# Patient Record
Sex: FEMALE | Race: WHITE | Employment: FULL TIME | URBAN - METROPOLITAN AREA
[De-identification: names, ages, dates, MRNs, and addresses within clinical notes are randomized per-mention and may not be internally consistent; named-entity substitution may affect disease eponyms.]

---

## 2017-02-01 ENCOUNTER — OFFICE VISIT (OUTPATIENT)
Dept: FAMILY MEDICINE CLINIC | Age: 47
End: 2017-02-01

## 2017-02-01 VITALS
DIASTOLIC BLOOD PRESSURE: 89 MMHG | TEMPERATURE: 99 F | WEIGHT: 293 LBS | RESPIRATION RATE: 26 BRPM | OXYGEN SATURATION: 98 % | SYSTOLIC BLOOD PRESSURE: 145 MMHG | HEART RATE: 69 BPM | BODY MASS INDEX: 48.82 KG/M2 | HEIGHT: 65 IN

## 2017-02-01 DIAGNOSIS — R05.9 COUGH: ICD-10-CM

## 2017-02-01 DIAGNOSIS — Z23 ENCOUNTER FOR IMMUNIZATION: ICD-10-CM

## 2017-02-01 DIAGNOSIS — J06.9 UPPER RESPIRATORY TRACT INFECTION, UNSPECIFIED TYPE: Primary | ICD-10-CM

## 2017-02-01 RX ORDER — LORAZEPAM 1 MG/1
1 TABLET ORAL
Qty: 30 TAB | Refills: 0 | Status: SHIPPED | OUTPATIENT
Start: 2017-02-01 | End: 2017-05-18 | Stop reason: SDUPTHER

## 2017-02-01 RX ORDER — BENZONATATE 200 MG/1
200 CAPSULE ORAL
Qty: 21 CAP | Refills: 1 | Status: SHIPPED | OUTPATIENT
Start: 2017-02-01 | End: 2017-02-08

## 2017-02-01 RX ORDER — AZITHROMYCIN 250 MG/1
TABLET, FILM COATED ORAL
Qty: 6 TAB | Refills: 1 | Status: SHIPPED | OUTPATIENT
Start: 2017-02-01 | End: 2017-02-06

## 2017-02-01 NOTE — PROGRESS NOTES
Cleveland Santiago is a 48 y.o. female      Issues discussed today include:        Signs and symptoms:  Cough producing green sputum  Duration: one week  Context:  +exposures  Location:  Head and chest  Quality:  congestion  Severity:  Preventing rest  Timing:  now  Modifying factors:  No fevers      Data reviewed or ordered today:  Needs flu vaccine    Other problems include:  Patient Active Problem List   Diagnosis Code    Anxiety F41.9    Panic attacks F41.0    Morbid obesity ([facility] [address] E66.01    HTN (hypertension), benign I10    Dysmenorrhea N94.6    H/O: hysterectomy Z90.710    Smoker F17.200    MAK (nonalcoholic steatohepatitis) K75.81       Medications:  Current Outpatient Prescriptions   Medication Sig Dispense Refill    azithromycin (ZITHROMAX) 250 mg tablet Take 2 tablets today, then take 1 tablet daily 6 Tab 1    benzonatate (TESSALON) 200 mg capsule Take 1 Cap by mouth three (3) times daily as needed for Cough for up to 7 days. 21 Cap 1    losartan (COZAAR) 25 mg tablet TAKE 1 TABLET BY MOUTH DAILY 90 Tab 1    LORazepam (ATIVAN) 1 mg tablet Take 1 Tab by mouth every six (6) hours as needed for Anxiety (Panic Attack). Max Daily Amount: 4 mg. 30 Tab 0    PARoxetine (PAXIL) 40 mg tablet Take 1 Tab by mouth daily. 90 Tab 1       Allergies: Allergies   Allergen Reactions    Pcn [Penicillins] Anaphylaxis and Rash    Amlodipine Other (comments)     Fatigue/drowsy       LMP:  Patient's last menstrual period was 06/10/2013. Social History     Social History    Marital status:      Spouse name: N/A    Number of children: N/A    Years of education: N/A     Occupational History    Not on file.      Social History Main Topics    Smoking status: Current Some Day Smoker     Packs/day: 0.50     Years: 28.00     Types: Cigarettes    Smokeless tobacco: Never Used    Alcohol use Yes      Comment: rarely    Drug use: No    Sexual activity: Yes     Partners: Male     Birth control/ protection: None     Other Topics Concern    Not on file     Social History Narrative         Family History   Problem Relation Age of Onset    Heart Disease Mother      heart bypass    Heart Attack Mother     Diabetes Brother     Stroke Maternal Grandmother     Malignant Hyperthermia Neg Hx     Pseudocholinesterase Deficiency Neg Hx     Delayed Awakening Neg Hx     Post-op Nausea/Vomiting Neg Hx     Emergence Delirium Neg Hx     Post-op Cognitive Dysfunction Neg Hx     Other Neg Hx          Meaningful use:  done      ROS:  Headaches:  no  Chest Pain:  no  SOB:  no  Fevers:  no  Other significant ROS:  +facial pressure    Patient's last menstrual period was 06/10/2013. Physical Exam  Visit Vitals    /89    Pulse 69    Temp 99 °F (37.2 °C) (Oral)    Resp 26    Ht 5' 5\" (1.651 m)    Wt 302 lb (137 kg)    LMP 06/10/2013    SpO2 98%    BMI 50.26 kg/m2     BP Readings from Last 3 Encounters:   02/01/17 145/89   06/09/16 (!) 155/91   05/16/16 120/58     Constitutional:  Appears well,  No Acute Distress, Vitals noted  Psychiatric:   Affect normal, Alert and cooperative, Oriented to person/place/time    Eyes:   Pupils equally round and reactive, EOMI, conjunctiva clear, eyelids normal  ENT:   External ears and nose normal/lips, teeth=OK/gums normal, TMs and Orophyarynx normal  Neck:   general inspection and Thyroid normal.  No abnormal cervical or supraclavicular nodes    Lungs:   clear to auscultation, good respiratory effort  Heart: Ausculation normal.  Regular rhythm. No cardiac murmurs. o carotid bruits or palpable thrills          Assessment:    Patient Active Problem List   Diagnosis Code    Anxiety F41.9    Panic attacks F41.0    Morbid obesity ([facility] [address] E66.01    HTN (hypertension), benign I10    Dysmenorrhea N94.6    H/O: hysterectomy Z90.710    Smoker F17.200    MAK (nonalcoholic steatohepatitis) K75.81       Today's diagnoses are:    ICD-10-CM ICD-9-CM    1.  Upper respiratory tract infection, unspecified type J06.9 465.9 azithromycin (ZITHROMAX) 250 mg tablet   2. Cough R05 786.2 benzonatate (TESSALON) 200 mg capsule       Plan:  Orders Placed This Encounter    azithromycin (ZITHROMAX) 250 mg tablet     Sig: Take 2 tablets today, then take 1 tablet daily     Dispense:  6 Tab     Refill:  1    benzonatate (TESSALON) 200 mg capsule     Sig: Take 1 Cap by mouth three (3) times daily as needed for Cough for up to 7 days. Dispense:  21 Cap     Refill:  1       See patient instructions  There are no Patient Instructions on file for this visit.       refresh note:  done    AVS Printed:  done

## 2017-02-01 NOTE — MR AVS SNAPSHOT
Visit Information Date & Time Provider Department Dept. Phone Encounter #  
 2/1/2017 11:00 AM Angelika Clarke MD [address] [place] [address] [phone] [id number] Upcoming Health Maintenance Date Due  
 BREAST CANCER SCRN MAMMOGRAM 7/6/2017 PAP AKA CERVICAL CYTOLOGY 7/6/2018 DTaP/Tdap/Td series (2 - Td) 7/6/2025 Allergies as of 2/1/2017  Review Complete On: 2/1/2017 By: Uri Centeno Severity Noted Reaction Type Reactions Pcn [Penicillins] High 07/21/2010   Systemic Anaphylaxis, Rash Amlodipine  11/18/2013   Side Effect Other (comments) Fatigue/drowsy Current Immunizations  Reviewed on 2/1/2017 Name Date Hep A Vaccine (Adult) 10/21/2015 Hep B Vaccine (Adult) 1/20/2016, 10/21/2015 Influenza Vaccine Split 10/7/2012 Pneumococcal Polysaccharide (PPSV-23) 11/18/2013 Tdap 7/6/2015 Reviewed by Uri Centeno on 2/1/2017 at 10:55 AM  
You Were Diagnosed With   
  
 Codes Comments Upper respiratory tract infection, unspecified type    -  Primary ICD-10-CM: J06.9 ICD-9-CM: 465.9 Cough     ICD-10-CM: R05 ICD-9-CM: 485. 2 Vitals BP Pulse Temp Resp Height(growth percentile) Weight(growth percentile) 145/89 69 99 °F (37.2 °C) (Oral) 26 5' 5\" (1.651 m) 302 lb (137 kg) LMP SpO2 BMI OB Status Smoking Status 06/10/2013 98% 50.26 kg/m2 Hysterectomy Current Some Day Smoker BMI and BSA Data Body Mass Index Body Surface Area  
 50.26 kg/m 2 2.51 m 2 Preferred Pharmacy [facility] Phone [facility] [address] [phone] Your Updated Medication List  
  
   
This list is accurate as of: 2/1/17 11:04 AM.  Always use your most recent med list.  
  
  
  
  
 azithromycin 250 mg tablet Commonly known as:  Panda Sleight Take 2 tablets today, then take 1 tablet daily  
  
 benzonatate 200 mg capsule Commonly known as:  TESSALON  
 Take 1 Cap by mouth three (3) times daily as needed for Cough for up to 7 days. LORazepam 1 mg tablet Commonly known as:  ATIVAN Take 1 Tab by mouth every six (6) hours as needed for Anxiety (Panic Attack). Max Daily Amount: 4 mg.  
  
 losartan 25 mg tablet Commonly known as:  COZAAR  
TAKE 1 TABLET BY MOUTH DAILY PARoxetine 40 mg tablet Commonly known as:  PAXIL Take 1 Tab by mouth daily. Prescriptions Sent to Pharmacy Refills  
 azithromycin (ZITHROMAX) 250 mg tablet 1 Sig: Take 2 tablets today, then take 1 tablet daily Class: Normal  
 Pharmacy: [address] [place] [address] Ph #: [phone]  
 benzonatate (TESSALON) 200 mg capsule 1 Sig: Take 1 Cap by mouth three (3) times daily as needed for Cough for up to 7 days. Class: Normal  
 Pharmacy: [address] [facility] Ph #: [phone] Route: Oral  
  
Patient Instructions Gargle with warm salt water Take:   over the counter tylenol as directed for pain or fever Use OTC Mucinex for cough Try Zyrtec 10 mg daily for allergy symptoms. Consider over the counter nasacort steroid nasal spray Consider trying the \"NetiPot\" which is a salt water nasal flush if you have nasal congestion:  It really helps! Make sure you use distilled water to make the saline solution Introducing [facility]! Dear Joan Gonzalez: Thank you for requesting a Reocar account. Our records indicate that you already have an active Reocar account. You can access your account anytime at [email or web address]. [email or web address] Did you know that you can access your hospital and ER discharge instructions at any time in Reocar? You can also review all of your test results from your hospital stay or ER visit. Additional Information If you have questions, please visit the Frequently Asked Questions section of the [employer] website at [email or web address] Remember, [employer] is NOT to be used for urgent needs. For medical emergencies, dial 911. Now available from your iPhone and Android! Please provide this summary of care documentation to your next provider. Your primary care clinician is listed as Agnieszkapearl Lopez. If you have any questions after today's visit, please call [phone].

## 2017-02-01 NOTE — PATIENT INSTRUCTIONS
Gargle with warm salt water    Take:   over the counter tylenol as directed for pain or fever    Use OTC Mucinex for cough    Try Zyrtec 10 mg daily for allergy symptoms. Consider over the counter nasacort steroid nasal spray    Consider trying the \"NetiPot\" which is a salt water nasal flush if you have nasal congestion:  It really helps!   Make sure you use distilled water to make the saline solution

## 2017-02-01 NOTE — TELEPHONE ENCOUNTER
Requested Prescriptions     Pending Prescriptions Disp Refills    LORazepam (ATIVAN) 1 mg tablet 30 Tab 0     Sig: Take 1 Tab by mouth every six (6) hours as needed for Anxiety (Panic Attack). Max Daily Amount: 4 mg.

## 2017-02-01 NOTE — PROGRESS NOTES
Chief Complaint   Patient presents with    Nasal Congestion     x 2 days, thinks she may have sinus infection. has pressure behind eyes.      Sinus Pain

## 2017-03-20 ENCOUNTER — OFFICE VISIT (OUTPATIENT)
Dept: FAMILY MEDICINE CLINIC | Age: 47
End: 2017-03-20

## 2017-03-20 VITALS
BODY MASS INDEX: 48.82 KG/M2 | TEMPERATURE: 97.9 F | DIASTOLIC BLOOD PRESSURE: 83 MMHG | WEIGHT: 293 LBS | HEART RATE: 69 BPM | RESPIRATION RATE: 16 BRPM | SYSTOLIC BLOOD PRESSURE: 124 MMHG | OXYGEN SATURATION: 100 % | HEIGHT: 65 IN

## 2017-03-20 DIAGNOSIS — I95.1 ORTHOSTATIC HYPOTENSION: Primary | ICD-10-CM

## 2017-03-20 DIAGNOSIS — R42 DIZZINESS: ICD-10-CM

## 2017-03-20 DIAGNOSIS — R07.89 CHEST PRESSURE: ICD-10-CM

## 2017-03-20 RX ORDER — DIPHENHYDRAMINE HCL 25 MG
25 CAPSULE ORAL
COMMUNITY
End: 2018-03-12 | Stop reason: ALTCHOICE

## 2017-03-20 NOTE — MR AVS SNAPSHOT
Visit Information Date & Time Provider Department Dept. Phone Encounter #  
 3/20/2017  1:20 PM Ulices Kaminski, [facility] [phone] [id number] Follow-up Instructions Return if symptoms worsen or fail to improve. Upcoming Health Maintenance Date Due  
 BREAST CANCER SCRN MAMMOGRAM 7/6/2017 PAP AKA CERVICAL CYTOLOGY 7/6/2018 DTaP/Tdap/Td series (2 - Td) 7/6/2025 Allergies as of 3/20/2017  Review Complete On: 3/20/2017 By: Mandy Su LPN Severity Noted Reaction Type Reactions Pcn [Penicillins] High 07/21/2010   Systemic Anaphylaxis, Rash Amlodipine  11/18/2013   Side Effect Other (comments) Fatigue/drowsy Current Immunizations  Reviewed on 2/1/2017 Name Date Hep A Vaccine (Adult) 10/21/2015 Hep B Vaccine (Adult) 1/20/2016, 10/21/2015 Influenza Vaccine (Quad) PF 2/1/2017 Influenza Vaccine Split 10/7/2012 Pneumococcal Polysaccharide (PPSV-23) 11/18/2013 Tdap 7/6/2015 Not reviewed this visit You Were Diagnosed With   
  
 Codes Comments Orthostatic hypotension    -  Primary ICD-10-CM: I95.1 ICD-9-CM: 487. 0 Chest pressure     ICD-10-CM: R07.89 ICD-9-CM: 786.59 Dizziness     ICD-10-CM: H02 ICD-9-CM: 780.4 Vitals BP Pulse Temp Resp Height(growth percentile) Weight(growth percentile) 124/83 (BP 1 Location: Left arm, BP Patient Position: Standing) 69 97.9 °F (36.6 °C) (Oral) 16 5' 5\" (1.651 m) 309 lb (140.2 kg) LMP SpO2 BMI OB Status Smoking Status 06/10/2013 100% 51.42 kg/m2 Hysterectomy Current Some Day Smoker Vitals History BMI and BSA Data Body Mass Index Body Surface Area  
 51.42 kg/m 2 2.54 m 2 Preferred Pharmacy [facility] Phone [facility] [address] [place] [address], [address] [place] [address] [phone] Your Updated Medication List  
  
   
This list is accurate as of: 3/20/17  2:43 PM.  Always use your most recent med list.  
  
  
  
  
 BENADRYL 25 mg capsule Generic drug:  diphenhydrAMINE Take 25 mg by mouth every six (6) hours as needed. LORazepam 1 mg tablet Commonly known as:  ATIVAN Take 1 Tab by mouth every six (6) hours as needed for Anxiety (Panic Attack). Max Daily Amount: 4 mg.  
  
 losartan 25 mg tablet Commonly known as:  COZAAR  
TAKE 1 TABLET BY MOUTH DAILY PARoxetine 40 mg tablet Commonly known as:  PAXIL Take 1 Tab by mouth daily. We Performed the Following AMB POC EKG ROUTINE W/ 12 LEADS, INTER & REP [29963 CPT(R)] CBC W/O DIFF [40260 CPT(R)] Follow-up Instructions Return if symptoms worsen or fail to improve. Patient Instructions Orthostatic Hypotension: Care Instructions Your Care Instructions Orthostatic hypotension is a quick drop in blood pressure. It happens when you get up from sitting or lying down. You may feel faint, lightheaded, or dizzy. When a person sits up or stands up, the body changes the way it pumps blood. This can slow the flow of blood to the brain for a very short time. And that can make you feel lightheaded. Many medicines can cause this problem, especially in older people. Lack of fluids (dehydration) or illnesses such as diabetes or heart disease also can cause it. Follow-up care is a key part of your treatment and safety. Be sure to make and go to all appointments, and call your doctor if you are having problems. It's also a good idea to know your test results and keep a list of the medicines you take. How can you care for yourself at home? · Tell your doctor about any problems you have with your medicines. · If your doctor prescribes medicine to help prevent a low blood pressure problem, take it exactly as prescribed. Call your doctor if you think you are having a problem with your medicine.  
· Drink plenty of fluids, enough so that your urine is light yellow or clear like water. Choose water and other caffeine-free clear liquids. If you have kidney, heart, or liver disease and have to limit fluids, talk with your doctor before you increase the amount of fluids you drink. · Limit or avoid alcohol and caffeine. · Get up slowly from bed or after sitting for a long time. If you are in bed, roll to your side and swing your legs over the edge of the bed and onto the floor. Push your body up to a sitting position. Wait for a while before you slowly stand up. If you are dizzy or lightheaded, sit or lie down. When should you call for help? Call 911 anytime you think you may need emergency care. For example, call if: 
· You passed out (lost consciousness). Watch closely for changes in your health, and be sure to contact your doctor if: 
· You do not get better as expected. Where can you learn more? Go to http://reyna-perri.info/. Enter H650 in the search box to learn more about \"Orthostatic Hypotension: Care Instructions. \" Current as of: January 27, 2016 Content Version: 11.1 © 6897-4687 [employer]ashion. Care instructions adapted under license by [employer] (which disclaims liability or warranty for this information). If you have questions about a medical condition or this instruction, always ask your healthcare professional. [address] any warranty or liability for your use of this information. Introducing [facility]! Dear Alie Atkinson: Thank you for requesting a BlitzLocal account. Our records indicate that you already have an active BlitzLocal account. You can access your account anytime at [email or web address] Did you know that you can access your hospital and ER discharge instructions at any time in BlitzLocal? You can also review all of your test results from your hospital stay or ER visit. Additional Information If you have questions, please visit the Frequently Asked Questions section of the [employer] website at [email or web address] Remember, [employer] is NOT to be used for urgent needs. For medical emergencies, dial 911. Now available from your iPhone and Android! Please provide this summary of care documentation to your next provider. Your primary care clinician is listed as Loretta Moseley. If you have any questions after today's visit, please call [phone].

## 2017-03-20 NOTE — PROGRESS NOTES
Radha Arrieta  48 y.o. female  1970  [address] [place] \[facility]\"" [address] [phone]  [id number]   [address]: Progress Note  Maria L Jeffries MD       Encounter Date: 3/20/2017    Chief Complaint   Patient presents with    Dizziness    Numbness     in left leg     History of Present Illness   Angelia Diaz is a 48 y.o. female who presents to clinic today for dizziness x2-3 weeks. Dizziness when bending over. No other associated sx. Intermittent in nature. Also notes heaviness in left chest.  Not exacerbated with activity. Occurred last night and resolved with turning. Rate 5/10 aching pain. No known aggravating factors. Does not occur with exertion. Numbness in upper leg x 2, lasted about an hour. Review of Systems   Review of Systems   Constitutional: Negative for chills, fever and weight loss. HENT: Negative for ear pain. Respiratory: Negative for cough, shortness of breath and wheezing. Cardiovascular: Positive for chest pain. Negative for palpitations. Gastrointestinal: Negative. Negative for abdominal pain and nausea. Genitourinary: Negative. Neurological: Positive for dizziness and sensory change (Anterior thighs). Negative for headaches. Psychiatric/Behavioral: Negative for depression, substance abuse and suicidal ideas. The patient is nervous/anxious. All other systems reviewed and are negative. Vitals/Objective:     Vitals:    03/20/17 1316 03/20/17 1421 03/20/17 1422 03/20/17 1423   BP: (!) 141/92 (!) 159/97 150/81 124/83   Pulse: 69      Resp: 16      Temp: 97.9 °F (36.6 °C)      TempSrc: Oral      SpO2: 100%      Weight: 309 lb (140.2 kg)      Height: 5' 5\" (1.651 m)        Body mass index is 51.42 kg/(m^2). Physical Exam   Constitutional: She is oriented to person, place, and time. Neck: Neck supple. No JVD present. Carotid bruit is not present. No thyroid mass and no thyromegaly present.    Cardiovascular: Normal rate, regular rhythm, normal heart sounds and intact distal pulses. Exam reveals no gallop and no friction rub. No murmur heard. Pulmonary/Chest: Effort normal. She has no decreased breath sounds. She has no wheezes. She has no rhonchi. Neurological: She is alert and oriented to person, place, and time. She has normal strength. A sensory deficit is present. No cranial nerve deficit. She displays a negative Romberg sign. GCS eye subscore is 4. GCS verbal subscore is 5. GCS motor subscore is 6. Negative [place]-Halpike     EKG personally reviewed. EKG: normal EKG, normal sinus rhythm. Assessment and Plan:   1. Orthostatic hypotension  Dizziness likely secondary intravascular volume depletion. Encouraged hydration. CBC to r/o significant anemia.  - CBC W/O DIFF    2. Chest pressure  URI/Cough and congestion.    - diphenhydrAMINE (BENADRYL) 25 mg capsule; Take 25 mg by mouth every six (6) hours as needed. - AMB POC EKG ROUTINE W/ 12 LEADS, INTER & REP    3. Dizziness  RTC if no resolution. Discussed risk of dizziness with chronic medications. I have discussed the diagnosis with the patient and the intended plan as seen in the above orders. she has expressed understanding. The patient has received an after-visit summary and questions were answered concerning future plans. I have discussed medication side effects and warnings with the patient as well. Follow-up Disposition:  Return if symptoms worsen or fail to improve. Electronically Signed: Perri Holland MD     History   Patients past medical, surgical and family histories were reviewed and updated.     Past Medical History:   Diagnosis Date    Anemia     Anxiety     Current smoker     Depression     Hypertension     Morbid obesity ([facility] [address]     Psychiatric disorder     anxiety & panic attacks    Snoring      Past Surgical History:   Procedure Laterality Date    BIOPSY  2010    polyp, 1/2 cm - negative, per patient    [facility]LO[facility]    CYSTOURETHROSCOPY  7/10/2013    CYSTOSCOPY performed by Houston Danielle MD at [address] HX CHOLECYSTECTOMY  2006    CA LAPAROSCOPY W TOT HYSTERECTUTERUS <=250 GRAM  W TUBE/OVARY  7/10/2013    HYSTERECTOMY ROBOTIC ASSISTED performed by Houston Danielle MD at [facility] MAIN OR     Family History   Problem Relation Age of Onset    Heart Disease Mother      heart bypass    Heart Attack Mother     Diabetes Brother     Stroke Maternal Grandmother     Malignant Hyperthermia Neg Hx     Pseudocholinesterase Deficiency Neg Hx     Delayed Awakening Neg Hx     Post-op Nausea/Vomiting Neg Hx     Emergence Delirium Neg Hx     Post-op Cognitive Dysfunction Neg Hx     Other Neg Hx      Social History     Social History    Marital status:      Spouse name: N/A    Number of children: N/A    Years of education: N/A     Occupational History    Not on file. Social History Main Topics    Smoking status: Current Some Day Smoker     Packs/day: 0.50     Years: 28.00     Types: Cigarettes    Smokeless tobacco: Never Used    Alcohol use Yes      Comment: rarely    Drug use: No    Sexual activity: Yes     Partners: Male     Birth control/ protection: None     Other Topics Concern    Not on file     Social History Narrative            Current Medications/Allergies     Current Outpatient Prescriptions   Medication Sig Dispense Refill    diphenhydrAMINE (BENADRYL) 25 mg capsule Take 25 mg by mouth every six (6) hours as needed.  LORazepam (ATIVAN) 1 mg tablet Take 1 Tab by mouth every six (6) hours as needed for Anxiety (Panic Attack). Max Daily Amount: 4 mg. 30 Tab 0    losartan (COZAAR) 25 mg tablet TAKE 1 TABLET BY MOUTH DAILY 90 Tab 1    PARoxetine (PAXIL) 40 mg tablet Take 1 Tab by mouth daily.  90 Tab 1     Allergies   Allergen Reactions    Pcn [Penicillins] Anaphylaxis and Rash    Amlodipine Other (comments)     Fatigue/drowsy

## 2017-03-20 NOTE — PROGRESS NOTES
Chief Complaint   Patient presents with    Dizziness     1. Have you been to the ER, urgent care clinic since your last visit? Hospitalized since your last visit? No    2. Have you seen or consulted any other health care providers outside of the [address] [place] [address] since your last visit? Include any pap smears or colon screening.  No

## 2017-03-20 NOTE — PATIENT INSTRUCTIONS
Orthostatic Hypotension: Care Instructions  Your Care Instructions  Orthostatic hypotension is a quick drop in blood pressure. It happens when you get up from sitting or lying down. You may feel faint, lightheaded, or dizzy. When a person sits up or stands up, the body changes the way it pumps blood. This can slow the flow of blood to the brain for a very short time. And that can make you feel lightheaded. Many medicines can cause this problem, especially in older people. Lack of fluids (dehydration) or illnesses such as diabetes or heart disease also can cause it. Follow-up care is a key part of your treatment and safety. Be sure to make and go to all appointments, and call your doctor if you are having problems. It's also a good idea to know your test results and keep a list of the medicines you take. How can you care for yourself at home? · Tell your doctor about any problems you have with your medicines. · If your doctor prescribes medicine to help prevent a low blood pressure problem, take it exactly as prescribed. Call your doctor if you think you are having a problem with your medicine. · Drink plenty of fluids, enough so that your urine is light yellow or clear like water. Choose water and other caffeine-free clear liquids. If you have kidney, heart, or liver disease and have to limit fluids, talk with your doctor before you increase the amount of fluids you drink. · Limit or avoid alcohol and caffeine. · Get up slowly from bed or after sitting for a long time. If you are in bed, roll to your side and swing your legs over the edge of the bed and onto the floor. Push your body up to a sitting position. Wait for a while before you slowly stand up. If you are dizzy or lightheaded, sit or lie down. When should you call for help? Call 911 anytime you think you may need emergency care. For example, call if:  · You passed out (lost consciousness).   Watch closely for changes in your health, and be sure to contact your doctor if:  · You do not get better as expected. Where can you learn more? Go to http://reyna-perri.info/. Enter Y970 in the search box to learn more about \"Orthostatic Hypotension: Care Instructions. \"  Current as of: January 27, 2016  Content Version: 11.1  © 4691-2847 [employer]. Care instructions adapted under license by [employer] (which disclaims liability or warranty for this information). If you have questions about a medical condition or this instruction, always ask your healthcare professional. [address] any warranty or liability for your use of this information.

## 2017-03-21 LAB
ERYTHROCYTE [DISTWIDTH] IN BLOOD BY AUTOMATED COUNT: 15 % (ref 12.3–15.4)
HCT VFR BLD AUTO: 37.6 % (ref 34–46.6)
HGB BLD-MCNC: 12.2 G/DL (ref 11.1–15.9)
MCH RBC QN AUTO: 26.9 PG (ref 26.6–33)
MCHC RBC AUTO-ENTMCNC: 32.4 G/DL (ref 31.5–35.7)
MCV RBC AUTO: 83 FL (ref 79–97)
PLATELET # BLD AUTO: 262 X10E3/UL (ref 150–379)
RBC # BLD AUTO: 4.53 X10E6/UL (ref 3.77–5.28)
WBC # BLD AUTO: 9 X10E3/UL (ref 3.4–10.8)

## 2017-03-22 NOTE — PROGRESS NOTES
Your blood counts are normal, showing no anemia. This correlates with the diagnosis of orthostatic hypotension due to dehydration. Let us know if your symptoms continue.

## 2017-05-19 ENCOUNTER — OFFICE VISIT (OUTPATIENT)
Dept: FAMILY MEDICINE CLINIC | Age: 47
End: 2017-05-19

## 2017-05-19 VITALS
RESPIRATION RATE: 18 BRPM | WEIGHT: 293 LBS | TEMPERATURE: 98.2 F | DIASTOLIC BLOOD PRESSURE: 82 MMHG | SYSTOLIC BLOOD PRESSURE: 136 MMHG | HEART RATE: 71 BPM | HEIGHT: 65 IN | OXYGEN SATURATION: 97 % | BODY MASS INDEX: 48.82 KG/M2

## 2017-05-19 DIAGNOSIS — R42 DIZZINESS: ICD-10-CM

## 2017-05-19 DIAGNOSIS — F51.04 PSYCHOPHYSIOLOGICAL INSOMNIA: ICD-10-CM

## 2017-05-19 DIAGNOSIS — F41.0 PANIC ATTACKS: ICD-10-CM

## 2017-05-19 DIAGNOSIS — F41.9 ANXIETY: Primary | ICD-10-CM

## 2017-05-19 DIAGNOSIS — E66.01 MORBID OBESITY DUE TO EXCESS CALORIES (HCC): ICD-10-CM

## 2017-05-19 RX ORDER — TRAZODONE HYDROCHLORIDE 50 MG/1
50 TABLET ORAL
Qty: 30 TAB | Refills: 1 | Status: SHIPPED | OUTPATIENT
Start: 2017-05-19 | End: 2018-03-01 | Stop reason: ALTCHOICE

## 2017-05-19 RX ORDER — LORAZEPAM 1 MG/1
1 TABLET ORAL
Qty: 30 TAB | Refills: 0 | Status: SHIPPED | OUTPATIENT
Start: 2017-05-19 | End: 2017-08-07 | Stop reason: SDUPTHER

## 2017-05-19 NOTE — PROGRESS NOTES
McCrory Pain Englehart  48 y.o. female  1970  [address] [facility] [address]  [id number]   [address]: Progress Note  Geovanna Alatorre MD       Encounter Date: 5/19/2017    Chief Complaint   Patient presents with    Lethargy     could be blood pressure    Dizziness     when bending and standing     History of Present Illness   Mello Dunn is a 48 y.o. female who presents to clinic today for dizziness. Pt states she continues to have intermittent lightheadedness and weakness. She attributes it to her weight. She awoke with a headache, pounding, resolved with tylenol. She does note some chest heaviness. Worse with position changes. She also had a concern for some left hand tingling.  +mild snoring. Take benadryl which she has been taking nightly for the past 4-5 yrs for insomnia. Denies any additional sleep aids. Depression fluctuating. She states she has panic attacks in her sleep. She is currently on paxil and prn meds. Declines changing her medications at this time. Tried buspar which had unfavorable side effects. 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 She also complaints of foot swelling worse on the right. Review of Systems   Review of Systems   Constitutional: Negative for fever. Respiratory: Negative for cough. Cardiovascular: Positive for chest pain. Negative for palpitations, orthopnea, claudication and leg swelling. Gastrointestinal: Negative for abdominal pain, constipation, diarrhea, nausea and vomiting. Vitals/Objective:     Vitals:    05/19/17 1508 05/19/17 1518 05/19/17 1519   BP: 138/86 124/85 136/82   Pulse: 71     Resp: 18     Temp: 98.2 °F (36.8 °C)     TempSrc: Oral     SpO2: 97%     Weight: 310 lb 6.4 oz (140.8 kg)     Height: 5' 5\" (1.651 m)       Body mass index is 51.65 kg/(m^2). Physical Exam   Constitutional: She appears well-developed and well-nourished. No distress. Cardiovascular: Normal rate, regular rhythm and normal heart sounds. Exam reveals no gallop and no friction rub. No murmur heard. Pulmonary/Chest: Effort normal and breath sounds normal. No respiratory distress. She has no wheezes. She has no rales. Abdominal: Soft. Bowel sounds are normal. She exhibits no distension. There is no tenderness. There is no rebound and no guarding. Skin: She is not diaphoretic. Vitals reviewed. EKG unremarkable. No results found for this or any previous visit (from the past 24 hour(s)). Assessment and Plan:   1. Anxiety  Currently on paxil. Suggested changing to SNRI. However, pt states she has been stable and declines medication change at this time. 2. Panic attacks      3. Morbid obesity due to excess calories ([address]      4. Psychophysiological insomnia  Added trazodone for sleep. - traZODone (DESYREL) 50 mg tablet; Take 1 Tab by mouth nightly. Dispense: 30 Tab; Refill: 1    5. Dizziness  EKG neg. Has recent evaluation with similar symptoms. Advised likely secondary to panic attacks. She continues to have to take prn medications. Trazodone should also help with stability and improving panic attacks. Highly concerned for medication side effects. Tried buspar yet had multiple side effects.    - AMB POC EKG ROUTINE W/ 12 LEADS, INTER & REP    I have discussed the diagnosis with the patient and the intended plan as seen in the above orders. she has expressed understanding. The patient has received an after-visit summary and questions were answered concerning future plans. I have discussed medication side effects and warnings with the patient as well. Follow-up Disposition:  Return if symptoms worsen or fail to improve. Electronically Signed: Jason Prado MD     History   Patients past medical, surgical and family histories were reviewed and updated.     Past Medical History:   Diagnosis Date    Anemia     Anxiety     Current smoker     Depression     Hypertension     Morbid obesity [address] [address]     Psychiatric disorder     anxiety & panic attacks    Snoring      Past Surgical History:   Procedure Laterality Date    BIOPSY  2010    polyp, 1/2 cm - negative, per patient   [address]  2010    [facility]    CYSTOURETHROSCOPY  7/10/2013    CYSTOSCOPY performed by Bryson Dodge MD at [address] HX CHOLECYSTECTOMY  2006    AK LAPAROSCOPY W TOT HYSTERECTUTERUS <=250 GRAM  W TUBE/OVARY  7/10/2013    HYSTERECTOMY ROBOTIC ASSISTED performed by Bryson Dodge MD at [facility] MAIN OR     Family History   Problem Relation Age of Onset    Heart Disease Mother      heart bypass    Heart Attack Mother     Diabetes Brother     Stroke Maternal Grandmother     Malignant Hyperthermia Neg Hx     Pseudocholinesterase Deficiency Neg Hx     Delayed Awakening Neg Hx     Post-op Nausea/Vomiting Neg Hx     Emergence Delirium Neg Hx     Post-op Cognitive Dysfunction Neg Hx     Other Neg Hx      Social History     Social History    Marital status:      Spouse name: N/A    Number of children: N/A    Years of education: N/A     Occupational History    Not on file. Social History Main Topics    Smoking status: Current Some Day Smoker     Packs/day: 0.50     Years: 28.00     Types: Cigarettes    Smokeless tobacco: Never Used    Alcohol use Yes      Comment: rarely    Drug use: No    Sexual activity: Yes     Partners: Male     Birth control/ protection: None     Other Topics Concern    Not on file     Social History Narrative            Current Medications/Allergies     Current Outpatient Prescriptions   Medication Sig Dispense Refill    traZODone (DESYREL) 50 mg tablet Take 1 Tab by mouth nightly. 30 Tab 1    losartan (COZAAR) 25 mg tablet TAKE 1 TABLET BY MOUTH DAILY 90 Tab 1    PARoxetine (PAXIL) 40 mg tablet Take 1 Tab by mouth daily.  90 Tab 1    LORazepam (ATIVAN) 1 mg tablet Take 1 Tab by mouth every six (6) hours as needed for Anxiety (Panic Attack). Max Daily Amount: 4 mg. 30 Tab 0    diphenhydrAMINE (BENADRYL) 25 mg capsule Take 25 mg by mouth every six (6) hours as needed.        Allergies   Allergen Reactions    Pcn [Penicillins] Anaphylaxis and Rash    Amlodipine Other (comments)     Fatigue/drowsy

## 2017-05-19 NOTE — MR AVS SNAPSHOT
Visit Information Date & Time Provider Department Dept. Phone Encounter #  
 5/19/2017  3:00 PM Noe Harris, [place] [phone] [id number] Follow-up Instructions Return if symptoms worsen or fail to improve. Upcoming Health Maintenance Date Due  
 BREAST CANCER SCRN MAMMOGRAM 7/6/2017 INFLUENZA AGE 9 TO ADULT 8/1/2017 PAP AKA CERVICAL CYTOLOGY 7/6/2018 DTaP/Tdap/Td series (2 - Td) 7/6/2025 Allergies as of 5/19/2017  Review Complete On: 5/19/2017 By: Noe Harris MD  
  
 Severity Noted Reaction Type Reactions Pcn [Penicillins] High 07/21/2010   Systemic Anaphylaxis, Rash Amlodipine  11/18/2013   Side Effect Other (comments) Fatigue/drowsy Current Immunizations  Reviewed on 2/1/2017 Name Date Hep A Vaccine (Adult) 10/21/2015 Hep B Vaccine (Adult) 1/20/2016, 10/21/2015 Influenza Vaccine (Quad) PF 2/1/2017 Influenza Vaccine Split 10/7/2012 Pneumococcal Polysaccharide (PPSV-23) 11/18/2013 Tdap 7/6/2015 Not reviewed this visit You Were Diagnosed With   
  
 Codes Comments Anxiety    -  Primary ICD-10-CM: F41.9 ICD-9-CM: 300.00 Panic attacks     ICD-10-CM: F41.0 ICD-9-CM: 300.01 Morbid obesity due to excess calories ([facility] [address]     ICD-10-CM: E66.01 
ICD-9-CM: 278.01   
 Psychophysiological insomnia     ICD-10-CM: F51.04 
ICD-9-CM: 307.42 Dizziness     ICD-10-CM: N23 ICD-9-CM: 780.4 Vitals BP Pulse Temp Resp Height(growth percentile) Weight(growth percentile) 136/82 (BP 1 Location: Left arm, BP Patient Position: Supine) 71 98.2 °F (36.8 °C) (Oral) 18 5' 5\" (1.651 m) 310 lb 6.4 oz (140.8 kg) LMP SpO2 BMI OB Status Smoking Status 06/10/2013 97% 51.65 kg/m2 Hysterectomy Current Some Day Smoker Vitals History BMI and BSA Data Body Mass Index Body Surface Area  
 51.65 kg/m 2 2.54 m 2 Preferred Pharmacy Pharmacy Name Phone [facility] [address] [place] [address], [address] [place] [address] [phone] Your Updated Medication List  
  
   
This list is accurate as of: 5/19/17  3:53 PM.  Always use your most recent med list.  
  
  
  
  
 BENADRYL 25 mg capsule Generic drug:  diphenhydrAMINE Take 25 mg by mouth every six (6) hours as needed. LORazepam 1 mg tablet Commonly known as:  ATIVAN Take 1 Tab by mouth every six (6) hours as needed for Anxiety (Panic Attack). Max Daily Amount: 4 mg.  
  
 losartan 25 mg tablet Commonly known as:  COZAAR  
TAKE 1 TABLET BY MOUTH DAILY PARoxetine 40 mg tablet Commonly known as:  PAXIL Take 1 Tab by mouth daily. traZODone 50 mg tablet Commonly known as:  Shelva [place] Take 1 Tab by mouth nightly. Prescriptions Sent to Pharmacy Refills  
 traZODone (DESYREL) 50 mg tablet 1 Sig: Take 1 Tab by mouth nightly. Class: Normal  
 Pharmacy: [address] #: [phone] Route: Oral  
  
We Performed the Following AMB POC EKG ROUTINE W/ 12 LEADS, INTER & REP [23379 CPT(R)] Follow-up Instructions Return if symptoms worsen or fail to improve. Patient Instructions Learning About Sleeping Well What does sleeping well mean? Sleeping well means getting enough sleep. How much sleep is enough varies among people. The number of hours you sleep is not as important as how you feel when you wake up. If you do not feel refreshed, you probably need more sleep. Another sign of not getting enough sleep is feeling tired during the day. The average total nightly sleep time is 7½ to 8 hours. Healthy adults may need a little more or a little less than this. Why is getting enough sleep important? Getting enough quality sleep is a basic part of good health. When your sleep suffers, your mood and your thoughts can suffer too.  You may find yourself feeling more grumpy or stressed. Not getting enough sleep also can lead to serious problems, including injury, accidents, anxiety, and depression. What might cause poor sleeping? Many things can cause sleep problems, including: · Stress. Stress can be caused by fear about a single event, such as giving a speech. Or you may have ongoing stress, such as worry about work or school. · Depression, anxiety, and other mental or emotional conditions. · Changes in your sleep habits or surroundings. This includes changes that happen where you sleep, such as noise, light, or sleeping in a different bed. It also includes changes in your sleep pattern, such as having jet lag or working a late shift. · Health problems, such as pain, breathing problems, and restless legs syndrome. · Lack of regular exercise. How can you help yourself? Here are some tips that may help you sleep more soundly and wake up feeling more refreshed. Your sleeping area · Use your bedroom only for sleeping and sex. A bit of light reading may help you fall asleep. But if it doesn't, do your reading elsewhere in the house. Don't watch TV in bed. · Be sure your bed is big enough to stretch out comfortably, especially if you have a sleep partner. · Keep your bedroom quiet, dark, and cool. Use curtains, blinds, or a sleep mask to block out light. To block out noise, use earplugs, soothing music, or a \"white noise\" machine. Your evening and bedtime routine · Create a relaxing bedtime routine. You might want to take a warm shower or bath, listen to soothing music, or drink a cup of noncaffeinated tea. · Go to bed at the same time every night. And get up at the same time every morning, even if you feel tired. What to avoid · Limit caffeine (coffee, tea, caffeinated sodas) during the day, and don't have any for at least 4 to 6 hours before bedtime. · Don't drink alcohol before bedtime.  Alcohol can cause you to wake up more often during the night. · Don't smoke or use tobacco, especially in the evening. Nicotine can keep you awake. · Don't take naps during the day, especially close to bedtime. · Don't lie in bed awake for too long. If you can't fall asleep, or if you wake up in the middle of the night and can't get back to sleep within 15 minutes or so, get out of bed and go to another room until you feel sleepy. · Don't take medicine right before bed that may keep you awake or make you feel hyper or energized. Your doctor can tell you if your medicine may do this and if you can take it earlier in the day. If you can't sleep · Imagine yourself in a peaceful, pleasant scene. Focus on the details and feelings of being in a place that is relaxing. · Get up and do a quiet or boring activity until you feel sleepy. · Don't drink any liquids after 6 p.m. if you wake up often because you have to go to the bathroom. Where can you learn more? Go to http://reynaBioRestorative Therapiesperri.info/. Enter R251 in the search box to learn more about \"Learning About Sleeping Well. \" Current as of: July 26, 2016 Content Version: 11.2 © 2869-1360 [employer]. Care instructions adapted under license by [employer] (which disclaims liability or warranty for this information). If you have questions about a medical condition or this instruction, always ask your healthcare professional. [place] [address] any warranty or liability for your use of this information. Insomnia: Care Instructions Your Care Instructions Insomnia is the inability to sleep well. It is a common problem for most people at some time. Insomnia may make it hard for you to get to sleep, stay asleep, or sleep as long as you need to. This can make you tired and grouchy during the day. It can also make you forgetful, less effective at work, and unhappy. Insomnia can be caused by conditions such as depression or anxiety.  Pain can also affect your ability to sleep. When these problems are solved, the insomnia usually clears up. But sometimes bad sleep habits can cause insomnia. If insomnia is affecting your work or your enjoyment of life, you can take steps to improve your sleep. Follow-up care is a key part of your treatment and safety. Be sure to make and go to all appointments, and call your doctor if you are having problems. It's also a good idea to know your test results and keep a list of the medicines you take. How can you care for yourself at home? What to avoid · Do not have drinks with caffeine, such as coffee or black tea, for 8 hours before bed. · Do not smoke or use other types of tobacco near bedtime. Nicotine is a stimulant and can keep you awake. · Avoid drinking alcohol late in the evening, because it can cause you to wake in the middle of the night. · Do not eat a big meal close to bedtime. If you are hungry, eat a light snack. · Do not drink a lot of water close to bedtime, because the need to urinate may wake you up during the night. · Do not read or watch TV in bed. Use the bed only for sleeping and sexual activity. What to try · Go to bed at the same time every night, and wake up at the same time every morning. Do not take naps during the day. · Keep your bedroom quiet, dark, and cool. · Sleep on a comfortable pillow and mattress. · If watching the clock makes you anxious, turn it facing away from you so you cannot see the time. · If you worry when you lie down, start a worry book. Well before bedtime, write down your worries, and then set the book and your concerns aside. · Try meditation or other relaxation techniques before you go to bed. · If you cannot fall asleep, get up and go to another room until you feel sleepy. Do something relaxing. Repeat your bedtime routine before you go to bed again. · Make your house quiet and calm about an hour before bedtime.  Turn down the lights, turn off the TV, log off the computer, and turn down the volume on music. This can help you relax after a busy day. When should you call for help? Watch closely for changes in your health, and be sure to contact your doctor if: 
· Your efforts to improve your sleep do not work. · Your insomnia gets worse. · You have been feeling down, depressed, or hopeless or have lost interest in things that you usually enjoy. Where can you learn more? Go to http://reyna-perri.info/. Enter P513 in the search box to learn more about \"Insomnia: Care Instructions. \" Current as of: July 26, 2016 Content Version: 11.2 © 7032-0394 [employer]. Care instructions adapted under license by [employer] (which disclaims liability or warranty for this information). If you have questions about a medical condition or this instruction, always ask your healthcare professional. [place] [address] any warranty or liability for your use of this information. Introducing [facility]! Dear Michel Godfrey: Thank you for requesting a Q.branch account. Our records indicate that you already have an active Q.branch account. You can access your account anytime at [email or web address] Did you know that you can access your hospital and ER discharge instructions at any time in Q.branch? You can also review all of your test results from your hospital stay or ER visit. Additional Information If you have questions, please visit the Frequently Asked Questions section of the Q.branch website at [email or web address] Remember, Q.branch is NOT to be used for urgent needs. For medical emergencies, dial 911. Now available from your iPhone and Android! Please provide this summary of care documentation to your next provider. Your primary care clinician is listed as Irma Hodge.  If you have any questions after today's visit, please call [phone].

## 2017-05-19 NOTE — PATIENT INSTRUCTIONS
Learning About Sleeping Well  What does sleeping well mean? Sleeping well means getting enough sleep. How much sleep is enough varies among people. The number of hours you sleep is not as important as how you feel when you wake up. If you do not feel refreshed, you probably need more sleep. Another sign of not getting enough sleep is feeling tired during the day. The average total nightly sleep time is 7½ to 8 hours. Healthy adults may need a little more or a little less than this. Why is getting enough sleep important? Getting enough quality sleep is a basic part of good health. When your sleep suffers, your mood and your thoughts can suffer too. You may find yourself feeling more grumpy or stressed. Not getting enough sleep also can lead to serious problems, including injury, accidents, anxiety, and depression. What might cause poor sleeping? Many things can cause sleep problems, including:  · Stress. Stress can be caused by fear about a single event, such as giving a speech. Or you may have ongoing stress, such as worry about work or school. · Depression, anxiety, and other mental or emotional conditions. · Changes in your sleep habits or surroundings. This includes changes that happen where you sleep, such as noise, light, or sleeping in a different bed. It also includes changes in your sleep pattern, such as having jet lag or working a late shift. · Health problems, such as pain, breathing problems, and restless legs syndrome. · Lack of regular exercise. How can you help yourself? Here are some tips that may help you sleep more soundly and wake up feeling more refreshed. Your sleeping area  · Use your bedroom only for sleeping and sex. A bit of light reading may help you fall asleep. But if it doesn't, do your reading elsewhere in the house. Don't watch TV in bed. · Be sure your bed is big enough to stretch out comfortably, especially if you have a sleep partner.   · Keep your bedroom quiet, dark, and cool. Use curtains, blinds, or a sleep mask to block out light. To block out noise, use earplugs, soothing music, or a \"white noise\" machine. Your evening and bedtime routine  · Create a relaxing bedtime routine. You might want to take a warm shower or bath, listen to soothing music, or drink a cup of noncaffeinated tea. · Go to bed at the same time every night. And get up at the same time every morning, even if you feel tired. What to avoid  · Limit caffeine (coffee, tea, caffeinated sodas) during the day, and don't have any for at least 4 to 6 hours before bedtime. · Don't drink alcohol before bedtime. Alcohol can cause you to wake up more often during the night. · Don't smoke or use tobacco, especially in the evening. Nicotine can keep you awake. · Don't take naps during the day, especially close to bedtime. · Don't lie in bed awake for too long. If you can't fall asleep, or if you wake up in the middle of the night and can't get back to sleep within 15 minutes or so, get out of bed and go to another room until you feel sleepy. · Don't take medicine right before bed that may keep you awake or make you feel hyper or energized. Your doctor can tell you if your medicine may do this and if you can take it earlier in the day. If you can't sleep  · Imagine yourself in a peaceful, pleasant scene. Focus on the details and feelings of being in a place that is relaxing. · Get up and do a quiet or boring activity until you feel sleepy. · Don't drink any liquids after 6 p.m. if you wake up often because you have to go to the bathroom. Where can you learn more? Go to http://reyna-perri.info/. Enter X208 in the search box to learn more about \"Learning About Sleeping Well. \"  Current as of: July 26, 2016  Content Version: 11.2  © 9900-3457 [employer].  Care instructions adapted under license by [employer] (which disclaims liability or warranty for this information). If you have questions about a medical condition or this instruction, always ask your healthcare professional. [address] any warranty or liability for your use of this information. Insomnia: Care Instructions  Your Care Instructions  Insomnia is the inability to sleep well. It is a common problem for most people at some time. Insomnia may make it hard for you to get to sleep, stay asleep, or sleep as long as you need to. This can make you tired and grouchy during the day. It can also make you forgetful, less effective at work, and unhappy. Insomnia can be caused by conditions such as depression or anxiety. Pain can also affect your ability to sleep. When these problems are solved, the insomnia usually clears up. But sometimes bad sleep habits can cause insomnia. If insomnia is affecting your work or your enjoyment of life, you can take steps to improve your sleep. Follow-up care is a key part of your treatment and safety. Be sure to make and go to all appointments, and call your doctor if you are having problems. It's also a good idea to know your test results and keep a list of the medicines you take. How can you care for yourself at home? What to avoid  · Do not have drinks with caffeine, such as coffee or black tea, for 8 hours before bed. · Do not smoke or use other types of tobacco near bedtime. Nicotine is a stimulant and can keep you awake. · Avoid drinking alcohol late in the evening, because it can cause you to wake in the middle of the night. · Do not eat a big meal close to bedtime. If you are hungry, eat a light snack. · Do not drink a lot of water close to bedtime, because the need to urinate may wake you up during the night. · Do not read or watch TV in bed. Use the bed only for sleeping and sexual activity. What to try  · Go to bed at the same time every night, and wake up at the same time every morning. Do not take naps during the day.   · Keep your bedroom quiet, dark, and cool. · Sleep on a comfortable pillow and mattress. · If watching the clock makes you anxious, turn it facing away from you so you cannot see the time. · If you worry when you lie down, start a worry book. Well before bedtime, write down your worries, and then set the book and your concerns aside. · Try meditation or other relaxation techniques before you go to bed. · If you cannot fall asleep, get up and go to another room until you feel sleepy. Do something relaxing. Repeat your bedtime routine before you go to bed again. · Make your house quiet and calm about an hour before bedtime. Turn down the lights, turn off the TV, log off the computer, and turn down the volume on music. This can help you relax after a busy day. When should you call for help? Watch closely for changes in your health, and be sure to contact your doctor if:  · Your efforts to improve your sleep do not work. · Your insomnia gets worse. · You have been feeling down, depressed, or hopeless or have lost interest in things that you usually enjoy. Where can you learn more? Go to http://reyna-perri.info/. Enter P513 in the search box to learn more about \"Insomnia: Care Instructions. \"  Current as of: July 26, 2016  Content Version: 11.2  © 3048-3907 [employer]. Care instructions adapted under license by [employer] (which disclaims liability or warranty for this information). If you have questions about a medical condition or this instruction, always ask your healthcare professional. [place] [address] any warranty or liability for your use of this information.

## 2017-05-19 NOTE — LETTER
NOTIFICATION RETURN TO WORK / SCHOOL 
 
5/19/2017 3:54 PM 
 
Ms. Gerard Oliver [address] [phone] To Whom It May Concern: 
 
Gerard Oliver is currently under the care of [facility]. She was seen and evaluated on 05/19/17. She will return to work/school on: 5/20/17 without restrictions. If there are questions or concerns please have the patient contact our office. Sincerely, Melanie Asher MD

## 2017-07-24 ENCOUNTER — OFFICE VISIT (OUTPATIENT)
Dept: FAMILY MEDICINE CLINIC | Age: 47
End: 2017-07-24

## 2017-07-24 VITALS
DIASTOLIC BLOOD PRESSURE: 87 MMHG | HEART RATE: 85 BPM | BODY MASS INDEX: 48.82 KG/M2 | WEIGHT: 293 LBS | SYSTOLIC BLOOD PRESSURE: 134 MMHG | TEMPERATURE: 99.1 F | OXYGEN SATURATION: 99 % | HEIGHT: 65 IN | RESPIRATION RATE: 20 BRPM

## 2017-07-24 DIAGNOSIS — M54.9 BACK PAIN, UNSPECIFIED BACK LOCATION, UNSPECIFIED BACK PAIN LATERALITY, UNSPECIFIED CHRONICITY: Primary | ICD-10-CM

## 2017-07-24 DIAGNOSIS — Z12.31 ENCOUNTER FOR SCREENING MAMMOGRAM FOR BREAST CANCER: ICD-10-CM

## 2017-07-24 DIAGNOSIS — J06.9 UPPER RESPIRATORY TRACT INFECTION, UNSPECIFIED TYPE: ICD-10-CM

## 2017-07-24 DIAGNOSIS — E66.01 MORBID OBESITY DUE TO EXCESS CALORIES (HCC): ICD-10-CM

## 2017-07-24 DIAGNOSIS — Z13.31 SCREENING FOR DEPRESSION: ICD-10-CM

## 2017-07-24 DIAGNOSIS — F17.200 SMOKER: ICD-10-CM

## 2017-07-24 LAB
BILIRUB UR QL STRIP: NORMAL
GLUCOSE UR-MCNC: NEGATIVE MG/DL
KETONES P FAST UR STRIP-MCNC: NEGATIVE MG/DL
PH UR STRIP: 5.5 [PH] (ref 4.6–8)
PROT UR QL STRIP: NORMAL MG/DL
SP GR UR STRIP: 1.03 (ref 1–1.03)
UA UROBILINOGEN AMB POC: NORMAL (ref 0.2–1)
URINALYSIS CLARITY POC: CLEAR
URINALYSIS COLOR POC: YELLOW
URINE BLOOD POC: NEGATIVE
URINE LEUKOCYTES POC: NEGATIVE
URINE NITRITES POC: NEGATIVE

## 2017-07-24 RX ORDER — MELOXICAM 7.5 MG/1
7.5 TABLET ORAL DAILY
Qty: 10 TAB | Refills: 0 | Status: SHIPPED | OUTPATIENT
Start: 2017-07-24 | End: 2018-03-01 | Stop reason: ALTCHOICE

## 2017-07-24 RX ORDER — DOXYCYCLINE 100 MG/1
100 TABLET ORAL 2 TIMES DAILY
Qty: 20 TAB | Refills: 0 | Status: SHIPPED | OUTPATIENT
Start: 2017-07-24 | End: 2017-08-03

## 2017-07-24 NOTE — LETTER
7/24/2017 4:29 PM 
 
Ms. Talha Duffy [address] [place] [phone] To Whom It May Concern, [employer] form work through 7/25. May return 7/26/2017 for medical reasons. Sincerely, Hansa Cruz MD

## 2017-07-24 NOTE — LETTER
7/24/2017 4:12 PM 
 
Ms. Warden Melissa [address] [place] [phone] Body mass index is 51.75 kg/(m^2). Focus on regular exercise (150 minutes each week) and healthy eating. Eat more fruits and vegetables. Eat more protein (egg whites, beans, and nuts you know you tolerate) and less carbohydrates (white bread, white rice, white pasta, white potatoes, sodas, and sweets). Eat appropriately small portion sizes. WELLNESS exam every November. Sincerely, Yoav Bustillo MD

## 2017-07-24 NOTE — LETTER
7/26/2017 10:33 AM 
 
Ms. Bettye Hector [address] [place] [phone] Dear Bettye Hector: 
 
Please find your most recent results below. Resulted Orders AMB POC URINALYSIS DIP STICK AUTO W/O MICRO Result Value Ref Range Color (UA POC) Yellow Clarity (UA POC) Clear Glucose (UA POC) Negative Negative Bilirubin (UA POC) 1+ Negative Ketones (UA POC) Negative Negative Specific gravity (UA POC) 1.030 1.001 - 1.035 Blood (UA POC) Negative Negative pH (UA POC) 5.5 4.6 - 8.0 Protein (UA POC) 1+ Negative mg/dL Urobilinogen (UA POC) 0.2 mg/dL 0.2 - 1 Nitrites (UA POC) Negative Negative Leukocyte esterase (UA POC) Negative Negative CULTURE, URINE Result Value Ref Range Urine Culture, Routine No growth Narrative Performed at:  [address] [place] [address], [address], [place]  [id number] : Renée Wilson MD, Phone:  [phone] Your urine culture is negative. Sincerely, Keith Pedraza MD

## 2017-07-24 NOTE — LETTER
7/24/2017 4:20 PM 
 
Ms. Jackelin Lux [address] [place] [phone] I strongly suggest that you stop smoking or use of any tobacco products. This may be the most important thing you can do for your health. While medicines may help, the most important thing for you is the decision to quit. If you need a \"Quit \", please call [phone]-QUIT NOW ([phone]). If you need help with nicotine withdrawal, I suggest over-the-counter nicotine replacement aids such as nicotine gum or patches, used as directed. As you may know, use of tobacco products increases your risk for many forms of cancer, heart disease, stroke, and blood clotting. Your body is very forgiving. Quit now and improve your health! Sincerely, Tamir Birmingham MD

## 2017-07-24 NOTE — MR AVS SNAPSHOT
Visit Information Date & Time Provider Department Dept. Phone Encounter #  
 7/24/2017  7:30 PM Zulay Hogue MD 1000 [facility] [phone] [id number] Your Appointments 7/24/2017  7:30 PM  
WALK IN with Zulay Hogue MD  
[address]) Appt Note: back pain per nurse [address] [place] [place] [address]  
[phone]  
  
   
 [address] [facility] [phone]  
  
    
 7/25/2017  9:40 AM  
ROUTINE CARE with Belkis Monteiro MD  
[address]) Appt Note: back pain, lower back; r/s  
 [address] [place] [place] [address]  
[phone]  
  
   
 [address] [phone] Upcoming Health Maintenance Date Due  
 BREAST CANCER SCRN MAMMOGRAM 7/6/2017 INFLUENZA AGE 9 TO ADULT 8/1/2017 PAP AKA CERVICAL CYTOLOGY 7/6/2018 DTaP/Tdap/Td series (2 - Td) 7/6/2025 Allergies as of 7/24/2017  Review Complete On: 7/24/2017 By: Zulay Hogue MD  
  
 Severity Noted Reaction Type Reactions Pcn [Penicillins] High 07/21/2010   Systemic Anaphylaxis, Rash Amlodipine  11/18/2013   Side Effect Other (comments) Fatigue/drowsy Current Immunizations  Reviewed on 2/1/2017 Name Date Hep A Vaccine (Adult) 10/21/2015 Hep B Vaccine (Adult) 1/20/2016, 10/21/2015 Influenza Vaccine (Quad) PF 2/1/2017 Influenza Vaccine Split 10/7/2012 Pneumococcal Polysaccharide (PPSV-23) 11/18/2013 Tdap 7/6/2015 Not reviewed this visit You Were Diagnosed With   
  
 Codes Comments Back pain, unspecified back location, unspecified back pain laterality, unspecified chronicity    -  Primary ICD-10-CM: M54.9 ICD-9-CM: 724.5 Encounter for screening mammogram for breast cancer     ICD-10-CM: Z12.31 
ICD-9-CM: V76.12 Smoker     ICD-10-CM: V96.443 ICD-9-CM: 305.1 Morbid obesity due to excess calories (HCC)     ICD-10-CM: E66.01 
ICD-9-CM: 278.01 letter Screening for depression     ICD-10-CM: Z13.89 ICD-9-CM: V79.0 Upper respiratory tract infection, unspecified type     ICD-10-CM: J06.9 ICD-9-CM: 465.9 Vitals BP Pulse Temp Resp Height(growth percentile) Weight(growth percentile) 134/87 (BP 1 Location: Left arm, BP Patient Position: Sitting) 85 99.1 °F (37.3 °C) (Oral) 20 5' 5\" (1.651 m) 311 lb (141.1 kg) LMP SpO2 BMI OB Status Smoking Status 06/10/2013 99% 51.75 kg/m2 Hysterectomy Current Some Day Smoker Vitals History BMI and BSA Data Body Mass Index Body Surface Area 51.75 kg/m 2 2.54 m 2 Preferred Pharmacy Pharmacy Name Phone [facility] [address] [phone] Your Updated Medication List  
  
   
This list is accurate as of: 7/24/17  4:23 PM.  Always use your most recent med list.  
  
  
  
  
 BENADRYL 25 mg capsule Generic drug:  diphenhydrAMINE Take 25 mg by mouth every six (6) hours as needed. doxycycline 100 mg tablet Commonly known as:  ADOXA Take 1 Tab by mouth two (2) times a day for 10 days. LORazepam 1 mg tablet Commonly known as:  ATIVAN Take 1 Tab by mouth every six (6) hours as needed for Anxiety (Panic Attack). Max Daily Amount: 4 mg.  
  
 losartan 25 mg tablet Commonly known as:  COZAAR  
TAKE 1 TABLET BY MOUTH DAILY  
  
 meloxicam 7.5 mg tablet Commonly known as:  MOBIC Take 1 Tab by mouth daily. PARoxetine 40 mg tablet Commonly known as:  PAXIL Take 1 Tab by mouth daily. traZODone 50 mg tablet Commonly known as:  Georganna Quiver Take 1 Tab by mouth nightly. Prescriptions Printed Refills  
 doxycycline (ADOXA) 100 mg tablet 0 Sig: Take 1 Tab by mouth two (2) times a day for 10 days. Class: Print  Route: Oral  
 meloxicam (MOBIC) 7.5 mg tablet 0  
 Sig: Take 1 Tab by mouth daily. Class: Print Route: Oral  
  
We Performed the Following AMB POC URINALYSIS DIP STICK AUTO W/O MICRO [88235 CPT(R)] BEHAV ASSMT W/SCORE & DOCD/STAND INSTRUMENT [id number] CPT(R)] CULTURE, URINE [id number] CPT(R)] MT SMOKING AND TOBACCO USE CESSATION 3 - 10 MINUTES [34268 CPT(R)] To-Do List   
 07/24/2017 Imaging:  LM MAMMO BI SCREENING INCL CAD Patient Instructions Take meloxicam with food May also Take:   over the counter tylenol as directed for pain or fever Use OTC Mucinex for cough Consider over the counter nasacort steroid nasal spray Consider trying the \"NetiPot\" which is a salt water nasal flush if you have nasal congestion:  It really helps! Make sure you use distilled water to make the saline solution Stop smoking Focus on regular exercise (150 minutes each week) and healthy eating. Eat more fruits and vegetables. Eat more protein (egg whites, beans, and nuts you know you tolerate) and less carbohydrates (white bread, white rice, white pasta, white potatoes, sodas, and sweets). Eat appropriately small portion sizes. Introducing [facility]! Dear Sara Steven: Thank you for requesting a UserMojo account. Our records indicate that you already have an active UserMojo account. You can access your account anytime at [email or web address] Did you know that you can access your hospital and ER discharge instructions at any time in UserMojo? You can also review all of your test results from your hospital stay or ER visit. Additional Information If you have questions, please visit the Frequently Asked Questions section of the UserMojo website at [email or web address] Remember, UserMojo is NOT to be used for urgent needs. For medical emergencies, dial 911. Now available from your iPhone and Android! Please provide this summary of care documentation to your next provider. Your primary care clinician is listed as Bhavya Ness. If you have any questions after today's visit, please call [phone].

## 2017-07-24 NOTE — PROGRESS NOTES
Chief Complaint   Patient presents with    Back Pain     lower back pain x 2 days, pain level 6/10     1. Have you been to the ER, urgent care clinic since your last visit? Hospitalized since your last visit? No    2. Have you seen or consulted any other health care providers outside of the [address] [place] [address] since your last visit? Include any pap smears or colon screening. No     Reviewed record in preparation for visit and have obtained necessary documentation.

## 2017-07-24 NOTE — LETTER
NOTIFICATION RETURN TO WORK / SCHOOL 
 
7/24/2017 4:28 PM 
 
Ms. Anitha Cabrera [address] [address] To Whom It May Concern: 
 
Anitha Cabrera is [facility]. She will return to work/school on 07/26/17. If there are questions or concerns please have the patient contact our office. Sincerely, Sherrie Andino MD

## 2017-07-24 NOTE — PROGRESS NOTES
Crow Condon is a 48 y.o. female      Issues discussed today include:        Signs and symptoms:  Back pain  Duration:   2 days  Context:   No injury or illness  Location:  Mid back  Quality:  Sounds muscluar  Severity:  annoying  Timin days  Modifying factors: Body mass index is 51.75 kg/(m^2). Data reviewed or ordered today:  mammogram    Other problems include:  Patient Active Problem List   Diagnosis Code    Anxiety F41.9    Panic attacks F41.0    Morbid obesity ([facility] [address] E66.01    HTN (hypertension), benign I10    Dysmenorrhea N94.6    H/O: hysterectomy Z90.710    Smoker F17.200    MAK (nonalcoholic steatohepatitis) K75.81       Medications:  Current Outpatient Prescriptions   Medication Sig Dispense Refill    doxycycline (ADOXA) 100 mg tablet Take 1 Tab by mouth two (2) times a day for 10 days. 20 Tab 0    meloxicam (MOBIC) 7.5 mg tablet Take 1 Tab by mouth daily. 10 Tab 0    LORazepam (ATIVAN) 1 mg tablet Take 1 Tab by mouth every six (6) hours as needed for Anxiety (Panic Attack). Max Daily Amount: 4 mg. 30 Tab 0    traZODone (DESYREL) 50 mg tablet Take 1 Tab by mouth nightly. 30 Tab 1    diphenhydrAMINE (BENADRYL) 25 mg capsule Take 25 mg by mouth every six (6) hours as needed.  losartan (COZAAR) 25 mg tablet TAKE 1 TABLET BY MOUTH DAILY 90 Tab 1    PARoxetine (PAXIL) 40 mg tablet Take 1 Tab by mouth daily. 90 Tab 1       Allergies: Allergies   Allergen Reactions    Pcn [Penicillins] Anaphylaxis and Rash    Amlodipine Other (comments)     Fatigue/drowsy       LMP:  Patient's last menstrual period was 06/10/2013. Social History     Social History    Marital status:      Spouse name: N/A    Number of children: N/A    Years of education: N/A     Occupational History    Not on file.      Social History Main Topics    Smoking status: Current Some Day Smoker     Packs/day: 0.50     Years: 28.00     Types: Cigarettes    Smokeless tobacco: Never Used    Alcohol use Yes      Comment: rarely    Drug use: No    Sexual activity: Yes     Partners: Male     Birth control/ protection: None     Other Topics Concern    Not on file     Social History Narrative         Family History   Problem Relation Age of Onset    Heart Disease Mother      heart bypass    Heart Attack Mother     Diabetes Brother     Stroke Maternal Grandmother     Malignant Hyperthermia Neg Hx     Pseudocholinesterase Deficiency Neg Hx     Delayed Awakening Neg Hx     Post-op Nausea/Vomiting Neg Hx     Emergence Delirium Neg Hx     Post-op Cognitive Dysfunction Neg Hx     Other Neg Hx          Meaningful use:  done      ROS:  Headaches:  no  Chest Pain:  no  SOB:  no  Fevers:  no  Falls:  no  anxiety/depression/losing interest in doing things that were previously enjoyed:  no. PHQ2 = 0  Other significant ROS:  +smoker (we discussed cessation for 5 minutes), some sinus congestion    Patient's last menstrual period was 06/10/2013. Physical Exam  Visit Vitals    /87 (BP 1 Location: Left arm, BP Patient Position: Sitting)    Pulse 85    Temp 99.1 °F (37.3 °C) (Oral)    Resp 20    Ht 5' 5\" (1.651 m)    Wt 311 lb (141.1 kg)    LMP 06/10/2013    SpO2 99%    BMI 51.75 kg/m2     BP Readings from Last 3 Encounters:   07/24/17 134/87   05/19/17 136/82   03/20/17 124/83     Constitutional:  Appears well,  No Acute Distress, Vitals noted  Psychiatric:   Affect normal, Alert and cooperative, Oriented to person/place/time    Eyes:   Pupils equally round and reactive, EOMI, conjunctiva clear, eyelids normal  ENT:   External ears and nose normal/lips, teeth=OK/gums normal, TMs and Orophyarynx normal  Neck:   general inspection and Thyroid normal.  No abnormal cervical or supraclavicular nodes    Lungs:   clear to auscultation, good respiratory effort  Heart: Ausculation normal.  Regular rhythm. No cardiac murmurs.   No carotid bruits or palpable thrills  Chest wall normal  Abd: benign  Extremities:   without edema, good peripheral pulses  Skin:   Warm to palpation, without rashes, bruising, or suspicious lesions     MSK  Tender over both SI joints. Full ROM at hips, no tenderness TB areas      Assessment:    Patient Active Problem List   Diagnosis Code    Anxiety F41.9    Panic attacks F41.0    Morbid obesity ([address] E66.01    HTN (hypertension), benign I10    Dysmenorrhea N94.6    H/O: hysterectomy Z90.710    Smoker F17.200    MAK (nonalcoholic steatohepatitis) K75.81       Today's diagnoses are:    ICD-10-CM ICD-9-CM    1. Back pain, unspecified back location, unspecified back pain laterality, unspecified chronicity M54.9 724.5 AMB POC URINALYSIS DIP STICK AUTO W/O MICRO      CULTURE, URINE      meloxicam (MOBIC) 7.5 mg tablet   2. Encounter for screening mammogram for breast cancer Z12.31 V76.12 LM MAMMO BI SCREENING INCL CAD   3. Smoker F17.200 305.1 NE SMOKING AND TOBACCO USE CESSATION 3 - 10 MINUTES   4. Morbid obesity due to excess calories (HCC) E66.01 278.01     letter   5. Screening for depression Z13.89 V79.0 BEHAV ASSMT W/SCORE & DOCD/STAND INSTRUMENT   6. Upper respiratory tract infection, unspecified type J06.9 465.9 doxycycline (ADOXA) 100 mg tablet       Plan:  Orders Placed This Encounter    BEHAV ASSMT W/SCORE & DOCD/STAND INSTRUMENT    CULTURE, URINE    LM MAMMO BI SCREENING INCL CAD     Standing Status:   Future     Standing Expiration Date:   8/24/2018     Order Specific Question:   Reason for Exam     Answer:   screening     Order Specific Question:   Is Patient Pregnant? Answer:   Unknown    AMB POC URINALYSIS DIP STICK AUTO W/O MICRO    NE SMOKING AND TOBACCO USE CESSATION 3 - 10 MINUTES    doxycycline (ADOXA) 100 mg tablet     Sig: Take 1 Tab by mouth two (2) times a day for 10 days. Dispense:  20 Tab     Refill:  0     Take with food    meloxicam (MOBIC) 7.5 mg tablet     Sig: Take 1 Tab by mouth daily.      Dispense:  10 Tab     Refill:  0 See patient instructions  Patient Instructions   Take meloxicam with food    May also Take:   over the counter tylenol as directed for pain or fever    Use OTC Mucinex for cough    Consider over the counter nasacort steroid nasal spray    Consider trying the \"NetiPot\" which is a salt water nasal flush if you have nasal congestion:  It really helps! Make sure you use distilled water to make the saline solution    Stop smoking    Focus on regular exercise (150 minutes each week) and healthy eating. Eat more fruits and vegetables. Eat more protein (egg whites, beans, and nuts you know you tolerate) and less carbohydrates (white bread, white rice, white pasta, white potatoes, sodas, and sweets). Eat appropriately small portion sizes. Obtain mammogram                refresh note:  done    AVS Printed:  done    .

## 2017-07-24 NOTE — PATIENT INSTRUCTIONS
Take meloxicam with food    May also Take:   over the counter tylenol as directed for pain or fever    Use OTC Mucinex for cough    Consider over the counter nasacort steroid nasal spray    Consider trying the \"NetiPot\" which is a salt water nasal flush if you have nasal congestion:  It really helps! Make sure you use distilled water to make the saline solution    Stop smoking    Focus on regular exercise (150 minutes each week) and healthy eating. Eat more fruits and vegetables. Eat more protein (egg whites, beans, and nuts you know you tolerate) and less carbohydrates (white bread, white rice, white pasta, white potatoes, sodas, and sweets). Eat appropriately small portion sizes.     Obtain mammogram

## 2017-07-26 LAB — BACTERIA UR CULT: NO GROWTH

## 2017-08-01 ENCOUNTER — HOSPITAL ENCOUNTER (OUTPATIENT)
Dept: MAMMOGRAPHY | Age: 47
Discharge: HOME | End: 2017-08-01
Payer: COMMERCIAL

## 2017-08-01 DIAGNOSIS — Z12.31 ENCOUNTER FOR SCREENING MAMMOGRAM FOR BREAST CANCER: ICD-10-CM

## 2017-08-01 PROCEDURE — 77067 SCR MAMMO BI INCL CAD: CPT

## 2017-08-01 NOTE — LETTER
8/2/2017 10:41 AM 
 
Ms. Crow Condon [address] [place] [phone] Dear Crow Taurus: 
 
Please find your most recent results below. Resulted Orders LM MAMMO BI SCREENING INCL CAD Narrative STUDY: Bilateral digital screening mammogram 
 
INDICATION:  Screening. COMPARISON:  None. BREAST COMPOSITION:  The breasts are almost entirely fatty. FINDINGS: Bilateral digital screening mammography was performed and is 
interpreted in conjunction with a computer assisted detection (CAD) system. There are scattered punctate benign-appearing calcifications in the left breast. 
No suspicious masses or calcifications are identified. There has been no 
significant change. IMPRESSION: 
BI-RADS 2: Benign. No mammographic evidence of malignancy. RECOMMENDATIONS: 
Next screening mammogram is recommended in one year. The patient will be notified of these results. Your recent mammogram was normal.  Congratulations.  Please follow up in one year. Sincerely, Leticia Nino MD

## 2017-08-15 RX ORDER — LORAZEPAM 1 MG/1
1 TABLET ORAL
Qty: 30 TAB | Refills: 0 | Status: SHIPPED | OUTPATIENT
Start: 2017-08-15 | End: 2017-10-13 | Stop reason: SDUPTHER

## 2017-08-31 ENCOUNTER — OFFICE VISIT (OUTPATIENT)
Dept: FAMILY MEDICINE CLINIC | Age: 47
End: 2017-08-31

## 2017-08-31 VITALS
RESPIRATION RATE: 18 BRPM | HEIGHT: 65 IN | TEMPERATURE: 98.4 F | DIASTOLIC BLOOD PRESSURE: 83 MMHG | HEART RATE: 68 BPM | WEIGHT: 293 LBS | BODY MASS INDEX: 48.82 KG/M2 | OXYGEN SATURATION: 99 % | SYSTOLIC BLOOD PRESSURE: 125 MMHG

## 2017-08-31 DIAGNOSIS — M25.571 RIGHT ANKLE PAIN, UNSPECIFIED CHRONICITY: Primary | ICD-10-CM

## 2017-08-31 NOTE — PROGRESS NOTES
Subjective:     Latoya Hensley is an 48 y.o. female who presents with right ankle pain. Pain for about 5 months. Started after doing a lot of walking while in [place]. Pain mostly over the lateral ankle. Pain with standing for a long time. Improves with rest.     Past Medical History:   Diagnosis Date    Anemia     Anxiety     Current smoker     Depression     Hypertension     Morbid obesity ([address]     Psychiatric disorder     anxiety & panic attacks    Snoring      Family History   Problem Relation Age of Onset    Heart Disease Mother      heart bypass    Heart Attack Mother     Diabetes Brother     Stroke Maternal Grandmother     Malignant Hyperthermia Neg Hx     Pseudocholinesterase Deficiency Neg Hx     Delayed Awakening Neg Hx     Post-op Nausea/Vomiting Neg Hx     Emergence Delirium Neg Hx     Post-op Cognitive Dysfunction Neg Hx     Other Neg Hx      Current Outpatient Prescriptions   Medication Sig Dispense Refill    LORazepam (ATIVAN) 1 mg tablet Take 1 Tab by mouth every six (6) hours as needed for Anxiety (Panic Attack). Max Daily Amount: 4 mg. 30 Tab 0    diphenhydrAMINE (BENADRYL) 25 mg capsule Take 25 mg by mouth every six (6) hours as needed.  losartan (COZAAR) 25 mg tablet TAKE 1 TABLET BY MOUTH DAILY 90 Tab 1    PARoxetine (PAXIL) 40 mg tablet Take 1 Tab by mouth daily. 90 Tab 1    meloxicam (MOBIC) 7.5 mg tablet Take 1 Tab by mouth daily. 10 Tab 0    traZODone (DESYREL) 50 mg tablet Take 1 Tab by mouth nightly. 30 Tab 1     Allergies   Allergen Reactions    Pcn [Penicillins] Anaphylaxis and Rash    Amlodipine Other (comments)     Fatigue/drowsy     Social History     Social History    Marital status:      Spouse name: N/A    Number of children: N/A    Years of education: N/A     Occupational History    Not on file.      Social History Main Topics    Smoking status: Current Some Day Smoker     Packs/day: 0.50     Years: 28.00     Types: Cigarettes    Smokeless tobacco: Never Used    Alcohol use Yes      Comment: rarely    Drug use: No    Sexual activity: Yes     Partners: Male     Birth control/ protection: None     Other Topics Concern    Not on file     Social History Narrative       Review of Systems  A comprehensive review of systems was negative except for that written in the HPI. Objective:     Visit Vitals    /83 (BP 1 Location: Left arm, BP Patient Position: Sitting)    Pulse 68    Temp 98.4 °F (36.9 °C) (Oral)    Resp 18    Ht 5' 5\" (1.651 m)    Wt 308 lb (139.7 kg)    LMP 06/10/2013    SpO2 99%    BMI 51.25 kg/m2       Gen: No apparent distress. Alert and oriented. Responds to all questions appropriately. Ankle/Foot: right  Ankle Effusion: None  Lateral soft tissue swelling  Great Toe MTP (normal/valgus/hallux rigidis/varus): Normal    ROM:FROM    Alignment:  Hindfoot (neutral/varus/valgus): valgus  Midfoot (normal/planus/cavus): planus  Forefoot (normal/pronated/supinated): pronated    Dynamic Test:  Gait: antalgic  Urias test: Normal      Palpation:  Diffuse lateral ankle tenderness  Achilles insertion tenderness: None   Achilles tendon tenderness: None   Great Toe IP joint tenderness: None  Great Toe MTP join tenderness t: None  Lisfranc Joint tenderness: None  Medial Malleolus tenderness: None  Lateral Malleolus tenderness: None  5th Metatarsal tenderness: None  Metatarsals tenderness: None  Navicular tenderness: None    Instability:  Anterior Drawer: Normal  Talar Tilt: Normal  Syndesmosis Tenderness: None  External Rotation Test: Normal    Strength (0-5/5):   Plantarflexion: 5/5  Dorsiflexion: 5/5  Inversion: 5/5  Eversion: 5/5  FHL: 5/5  EHL: 5/5    Neuro/Vascular:  Pulses intact, no edema, and neurologically intact. Skin: No obvious rash    Imaging: Radiographs of the right ankle personally reviewed and demonstrates no obvious fracture or dislocation. Mild degenerative changes.      Assessment:       ICD-10-CM ICD-9-CM    1. Right ankle pain, unspecified chronicity M25.571 719.47 XR ANKLE RT MIN 3 V   Mild degenerative changes; posterior tib tendon dysfunction with lateral ankle impingement. Plan:       1. Home Exercise Program as per handout. 2. Ice 15 minutes, three times a day PRN and after exercise. Can alternate with heat for 15 minutes. 3. Discussed proper footwear  4. antipronator shoe inserts  5. ASO    Medications:    1. Naproxin (Aleve): 220mg 1-2 tablets twice a day PRN. 2. Acetaminophen (Tylenol):  500mg 1-2 tablets every 6 hours as needed for pain.     RTC: PRN

## 2017-08-31 NOTE — PROGRESS NOTES
Chief Complaint   Patient presents with    Foot Pain     pt states right foot pain ongoing for a year keep swelling up     1. Have you been to the ER, urgent care clinic since your last visit? Hospitalized since your last visit? No    2. Have you seen or consulted any other health care providers outside of the [address] [place] [address] since your last visit? Include any pap smears or colon screening.  No

## 2017-08-31 NOTE — LETTER
NOTIFICATION RETURN TO WORK / SCHOOL 
 
8/31/2017 11:56 AM 
 
Ms. Pike [address] [place] [phone] To Whom It May Concern: 
 
Rosalba Pimentel is currently under the care of [facility]. She was seen in clinic 8/31/17. Please allow to wear athletic type shoe to work. If there are questions or concerns please have the patient contact our office.  
 
 
 
Sincerely, 
 
 
Fabian Lee MD

## 2017-09-06 DIAGNOSIS — F41.9 ANXIETY: ICD-10-CM

## 2017-09-06 RX ORDER — PAROXETINE HYDROCHLORIDE 40 MG/1
TABLET, FILM COATED ORAL
Qty: 90 TAB | Refills: 1 | Status: SHIPPED | OUTPATIENT
Start: 2017-09-06 | End: 2017-10-13 | Stop reason: SDUPTHER

## 2017-10-13 ENCOUNTER — OFFICE VISIT (OUTPATIENT)
Dept: FAMILY MEDICINE CLINIC | Age: 47
End: 2017-10-13

## 2017-10-13 VITALS
WEIGHT: 293 LBS | OXYGEN SATURATION: 98 % | DIASTOLIC BLOOD PRESSURE: 83 MMHG | BODY MASS INDEX: 48.82 KG/M2 | TEMPERATURE: 98 F | RESPIRATION RATE: 18 BRPM | HEIGHT: 65 IN | HEART RATE: 66 BPM | SYSTOLIC BLOOD PRESSURE: 129 MMHG

## 2017-10-13 DIAGNOSIS — I10 ESSENTIAL HYPERTENSION: ICD-10-CM

## 2017-10-13 DIAGNOSIS — Z00.00 WELL WOMAN EXAM (NO GYNECOLOGICAL EXAM): Primary | ICD-10-CM

## 2017-10-13 DIAGNOSIS — F41.9 ANXIETY: ICD-10-CM

## 2017-10-13 DIAGNOSIS — Z23 ENCOUNTER FOR IMMUNIZATION: ICD-10-CM

## 2017-10-13 DIAGNOSIS — Z71.6 TOBACCO ABUSE COUNSELING: ICD-10-CM

## 2017-10-13 RX ORDER — LORAZEPAM 1 MG/1
1 TABLET ORAL
Qty: 30 TAB | Refills: 0 | Status: SHIPPED | OUTPATIENT
Start: 2017-10-13 | End: 2017-12-11 | Stop reason: SDUPTHER

## 2017-10-13 RX ORDER — LOSARTAN POTASSIUM 25 MG/1
TABLET ORAL
Qty: 90 TAB | Refills: 1 | Status: SHIPPED | OUTPATIENT
Start: 2017-10-13 | End: 2018-02-12 | Stop reason: SDUPTHER

## 2017-10-13 RX ORDER — PAROXETINE HYDROCHLORIDE 40 MG/1
TABLET, FILM COATED ORAL
Qty: 90 TAB | Refills: 1 | Status: SHIPPED | OUTPATIENT
Start: 2017-10-13 | End: 2018-02-12 | Stop reason: SDUPTHER

## 2017-10-13 NOTE — PATIENT INSTRUCTIONS
[place] [address] [facility], [facility]  [phone]    Rodolfo Pacheco, Ph.D.  [facility] Office  Phone: [phone]  FAX: [phone]  [address]  Phone: [phone]  FAX: [phone]  [address] [facility], [address]  [facility] Office  [phone]  FAX: [phone], [facility] [address]    [facility] Office  [phone]  FAX: 800 [facility], [address] [facility], [facility] [address]    [facility] - Cecily Serra, Ph.D  [address] [facility] [address] [facility], [facility].   [phone]    The [facility] Office  [address] [facility], [address]  [phone]    [facility] Office  [address], [address] [facility]  [phone]    [facility] ([place] [facility])  [phone]    [address] Location  [address], [facility]    [address] [facility]  [address] [place] [address], [address] [place] [address], [address]  [phone]    No Villal[place]os (for residents of [facility])  [address] Address  [facility] [address]  Phone Numbers  [phone]   TDD [phone]   FAX [phone]  Crisis Intervention  24 Hours/Day   [phone]     [facility] (for residents of [facility])  Referral/Intake Services [phone]   Emergency Services (24 hrs 365 days) [phone]   (all clinics during business hours & [facility] location for after hours)     Anxiety Disorder: Care Instructions  Your Care Instructions  Anxiety is a normal reaction to stress.  Difficult situations can cause you to have symptoms such as sweaty palms and a nervous feeling. In an anxiety disorder, the symptoms are far more severe. Constant worry, muscle tension, trouble sleeping, nausea and diarrhea, and other symptoms can make normal daily activities difficult or impossible. These symptoms may occur for no reason, and they can affect your work, school, or social life. Medicines, counseling, and self-care can all help. Follow-up care is a key part of your treatment and safety. Be sure to make and go to all appointments, and call your doctor if you are having problems. It's also a good idea to know your test results and keep a list of the medicines you take. How can you care for yourself at home? · Take medicines exactly as directed. Call your doctor if you think you are having a problem with your medicine. · Go to your counseling sessions and follow-up appointments. · Recognize and accept your anxiety. Then, when you are in a situation that makes you anxious, say to yourself, \"This is not an emergency. I feel uncomfortable, but I am not in danger. I can keep going even if I feel anxious. \"  · Be kind to your body:  ¨ Relieve tension with exercise or a massage. ¨ Get enough rest.  ¨ Avoid alcohol, caffeine, nicotine, and illegal drugs. They can increase your anxiety level and cause sleep problems. ¨ Learn and do relaxation techniques. See below for more about these techniques. · Engage your mind. Get out and do something you enjoy. Go to a funny movie, or take a walk or hike. Plan your day. Having too much or too little to do can make you anxious. · Keep a record of your symptoms. Discuss your fears with a good friend or family member, or join a support group for people with similar problems. Talking to others sometimes relieves stress. · Get involved in social groups, or volunteer to help others. Being alone sometimes makes things seem worse than they are.   · Get at least 30 minutes of exercise on most days of the week to relieve stress. Walking is a good choice. You also may want to do other activities, such as running, swimming, cycling, or playing tennis or team sports. Relaxation techniques  Do relaxation exercises 10 to 20 minutes a day. You can play soothing, relaxing music while you do them, if you wish. · Tell others in your house that you are going to do your relaxation exercises. Ask them not to disturb you. · Find a comfortable place, away from all distractions and noise. · Lie down on your back, or sit with your back straight. · Focus on your breathing. Make it slow and steady. · Breathe in through your nose. Breathe out through either your nose or mouth. · Breathe deeply, filling up the area between your navel and your rib cage. Breathe so that your belly goes up and down. · Do not hold your breath. · Breathe like this for 5 to 10 minutes. Notice the feeling of calmness throughout your whole body. As you continue to breathe slowly and deeply, relax by doing the following for another 5 to 10 minutes:  · Tighten and relax each muscle group in your body. You can begin at your toes and work your way up to your head. · Imagine your muscle groups relaxing and becoming heavy. · Empty your mind of all thoughts. · Let yourself relax more and more deeply. · Become aware of the state of calmness that surrounds you. · When your relaxation time is over, you can bring yourself back to alertness by moving your fingers and toes and then your hands and feet and then stretching and moving your entire body. Sometimes people fall asleep during relaxation, but they usually wake up shortly afterward. · Always give yourself time to return to full alertness before you drive a car or do anything that might cause an accident if you are not fully alert. Never play a relaxation tape while you drive a car. When should you call for help? Call 911 anytime you think you may need emergency care.  For example, call if:  · You feel you cannot stop from hurting yourself or someone else. Keep the numbers for these national suicide hotlines: 4-308-005-TALK (9-353.520.3407) and 4-075-YPDEXID (3-520.704.7503). If you or someone you know talks about suicide or feeling hopeless, get help right away. Watch closely for changes in your health, and be sure to contact your doctor if:  · You have anxiety or fear that affects your life. · You have symptoms of anxiety that are new or different from those you had before. Where can you learn more? Go to http://[employer].info/. Enter P754 in the search box to learn more about \"Anxiety Disorder: Care Instructions. \"  Current as of: July 26, 2016  Content Version: 11.3  © 0852-8189 [employer]. Care instructions adapted under license by [employer] (which disclaims liability or warranty for this information). If you have questions about a medical condition or this instruction, always ask your healthcare professional. [place] [address] any warranty or liability for your use of this information.     Influenza (Flu) Vaccine (Inactivated or Recombinant): What You Need to Know  Why get vaccinated? Influenza (\"flu\") is a contagious disease that spreads around the [place] every winter, usually between October and May. Flu is caused by influenza viruses and is spread mainly by coughing, sneezing, and close contact. Anyone can get flu. Flu strikes suddenly and can last several days. Symptoms vary by age, but can include:  · Fever/chills. · Sore throat. · Muscle aches. · Fatigue. · Cough. · Headache. · Runny or stuffy nose. Flu can also lead to pneumonia and blood infections, and cause diarrhea and seizures in children. If you have a medical condition, such as heart or lung disease, flu can make it worse. Flu is more dangerous for some people.  Infants and young children, people 72years of age and older, pregnant women, and people with certain health conditions or a weakened immune system are at greatest risk. Each year thousands of people in the [facility] die from flu, and many more are hospitalized. Flu vaccine can:  · Keep you from getting flu. · Make flu less severe if you do get it. · Keep you from spreading flu to your family and other people. Inactivated and recombinant flu vaccines  A dose of flu vaccine is recommended every flu season. Children 6 months through 6years of age may need two doses during the same flu season. Everyone else needs only one dose each flu season. Some inactivated flu vaccines contain a very small amount of a mercury-based preservative called thimerosal. Studies have not shown thimerosal in vaccines to be harmful, but flu vaccines that do not contain thimerosal are available. There is no live flu virus in flu shots. They cannot cause the flu. There are many flu viruses, and they are always changing. Each year a new flu vaccine is made to protect against three or four viruses that are likely to cause disease in the upcoming flu season. But even when the vaccine doesn't exactly match these viruses, it may still provide some protection. Flu vaccine cannot prevent:  · Flu that is caused by a virus not covered by the vaccine. · Illnesses that look like flu but are not. Some people should not get this vaccine  Tell the person who is giving you the vaccine:  · If you have any severe (life-threatening) allergies. If you ever had a life-threatening allergic reaction after a dose of flu vaccine, or have a severe allergy to any part of this vaccine, you may be advised not to get vaccinated. Most, but not all, types of flu vaccine contain a small amount of egg protein. · If you ever had Guillain-Barré syndrome (also called GBS) Some people with a history of GBS should not get this vaccine. This should be discussed with your doctor. · If you are not feeling well.  It is usually okay to get flu vaccine when you have a mild illness, but you might be asked to come back when you feel better. Risks of a vaccine reaction  With any medicine, including vaccines, there is a chance of reactions. These are usually mild and go away on their own, but serious reactions are also possible. Most people who get a flu shot do not have any problems with it. Minor problems following a flu shot include:  · Soreness, redness, or swelling where the shot was given  · Hoarseness  · Sore, red or itchy eyes  · Cough  · Fever  · Aches  · Headache  · Itching  · Fatigue  If these problems occur, they usually begin soon after the shot and last 1 or 2 days. More serious problems following a flu shot can include the following:  · There may be a small increased risk of Guillain-Barré Syndrome (GBS) after inactivated flu vaccine. This risk has been estimated at 1 or 2 additional cases per million people vaccinated. This is much lower than the risk of severe complications from flu, which can be prevented by flu vaccine. · Cook L[place] children who get the flu shot along with pneumococcal vaccine (PCV13) and/or DTaP vaccine at the same time might be slightly more likely to have a seizure caused by fever. Ask your doctor for more information. Tell your doctor if a child who is getting flu vaccine has ever had a seizure  Problems that could happen after any injected vaccine:  · People sometimes faint after a medical procedure, including vaccination. Sitting or lying down for about 15 minutes can help prevent fainting, and injuries caused by a fall. Tell your doctor if you feel dizzy, or have vision changes or ringing in the ears. · Some people get severe pain in the shoulder and have difficulty moving the arm where a shot was given. This happens very rarely. · Any medication can cause a severe allergic reaction.  Such reactions from a vaccine are very rare, estimated at about 1 in a million doses, and would happen within a few minutes to a few hours after the vaccination. As with any medicine, there is a very remote chance of a vaccine causing a serious injury or death. The safety of vaccines is always being monitored. For more information, visit: www.cdc.gov/vaccinesafety/. What if there is a serious reaction? What should I look for? · Look for anything that concerns you, such as signs of a severe allergic reaction, very high fever, or unusual behavior. Signs of a severe allergic reaction can include hives, swelling of the face and throat, difficulty breathing, a fast heartbeat, dizziness, and weakness - usually within a few minutes to a few hours after the vaccination. What should I do? · If you think it is a severe allergic reaction or other emergency that can't wait, call 9-1-1 and get the person to the nearest hospital. Otherwise, call your doctor. · Reactions should be reported to the \"Vaccine Adverse Event Reporting System\" (VAERS). Your doctor should file this report, or you can do it yourself through the VAERS website at www.vaers. [facility].gov, or by calling 6-499.354.7362. VAERS does not give medical advice. The National Vaccine Injury Compensation Program  The National Vaccine Injury Compensation Program (VICP) is a federal program that was created to compensate people who may have been injured by certain vaccines. Persons who believe they may have been injured by a vaccine can learn about the program and about filing a claim by calling 0-460.429.9950 or visiting the "[employer] website at www.[facility].gov/vaccinecompensation. There is a time limit to file a claim for compensation. How can I learn more? · Ask your healthcare provider. He or she can give you the vaccine package insert or suggest other sources of information. · Call your local or state health department.   · Contact the Centers for Disease Control and Prevention (CDC):  ¨ Call 9-562.529.9515 (1-800-CDC-INFO) or  ¨ Visit CDC's website at www.cdc.gov/flu  Vaccine Information Statement  Inactivated Influenza Vaccine  8/7/2015)  [address] ROS[address]DA Flores [id number]-21  Department of Health and Human Services  Centers for Disease Control and Prevention  Many Vaccine Information Statements are available in Congolese and other languages. See www.immunize.org/vis. Muchas hojas de información sobre vacunas están disponibles en español y en otros idiomas. Visite www.immunize.org/vis. Care instructions adapted under license by [employer] (which disclaims liability or warranty for this information).  If you have questions about a medical condition or this instruction, always ask your healthcare professional. [place] [address] any warranty or liability for your use of this information.

## 2017-10-13 NOTE — MR AVS SNAPSHOT
Visit Information Date & Time Provider Department Dept. Phone Encounter #  
 10/13/2017  1:45 PM Doug Cuba, [facility] [phone] [id number] Upcoming Health Maintenance Date Due INFLUENZA AGE 9 TO ADULT 8/1/2017 PAP AKA CERVICAL CYTOLOGY 7/6/2018 BREAST CANCER SCRN MAMMOGRAM 8/1/2019 DTaP/Tdap/Td series (2 - Td) 7/6/2025 Allergies as of 10/13/2017  Review Complete On: 10/13/2017 By: Doug Cuba DO Severity Noted Reaction Type Reactions Pcn [Penicillins] High 07/21/2010   Systemic Anaphylaxis, Rash Amlodipine  11/18/2013   Side Effect Other (comments) Fatigue/drowsy Current Immunizations  Reviewed on 2/1/2017 Name Date Hep A Vaccine (Adult) 10/21/2015 Hep B Vaccine (Adult) 1/20/2016, 10/21/2015 Influenza Vaccine (Quad) PF  Incomplete, 2/1/2017 Influenza Vaccine Split 10/7/2012 Pneumococcal Polysaccharide (PPSV-23) 11/18/2013 Tdap 7/6/2015 Not reviewed this visit You Were Diagnosed With   
  
 Codes Comments Encounter for immunization    -  Primary ICD-10-CM: D65 ICD-9-CM: V03.89 Essential hypertension     ICD-10-CM: I10 
ICD-9-CM: 401.9 switch HCTZ to losartan Anxiety     ICD-10-CM: F41.9 ICD-9-CM: 300.00 Well woman exam (no gynecological exam)     ICD-10-CM: Z00.00 ICD-9-CM: V70.0 Vitals BP Pulse Temp Resp Height(growth percentile) Weight(growth percentile) 129/83 (BP 1 Location: Right arm, BP Patient Position: Sitting) 66 98 °F (36.7 °C) (Oral) 18 5' 5\" (1.651 m) 312 lb (141.5 kg) LMP SpO2 BMI OB Status Smoking Status 06/10/2013 98% 51.92 kg/m2 Hysterectomy Current Some Day Smoker Vitals History BMI and BSA Data Body Mass Index Body Surface Area 51.92 kg/m 2 2.55 m 2 Preferred Pharmacy Pharmacy Name Phone [facility] [address], [address] [place] [address] [phone] Your Updated Medication List  
  
   
This list is accurate as of: 10/13/17  2:04 PM.  Always use your most recent med list.  
  
  
  
  
 BENADRYL 25 mg capsule Generic drug:  diphenhydrAMINE Take 25 mg by mouth every six (6) hours as needed. LORazepam 1 mg tablet Commonly known as:  ATIVAN Take 1 Tab by mouth every six (6) hours as needed for Anxiety (Panic Attack). Max Daily Amount: 4 mg.  
  
 losartan 25 mg tablet Commonly known as:  COZAAR  
TAKE 1 TABLET BY MOUTH DAILY  
  
 meloxicam 7.5 mg tablet Commonly known as:  MOBIC Take 1 Tab by mouth daily. PARoxetine 40 mg tablet Commonly known as:  PAXIL TAKE 1 TABLET BY MOUTH EVERY DAY  
  
 pneumococcal 13 magdalena conj dip 0.5 mL Syrg injection Commonly known as:  PREVNAR-13  
0.5 mL by IntraMUSCular route once for 1 dose. traZODone 50 mg tablet Commonly known as:  [place] Take 1 Tab by mouth nightly. Prescriptions Printed Refills LORazepam (ATIVAN) 1 mg tablet 0 Sig: Take 1 Tab by mouth every six (6) hours as needed for Anxiety (Panic Attack). Max Daily Amount: 4 mg. Class: Print Route: Oral  
  
Prescriptions Sent to Pharmacy Refills  
 losartan (COZAAR) 25 mg tablet 1 Sig: TAKE 1 TABLET BY MOUTH DAILY Class: Normal  
 Pharmacy: [address] Ph #: [phone] PARoxetine (PAXIL) 40 mg tablet 1 Sig: TAKE 1 TABLET BY MOUTH EVERY DAY Class: Normal  
 Pharmacy: [address] Ph #: [phone] pneumococcal 13 magdalena conj dip (PREVNAR-13) 0.5 mL syrg injection 0 Si.5 mL by IntraMUSCular route once for 1 dose. Class: Normal  
 Pharmacy: [address] Ph #: [phone] Route: IntraMUSCular We Performed the Following CBC W/O DIFF [21331 CPT(R)] HEMOGLOBIN A1C WITH EAG [05062 CPT(R)] INFLUENZA VIRUS VAC QUAD,SPLIT,PRESV FREE SYRINGE IM [id number] CPT(R)] LIPID PANEL [72572 CPT(R)] METABOLIC PANEL, COMPREHENSIVE [74402 CPT(R)] [place] IMMUNIZ ADMIN,1 SINGLE/COMB VAC/TOXOID [id number] CPT(R)] TSH 3RD GENERATION [07540 CPT(R)] Patient Instructions Marcinronnie Tanya, [address] [facility] [address] 
[phone] Cherelle Kelsey, Ph.D. 
[address] [facility] Phone: [phone] FAX: [phone] 
[address] Phone: [phone] FAX: [phone] 
201 [facility], [address] [facility] [address] [employer] [phone] FAX: [phone] [facility] 33 [facility] [phone] FAX: [phone] [facility], [address] [facility], [facility] [address] [facility] [place] - Christiano López, Ph.D 
1111 [facility] [address]  
[phone] [facility] SPECIALTY [facility] [address] 1099 [facility], [facility] [address] 
[phone] [facility] Office [address] [place] [address] 
[phone] [facility] ([place] [facility]) [phone] [address] Location [address] 33 47 [facility] [address] [facility] [address] [place], [address] 
[phone] [facility] (for residents of [place]) Street Address [address] Phone Numbers 
[phone] TDD [phone] FAX [phone] Crisis Intervention 24 Hours/Day  
[phone] [facility] (for residents of [facility], and [address] [place] [address]) Referral/Intake Services [phone] Emergency Services (24 hrs 365 days) [phone]  
(all clinics during business hours & [place] location for after hours) Anxiety Disorder: Care Instructions Your Care Instructions Anxiety is a normal reaction to stress. Difficult situations can cause you to have symptoms such as sweaty palms and a nervous feeling. In an anxiety disorder, the symptoms are far more severe. Constant worry, muscle tension, trouble sleeping, nausea and diarrhea, and other symptoms can make normal daily activities difficult or impossible. These symptoms may occur for no reason, and they can affect your work, school, or social life. Medicines, counseling, and self-care can all help. Follow-up care is a key part of your treatment and safety. Be sure to make and go to all appointments, and call your doctor if you are having problems. It's also a good idea to know your test results and keep a list of the medicines you take. How can you care for yourself at home? · Take medicines exactly as directed. Call your doctor if you think you are having a problem with your medicine. · Go to your counseling sessions and follow-up appointments. · Recognize and accept your anxiety. Then, when you are in a situation that makes you anxious, say to yourself, \"This is not an emergency. I feel uncomfortable, but I am not in danger. I can keep going even if I feel anxious. \" · Be kind to your body: ¨ Relieve tension with exercise or a massage. ¨ Get enough rest. 
¨ Avoid alcohol, caffeine, nicotine, and illegal drugs. They can increase your anxiety level and cause sleep problems. ¨ Learn and do relaxation techniques. See below for more about these techniques. · Engage your mind. Get out and do something you enjoy. Go to a funny movie, or take a walk or hike. Plan your day. Having too much or too little to do can make you anxious. · Keep a record of your symptoms.  Discuss your fears with a good friend or family member, or join a support group for people with similar problems. Talking to others sometimes relieves stress. · Get involved in social groups, or volunteer to help others. Being alone sometimes makes things seem worse than they are. · Get at least 30 minutes of exercise on most days of the week to relieve stress. Walking is a good choice. You also may want to do other activities, such as running, swimming, cycling, or playing tennis or team sports. Relaxation techniques Do relaxation exercises 10 to 20 minutes a day. You can play soothing, relaxing music while you do them, if you wish. · Tell others in your house that you are going to do your relaxation exercises. Ask them not to disturb you. · Find a comfortable place, away from all distractions and noise. · Lie down on your back, or sit with your back straight. · Focus on your breathing. Make it slow and steady. · Breathe in through your nose. Breathe out through either your nose or mouth. · Breathe deeply, filling up the area between your navel and your rib cage. Breathe so that your belly goes up and down. · Do not hold your breath. · Breathe like this for 5 to 10 minutes. Notice the feeling of calmness throughout your whole body. As you continue to breathe slowly and deeply, relax by doing the following for another 5 to 10 minutes: · Tighten and relax each muscle group in your body. You can begin at your toes and work your way up to your head. · Imagine your muscle groups relaxing and becoming heavy. · Empty your mind of all thoughts. · Let yourself relax more and more deeply. · Become aware of the state of calmness that surrounds you. · When your relaxation time is over, you can bring yourself back to alertness by moving your fingers and toes and then your hands and feet and then stretching and moving your entire body. Sometimes people fall asleep during relaxation, but they usually wake up shortly afterward. · Always give yourself time to return to full alertness before you drive a car or do anything that might cause an accident if you are not fully alert. Never play a relaxation tape while you drive a car. When should you call for help? Call 911 anytime you think you may need emergency care. For example, call if: 
· You feel you cannot stop from hurting yourself or someone else. Keep the numbers for these national suicide hotlines: 7-724-134-TALK (6-290.351.7807) and 9-765-MYQPIEY (4-391.215.6007). If you or someone you know talks about suicide or feeling hopeless, get help right away. Watch closely for changes in your health, and be sure to contact your doctor if: 
· You have anxiety or fear that affects your life. · You have symptoms of anxiety that are new or different from those you had before. Where can you learn more? Go to http://reynaIgnis Energyperri.info/. Enter P754 in the search box to learn more about \"Anxiety Disorder: Care Instructions. \" Current as of: July 26, 2016 Content Version: 11.3 © 8739-9273 Portr. Care instructions adapted under license by [employer] (which disclaims liability or warranty for this information). If you have questions about a medical condition or this instruction, always ask your healthcare professional. [address] any warranty or liability for your use of this information. 
  
  
 
 
 
  
Introducing hospitals & HEALTH SERVICES! Dear Med Jaimes: Thank you for requesting a Medlio account. Our records indicate that you already have an active Medlio account. You can access your account anytime at [email or web address]). [email or web address] ([email or web address]) Did you know that you can access your hospital and ER discharge instructions at any time in Medlio? You can also review all of your test results from your hospital stay or ER visit. Additional Information If you have questions, please visit the Frequently Asked Questions section of the Medlio website at [email or web address]). [email or web address]. Remember, Medlio is NOT to be used for urgent needs. For medical emergencies, dial 911. Now available from your iPhone and Android! Please provide this summary of care documentation to your next provider. Your primary care clinician is listed as Juve Linton. If you have any questions after today's visit, please call [phone].

## 2017-10-13 NOTE — LETTER
To whom it may concern: Ms. Alexa Galdamez was seen at [facility] 10/13/17. Thank you for your understanding, Cody Francis

## 2017-10-13 NOTE — PROGRESS NOTES
Chief Complaint   Patient presents with    Medication Refill           1. Have you been to the ER, urgent care clinic since your last visit? Hospitalized since your last visit? No    2. Have you seen or consulted any other health care providers outside of the [address] [place] [address] since your last visit? Include any pap smears or colon screening.  No

## 2017-10-13 NOTE — LETTER
10/13/2017 2:45 PM 
 
Ms. Carly Ford [address] [place] [phone] To whom it may concern: 
 
Please excuse Ms. Kiki Olguin from work 10/13-10/14/17. She can return to work 10/15/17 without restrictions. If you have any questions please do not hesitate to call. Sincerely, Galilea Gerber, DO

## 2017-10-14 LAB
ALBUMIN SERPL-MCNC: 4 G/DL (ref 3.5–5.5)
ALBUMIN/GLOB SERPL: 1.5 {RATIO} (ref 1.2–2.2)
ALP SERPL-CCNC: 95 IU/L (ref 39–117)
ALT SERPL-CCNC: 12 IU/L (ref 0–32)
AST SERPL-CCNC: 8 IU/L (ref 0–40)
BILIRUB SERPL-MCNC: 0.3 MG/DL (ref 0–1.2)
BUN SERPL-MCNC: 12 MG/DL (ref 6–24)
BUN/CREAT SERPL: 16 (ref 9–23)
CALCIUM SERPL-MCNC: 9.4 MG/DL (ref 8.7–10.2)
CHLORIDE SERPL-SCNC: 100 MMOL/L (ref 96–106)
CHOLEST SERPL-MCNC: 211 MG/DL (ref 100–199)
CO2 SERPL-SCNC: 25 MMOL/L (ref 18–29)
CREAT SERPL-MCNC: 0.76 MG/DL (ref 0.57–1)
ERYTHROCYTE [DISTWIDTH] IN BLOOD BY AUTOMATED COUNT: 14.5 % (ref 12.3–15.4)
EST. AVERAGE GLUCOSE BLD GHB EST-MCNC: 111 MG/DL
GLOBULIN SER CALC-MCNC: 2.7 G/DL (ref 1.5–4.5)
GLUCOSE SERPL-MCNC: 115 MG/DL (ref 65–99)
HBA1C MFR BLD: 5.5 % (ref 4.8–5.6)
HCT VFR BLD AUTO: 40 % (ref 34–46.6)
HDLC SERPL-MCNC: 44 MG/DL
HGB BLD-MCNC: 12.6 G/DL (ref 11.1–15.9)
INTERPRETATION, 910389: NORMAL
LDLC SERPL CALC-MCNC: 113 MG/DL (ref 0–99)
MCH RBC QN AUTO: 27.1 PG (ref 26.6–33)
MCHC RBC AUTO-ENTMCNC: 31.5 G/DL (ref 31.5–35.7)
MCV RBC AUTO: 86 FL (ref 79–97)
PLATELET # BLD AUTO: 264 X10E3/UL (ref 150–379)
POTASSIUM SERPL-SCNC: 4.4 MMOL/L (ref 3.5–5.2)
PROT SERPL-MCNC: 6.7 G/DL (ref 6–8.5)
RBC # BLD AUTO: 4.65 X10E6/UL (ref 3.77–5.28)
SODIUM SERPL-SCNC: 141 MMOL/L (ref 134–144)
TRIGL SERPL-MCNC: 268 MG/DL (ref 0–149)
TSH SERPL DL<=0.005 MIU/L-ACNC: 3.06 UIU/ML (ref 0.45–4.5)
VLDLC SERPL CALC-MCNC: 54 MG/DL (ref 5–40)
WBC # BLD AUTO: 10.5 X10E3/UL (ref 3.4–10.8)

## 2017-10-16 ENCOUNTER — TELEPHONE (OUTPATIENT)
Dept: FAMILY MEDICINE CLINIC | Age: 47
End: 2017-10-16

## 2017-10-16 DIAGNOSIS — E78.00 HYPERCHOLESTEREMIA: Primary | ICD-10-CM

## 2017-10-16 NOTE — PROGRESS NOTES
CBC, TSH WNL. Lipid panel shows elevated TC and LDL. Patient 10 yr CVD risk is 6.3%. Per AAFP guidelines recommendation is to start stain therapy at 7.5%. Patient did mention she had a hamburger 1 hour before lipid panel drawn.

## 2017-10-16 NOTE — TELEPHONE ENCOUNTER
10/16/2017 6:00 PM    Spoke with patient about recent lab results and elevated TC, TG and LDL. Patient had a hamburger 1 hour before lab work which may be cause for elevating. 10 year CVD risk factor is 6.3%. Discussed guidelines recommend statin at 7.5% or LDL > 160. Patient has recently started dieting and seeing . Recommend patient continue diet and exercise and come in for fasting lipid panel recheck in 1-2 months and can discuss possible medication therapy if still elevated. CBC, CMP, HgA1c, TSH all within normal range.      Mirtha Godfrey,   Family Medicine Resident, PGY1

## 2017-10-16 NOTE — TELEPHONE ENCOUNTER
10/16/2017 5:44 PM    Left message telling patient to call the practice back. Would like to discuss recent lab work.      Be Albrecht, DO  Family Medicine Resident, PGY1

## 2017-12-11 DIAGNOSIS — F41.9 ANXIETY: ICD-10-CM

## 2017-12-14 RX ORDER — LORAZEPAM 1 MG/1
1 TABLET ORAL
Qty: 30 TAB | Refills: 0 | Status: SHIPPED | OUTPATIENT
Start: 2017-12-14 | End: 2018-02-16 | Stop reason: SDUPTHER

## 2018-02-05 DIAGNOSIS — I10 ESSENTIAL HYPERTENSION: ICD-10-CM

## 2018-02-05 DIAGNOSIS — F41.9 ANXIETY: ICD-10-CM

## 2018-02-05 NOTE — TELEPHONE ENCOUNTER
Patient needs a refill of the following  Requested Prescriptions     Pending Prescriptions Disp Refills    LORazepam (ATIVAN) 1 mg tablet 30 Tab 0     Sig: Take 1 Tab by mouth every six (6) hours as needed for Anxiety (Panic Attack). Max Daily Amount: 4 mg.

## 2018-02-12 RX ORDER — LOSARTAN POTASSIUM 25 MG/1
TABLET ORAL
Qty: 90 TAB | Refills: 1 | Status: SHIPPED | OUTPATIENT
Start: 2018-02-12 | End: 2019-02-08 | Stop reason: SDUPTHER

## 2018-02-12 RX ORDER — LORAZEPAM 1 MG/1
1 TABLET ORAL
Qty: 30 TAB | Refills: 0 | Status: CANCELLED | OUTPATIENT
Start: 2018-02-12

## 2018-02-12 RX ORDER — PAROXETINE HYDROCHLORIDE 40 MG/1
TABLET, FILM COATED ORAL
Qty: 90 TAB | Refills: 1 | Status: SHIPPED | OUTPATIENT
Start: 2018-02-12 | End: 2018-11-21 | Stop reason: SDUPTHER

## 2018-02-13 NOTE — TELEPHONE ENCOUNTER
Per Pedro Cruz this patient is due for follow up. She is asking for refill of controlled substance Ativan and should be seen. Please ino her and offer her an appt.  Thanks

## 2018-02-16 ENCOUNTER — OFFICE VISIT (OUTPATIENT)
Dept: FAMILY MEDICINE CLINIC | Age: 48
End: 2018-02-16

## 2018-02-16 VITALS
HEART RATE: 64 BPM | HEIGHT: 65 IN | TEMPERATURE: 98.3 F | OXYGEN SATURATION: 97 % | RESPIRATION RATE: 18 BRPM | DIASTOLIC BLOOD PRESSURE: 95 MMHG | SYSTOLIC BLOOD PRESSURE: 130 MMHG | BODY MASS INDEX: 48.82 KG/M2 | WEIGHT: 293 LBS

## 2018-02-16 DIAGNOSIS — F41.0 PANIC ATTACKS: Primary | ICD-10-CM

## 2018-02-16 DIAGNOSIS — I10 HTN (HYPERTENSION), BENIGN: ICD-10-CM

## 2018-02-16 DIAGNOSIS — F41.9 ANXIETY: ICD-10-CM

## 2018-02-16 DIAGNOSIS — E78.9 LIPID DISORDER: ICD-10-CM

## 2018-02-16 PROBLEM — F32.1 MODERATE MAJOR DEPRESSION (HCC): Status: ACTIVE | Noted: 2018-02-16

## 2018-02-16 RX ORDER — LORAZEPAM 1 MG/1
1 TABLET ORAL
Qty: 30 TAB | Refills: 0 | Status: SHIPPED | OUTPATIENT
Start: 2018-02-16 | End: 2018-03-29 | Stop reason: SDUPTHER

## 2018-02-16 NOTE — PROGRESS NOTES
History of Present Illness:     Chief Complaint   Patient presents with    Labs    Anxiety    Medication Refill     Pt is a 50y.o. year old female    Presents to clinic for medication refill. She has been under a lot of stress with work. She currently takes Paxil and Ativan 1mg. Very stressed with her parents living with her. She feels overwhelmed at work. Takes Ativan 2-3 times per week. She has had 2 severe panic attacks over the past 1 year. She thinks a lot of her anxiety has to do with her parents. Takes her Paxil every other day. HTN - Usually in the 120s/80s at home. Feels it is elevated right now due to stress. Looking into bariatric surgery for weight loss. Currently taking Cozaar. Elevated triglycerides - Needs to repeat her lipids fasting. Past Medical History:   Diagnosis Date    Anemia     Anxiety     Current smoker     Depression     Hypertension     Morbid obesity ([address]     Psychiatric disorder     anxiety & panic attacks    Snoring          Current Outpatient Prescriptions on File Prior to Visit   Medication Sig Dispense Refill    losartan (COZAAR) 25 mg tablet TAKE 1 TABLET BY MOUTH DAILY 90 Tab 1    PARoxetine (PAXIL) 40 mg tablet TAKE 1 TABLET BY MOUTH EVERY DAY 90 Tab 1    diphenhydrAMINE (BENADRYL) 25 mg capsule Take 25 mg by mouth every six (6) hours as needed.  meloxicam (MOBIC) 7.5 mg tablet Take 1 Tab by mouth daily. 10 Tab 0    traZODone (DESYREL) 50 mg tablet Take 1 Tab by mouth nightly. 30 Tab 1     No current facility-administered medications on file prior to visit. Allergies:   Allergies   Allergen Reactions    Pcn [Penicillins] Anaphylaxis and Rash    Amlodipine Other (comments)     Fatigue/drowsy         Review of Systems:  Denies fever, chills, sweats  Denies chest pain, MACKEY, palpitations, LE edema      Objective:     Vitals:    02/16/18 1524 02/16/18 1608   BP: 167/79 (!) 130/95   Pulse: 64    Resp: 18    Temp: 98.3 °F (36.8 °C)    TempSrc: Oral    SpO2: 97%    Weight: 313 lb (142 kg)    Height: 5' 5\" (1.651 m)        Physical Exam:  General appearance - alert, well appearing, and in no distress  Mental status - alert, oriented to person, place, and time, anxious  Chest - clear to auscultation, no wheezes, rales or rhonchi, symmetric air entry  Heart - normal rate, regular rhythm, normal S1, S2, no murmurs, rubs, clicks or gallops      Recent Labs:  No results found for this or any previous visit (from the past 12 hour(s)). Assessment and Plan:   Pt is a 50y.o. year old female,      ICD-10-CM ICD-9-CM    1. Panic attacks F41.0 300.01    2. Anxiety F41.9 300.00 LORazepam (ATIVAN) 1 mg tablet   3. HTN (hypertension), benign I10 401.1      Continue current medication regimen   Encouraged her to consider counseling    Pt to monitor home BPs. If above goal, needs follow up. But reported home BPs are WNL. Repeat lipid panel ordered. She is fasting for lab today. Follow up in 3 months if BPs at home are remaining at goal.  If above goal, follow up in 2 wks. Pt in agreement with plan. Andrew Carias MD      I have discussed the diagnosis with the patient and the intended plan as seen in the above orders. The patient has received an after-visit summary and questions were answered concerning future plans.

## 2018-02-16 NOTE — MR AVS SNAPSHOT
[address] 
[phone] Patient: Nellie Doyle MRN: [id number] :1970 Visit Information Date & Time Provider Department Dept. Phone Encounter #  
 2018  3:30 PM Cherie Merlos [phone] [id number] Follow-up Instructions Return in about 4 weeks (around 3/16/2018) for Blood Pressure. Upcoming Health Maintenance Date Due  
 PAP AKA CERVICAL CYTOLOGY 2018 DTaP/Tdap/Td series (2 - Td) 2025 Allergies as of 2018  Review Complete On: 2018 By: Iqra Guidry LPN Severity Noted Reaction Type Reactions Pcn [Penicillins] High 2010   Systemic Anaphylaxis, Rash Amlodipine  2013   Side Effect Other (comments) Fatigue/drowsy Current Immunizations  Reviewed on 2017 Name Date Hep A Vaccine (Adult) 10/21/2015 Hep B Vaccine (Adult) 2016, 10/21/2015 Influenza Vaccine (Quad) PF 10/13/2017, 2017 Influenza Vaccine Split 10/7/2012 Pneumococcal Polysaccharide (PPSV-23) 2013 Tdap 2015 Not reviewed this visit You Were Diagnosed With   
  
 Codes Comments Panic attacks    -  Primary ICD-10-CM: F41.0 ICD-9-CM: 300.01 Anxiety     ICD-10-CM: F41.9 ICD-9-CM: 300.00 HTN (hypertension), benign     ICD-10-CM: I10 
ICD-9-CM: 401.1 Vitals BP Pulse Temp Resp Height(growth percentile) Weight(growth percentile) 167/79 (BP 1 Location: Left arm, BP Patient Position: Sitting) 64 98.3 °F (36.8 °C) (Oral) 18 5' 5\" (1.651 m) 313 lb (142 kg) LMP SpO2 BMI OB Status Smoking Status 06/10/2013 97% 52.09 kg/m2 Hysterectomy Current Some Day Smoker Vitals History BMI and BSA Data Body Mass Index Body Surface Area 52.09 kg/m 2 2.55 m 2 Preferred Pharmacy Pharmacy Name Phone [facility] [address] [place], [place] [address]jeffery Simpson, [address] [place] [address] [phone] Your Updated Medication List  
  
   
This list is accurate as of: 2/16/18  4:05 PM.  Always use your most recent med list.  
  
  
  
  
 BENADRYL 25 mg capsule Generic drug:  diphenhydrAMINE Take 25 mg by mouth every six (6) hours as needed. LORazepam 1 mg tablet Commonly known as:  ATIVAN Take 1 Tab by mouth daily as needed for Anxiety (Panic Attack). losartan 25 mg tablet Commonly known as:  COZAAR  
TAKE 1 TABLET BY MOUTH DAILY  
  
 meloxicam 7.5 mg tablet Commonly known as:  MOBIC Take 1 Tab by mouth daily. PARoxetine 40 mg tablet Commonly known as:  PAXIL TAKE 1 TABLET BY MOUTH EVERY DAY  
  
 traZODone 50 mg tablet Commonly known as:  Hari Guillory Take 1 Tab by mouth nightly. Prescriptions Printed Refills LORazepam (ATIVAN) 1 mg tablet 0 Sig: Take 1 Tab by mouth daily as needed for Anxiety (Panic Attack). Class: Print Route: Oral  
  
Follow-up Instructions Return in about 4 weeks (around 3/16/2018) for Blood Pressure. Patient Instructions Home Blood Pressure Test: About This Test 
What is it? A home blood pressure test allows you to keep track of your blood pressure at home. Blood pressure is a measure of the force of blood against the walls of your arteries. Blood pressure readings include two numbers, such as 130/80 (say \"130 over 80\"). The first number is the systolic pressure. The second number is the diastolic pressure. Why is this test done? You may do this test at home to: · Find out if you have high blood pressure. · Track your blood pressure if you have high blood pressure. · Track how well medicine is working to reduce high blood pressure. · Check how lifestyle changes, such as weight loss and exercise, are affecting blood pressure.  
How can you prepare for the test? 
 · Do not use caffeine, tobacco, or medicines known to raise blood pressure (such as nasal decongestant sprays) for at least 30 minutes before taking your blood pressure. · Do not exercise for at least 30 minutes before taking your blood pressure. What happens before the test? 
Take your blood pressure while you feel comfortable and relaxed. Sit quietly with both feet on the floor for at least 5 minutes before the test. 
What happens during the test? 
· Sit with your arm slightly bent and resting on a table so that your upper arm is at the same level as your heart. · Roll up your sleeve or take off your shirt to expose your upper arm. · Wrap the blood pressure cuff around your upper arm so that the lower edge of the cuff is about 1 inch above the bend of your elbow. Proceed with the following steps depending on if you are using an automatic or manual pressure monitor. Automatic blood pressure monitors · Press the on/off button on the automatic monitor and wait until the ready-to-measure \"heart\" symbol appears next to zero in the display window. · Press the start button. The cuff will inflate and deflate by itself. · Your blood pressure numbers will appear on the screen. · Write your numbers in your log book, along with the date and time. Manual blood pressure monitors · Place the earpieces of a stethoscope in your ears, and place the bell of the stethoscope over the artery, just below the cuff. · Close the valve on the rubber inflating bulb. · Squeeze the bulb rapidly with your opposite hand to inflate the cuff until the dial or column of mercury reads about 30 mm Hg higher than your usual systolic pressure. If you do not know your usual pressure, inflate the cuff to 210 mm Hg or until the pulse at your wrist disappears. · Open the pressure valve just slightly by twisting or pressing the valve on the bulb.  
· As you watch the pressure slowly fall, note the level on the dial at which you first start to hear a pulsing or tapping sound through the stethoscope. This is your systolic blood pressure. · Continue letting the air out slowly. The sounds will become muffled and will finally disappear. Note the pressure when the sounds completely disappear. This is your diastolic blood pressure. Let out all the remaining air. · Write your numbers in your log book, along with the date and time. What else should you know about the test? 
Results for adults ages 25 and older (mm Hg): · Normal (ideal): Systolic 587 or below. Diastolic 79 or below. · Prehypertension: Systolic 115 to 744. Diastolic 80 to 89. · Hypertension: Systolic 811 or above. Diastolic 90 or above. Follow-up care is a key part of your treatment and safety. Be sure to make and go to all appointments, and call your doctor if you are having problems. It's also a good idea to keep a list of the medicines you take. Where can you learn more? Go to http://reyna-perri.info/. Enter C427 in the search box to learn more about \"Home Blood Pressure Test: About This Test.\" Current as of: September 21, 2016 Content Version: 11.4 © 6142-1569 [employer]. Care instructions adapted under license by [email or web address] (which disclaims liability or warranty for this information). If you have questions about a medical condition or this instruction, always ask your healthcare professional. [place] [address] any warranty or liability for your use of this information. Introducing [facility]! Dear Bunny Mahmood: Thank you for requesting a Zebtab account. Our records indicate that you already have an active Zebtab account. You can access your account anytime at [email or web address] Did you know that you can access your hospital and ER discharge instructions at any time in Zebtab? You can also review all of your test results from your hospital stay or ER visit. Additional Information If you have questions, please visit the Frequently Asked Questions section of the [employer] website at [email or web address] Remember, [employer] is NOT to be used for urgent needs. For medical emergencies, dial 911. Now available from your iPhone and Android! Please provide this summary of care documentation to your next provider. Your primary care clinician is listed as Yaakov Duverney. If you have any questions after today's visit, please call [phone].

## 2018-02-16 NOTE — PATIENT INSTRUCTIONS
Home Blood Pressure Test: About This Test  What is it? A home blood pressure test allows you to keep track of your blood pressure at home. Blood pressure is a measure of the force of blood against the walls of your arteries. Blood pressure readings include two numbers, such as 130/80 (say \"130 over 80\"). The first number is the systolic pressure. The second number is the diastolic pressure. Why is this test done? You may do this test at home to:  · Find out if you have high blood pressure. · Track your blood pressure if you have high blood pressure. · Track how well medicine is working to reduce high blood pressure. · Check how lifestyle changes, such as weight loss and exercise, are affecting blood pressure. How can you prepare for the test?  · Do not use caffeine, tobacco, or medicines known to raise blood pressure (such as nasal decongestant sprays) for at least 30 minutes before taking your blood pressure. · Do not exercise for at least 30 minutes before taking your blood pressure. What happens before the test?  Take your blood pressure while you feel comfortable and relaxed. Sit quietly with both feet on the floor for at least 5 minutes before the test.  What happens during the test?  · Sit with your arm slightly bent and resting on a table so that your upper arm is at the same level as your heart. · Roll up your sleeve or take off your shirt to expose your upper arm. · Wrap the blood pressure cuff around your upper arm so that the lower edge of the cuff is about 1 inch above the bend of your elbow. Proceed with the following steps depending on if you are using an automatic or manual pressure monitor. Automatic blood pressure monitors  · Press the on/off button on the automatic monitor and wait until the ready-to-measure \"heart\" symbol appears next to zero in the display window. · Press the start button. The cuff will inflate and deflate by itself.   · Your blood pressure numbers will appear on the screen. · Write your numbers in your log book, along with the date and time. Manual blood pressure monitors  · Place the earpieces of a stethoscope in your ears, and place the bell of the stethoscope over the artery, just below the cuff. · Close the valve on the rubber inflating bulb. · Squeeze the bulb rapidly with your opposite hand to inflate the cuff until the dial or column of mercury reads about 30 mm Hg higher than your usual systolic pressure. If you do not know your usual pressure, inflate the cuff to 210 mm Hg or until the pulse at your wrist disappears. · Open the pressure valve just slightly by twisting or pressing the valve on the bulb. · As you watch the pressure slowly fall, note the level on the dial at which you first start to hear a pulsing or tapping sound through the stethoscope. This is your systolic blood pressure. · Continue letting the air out slowly. The sounds will become muffled and will finally disappear. Note the pressure when the sounds completely disappear. This is your diastolic blood pressure. Let out all the remaining air. · Write your numbers in your log book, along with the date and time. What else should you know about the test?  Results for adults ages 25 and older (mm Hg):  · Normal (ideal): Systolic 821 or below. Diastolic 79 or below. · Prehypertension: Systolic 233 to 186. Diastolic 80 to 89. · Hypertension: Systolic 177 or above. Diastolic 90 or above. Follow-up care is a key part of your treatment and safety. Be sure to make and go to all appointments, and call your doctor if you are having problems. It's also a good idea to keep a list of the medicines you take. Where can you learn more? Go to http://reyna-perri.info/. Enter C427 in the search box to learn more about \"Home Blood Pressure Test: About This Test.\"  Current as of: September 21, 2016  Content Version: 11.4  © 2139-7806 Healthwise, Incorporated.  Care instructions adapted under license by [employer] (which disclaims liability or warranty for this information). If you have questions about a medical condition or this instruction, always ask your healthcare professional. [address] any warranty or liability for your use of this information.

## 2018-02-16 NOTE — LETTER
NOTIFICATION RETURN TO WORK  
 
2/16/2018 4:09 PM 
 
Ms. Celso Pacheco [address] [place] [id number] To Whom It May Concern: 
 
Celso Pacheco is currently under the care of [facility]. She will return to work on: 2/19/18 If there are questions or concerns please have the patient contact our office.  
 
 
 
Sincerely, 
 
 
Rachel Ascencio MD

## 2018-03-01 ENCOUNTER — OFFICE VISIT (OUTPATIENT)
Dept: SURGERY | Age: 48
End: 2018-03-01

## 2018-03-01 VITALS
DIASTOLIC BLOOD PRESSURE: 100 MMHG | RESPIRATION RATE: 20 BRPM | SYSTOLIC BLOOD PRESSURE: 140 MMHG | BODY MASS INDEX: 45.99 KG/M2 | WEIGHT: 293 LBS | TEMPERATURE: 98.8 F | HEART RATE: 70 BPM | OXYGEN SATURATION: 97 % | HEIGHT: 67 IN

## 2018-03-01 DIAGNOSIS — I10 ESSENTIAL HYPERTENSION: ICD-10-CM

## 2018-03-01 DIAGNOSIS — R06.83 SNORES: ICD-10-CM

## 2018-03-01 DIAGNOSIS — K75.81 NASH (NONALCOHOLIC STEATOHEPATITIS): ICD-10-CM

## 2018-03-01 DIAGNOSIS — E66.01 MORBID OBESITY WITH BMI OF 45.0-49.9, ADULT (HCC): Primary | ICD-10-CM

## 2018-03-01 NOTE — PROGRESS NOTES
1. Have you been to the ER, urgent care clinic since your last visit? Hospitalized since your last visit? No    2. Have you seen or consulted any other health care providers outside of the [address] [place] [address] since your last visit? Include any pap smears or colon screening. No       Daija Arrieta  Body composition    female  50 y.o. There were no vitals filed for this visit. There is no height or weight on file to calculate BMI.   .Neck- 16 inches  Waist-54 inches  Hips-62 inches  Frame size-medium

## 2018-03-01 NOTE — PATIENT INSTRUCTIONS
Carnell Loveless Stroop is our surgical coordinator and she will contact you with regard to dietician evaluation and sleep study. Must be smoke free for minimum of 30 days prior to surgery (longer the better)    You can schedule the psychological evaluation at any time     Sreenity's contact:  [email or web address]     Plan:  Gastric bypass with Zulma Farias MD        Learning About How to Prepare for Weight-Loss Surgery  How can you prepare for weight-loss surgery? Having weight-loss surgery (also called bariatric surgery) is a big step. You can prepare for surgery by having a plan. Your plan may include your goals for losing weight and how to makes changes in your diet, activity, and lifestyle to help raise your chances of success. One way to prepare for surgery is to think about your goal or reason why you want to reach a healthy weight. Do you want to lower your blood pressure, cholesterol, or blood sugar? Do you want to be able to sleep better, play with your kids, or walk around the block? Having a reason can help you stay with your plan and meet your goals. Your weight-loss surgery team can help you meet your goals and get ready for surgery. Adilia Khan work with a team that's trained to help you lose weight and make healthy changes in your life. This team may include:  · A medical doctor or nurse to help manage your care and schedule tests before surgery. · A surgeon who specializes in weight-loss surgery. · A registered dietitian to help you plan meals and make changes in the way you eat. · An exercise specialist to help you be more active and get stronger. · A therapist or counselor to help you learn why you eat and teach you ways to deal with stress and your emotions. Your team will also be there to help you prepare for life after surgery. They will help you adjust to new ways of eating and changes to your body. How will weight-loss surgery affect your life?   You have likely thought a lot about how surgery may affect your life-how you will eat, how your body will look, or how you will feel. Some people feel overwhelmed with these changes. But planning can help you prepare for the changes and meet your weight-loss goals. One important step in your plan is to learn about the ways surgery will affect your life. These may include:  · A slimmer you. You probably will lose weight very quickly in the first few months after surgery. As time goes on, your weight loss will slow down. How much weight you lose depends on what type of surgery you had and how well your new eating and activity plans are working for you. · A new way of eating. Success in reaching and keeping a healthy weight depends on making lifelong changes in how you eat. After surgery, you raise your chances of success if you:  ¨ Eat just a few ounces of food at a time. ¨ Eat very slowly and chew your food to mush. ¨ Don't drink for 30 minutes before you eat, during your meal, and for 30 minutes after you eat. ¨ Are careful about drinking alcohol. ¨ Avoid foods that are high in fat or sugar. ¨ Take vitamin and mineral supplements. · A healthier you. Weight-loss surgery can have some real health benefits. Problems like diabetes, high blood pressure, and sleep apnea may go away-or at least become easier to manage. · A more active you. After surgery, being active on most days of the week will help you reach your weight goal and avoid gaining back the weight you lose. · A lot of extra skin. When you lose weight quickly, you may have a lot of extra skin. That's normal. You can have surgery to remove the extra skin if it bothers you. There are going to be some ups and downs while you get used to these changes. So another way to adjust is to identify who can help support you. Getting support from friends and family can help. And joining a support group for people who have had the surgery can be a big help too, because they know what you're going through.   As you know, it's a big decision to have weight-loss surgery. But when you have a plan, you can focus on losing weight and living a healthier life. So what steps can you take to prepare for weight-loss surgery? Will you set some goals? Will you learn about how surgery can affect your life? How about asking family or friends for help? Write out your plan. Then get ready. Where can you learn more? Go to http://reyna-perri.info/. Enter U150 in the search box to learn more about \"Learning About How to Prepare for Weight-Loss Surgery. \"  Current as of: October 13, 2016  Content Version: 11.4  © 3239-4849 Healthwise, [employer]. Care instructions adapted under license by [employer] (which disclaims liability or warranty for this information). If you have questions about a medical condition or this instruction, always ask your healthcare professional. [address] any warranty or liability for your use of this information.
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
## 2018-03-01 NOTE — MR AVS SNAPSHOT
[address] [facility] [id number] [phone] Patient: Sandi Rinne MRN: [id number] :1970 Visit Information Date & Time Provider Department Dept. Phone Encounter #  
 3/1/2018  1:00 PM Blair Powell NP [facility] 22 159 [phone] [id number] Upcoming Health Maintenance Date Due  
 PAP AKA CERVICAL CYTOLOGY 2018 DTaP/Tdap/Td series (2 - Td) 2025 Allergies as of 3/1/2018  Review Complete On: 3/1/2018 By: Blair Powell NP Severity Noted Reaction Type Reactions Pcn [Penicillins] High 2010   Systemic Anaphylaxis, Rash Amlodipine  2013   Side Effect Other (comments) Fatigue/drowsy Current Immunizations  Reviewed on 2017 Name Date Hep A Vaccine (Adult) 10/21/2015 Hep B Vaccine (Adult) 2016, 10/21/2015 Influenza Vaccine (Quad) PF 10/13/2017, 2017 Influenza Vaccine Split 10/7/2012 Pneumococcal Polysaccharide (PPSV-23) 2013 Tdap 2015 Not reviewed this visit You Were Diagnosed With   
  
 Codes Comments Morbid obesity with BMI of 45.0-49.9, adult [facility]    -  Primary ICD-10-CM: E66.01, Z68.42 
ICD-9-CM: 278.01, V85.42 Essential hypertension     ICD-10-CM: I10 
ICD-9-CM: 401.9 Snores     ICD-10-CM: R06.83 
ICD-9-CM: 786.09 Vitals BP Pulse Temp Resp Height(growth percentile) Weight(growth percentile) (!) 140/100 70 98.8 °F (37.1 °C) (Oral) 20 5' 7\" (1.702 m) 313 lb 3.2 oz (142.1 kg) LMP SpO2 BMI OB Status Smoking Status 06/10/2013 97% 49.05 kg/m2 Hysterectomy Current Some Day Smoker Vitals History BMI and BSA Data Body Mass Index Body Surface Area 49.05 kg/m 2 2.59 m 2 Preferred Pharmacy [facility] Name Phone [facility] [address] [place], [address] [facility] [phone] Your Updated Medication List  
  
   
 This list is accurate as of 3/1/18  3:01 PM.  Always use your most recent med list.  
  
  
  
  
 BENADRYL 25 mg capsule Generic drug:  diphenhydrAMINE Take 25 mg by mouth every six (6) hours as needed. LORazepam 1 mg tablet Commonly known as:  ATIVAN Take 1 Tab by mouth daily as needed for Anxiety (Panic Attack). losartan 25 mg tablet Commonly known as:  COZAAR  
TAKE 1 TABLET BY MOUTH DAILY PARoxetine 40 mg tablet Commonly known as:  PAXIL TAKE 1 TABLET BY MOUTH EVERY DAY We Performed the Following REFERRAL TO SLEEP STUDIES [REF99 Custom] Comments:  
 Pre op gastric bypass Body mass index is 49.05 kg/(m^2). Snores and HTN Referral Information Referral ID Referred By Referred To  
  
 [id number] Hermann Bingham MD   
   [address] [place], [place] [address] Phone: [phone] Fax: [phone] Visits Status Start Date End Date 1 New Request 3/1/18 3/1/19 If your referral has a status of pending review or denied, additional information will be sent to support the outcome of this decision. Patient Instructions Chucky Plush Stroop is our surgical coordinator and she will contact you with regard to dietician evaluation and sleep study. Must be smoke free for minimum of 30 days prior to surgery (longer the better) You can schedule the psychological evaluation at any time [facility]'s contact:  [email or web address]  
 
Plan:  Gastric bypass with Roverto Burrows MD  
 
  
Learning About How to Prepare for [address] [place] [address] Surgery How can you prepare for weight-loss surgery? Having weight-loss surgery (also called bariatric surgery) is a big step. You can prepare for surgery by having a plan. Your plan may include your goals for losing weight and how to makes changes in your diet, activity, and lifestyle to help raise your chances of success. One way to prepare for surgery is to think about your goal or reason why you want to reach a healthy weight. Do you want to lower your blood pressure, cholesterol, or blood sugar? Do you want to be able to sleep better, play with your kids, or walk around the block? Having a reason can help you stay with your plan and meet your goals. Your weight-loss surgery team can help you meet your goals and get ready for surgery. Vasiliy Mackey work with a team that's trained to help you lose weight and make healthy changes in your life. This team may include: · A medical doctor or nurse to help manage your care and schedule tests before surgery. · A surgeon who specializes in weight-loss surgery. · A registered dietitian to help you plan meals and make changes in the way you eat. · An exercise specialist to help you be more active and get stronger. · A therapist or counselor to help you learn why you eat and teach you ways to deal with stress and your emotions. Your team will also be there to help you prepare for life after surgery. They will help you adjust to new ways of eating and changes to your body. How will weight-loss surgery affect your life? You have likely thought a lot about how surgery may affect your life-how you will eat, how your body will look, or how you will feel. Some people feel overwhelmed with these changes. But planning can help you prepare for the changes and meet your weight-loss goals. One important step in your plan is to learn about the ways surgery will affect your life. These may include: · A slimmer you. You probably will lose weight very quickly in the first few months after surgery. As time goes on, your weight loss will slow down. How much weight you lose depends on what type of surgery you had and how well your new eating and activity plans are working for you. · A new way of eating.  Success in reaching and keeping a healthy weight depends on making lifelong changes in how you eat. After surgery, you raise your chances of success if you: 
¨ Eat just a few ounces of food at a time. ¨ Eat very slowly and chew your food to mush. ¨ Don't drink for 30 minutes before you eat, during your meal, and for 30 minutes after you eat. ¨ Are careful about drinking alcohol. ¨ Avoid foods that are high in fat or sugar. ¨ Take vitamin and mineral supplements. · A healthier you. Weight-loss surgery can have some real health benefits. Problems like diabetes, high blood pressure, and sleep apnea may go away-or at least become easier to manage. · A more active you. After surgery, being active on most days of the week will help you reach your weight goal and avoid gaining back the weight you lose. · A lot of extra skin. When you lose weight quickly, you may have a lot of extra skin. That's normal. You can have surgery to remove the extra skin if it bothers you. There are going to be some ups and downs while you get used to these changes. So another way to adjust is to identify who can help support you. Getting support from friends and family can help. And joining a support group for people who have had the surgery can be a big help too, because they know what you're going through. As you know, it's a big decision to have weight-loss surgery. But when you have a plan, you can focus on losing weight and living a healthier life. So what steps can you take to prepare for weight-loss surgery? Will you set some goals? Will you learn about how surgery can affect your life? How about asking family or friends for help? Write out your plan. Then get ready. Where can you learn more? Go to http://reyna-perri.info/. Enter R676 in the search box to learn more about \"Learning About How to Prepare for Weight-Loss Surgery. \" Current as of: October 13, 2016 Content Version: 11.4 © 4868-4532 Healthwise, Incorporated.  Care instructions adapted under license by [address] (which disclaims liability or warranty for this information). If you have questions about a medical condition or this instruction, always ask your healthcare professional. [address] any warranty or liability for your use of this information. Introducing [facility] & HE[facility] SERVICES! Dear Percy Bence: Thank you for requesting a [employer] account. Our records indicate that you already have an active [employer] account. You can access your account anytime at [email or web address] Did you know that you can access your hospital and ER discharge instructions at any time in [employer]? You can also review all of your test results from your hospital stay or ER visit. Additional Information If you have questions, please visit the Frequently Asked Questions section of the Fl[employer] Factory website at [email or web address] Remember, [employer] is NOT to be used for urgent needs. For medical emergencies, dial 911. Now available from your iPhone and Android! Please provide this summary of care documentation to your next provider. Your primary care clinician is listed as Primo Ray. If you have any questions after today's visit, please call [phone].

## 2018-03-12 ENCOUNTER — OFFICE VISIT (OUTPATIENT)
Dept: FAMILY MEDICINE CLINIC | Age: 48
End: 2018-03-12

## 2018-03-12 VITALS
DIASTOLIC BLOOD PRESSURE: 81 MMHG | HEART RATE: 72 BPM | WEIGHT: 293 LBS | SYSTOLIC BLOOD PRESSURE: 118 MMHG | RESPIRATION RATE: 18 BRPM | HEIGHT: 67 IN | BODY MASS INDEX: 45.99 KG/M2 | OXYGEN SATURATION: 98 % | TEMPERATURE: 98.6 F

## 2018-03-12 DIAGNOSIS — F41.0 PANIC ATTACKS: ICD-10-CM

## 2018-03-12 DIAGNOSIS — K75.81 NASH (NONALCOHOLIC STEATOHEPATITIS): ICD-10-CM

## 2018-03-12 DIAGNOSIS — E66.01 MORBID OBESITY (HCC): ICD-10-CM

## 2018-03-12 DIAGNOSIS — Z13.31 SCREENING FOR DEPRESSION: ICD-10-CM

## 2018-03-12 DIAGNOSIS — F17.200 SMOKER: ICD-10-CM

## 2018-03-12 DIAGNOSIS — F32.1 MODERATE MAJOR DEPRESSION (HCC): ICD-10-CM

## 2018-03-12 DIAGNOSIS — Z23 NEED FOR HEPATITIS A AND B VACCINATION: ICD-10-CM

## 2018-03-12 NOTE — PROGRESS NOTES
Chief Complaint   Patient presents with    Other     requesting letter per weight loss surgery     1. Have you been to the ER, urgent care clinic since your last visit? Hospitalized since your last visit? No    2. Have you seen or consulted any other health care providers outside of the [address] [place] [address] since your last visit? Include any pap smears or colon screening. No     Reviewed record in preparation for visit and have obtained necessary documentation.

## 2018-03-12 NOTE — ASSESSMENT & PLAN NOTE
Well Controlled, based on history, physical exam and review of pertinent labs, studies and medications; meds reconciled; continue current treatment plan, 3/12/2018:  PHQ9 = 2. Key Psychotherapeutic Meds             LORazepam (ATIVAN) 1 mg tablet  (Taking) Take 1 Tab by mouth daily as needed for Anxiety (Panic Attack). PARoxetine (PAXIL) 40 mg tablet  (Taking) TAKE 1 TABLET BY MOUTH EVERY DAY        Other [facility]     The patient is on no other behavioral health meds.         Lab Results   Component Value Date/Time    Sodium 141 10/13/2017 04:23 PM    Creatinine 0.76 10/13/2017 04:23 PM    TSH 3.060 10/13/2017 04:23 PM    WBC 10.5 10/13/2017 04:23 PM    ALT (SGPT) 12 10/13/2017 04:23 PM    AST (SGOT) 8 10/13/2017 04:23 PM

## 2018-03-12 NOTE — MR AVS SNAPSHOT
[address] [phone] Patient: Vipul Henry MRN: [id number] :1970 Visit Information Date & Time Provider Department Dept. Phone Encounter #  
 3/12/2018  5:30 PM Nilda Fong MD [address] [place] [address] [phone] [id number] Your Appointments 3/26/2018 10:00 AM  
NUTRITION COUNSELING with Surya Amin RD [address] [facility] ([facility]) Appt Note: pre-op evaluation [address] [facility]  [address] [facility] [address]  
[phone]  
  
   
 [address] [facility] [address] [facility] [address] [phone] Upcoming Health Maintenance Date Due  
 PAP AKA CERVICAL CYTOLOGY 2018 DTaP/Tdap/Td series (2 - Td) 2025 Allergies as of 3/12/2018  Review Complete On: 3/1/2018 By: Valentina Coates NP Severity Noted Reaction Type Reactions Pcn [Penicillins] High 2010   Systemic Anaphylaxis, Rash Amlodipine  2013   Side Effect Other (comments) Fatigue/drowsy Current Immunizations  Reviewed on 2017 Name Date Hep A Vaccine (Adult) 10/21/2015 Hep B Vaccine (Adult) 2016, 10/21/2015 Influenza Vaccine (Quad) PF 10/13/2017, 2017 Influenza Vaccine Split 10/7/2012 Pneumococcal Polysaccharide (PPSV-23) 2013 Tdap 2015 Not reviewed this visit You Were Diagnosed With   
  
 Codes Comments Smoker     ICD-10-CM: P51.912 ICD-9-CM: 305.1 Vitals BP Pulse Temp Resp Height(growth percentile) Weight(growth percentile) 118/81 (BP 1 Location: Right arm, BP Patient Position: Sitting) 72 98.6 °F (37 °C) (Oral) 18 5' 7\" (1.702 m) 317 lb (143.8 kg) LMP SpO2 BMI OB Status Smoking Status 06/10/2013 98% 49.65 kg/m2 Hysterectomy Current Some Day Smoker Vitals History BMI and BSA Data  Body Mass Index Body Surface Area  
 49.65 kg/m 2 2.61 m 2  
  
  
 Preferred Pharmacy Pharm[facility] Name Phone [facility] [address] [place] Rom[place], [address] [facility] [phone] Your Updated Medication List  
  
   
This list is accurate as of 3/12/18  6:16 PM.  Always use your most recent med list.  
  
  
  
  
 BENADRYL 25 mg capsule Generic drug:  diphenhydrAMINE Take 25 mg by mouth every six (6) hours as needed. LORazepam 1 mg tablet Commonly known as:  ATIVAN Take 1 Tab by mouth daily as needed for Anxiety (Panic Attack). losartan 25 mg tablet Commonly known as:  COZAAR  
TAKE 1 TABLET BY MOUTH DAILY PARoxetine 40 mg tablet Commonly known as:  PAXIL TAKE 1 TABLET BY MOUTH EVERY DAY Patient Instructions Must quit smoking > 8 weeks pre-op Consider PCV 13 vaccine Introducing [facility] [place] [facility]! Dear Zenaida Garcia: Thank you for requesting a Via account. Our records indicate that you already have an active Via account. You can access your account anytime at [email or web address] Did you know that you can access your hospital and ER discharge instructions at any time in Via? You can also review all of your test results from your hospital stay or ER visit. Additional Information If you have questions, please visit the Frequently Asked Questions section of the Via website at [email or web address] Remember, Via is NOT to be used for urgent needs. For medical emergencies, dial 911. Now available from your iPhone and Android! Please provide this summary of care documentation to your next provider. Your primary care clinician is listed as Kimberlee Franklin. If you have any questions after today's visit, please call [phone].

## 2018-03-12 NOTE — ASSESSMENT & PLAN NOTE
This condition is managed by Specialist.  Dr. Becca Saravia     Patient is on no anti-obesity meds.         Lab Results   Component Value Date/Time    Hemoglobin A1c 5.5 10/13/2017 04:23 PM    Glucose 115 10/13/2017 04:23 PM    Cholesterol, total 211 10/13/2017 04:23 PM    HDL Cholesterol 44 10/13/2017 04:23 PM    LDL, calculated 113 10/13/2017 04:23 PM    Triglyceride 268 10/13/2017 04:23 PM    TSH 3.060 10/13/2017 04:23 PM    Sodium 141 10/13/2017 04:23 PM    Potassium 4.4 10/13/2017 04:23 PM    ALT (SGPT) 12 10/13/2017 04:23 PM    AST (SGOT) 8 10/13/2017 04:23 PM
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
## 2018-03-12 NOTE — LETTER
3/12/2018 6:10 PM 
 
Ms. Dejuan Jaramillo [address] [place] [phone] I strongly suggest that you avoid use of any tobacco products. This may be the most important thing you can do for your health. While medicines may help, the most important thing for you is the decision to quit. If you need a \"Quit \", please call 2-123-QUIT NOW (4-272.742.8560). If you need help with nicotine withdrawal, I suggest over-the-counter nicotine replacement aids such as nicotine gum or patches, used as directed. As you may know, use of tobacco products increases your risk for many forms of cancer, heart disease, stroke, and blood clotting. Your body is very forgiving. Quit now and improve your health! Sincerely, Byron Gong MD

## 2018-03-26 ENCOUNTER — CLINICAL SUPPORT (OUTPATIENT)
Dept: SURGERY | Age: 48
End: 2018-03-26

## 2018-03-26 VITALS — BODY MASS INDEX: 49.18 KG/M2 | WEIGHT: 293 LBS

## 2018-03-26 DIAGNOSIS — E66.01 MORBID OBESITY WITH BMI OF 45.0-49.9, ADULT (HCC): Primary | ICD-10-CM

## 2018-03-29 DIAGNOSIS — F41.9 ANXIETY: ICD-10-CM

## 2018-03-29 NOTE — TELEPHONE ENCOUNTER
Informed patient that Dr Chris Borja refused her medication refill and asked her to come in for an appointment. Patient then asked to send her refill to Dr Ruchi Adrian.

## 2018-03-30 RX ORDER — LORAZEPAM 1 MG/1
1 TABLET ORAL
Qty: 10 TAB | Refills: 0 | Status: SHIPPED | OUTPATIENT
Start: 2018-03-30 | End: 2018-05-01 | Stop reason: SDUPTHER

## 2018-03-30 NOTE — TELEPHONE ENCOUNTER
Can provide temporary supply, however, I agree with Dr. Nate Barron that she needs to be seen in follow-up. Please let patient know I will write for #10 tablets, but she needs to schedule an appointment. She will need to pick only 1 provider who will provide her anxiety medication, in accordance with our controlled substance agreement.

## 2018-04-02 ENCOUNTER — DOCUMENTATION ONLY (OUTPATIENT)
Dept: SLEEP MEDICINE | Age: 48
End: 2018-04-02

## 2018-04-09 ENCOUNTER — OFFICE VISIT (OUTPATIENT)
Dept: FAMILY MEDICINE CLINIC | Age: 48
End: 2018-04-09

## 2018-04-09 VITALS
SYSTOLIC BLOOD PRESSURE: 153 MMHG | OXYGEN SATURATION: 98 % | DIASTOLIC BLOOD PRESSURE: 86 MMHG | BODY MASS INDEX: 45.99 KG/M2 | WEIGHT: 293 LBS | TEMPERATURE: 98.9 F | HEART RATE: 65 BPM | HEIGHT: 67 IN | RESPIRATION RATE: 16 BRPM

## 2018-04-09 DIAGNOSIS — J01.00 ACUTE NON-RECURRENT MAXILLARY SINUSITIS: Primary | ICD-10-CM

## 2018-04-09 DIAGNOSIS — R05.9 COUGH: ICD-10-CM

## 2018-04-09 LAB
FLUAV+FLUBV AG NOSE QL IA.RAPID: NEGATIVE POS/NEG
FLUAV+FLUBV AG NOSE QL IA.RAPID: NEGATIVE POS/NEG
S PYO AG THROAT QL: NEGATIVE
VALID INTERNAL CONTROL?: YES
VALID INTERNAL CONTROL?: YES

## 2018-04-09 RX ORDER — FLUTICASONE PROPIONATE 50 MCG
2 SPRAY, SUSPENSION (ML) NASAL
Qty: 1 BOTTLE | Refills: 0 | Status: SHIPPED | OUTPATIENT
Start: 2018-04-09 | End: 2018-07-25 | Stop reason: ALTCHOICE

## 2018-04-09 RX ORDER — ALBUTEROL SULFATE 90 UG/1
2 AEROSOL, METERED RESPIRATORY (INHALATION)
Qty: 1 INHALER | Refills: 0 | Status: SHIPPED | OUTPATIENT
Start: 2018-04-09 | End: 2018-07-25 | Stop reason: ALTCHOICE

## 2018-04-09 NOTE — PATIENT INSTRUCTIONS
Sinusitis: Care Instructions  Your Care Instructions    Sinusitis is an infection of the lining of the sinus cavities in your head. Sinusitis often follows a cold. It causes pain and pressure in your head and face. In most cases, sinusitis gets better on its own in 1 to 2 weeks. But some mild symptoms may last for several weeks. Sometimes antibiotics are needed. Follow-up care is a key part of your treatment and safety. Be sure to make and go to all appointments, and call your doctor if you are having problems. It's also a good idea to know your test results and keep a list of the medicines you take. How can you care for yourself at home? · Take an over-the-counter pain medicine, such as acetaminophen (Tylenol), ibuprofen (Advil, Motrin), or naproxen (Aleve). Read and follow all instructions on the label. · If the doctor prescribed antibiotics, take them as directed. Do not stop taking them just because you feel better. You need to take the full course of antibiotics. · Be careful when taking over-the-counter cold or flu medicines and Tylenol at the same time. Many of these medicines have acetaminophen, which is Tylenol. Read the labels to make sure that you are not taking more than the recommended dose. Too much acetaminophen (Tylenol) can be harmful. · Breathe warm, moist air from a steamy shower, a hot bath, or a sink filled with hot water. Avoid cold, dry air. Using a humidifier in your home may help. Follow the directions for cleaning the machine. · Use saline (saltwater) nasal washes to help keep your nasal passages open and wash out mucus and bacteria. You can buy saline nose drops at a grocery store or drugstore. Or you can make your own at home by adding 1 teaspoon of salt and 1 teaspoon of baking soda to 2 cups of distilled water. If you make your own, fill a bulb syringe with the solution, insert the tip into your nostril, and squeeze gently. Ferro Spurr your nose.   · Put a hot, wet towel or a warm gel pack on your face 3 or 4 times a day for 5 to 10 minutes each time. · Try a decongestant nasal spray like oxymetazoline (Afrin). Do not use it for more than 3 days in a row. Using it for more than 3 days can make your congestion worse. When should you call for help? Call your doctor now or seek immediate medical care if:  ? · You have new or worse swelling or redness in your face or around your eyes. ? · You have a new or higher fever. ? Watch closely for changes in your health, and be sure to contact your doctor if:  ? · You have new or worse facial pain. ? · The mucus from your nose becomes thicker (like pus) or has new blood in it. ? · You are not getting better as expected. Where can you learn more? Go to http://reyna-perri.info/. Enter Q520 in the search box to learn more about \"Sinusitis: Care Instructions. \"  Current as of: May 12, 2017  Content Version: 11.4  © 6403-6956 Healthwise, Incorporated. Care instructions adapted under license by [employer] (which disclaims liability or warranty for this information). If you have questions about a medical condition or this instruction, always ask your healthcare professional. [address] any warranty or liability for your use of this information.

## 2018-04-09 NOTE — MR AVS SNAPSHOT
[address] 
[phone] Patient: Shakila Roldan MRN: [id number] :1970 Visit Information Date & Time Provider Department Dept. Phone Encounter #  
 2018  8:45 AM Cassia Cifuentes MD [address] [facility] [phone] [id number] Follow-up Instructions Return if symptoms worsen or fail to improve. Your Appointments 2018 10:40 AM  
New Patient with Arjun Broussard MD  
[address] ([facility]) Appt Note: NP_ref by Anil Reynoso, NP_eval for OSA_BCBS; r/s from   
 [address] [place] [id number] [address] [facility] [address] Upcoming Health Maintenance Date Due  
 PAP AKA CERVICAL CYTOLOGY 2018 DTaP/Tdap/Td series (2 - Td) 2025 Allergies as of 2018  Review Complete On: 2018 By: Franca Mccarty LPN Severity Noted Reaction Type Reactions Pcn [Penicillins] High 2010   Systemic Anaphylaxis, Rash Amlodipine  2013   Side Effect Other (comments) Fatigue/drowsy Current Immunizations  Reviewed on 2017 Name Date Hep A Vaccine (Adult) 10/21/2015 Hep B Vaccine (Adult) 2016, 10/21/2015 Influenza Vaccine (Quad) PF 10/13/2017, 2017 Influenza Vaccine Split 10/7/2012 Pneumococcal Polysaccharide (PPSV-23) 2013 Tdap 2015 Not reviewed this visit You Were Diagnosed With   
  
 Codes Comments Cough     ICD-10-CM: R05 ICD-9-CM: 741. 2 Vitals BP Pulse Temp Resp Height(growth percentile) Weight(growth percentile) 153/86 (BP 1 Location: Left arm, BP Patient Position: Sitting) 65 98.9 °F (37.2 °C) (Oral) 16 5' 7\" (1.702 m) 315 lb (142.9 kg) LMP SpO2 BMI OB Status Smoking Status 06/10/2013 98% 49.34 kg/m2 Hysterectomy Current Some Day Smoker Vitals History BMI and BSA Data Body Mass Index Body Surface Area  
 49.34 kg/m 2 2.6 m 2 Preferred Pharmacy Pharmacy Name Phone [facility] [address] [place], [address] [place] [address] [phone] Your Updated Medication List  
  
   
This list is accurate as of 18  9:29 AM.  Always use your most recent med list.  
  
  
  
  
 albuterol 90 mcg/actuation inhaler Commonly known as:  PROVENTIL HFA, VENTOLIN HFA, PROAIR HFA Take 2 Puffs by inhalation every four (4) hours as needed for Wheezing. fluticasone 50 mcg/actuation nasal spray Commonly known as:  [place] End 2 Sprays by Both Nostrils route two (2) times daily as needed for Rhinitis. LORazepam 1 mg tablet Commonly known as:  ATIVAN Take 1 Tab by mouth daily as needed for Anxiety (Panic Attack). losartan 25 mg tablet Commonly known as:  COZAAR  
TAKE 1 TABLET BY MOUTH DAILY PARoxetine 40 mg tablet Commonly known as:  PAXIL TAKE 1 TABLET BY MOUTH EVERY DAY Prescriptions Sent to Pharmacy Refills  
 albuterol (PROVENTIL HFA, VENTOLIN HFA, PROAIR HFA) 90 mcg/actuation inhaler 0 Sig: Take 2 Puffs by inhalation every four (4) hours as needed for Wheezing. Class: Normal  
 Pharmacy: [address] Ph #: [phone] Route: Inhalation  
 fluticasone (FLONASE) 50 mcg/actuation nasal spray 0 Si Sprays by Both Nostrils route two (2) times daily as needed for Rhinitis. Class: Normal  
 Pharmacy: [address] Ph #: [phone] Route: Both Nostrils We Performed the Following AMB POC RAPID STREP A [51894 CPT(R)] AMB POC KIMBERLY INFLUENZA A/B TEST [40135 CPT(R)] Follow-up Instructions Return if symptoms worsen or fail to improve. To-Do List   
 2018 Imaging:  XR CHEST PA LAT Patient Instructions Sinusitis: Care Instructions Your Care Instructions Sinusitis is an infection of the lining of the sinus cavities in your head. Sinusitis often follows a cold. It causes pain and pressure in your head and face. In most cases, sinusitis gets better on its own in 1 to 2 weeks. But some mild symptoms may last for several weeks. Sometimes antibiotics are needed. Follow-up care is a key part of your treatment and safety. Be sure to make and go to all appointments, and call your doctor if you are having problems. It's also a good idea to know your test results and keep a list of the medicines you take. How can you care for yourself at home? · Take an over-the-counter pain medicine, such as acetaminophen (Tylenol), ibuprofen (Advil, Motrin), or naproxen (Aleve). Read and follow all instructions on the label. · If the doctor prescribed antibiotics, take them as directed. Do not stop taking them just because you feel better. You need to take the full course of antibiotics. · Be careful when taking over-the-counter cold or flu medicines and Tylenol at the same time. Many of these medicines have acetaminophen, which is Tylenol. Read the labels to make sure that you are not taking more than the recommended dose. Too much acetaminophen (Tylenol) can be harmful. · Breathe warm, moist air from a steamy shower, a hot bath, or a sink filled with hot water. Avoid cold, dry air. Using a humidifier in your home may help. Follow the directions for cleaning the machine. · Use saline (saltwater) nasal washes to help keep your nasal passages open and wash out mucus and bacteria. You can buy saline nose drops at a grocery store or drugstore. Or you can make your own at home by adding 1 teaspoon of salt and 1 teaspoon of baking soda to 2 cups of distilled water. If you make your own, fill a bulb syringe with the solution, insert the tip into your nostril, and squeeze gently. Selmer Hashimoto your nose. · Put a hot, wet towel or a warm gel pack on your face 3 or 4 times a day for 5 to 10 minutes each time. · Try a decongestant nasal spray like oxymetazoline (Afrin). Do not use it for more than 3 days in a row. Using it for more than 3 days can make your congestion worse. When should you call for help? Call your doctor now or seek immediate medical care if: 
? · You have new or worse swelling or redness in your face or around your eyes. ? · You have a new or higher fever. ? Watch closely for changes in your health, and be sure to contact your doctor if: 
? · You have new or worse facial pain. ? · The mucus from your nose becomes thicker (like pus) or has new blood in it. ? · You are not getting better as expected. Where can you learn more? Go to http://reyna-perri.info/. Enter A836 in the search box to learn more about \"Sinusitis: Care Instructions. \" Current as of: May 12, 2017 Content Version: 11.4 © 2345-6701 [employer]. Care instructions adapted under license by [employer] (which disclaims liability or warranty for this information). If you have questions about a medical condition or this instruction, always ask your healthcare professional. [address] any warranty or liability for your use of this information. Introducing [facility] & HEALTH SERVICES! Dear Jesse Lynn: Thank you for requesting a [employer] account. Our records indicate that you already have an active [employer] account. You can access your account anytime at [email or web address]. [email or web address] Did you know that you can access your hospital and ER discharge instructions at any time in [employer]? You can also review all of your test results from your hospital stay or ER visit. Additional Information If you have questions, please visit the Frequently Asked Questions section of the [employer] website at [email or web address] Remember, MyChart is NOT to be used for urgent needs. For medical emergencies, dial 911. Now available from your iPhone and Android! Please provide this summary of care documentation to your next provider. Your primary care clinician is listed as Nannette Singh. If you have any questions after today's visit, please call [phone].

## 2018-04-09 NOTE — PROGRESS NOTES
Chief Complaint   Patient presents with    Cough     x 2 days    Wheezing    Headache    Sinus Pain     1. Have you been to the ER, urgent care clinic since your last visit? Hospitalized since your last visit? no    2. Have you seen or consulted any other health care providers outside of the [address] [place] [address] since your last visit? Include any pap smears or colon screening.  no

## 2018-04-09 NOTE — PROGRESS NOTES
Subjective    Chief complaint: cough     Jack Thompson is an 50 y.o. female presenting with 2 days of cough. +congestion, headache, sore throat. Subjective fevers and chills. Tried tylenol and ibuprofen for headache. Uses benadryl every night to sleep. Feels fatigued during the day. One sick contact at work with flu. Denies chest pain, abdominal pain, nausea/vomiting, orthopnea, PND. Allergies - reviewed: Allergies   Allergen Reactions    Pcn [Penicillins] Anaphylaxis and Rash    Amlodipine Other (comments)     Fatigue/drowsy       Medications - reviewed:   Current Outpatient Prescriptions   Medication Sig    albuterol (PROVENTIL HFA, VENTOLIN HFA, PROAIR HFA) 90 mcg/actuation inhaler Take 2 Puffs by inhalation every four (4) hours as needed for Wheezing.  fluticasone (FLONASE) 50 mcg/actuation nasal spray 2 Sprays by Both Nostrils route two (2) times daily as needed for Rhinitis.  LORazepam (ATIVAN) 1 mg tablet Take 1 Tab by mouth daily as needed for Anxiety (Panic Attack).  losartan (COZAAR) 25 mg tablet TAKE 1 TABLET BY MOUTH DAILY    PARoxetine (PAXIL) 40 mg tablet TAKE 1 TABLET BY MOUTH EVERY DAY     No current facility-administered medications for this visit.           Past Medical History - reviewed:  Past Medical History:   Diagnosis Date    Anemia     Anxiety     Current smoker     Depression     Hypertension     Menorrhagia     Morbid obesity ([facility] [address]     Snoring          Immunizations - reviewed:   Immunization History   Administered Date(s) Administered    Hep A Vaccine (Adult) 10/21/2015    Hep B Vaccine (Adult) 10/21/2015, 01/20/2016    Influenza Vaccine (Quad) PF 02/01/2017, 10/13/2017    Influenza Vaccine Split 10/07/2012    Pneumococcal Polysaccharide (PPSV-23) 11/18/2013    Tdap 07/06/2015         ROS  Constitutional: negative for fatigue and malaise  Respiratory: negative for shortness of breath  Cardiovascular: negative for chest pain  Gastrointestinal: negative for abdominal pain  Neurological: negative for dizziness/headache    Physical Exam  Visit Vitals    /86 (BP 1 Location: Left arm, BP Patient Position: Sitting)    Pulse 65    Temp 98.9 °F (37.2 °C) (Oral)    Resp 16    Ht 5' 7\" (1.702 m)    Wt 315 lb (142.9 kg)    LMP 06/10/2013    SpO2 98%    BMI 49.34 kg/m2     General appearance - Alert, NAD. Appears uncomfortable. Facial flushing. Head: Atraumatic. Normocephalic. No lymphadenopathy. +maxillary sinus tenderness  Eyes: EOMI. Sclera white. Ears: Hearing grossly normal. TM non erythematous with no effusion   Nose: Nares patent, no polyps  Throat: pharynx erythematous, no exudates. Respiratory - Poor air movement, scattered wheezes. Heart - Normal rate, regular rhythm. No m/r/r    Assessment/Plan  1. Cough: cough associated with congestion and low grade fevers at home. Strep and flu negative. Will rule out PNA with CXR given her symptoms, exam and prolonged smoking hx.   - AMB POC KIMBERLY INFLUENZA A/B TEST  - AMB POC RAPID STREP A  - XR CHEST PA LAT; Future    2. Acute non-recurrent maxillary sinusitis: Pt with poor air movement and maxillary sinus tenderness. Symptomatic treatment with flonase and albuterol. Precautions provided to return to care or go to ED.   - albuterol (PROVENTIL HFA, VENTOLIN HFA, PROAIR HFA) 90 mcg/actuation inhaler; Take 2 Puffs by inhalation every four (4) hours as needed for Wheezing. Dispense: 1 Inhaler; Refill: 0  - fluticasone (FLONASE) 50 mcg/actuation nasal spray; 2 Sprays by Both Nostrils route two (2) times daily as needed for Rhinitis. Dispense: 1 Bottle; Refill: 0    Follow-up Disposition:  Return if symptoms worsen or fail to improve. I discussed the aforementioned diagnoses with the patient as well as the plan of care.      Pt discussed with Dr. Jeff Nguyen MD  Family Medicine Resident

## 2018-04-09 NOTE — LETTER
NOTIFICATION OF RETURN TO WORK / SCHOOL 
 
4/9/2018 Ms. Valerio Crocker [address] [phone] To Whom It May Concern: 
 
Valerio Crocker was seen at [facility] on 4/9/18. Please excuse her from work. If you have any questions/concerns call the number above. Sincerely, Cecilio Luo MD

## 2018-04-24 ENCOUNTER — TELEPHONE (OUTPATIENT)
Dept: FAMILY MEDICINE CLINIC | Age: 48
End: 2018-04-24

## 2018-05-01 ENCOUNTER — OFFICE VISIT (OUTPATIENT)
Dept: FAMILY MEDICINE CLINIC | Age: 48
End: 2018-05-01

## 2018-05-01 VITALS
BODY MASS INDEX: 45.99 KG/M2 | WEIGHT: 293 LBS | SYSTOLIC BLOOD PRESSURE: 141 MMHG | OXYGEN SATURATION: 100 % | HEIGHT: 67 IN | HEART RATE: 78 BPM | RESPIRATION RATE: 20 BRPM | TEMPERATURE: 98.4 F | DIASTOLIC BLOOD PRESSURE: 87 MMHG

## 2018-05-01 DIAGNOSIS — F41.0 PANIC ATTACKS: Primary | ICD-10-CM

## 2018-05-01 DIAGNOSIS — N95.1 MENOPAUSAL SYMPTOMS: ICD-10-CM

## 2018-05-01 DIAGNOSIS — Z20.9 EXPOSURE TO POTENTIAL INFECTION: ICD-10-CM

## 2018-05-01 DIAGNOSIS — F41.9 ANXIETY: ICD-10-CM

## 2018-05-01 DIAGNOSIS — R19.7 DIARRHEA, UNSPECIFIED TYPE: ICD-10-CM

## 2018-05-01 RX ORDER — LORAZEPAM 1 MG/1
1 TABLET ORAL
Qty: 10 TAB | Refills: 0 | Status: SHIPPED | OUTPATIENT
Start: 2018-05-01 | End: 2018-07-30 | Stop reason: SDUPTHER

## 2018-05-01 NOTE — MR AVS SNAPSHOT
[address] 
[phone] Patient: Lor Thomas MRN: [id number] :1970 Visit Information Date & Time Provider Department Dept. Phone Encounter #  
 2018 11:20 AM Sia Grace MD [address] [phone] [id number] Follow-up Instructions Return if symptoms worsen or fail to improve. Your Appointments 2018 10:30 AM  
NUTRITION COUNSELING with Jackson Castañeda RD [address] ([address]) Appt Note: pre-op follow up 1401 [facility]  [address] [place] [address]  
[phone]  
  
   
 [address] [place] [address] [address] [place] [address] [facility] [id number] 2018 10:40 AM  
New Patient with Scott Patel MD  
[employer] ([facility]) Appt Note: NP_ref by Antonio Díaz NP_eval for OSA_BCBS; r/s from   
 [address] [place] [id number] 9184 [facility] [phone] Upcoming Health Maintenance Date Due  
 PAP AKA CERVICAL CYTOLOGY 2019* Influenza Age 5 to Adult 2018 DTaP/Tdap/Td series (2 - Td) 2025 *Topic was postponed. The date shown is not the original due date. Allergies as of 2018  Review Complete On: 2018 By: Sia Grace MD  
  
 Severity Noted Reaction Type Reactions Pcn [Penicillins] High 2010   Systemic Anaphylaxis, Rash Amlodipine  2013   Side Effect Other (comments) Fatigue/drowsy Current Immunizations  Reviewed on 2017 Name Date Hep A Vaccine (Adult) 10/21/2015 Hep B Vaccine (Adult) 2016, 10/21/2015 Influenza Vaccine (Quad) PF 10/13/2017, 2017 Influenza Vaccine Split 10/7/2012 Pneumococcal Polysaccharide (PPSV-23) 2013 Tdap 2015 Not reviewed this visit You Were Diagnosed With   
  
 Codes Comments Panic attacks    -  Primary ICD-10-CM: F41.0 ICD-9-CM: 300.01 Exposure to potential infection     ICD-10-CM: Z20.9 ICD-9-CM: V01.9 Diarrhea, unspecified type     ICD-10-CM: R19.7 ICD-9-CM: 787.91 Menopausal symptoms     ICD-10-CM: N95.1 ICD-9-CM: 627.2 Vitals BP Pulse Temp Resp Height(growth percentile) Weight(growth percentile) 141/87 (BP 1 Location: Left arm, BP Patient Position: Sitting) 78 98.4 °F (36.9 °C) (Oral) 20 5' 7\" (1.702 m) 312 lb (141.5 kg) LMP SpO2 BMI OB Status Smoking Status 06/10/2013 100% 48.87 kg/m2 Hysterectomy Current Some Day Smoker Vitals History BMI and BSA Data Body Mass Index Body Surface Area  
 48.87 kg/m 2 2.59 m 2 Preferred Pharmacy Pharmacy Name Phone [facility] [address] [place] [address] [phone] Your Updated Medication List  
  
   
This list is accurate as of 5/1/18 12:10 PM.  Always use your most recent med list.  
  
  
  
  
 albuterol 90 mcg/actuation inhaler Commonly known as:  PROVENTIL HFA, VENTOLIN HFA, PROAIR HFA Take 2 Puffs by inhalation every four (4) hours as needed for Wheezing. fluticasone 50 mcg/actuation nasal spray Commonly known as:  Gayla Ascencio 2 Sprays by Both Nostrils route two (2) times daily as needed for Rhinitis. LORazepam 1 mg tablet Commonly known as:  ATIVAN Take 1 Tab by mouth daily as needed for Anxiety (Panic Attack). losartan 25 mg tablet Commonly known as:  COZAAR  
TAKE 1 TABLET BY MOUTH DAILY PARoxetine 40 mg tablet Commonly known as:  PAXIL TAKE 1 TABLET BY MOUTH EVERY DAY We Performed the Following CBC W/O DIFF [04179 CPT(R)] CULTURE, STOOL [id number] CPT(R)] [facility] AND  [96525 CPT(R)] METABOLIC PANEL, COMPREHENSIVE [56349 CPT(R)] REFERRAL TO PSYCHIATRY [REF91 Custom] Comments:  
 Dr. Jamal Botello for panic #[phone] TSH 3RD GENERATION [48131 CPT(R)] WBC, STOOL [39690 CPT(R)] Follow-up Instructions Return if symptoms worsen or fail to improve. Referral Information Referral ID Referred By Referred To  
  
 [id number] MD Neyda Stearns 47 [facility] Eulogio Hirsch 33 Phone: [phone] Fax: [phone] Visits Status Start Date End Date 1 New Request 5/1/18 5/1/19 If your referral has a status of pending review or denied, additional information will be sent to support the outcome of this decision. Patient Instructions Labs today Drink plenty of fluids Try salty foods like crackers, pretzels, soup Try benadryl liquid:  One teaspoon every 4 hours as needed for nausea For diarrhea or cramps use Pepto Bismol :  2 pills every 6 hours as needed (it will turn your stool black) If worse or if you feel dehydrated, return or go to ER Tylenol OK for pain 
 
Consider seeing Dr. Maria Luisa Gracia for panic #[phone] Introducing [facility]! Dear Harshad De La Torre: Thank you for requesting a [employer] account. Our records indicate that you already have an active CyberSp[employer] account. You can access your account anytime at [email or web address] Did you know that you can access your hospital and ER discharge instructions at any time in CyberSponse? You can also review all of your test results from your hospital stay or ER visit. Additional Information If you have questions, please visit the Frequently Asked Questions section of the Cyber[employer] website at [email or web address] Remember, CyberSponse is NOT to be used for urgent needs. For medical emergencies, dial 911. Now available from your iPhone and Android! Please provide this summary of care documentation to your next provider. Your primary care clinician is listed as Nannette Singh.  If you have any questions after today's visit, please call [phone].

## 2018-05-01 NOTE — LETTER
5/3/2018 10:18 AM 
 
Ms. Panda Davenport [address] [place] [phone] Dear Panda Davenport: 
 
Please find your most recent results below. Resulted Orders CBC W/O DIFF Result Value Ref Range WBC 10.7 3.4 - 10.8 x10E3/uL  
 RBC 4.44 3.77 - 5.28 x10E6/uL HGB 12.1 11.1 - 15.9 g/dL HCT 37.4 34.0 - 46.6 % MCV 84 79 - 97 fL  
 MCH 27.3 26.6 - 33.0 pg  
 MCHC 32.4 31.5 - 35.7 g/dL  
 RDW 14.7 12.3 - 15.4 % PLATELET 241 763 - 445 x10E3/uL Narrative Performed at:  [address]  [id number] : Suzanne Luke MD, Phone:  [phone] METABOLIC PANEL, COMPREHENSIVE Result Value Ref Range Glucose 122 (H) 65 - 99 mg/dL BUN 12 6 - 24 mg/dL Creatinine 0.80 0.57 - 1.00 mg/dL GFR est non-AA 87 >59 mL/min/1.73 GFR est  >59 mL/min/1.73  
 BUN/Creatinine ratio 15 9 - 23 Sodium 142 134 - 144 mmol/L Potassium 4.5 3.5 - 5.2 mmol/L Chloride 98 96 - 106 mmol/L  
 CO2 26 18 - 29 mmol/L Calcium 9.4 8.7 - 10.2 mg/dL Protein, total 7.1 6.0 - 8.5 g/dL Albumin 3.8 3.5 - 5.5 g/dL GLOBULIN, TOTAL 3.3 1.5 - 4.5 g/dL A-G Ratio 1.2 1.2 - 2.2 Bilirubin, total 0.4 0.0 - 1.2 mg/dL Alk. phosphatase 102 39 - 117 IU/L  
 AST (SGOT) 20 0 - 40 IU/L  
 ALT (SGPT) 21 0 - 32 IU/L Narrative Performed at:  [address]  [id number] : Suzanne Luke MD, Phone:  [phone] TSH 3RD GENERATION Result Value Ref Range TSH 3.530 0.450 - 4.500 uIU/mL Narrative Performed at:  [address]  [id number] : Suzanne Luke MD, Phone:  [phone] [facility] Result Value Ref Range Luteinizing hormone 27.1 mIU/mL Comment:  
                       Adult Female: Follicular phase      2.4 -  12.6 Ovulation phase      14.0 -  95.6 Luteal phase          1.0 -  11.4 Postmenopausal        7.7 -  58.5 FSH 11.4 mIU/mL Comment:  
                       Adult Female: Follicular phase      3.5 -  12.5 Ovulation phase       4.7 -  21.5 Luteal phase          1.7 -   7.7 Postmenopausal       25.8 - 134.8 Narrative Performed at:  [address]  [id number] : Karen Retana MD, Phone:  [phone] RECOMMENDATIONS: 
 
Your sugars show that you may be at increased risk for diabetes. I suggest that you obtain a 2 hour glucose tolerance test to rule out diabetes. Sincerely, Rebecca Calderon MD

## 2018-05-01 NOTE — PATIENT INSTRUCTIONS
Labs today    Drink plenty of fluids    Try salty foods like crackers, pretzels, soup    Try benadryl liquid:  One teaspoon every 4 hours as needed for nausea    For diarrhea or cramps use Pepto Bismol :  2 pills every 6 hours as needed (it will turn your stool black)    If worse or if you feel dehydrated, return or go to ER    Tylenol OK for pain    Consider seeing Dr. Isadora Amezcua for panic #[phone]
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
## 2018-05-02 DIAGNOSIS — R73.09 ELEVATED GLUCOSE: Primary | ICD-10-CM

## 2018-05-02 LAB
ALBUMIN SERPL-MCNC: 3.8 G/DL (ref 3.5–5.5)
ALBUMIN/GLOB SERPL: 1.2 {RATIO} (ref 1.2–2.2)
ALP SERPL-CCNC: 102 IU/L (ref 39–117)
ALT SERPL-CCNC: 21 IU/L (ref 0–32)
AST SERPL-CCNC: 20 IU/L (ref 0–40)
BILIRUB SERPL-MCNC: 0.4 MG/DL (ref 0–1.2)
BUN SERPL-MCNC: 12 MG/DL (ref 6–24)
BUN/CREAT SERPL: 15 (ref 9–23)
CALCIUM SERPL-MCNC: 9.4 MG/DL (ref 8.7–10.2)
CHLORIDE SERPL-SCNC: 98 MMOL/L (ref 96–106)
CO2 SERPL-SCNC: 26 MMOL/L (ref 18–29)
CREAT SERPL-MCNC: 0.8 MG/DL (ref 0.57–1)
ERYTHROCYTE [DISTWIDTH] IN BLOOD BY AUTOMATED COUNT: 14.7 % (ref 12.3–15.4)
FSH SERPL-ACNC: 11.4 MIU/ML
GFR SERPLBLD CREATININE-BSD FMLA CKD-EPI: 100 ML/MIN/1.73
GFR SERPLBLD CREATININE-BSD FMLA CKD-EPI: 87 ML/MIN/1.73
GLOBULIN SER CALC-MCNC: 3.3 G/DL (ref 1.5–4.5)
GLUCOSE SERPL-MCNC: 122 MG/DL (ref 65–99)
HCT VFR BLD AUTO: 37.4 % (ref 34–46.6)
HGB BLD-MCNC: 12.1 G/DL (ref 11.1–15.9)
LH SERPL-ACNC: 27.1 MIU/ML
MCH RBC QN AUTO: 27.3 PG (ref 26.6–33)
MCHC RBC AUTO-ENTMCNC: 32.4 G/DL (ref 31.5–35.7)
MCV RBC AUTO: 84 FL (ref 79–97)
PLATELET # BLD AUTO: 286 X10E3/UL (ref 150–379)
POTASSIUM SERPL-SCNC: 4.5 MMOL/L (ref 3.5–5.2)
PROT SERPL-MCNC: 7.1 G/DL (ref 6–8.5)
RBC # BLD AUTO: 4.44 X10E6/UL (ref 3.77–5.28)
SODIUM SERPL-SCNC: 142 MMOL/L (ref 134–144)
TSH SERPL DL<=0.005 MIU/L-ACNC: 3.53 UIU/ML (ref 0.45–4.5)
WBC # BLD AUTO: 10.7 X10E3/UL (ref 3.4–10.8)

## 2018-05-06 LAB
CAMPYLOBACTER STL CULT: NORMAL
E COLI SXT STL QL IA: NEGATIVE
SALM + SHIG STL CULT: NORMAL
WBC STL QL MICRO: NORMAL

## 2018-05-14 ENCOUNTER — TELEPHONE (OUTPATIENT)
Dept: SLEEP MEDICINE | Age: 48
End: 2018-05-14

## 2018-05-17 ENCOUNTER — OFFICE VISIT (OUTPATIENT)
Dept: SLEEP MEDICINE | Age: 48
End: 2018-05-17

## 2018-05-17 VITALS
BODY MASS INDEX: 45.99 KG/M2 | OXYGEN SATURATION: 100 % | WEIGHT: 293 LBS | HEIGHT: 67 IN | HEART RATE: 67 BPM | DIASTOLIC BLOOD PRESSURE: 100 MMHG | SYSTOLIC BLOOD PRESSURE: 160 MMHG

## 2018-05-17 DIAGNOSIS — G47.33 OSA (OBSTRUCTIVE SLEEP APNEA): Primary | ICD-10-CM

## 2018-05-17 NOTE — PROGRESS NOTES
[address] [place]. [place], [address]  Tel.  [phone]  Fax. 100 [facility] 60  [place], 200 [address] [facility]  Tel.  [phone]  Fax. [phone] 9250 [facility] Eulogio Hirsch   Tel.  [phone]  Fax. [phone]         Chief Complaint       Chief Complaint   Patient presents with    Sleep Problem     NP; ref NP melany Emanuel for ANNA; having gastric surgery probably in Sept 2018         KVNG Duffy is a right handed 50y.o. year old female seen for  evaluation of  Sleep apnea. She is being evaluated for bariatric surgery. The patient reports she has experienced episodes of awakening due to palpitations and anxiety, daytime sleepiness, snoring, non-restorative sleep, early morning headaches. The patient reports she has been snoring for a number of years and her snoring worsened. Describes a loud. She has problems with bruxism. Her bedtime is 10 PM. She awakens at  5:30 AM.  She tends to awaken spontaneously. She reports she is groggy on awakening and may fall asleep later in the day if watching TV or at the computer. The snoring is not exacerbated by alcohol, large meals, medications, sleeping supine. She notes episodes of AM headaches. Other remarks: Due to the severity of her 's snoring she will take a 25 mg Benadryl at hs. [place] Sleepiness Score: 9          Allergies   Allergen Reactions    Pcn [Penicillins] Anaphylaxis and Rash    Amlodipine Other (comments)     Fatigue/drowsy       Current Outpatient Prescriptions   Medication Sig Dispense Refill    diphenhydrAMINE (BENADRYL) 50 mg tablet Take 50 mg by mouth nightly as needed for Sleep.  LORazepam (ATIVAN) 1 mg tablet Take 1 Tab by mouth daily as needed for Anxiety (Panic Attack).  10 Tab 0    losartan (COZAAR) 25 mg tablet TAKE 1 TABLET BY MOUTH DAILY 90 Tab 1    PARoxetine (PAXIL) 40 mg tablet TAKE 1 TABLET BY MOUTH EVERY DAY 90 Tab 1    albuterol (PROVENTIL HFA, VENTOLIN HFA, PROAIR HFA) 90 mcg/actuation inhaler Take 2 Puffs by inhalation every four (4) hours as needed for Wheezing. 1 Inhaler 0    fluticasone (FLONASE) 50 mcg/actuation nasal spray 2 Sprays by Both Nostrils route two (2) times daily as needed for Rhinitis. 1 Bottle 0        She  has a past medical history of Anemia; Anxiety; Current smoker; Depression; Hypertension; Menorrhagia; Morbid obesity ([address] and Snoring. She  has a past surgical history that includes hx cholecystectomy (2006); pr blood transfusion service (2010); biopsy (2010); pr laparoscopy w tot hysterectuterus <=250 gram  w tube/ovary (7/10/2013); and pr cystourethroscopy (7/10/2013). She family history includes Diabetes in her brother; Heart Attack in her mother; Heart Disease in her mother; Stroke in her maternal grandmother. There is no history of Malignant Hyperthermia, Pseudocholinesterase Deficiency, Delayed Awakening, Post-op Nausea/Vomiting, Emergence Delirium, Post-op Cognitive Dysfunction, or Other. She  reports that she has been smoking Cigarettes. She has a 14.00 pack-year smoking history. She has never used smokeless tobacco. She reports that she does not drink alcohol or use illicit drugs. Review of Systems:  Review of Systems   Constitutional: Negative. HENT: Negative. Eyes: Negative for blurred vision and double vision. Respiratory: Negative. Cardiovascular: Positive for palpitations. Negative for chest pain. Gastrointestinal: Negative for nausea and vomiting. Genitourinary: Negative. Musculoskeletal: Negative. Skin: Negative. Neurological: Negative. Endo/Heme/Allergies: Negative. Psychiatric/Behavioral: Negative. Objective:     Visit Vitals    BP (!) 160/100  Comment: 150 100    Pulse 67    Ht 5' 7\" (1.702 m)    Wt 317 lb (143.8 kg)    LMP 06/10/2013    SpO2 100%    BMI 49.65 kg/m2     Body mass index is 49.65 kg/(m^2).     General: Conversant, cooperative   Eyes:  Pupils equal and reactive, no nystagmus, flat disk margins. Normal A/V ratio   Oropharynx:   Mallampati score IV, tongue  scalloped , small oral airway   Tonsils:   tonsils present   Neck:   No carotid bruits; Neck circ. in \"inches\": 17   Chest/Lungs:  Clear on auscultation    CVS:  Normal rate, regular rhythm   Skin:  Warm to touch; no obvious rashes   Neuro:  Speech fluent, face symmetrical, tongue movement normal   Psych:  Normal affect,  normal countenance        Assessment:       ICD-10-CM ICD-9-CM    1. ANNA (obstructive sleep apnea) G47.33 327.23 SPLIT CPAP/PSG     History of snoring, non-restorative sleep, consistent with sleep disordered breathing. Reports episodes of palpitations which she thought could be anxiety but which may represent cardiac dysrhythmia secondary to significant sleep apnea. An inlab split study will be requested. Plan:     Orders Placed This Encounter    SPLIT CPAP/PSG     Standing Status:   Future     Standing Expiration Date:   11/17/2018     Order Specific Question:   Reason for Exam     Answer:   snoring, non-restorative sleep, nocturnal palpitations, panic episodes    diphenhydrAMINE (BENADRYL) 50 mg tablet     Sig: Take 50 mg by mouth nightly as needed for Sleep. * Patient has a history and examination consistent with the diagnosis of sleep apnea. * Sleep testing was ordered for initial evaluation. * She was provided information on sleep apnea including coresponding risk factors and the importance of proper treatment. * Treatment options if indicated were reviewed today. Potential benefit of weight reduction was discussed. Weight reduction techniques reviewed.     Follow-up Disposition: Not on File     Emma Whitmore MD, [facility]  05/17/18

## 2018-05-17 NOTE — PATIENT INSTRUCTIONS
Sleep Apnea: Care Instructions  Your Care Instructions    Sleep apnea means that you frequently stop breathing for 10 seconds or longer during sleep. It can be mild to severe, based on the number of times an hour that you stop breathing or have slowed breathing. Blocked or narrowed airways in your nose, mouth, or throat can cause sleep apnea. Your airway can become blocked when your throat muscles and tongue relax during sleep. You can treat sleep apnea at home by making lifestyle changes. You also can use a CPAP breathing machine that keeps tissues in the throat from blocking your airway. Or your doctor may suggest that you use a breathing device while you sleep. It helps keep your airway open. This could be a device that you put in your mouth. Other examples include strips or disks that you use on your nose. In some cases, surgery may be needed to remove enlarged tissues in the throat. Follow-up care is a key part of your treatment and safety. Be sure to make and go to all appointments, and call your doctor if you are having problems. It's also a good idea to know your test results and keep a list of the medicines you take. How can you care for yourself at home? · Lose weight, if needed. It may reduce the number of times you stop breathing or have slowed breathing. · Sleep on your side. It may stop mild apnea. If you tend to roll onto your back, sew a pocket in the back of your pajama top. Put a tennis ball into the pocket, and stitch the pocket shut. This will help keep you from sleeping on your back. · Avoid alcohol and medicines such as sleeping pills and sedatives before bed. · Do not smoke. Smoking can make sleep apnea worse. If you need help quitting, talk to your doctor about stop-smoking programs and medicines. These can increase your chances of quitting for good. · Prop up the head of your bed 4 to 6 inches by putting bricks under the legs of the bed.   · Treat breathing problems, such as a stuffy nose, caused by a cold or allergies. · Try a continuous positive airway pressure (CPAP) breathing machine if your doctor recommends it. The machine keeps your airway open when you sleep. · If CPAP does not work for you, ask your doctor if you can try other breathing machines. A bilevel positive airway pressure machine uses one type of air pressure for breathing in and another type for breathing out. Another device raises or lowers air pressure as needed while you breathe. · Talk to your doctor if:  ¨ Your nose feels dry or bleeds when you use one of these machines. You may need to increase moisture in the air. A humidifier may help. ¨ Your nose is runny or stuffy from using a breathing machine. Decongestants or a corticosteroid nasal spray may help. ¨ You are sleepy during the day and it gets in the way of the normal things you do. Do not drive when you are drowsy. When should you call for help? Watch closely for changes in your health, and be sure to contact your doctor if:  ? · You still have sleep apnea even though you have made lifestyle changes. ? · You are thinking of trying a device such as CPAP. ? · You are having problems using a CPAP or similar machine. Where can you learn more? Go to http://reyna-perri.info/. Enter B292 in the search box to learn more about \"Sleep Apnea: Care Instructions. \"  Current as of: May 12, 2017  Content Version: 11.4  © 4393-0473 [employer]. Care instructions adapted under license by [employer] (which disclaims liability or warranty for this information). If you have questions about a medical condition or this instruction, always ask your healthcare professional. [address] any warranty or liability for your use of this information.

## 2018-06-01 ENCOUNTER — CLINICAL SUPPORT (OUTPATIENT)
Dept: SURGERY | Age: 48
End: 2018-06-01

## 2018-06-01 VITALS — WEIGHT: 293 LBS | BODY MASS INDEX: 49.34 KG/M2

## 2018-06-01 DIAGNOSIS — E66.01 MORBID OBESITY WITH BMI OF 45.0-49.9, ADULT (HCC): Primary | ICD-10-CM

## 2018-06-01 NOTE — PROGRESS NOTES
Pre-operative Bariatric Nutrition Evaluation - Follow Up     Date: 2018   Shira Travis M.D. Name: Berenice Mitchell  :  1970  Age:  50  Gender: Female   Type of Surgery: [x]           Gastric Bypass  []           LAGB  []           Sleeve Gastrectomy    ASSESSMENT:    Pt presents today for pre-op nutrition follow up in preparation for possible gastric bypass. Initial nutrition visit on 3/26/18 revealed some concerns for pt report of \"food addiction\" and \"difficulty with making changes for more than 1 week\". Goals were set at that time and pt was encouraged to follow up in 2 weeks. Pt has since rescheduled twice. Pt presents today and reports she has worked on smaller portions, healthier food choices, decreased red meat intake, using protein shakes in place of skipping meals and cutting back on soda (currently 1 can per day). She has mostly maintained her wt as a result of these changes. Pt is still smoking 2 cigarettes per week. Reports some increased stress recently d/t working long hours and recently \"had to kick my step son out of the house\". Pt's  is also going through approval process for wt loss surgery and she reports they are trying to support each other and making changes together. Overall it appears she is making appropriate changes. Still some concerns for previous limitations with making more long term changes and lack of follow up as scheduled. Have encouraged pt to continue to stay on track with reported changes and continue to focus on weight loss prior to surgery.        Anthropometrics:    Ht:67\"   Today's Wt: 315#  Wt at initial visit (3/26/18): 314#   Net change: 1# wt gain       BMI:49    Category: obesity III     Exercise/Physical Activity:still limited/none d/t work schedule; plans to join community pool in the next few weeks    24 Hour Diet Recall  Breakfast  Protein shake    Lunch  [place] ([facility]) sandwich    Dinner  Protein and veggies    Snacks  Nuts (in place of sweets); sweets 2 times per week (compared to daily in the past)    Beverages  Water; 1 can soda per day      A pre-op nutrition checklist was reviewed. Patient checked off 7 of 15 items. NUTRITION DIAGNOSIS:  1. Physical inactivity r/t time constraints that prevent exercise evidenced by pt with no current exercise regimen. NUTRITION INTERVENTION:  Pt educated on nutrition recommendations for weight loss surgery, specifically gastric bypass. Instructed on consuming 3 meals per day starting now. Use the balanced plate method to plan meals, include 3 oz of lean source of protein, 1/2 cup whole grains, unlimited non-starchy vegetables, 1/2 cup fruit and 1 serving of low fat dairy. Utilize handouts listing healthy snack and meal ideas to limit restaurant meals. After surgery measure all meals to 1/2 cup. Each meal will contain a 1/4 cup lean protein and 1/4 cup fruit, non-starchy vegetable or starch (limiting to once per day). Aim for 60 g protein per day. Sip on 48-64 oz of sugar free, calorie free, non-carbonated beverages each day. Do not use a straw. Do not consume beverages 30 minutes before, during or 30 minutes after meals. Read all nutrition labels. Demonstrated and emphasized identifying serving size, total fat, sugar and protein content. Defined low fat as </= 3 g per serving. Discussed lean and extra lean sources of protein. Provided list of low fat cooking methods. Avoid foods with sugar listed in the first 3 ingredients and >/15 g sugar per serving. Excess sugar/fat intake may lead to dumping syndrome. Discussed signs and symptoms of dumping syndrome. Practice mindful eating habits; take small bites, chew thoroughly, avoid distractions, utilize hunger/fullness scale. Consume meals over 20-30 minutes. Attend Bariatric Support Group and increase physical activity (approved per MD) for long term weight maintenance.       NUTRITION MONITORING AND EVALUATION:    The following goals were established with patient;  1. Maintain previously made changes. 2. Read food labels to limit added sugar intake. Limit to 10 grams or less per serving. 3. Start making a plan for exercise. Join pool as planned. 4. Continued weight maintenance/weight loss. 5. Follow up with RD PRN. Specific tips and techniques to facilitate compliance with above recommendations were provided and discussed. Pt was strongly encourage to begin making necessary changes now, attend support group, and re-visit the dietitian prn. Nutrition follow up reveals pt is making appropriate lifestyle changes evidenced by reported changes and weight maintenance. Pt would benefit from continued changes and demonstrating weight maintenance and/or loss. Demonstrates good understanding of general nutrition guidelines. Appears to be an appropriate candidate at this time. If further details are desired please feel free to contact me at [phone]. This phone number was also provided to the patient for any further questions or concerns.            Benny Hill RD

## 2018-06-07 ENCOUNTER — HOSPITAL ENCOUNTER (OUTPATIENT)
Dept: SLEEP MEDICINE | Age: 48
Discharge: HOME | End: 2018-06-07
Payer: COMMERCIAL

## 2018-06-07 VITALS
HEIGHT: 68 IN | WEIGHT: 293 LBS | BODY MASS INDEX: 44.41 KG/M2 | OXYGEN SATURATION: 99 % | HEART RATE: 73 BPM | SYSTOLIC BLOOD PRESSURE: 158 MMHG | TEMPERATURE: 98.1 F | DIASTOLIC BLOOD PRESSURE: 92 MMHG

## 2018-06-07 DIAGNOSIS — G47.33 OSA (OBSTRUCTIVE SLEEP APNEA): ICD-10-CM

## 2018-06-07 PROCEDURE — 95811 POLYSOM 6/>YRS CPAP 4/> PARM: CPT

## 2018-06-20 ENCOUNTER — TELEPHONE (OUTPATIENT)
Dept: SLEEP MEDICINE | Age: 48
End: 2018-06-20

## 2018-06-20 NOTE — TELEPHONE ENCOUNTER
Patient left message on office voicemail to get results of sleep study, per is having Bariatric surgery and need results faxed over to referring physician. Please review results asap. Patient had sleep study on 6/7/18 and was advise 5-7 days.

## 2018-06-21 ENCOUNTER — DOCUMENTATION ONLY (OUTPATIENT)
Dept: SLEEP MEDICINE | Age: 48
End: 2018-06-21

## 2018-06-21 NOTE — PROGRESS NOTES
This note is being routed to Dr. Jaime Shah NP. Sleep Medicine consult note and sleep study report in patient's chart for review.     Thank you for the referral.

## 2018-06-22 ENCOUNTER — OFFICE VISIT (OUTPATIENT)
Dept: SLEEP MEDICINE | Age: 48
End: 2018-06-22

## 2018-06-22 VITALS
BODY MASS INDEX: 44.41 KG/M2 | HEIGHT: 68 IN | DIASTOLIC BLOOD PRESSURE: 73 MMHG | SYSTOLIC BLOOD PRESSURE: 143 MMHG | HEART RATE: 65 BPM | WEIGHT: 293 LBS | OXYGEN SATURATION: 97 %

## 2018-06-22 DIAGNOSIS — G47.33 OSA (OBSTRUCTIVE SLEEP APNEA): Primary | ICD-10-CM

## 2018-06-22 NOTE — PATIENT INSTRUCTIONS
Sleep Apnea: Care Instructions  Your Care Instructions    Sleep apnea means that you frequently stop breathing for 10 seconds or longer during sleep. It can be mild to severe, based on the number of times an hour that you stop breathing or have slowed breathing. Blocked or narrowed airways in your nose, mouth, or throat can cause sleep apnea. Your airway can become blocked when your throat muscles and tongue relax during sleep. You can treat sleep apnea at home by making lifestyle changes. You also can use a CPAP breathing machine that keeps tissues in the throat from blocking your airway. Or your doctor may suggest that you use a breathing device while you sleep. It helps keep your airway open. This could be a device that you put in your mouth. Other examples include strips or disks that you use on your nose. In some cases, surgery may be needed to remove enlarged tissues in the throat. Follow-up care is a key part of your treatment and safety. Be sure to make and go to all appointments, and call your doctor if you are having problems. It's also a good idea to know your test results and keep a list of the medicines you take. How can you care for yourself at home? · Lose weight, if needed. It may reduce the number of times you stop breathing or have slowed breathing. · Sleep on your side. It may stop mild apnea. If you tend to roll onto your back, sew a pocket in the back of your pajama top. Put a tennis ball into the pocket, and stitch the pocket shut. This will help keep you from sleeping on your back. · Avoid alcohol and medicines such as sleeping pills and sedatives before bed. · Do not smoke. Smoking can make sleep apnea worse. If you need help quitting, talk to your doctor about stop-smoking programs and medicines. These can increase your chances of quitting for good. · Prop up the head of your bed 4 to 6 inches by putting bricks under the legs of the bed.   · Treat breathing problems, such as a stuffy nose, caused by a cold or allergies. · Try a continuous positive airway pressure (CPAP) breathing machine if your doctor recommends it. The machine keeps your airway open when you sleep. · If CPAP does not work for you, ask your doctor if you can try other breathing machines. A bilevel positive airway pressure machine uses one type of air pressure for breathing in and another type for breathing out. Another device raises or lowers air pressure as needed while you breathe. · Talk to your doctor if:  ¨ Your nose feels dry or bleeds when you use one of these machines. You may need to increase moisture in the air. A humidifier may help. ¨ Your nose is runny or stuffy from using a breathing machine. Decongestants or a corticosteroid nasal spray may help. ¨ You are sleepy during the day and it gets in the way of the normal things you do. Do not drive when you are drowsy. When should you call for help? Watch closely for changes in your health, and be sure to contact your doctor if:  ? · You still have sleep apnea even though you have made lifestyle changes. ? · You are thinking of trying a device such as CPAP. ? · You are having problems using a CPAP or similar machine. Where can you learn more? Go to http://reyna-perri.info/. Enter K254 in the search box to learn more about \"Sleep Apnea: Care Instructions. \"  Current as of: May 12, 2017  Content Version: 11.4  © 9890-9725 [employer]. Care instructions adapted under license by [employer] (which disclaims liability or warranty for this information). If you have questions about a medical condition or this instruction, always ask your healthcare professional. [address] any warranty or liability for your use of this information.

## 2018-06-22 NOTE — PROGRESS NOTES
[address] [facility] [place]. [place], [address]  Tel.  [phone]  Fax. [address] [facility] [address]  [place], [address] [facility]  Tel.  [phone]  Fax. [phone] [address] [facility] Eulogio Hirsch   Tel.  [phone]  Fax. [phone]         Chief Complaint       Chief Complaint   Patient presents with    Sleep Problem     review result         HPI        Britt Golden is a right handed 50y.o. year old female seen for follow-up. She reported  episodes of awakening due to palpitations and anxiety, daytime sleepiness, snoring, non-restorative sleep, early morning headaches. She was evaluated with a sleep study which demonstrated obstructive sleep apnea characterized by an AHI of 34.9  per hour associated with SaO2 to 86%;  responding to CPAP of  10-12 cm. The sleep study was  reviewed in detail with the patient today. Allergies   Allergen Reactions    Pcn [Penicillins] Anaphylaxis and Rash    Amlodipine Other (comments)     Fatigue/drowsy       Current Outpatient Prescriptions   Medication Sig Dispense Refill    diphenhydrAMINE (BENADRYL) 50 mg tablet Take 50 mg by mouth nightly as needed for Sleep.  LORazepam (ATIVAN) 1 mg tablet Take 1 Tab by mouth daily as needed for Anxiety (Panic Attack). 10 Tab 0    albuterol (PROVENTIL HFA, VENTOLIN HFA, PROAIR HFA) 90 mcg/actuation inhaler Take 2 Puffs by inhalation every four (4) hours as needed for Wheezing. 1 Inhaler 0    fluticasone (FLONASE) 50 mcg/actuation nasal spray 2 Sprays by Both Nostrils route two (2) times daily as needed for Rhinitis. 1 Bottle 0    losartan (COZAAR) 25 mg tablet TAKE 1 TABLET BY MOUTH DAILY 90 Tab 1    PARoxetine (PAXIL) 40 mg tablet TAKE 1 TABLET BY MOUTH EVERY DAY 90 Tab 1        She  has a past medical history of Anemia; Anxiety; Current smoker; Depression; Hypertension; Menorrhagia; Morbid obesity ([address] and Snoring.     She  has a past surgical history that includes hx cholecystectomy (2006); pr blood transfusion service (2010); biopsy (2010); pr laparoscopy w tot hysterectuterus <=250 gram  w tube/ovary (7/10/2013); and pr cystourethroscopy (7/10/2013). She family history includes Diabetes in her brother; Heart Attack in her mother; Heart Disease in her mother; Stroke in her maternal grandmother. There is no history of Malignant Hyperthermia, Pseudocholinesterase Deficiency, Delayed Awakening, Post-op Nausea/Vomiting, Emergence Delirium, Post-op Cognitive Dysfunction, or Other. She  reports that she has been smoking Cigarettes. She has a 14.00 pack-year smoking history. She has never used smokeless tobacco. She reports that she does not drink alcohol or use illicit drugs. Review of Systems:  Unchanged per patient      Objective:     Visit Vitals    /73    Pulse 65    Ht 5' 7.5\" (1.715 m)    Wt 314 lb (142.4 kg)    LMP 06/10/2013    SpO2 97%    BMI 48.45 kg/m2     Body mass index is 48.45 kg/(m^2). Assessment:       ICD-10-CM ICD-9-CM    1. ANNA (obstructive sleep apnea) G47.33 327.23 AMB SUPPLY ORDER     Severe sleep disordered breathing responding to CPAP 10-12 cm    Plan:     Orders Placed This Encounter    AMB SUPPLY ORDER     Resmed APAP 10-12 cm  Mask per pt preference       Copy of the sleep study was provided to the patient and reviewed in detail  APAP will be started at the above pressure settings. Patient will contact the office if there are problems with either the mask or pressure settings. Follow up will be scheduled at which time compliance data will be downloaded and reviewed. She was provided information on sleep apnea including corresponding risk factors and the importance of proper treatment. She understands she should not engage in activities requiring a normal degree of alertness if fatigue is present. Potential benefit of weight reduction was discussed.   Reevaluation once weight loss reaches plateau      Roney MUIR Alexey Hawkins MD, Pepper Arellano  06/22/18

## 2018-06-23 ENCOUNTER — OFFICE VISIT (OUTPATIENT)
Dept: FAMILY MEDICINE CLINIC | Age: 48
End: 2018-06-23

## 2018-06-23 VITALS
RESPIRATION RATE: 16 BRPM | BODY MASS INDEX: 44.41 KG/M2 | WEIGHT: 293 LBS | TEMPERATURE: 98.7 F | SYSTOLIC BLOOD PRESSURE: 138 MMHG | HEIGHT: 68 IN | HEART RATE: 72 BPM | DIASTOLIC BLOOD PRESSURE: 81 MMHG | OXYGEN SATURATION: 100 %

## 2018-06-23 DIAGNOSIS — M54.9 BACK PAIN, UNSPECIFIED BACK LOCATION, UNSPECIFIED BACK PAIN LATERALITY, UNSPECIFIED CHRONICITY: ICD-10-CM

## 2018-06-23 DIAGNOSIS — G89.29 CHRONIC PAIN OF LEFT KNEE: ICD-10-CM

## 2018-06-23 DIAGNOSIS — G57.12 MERALGIA PARAESTHETICA, LEFT: Primary | ICD-10-CM

## 2018-06-23 DIAGNOSIS — M25.562 CHRONIC PAIN OF LEFT KNEE: ICD-10-CM

## 2018-06-23 RX ORDER — TRAMADOL HYDROCHLORIDE 50 MG/1
50 TABLET ORAL
Qty: 30 TAB | Refills: 0 | Status: SHIPPED | OUTPATIENT
Start: 2018-06-23 | End: 2018-06-26

## 2018-06-23 RX ORDER — METHYLPREDNISOLONE 4 MG/1
TABLET ORAL
Qty: 1 DOSE PACK | Refills: 0 | Status: SHIPPED | OUTPATIENT
Start: 2018-06-23 | End: 2018-06-26

## 2018-06-23 NOTE — PROGRESS NOTES
Chief Complaint   Patient presents with    Knee Pain     Left knee pain; Has gotten worst over the last month.  Pain is worst during standing

## 2018-06-23 NOTE — LETTER
6/23/2018 10:37 AM 
 
Ms. Anitha Cabrera [address] [place] [address] To Whom It May Concern, Please excuse from work through 6/25/2018 for medical reasons. Sincerely, Sherrie Andino MD

## 2018-06-25 ENCOUNTER — DOCUMENTATION ONLY (OUTPATIENT)
Dept: SLEEP MEDICINE | Age: 48
End: 2018-06-25

## 2018-06-25 ENCOUNTER — TELEPHONE (OUTPATIENT)
Dept: FAMILY MEDICINE CLINIC | Age: 48
End: 2018-06-25

## 2018-06-25 DIAGNOSIS — M17.10 PRIMARY OSTEOARTHRITIS OF KNEE, UNSPECIFIED LATERALITY: Primary | ICD-10-CM

## 2018-06-25 NOTE — TELEPHONE ENCOUNTER
Patient received results from xray via [facility] and is concerned on what she could take to relieve the arthritis in her knee. She also requested to have a note for work stating she needs frequent breaks and lifting restrictions. Please call patient regarding this. Thank you  (Routing comment)     I suggest tylenol arthritis for pain. Also suggest physical therapy. Please call Shedrick Meigs to help arrange and authorize any tests and/or referrals.   Her # is [phone] at [place] Pat[place] is #[phone]

## 2018-06-25 NOTE — TELEPHONE ENCOUNTER
Patient received results from xray via [facility] and is concerned on what she could take to relieve the arthritis in her knee. She also requested to have a note for work stating she needs frequent breaks and lifting restrictions. Please call patient regarding this. Thank you  (Routing comment)       To be seen for further work note.

## 2018-06-26 ENCOUNTER — OFFICE VISIT (OUTPATIENT)
Dept: FAMILY MEDICINE CLINIC | Age: 48
End: 2018-06-26

## 2018-06-26 VITALS
TEMPERATURE: 98.9 F | DIASTOLIC BLOOD PRESSURE: 84 MMHG | SYSTOLIC BLOOD PRESSURE: 141 MMHG | HEIGHT: 68 IN | RESPIRATION RATE: 18 BRPM | OXYGEN SATURATION: 94 % | HEART RATE: 80 BPM | WEIGHT: 293 LBS | BODY MASS INDEX: 44.41 KG/M2

## 2018-06-26 DIAGNOSIS — R29.2 DECREASED PATELLAR REFLEX: ICD-10-CM

## 2018-06-26 DIAGNOSIS — M51.36 DDD (DEGENERATIVE DISC DISEASE), LUMBAR: Primary | ICD-10-CM

## 2018-06-26 RX ORDER — MELOXICAM 15 MG/1
15 TABLET ORAL DAILY
Qty: 10 TAB | Refills: 0 | Status: SHIPPED | OUTPATIENT
Start: 2018-06-26 | End: 2018-07-25 | Stop reason: ALTCHOICE

## 2018-06-26 NOTE — MR AVS SNAPSHOT
[address] 
[phone] Patient: Cindi Howard MRN: [id number] :1970 Visit Information Date & Time Provider Department Dept. Phone Encounter #  
 2018  3:50 PM Prisca Patterson MD [address] [facility] [phone] [id number] Your Appointments 2018  9:20 AM  
Any with Mirta Desai MD  
[address] ([address]) Appt Note: 1st adh, bring machine [address] [employer]â„¢ [place] [phone] [address]  [phone] Upcoming Health Maintenance Date Due  
 PAP AKA CERVICAL CYTOLOGY 2019* Influenza Age 5 to Adult 2018 DTaP/Tdap/Td series (2 - Td) 2025 *Topic was postponed. The date shown is not the original due date. Allergies as of 2018  Review Complete On: 2018 By: Prisca Patterson MD  
  
 Severity Noted Reaction Type Reactions Pcn [Penicillins] High 2010   Systemic Anaphylaxis, Rash Amlodipine  2013   Side Effect Other (comments) Fatigue/drowsy Current Immunizations  Reviewed on 2018 Name Date Hep A Vaccine (Adult) 10/21/2015 Hep B Vaccine (Adult) 2016, 10/21/2015 Influenza Vaccine (Quad) PF 10/13/2017, 2017 Influenza Vaccine Split 10/7/2012 Pneumococcal Polysaccharide (PPSV-23) 2013 Tdap 2015 Reviewed by Dash Billy LPN on  at  4:05 PM  
You Were Diagnosed With   
  
 Codes Comments DDD (degenerative disc disease), lumbar    -  Primary ICD-10-CM: M51.36 
ICD-9-CM: 722.52 Decreased patellar reflex     ICD-10-CM: R29.2 ICD-9-CM: 796.1 left knee Vitals BP Pulse Temp Resp Height(growth percentile) Weight(growth percentile)  141/84 (BP 1 Location: Right arm, BP Patient Position: Sitting) 80 98.9 °F (37.2 °C) (Oral) 18 5' 7.5\" (1.715 m) 318 lb (144.2 kg) LMP SpO2 BMI OB Status Smoking Status 06/10/2013 94% 49.07 kg/m2 Hysterectomy Former Smoker BMI and BSA Data Body Mass Index Body Surface Area 49.07 kg/m 2 2.62 m 2 Preferred Pharmacy Pharmacy Name Phone [facility] [address] [phone] Your Updated Medication List  
  
   
This list is accurate as of 6/26/18  4:39 PM.  Always use your most recent med list.  
  
  
  
  
 albuterol 90 mcg/actuation inhaler Commonly known as:  PROVENTIL HFA, VENTOLIN HFA, PROAIR HFA Take 2 Puffs by inhalation every four (4) hours as needed for Wheezing. diphenhydrAMINE 50 mg tablet Commonly known as:  BENADRYL Take 50 mg by mouth nightly as needed for Sleep. fluticasone 50 mcg/actuation nasal spray Commonly known as:  Ino Sukhdeep 2 Sprays by Both Nostrils route two (2) times daily as needed for Rhinitis. LORazepam 1 mg tablet Commonly known as:  ATIVAN Take 1 Tab by mouth daily as needed for Anxiety (Panic Attack). losartan 25 mg tablet Commonly known as:  COZAAR  
TAKE 1 TABLET BY MOUTH DAILY  
  
 meloxicam 15 mg tablet Commonly known as:  MOBIC Take 1 Tab by mouth daily. PARoxetine 40 mg tablet Commonly known as:  PAXIL TAKE 1 TABLET BY MOUTH EVERY DAY Prescriptions Sent to Pharmacy Refills  
 meloxicam (MOBIC) 15 mg tablet 0 Sig: Take 1 Tab by mouth daily. Class: Normal  
 Pharmacy: [facility] Ph #: [phone] Route: Oral  
  
We Performed the Following REFERRAL TO ORTHOPEDIC SURGERY [REF62 Custom] To-Do List   
 06/26/2018 Imaging:  MRI LUMB SPINE WO CONT Referral Information Referral ID Referred By Referred To  
  
 [id number] Nahid Cleveland Not Available Visits Status Start Date End Date 1 New Request 6/26/18 6/26/19 If your referral has a status of pending review or denied, additional information will be sent to support the outcome of this decision. Patient Instructions Obtain physical therapy See back specialist Dr. Chloe Wright #[phone] Obtain MRI of back Please call David Milian to help arrange and authorize any tests and/or referrals. Her # is [phone] Central Scheduling at [facility] is #[phone] Stop medrol and tramadol Use meloxicam with food May also use tylenol for pain Introducing [facility] SERVICES! Dear Gracie Gaitan: Thank you for requesting a Dar[employer] account. Our records indicate that you already have an active Darberry account. You can access your account anytime at [email or web address] Did you know that you can access your hospital and ER discharge instructions at any time in Darberry? You can also review all of your test results from your hospital stay or ER visit. Additional Information If you have questions, please visit the Frequently Asked Questions section of the Darberry website at [email or web address] Remember, [employer]berry is NOT to be used for urgent needs. For medical emergencies, dial 911. Now available from your iPhone and Android! Please provide this summary of care documentation to your next provider. Your primary care clinician is listed as Doe Eller. If you have any questions after today's visit, please call [phone].

## 2018-06-26 NOTE — PATIENT INSTRUCTIONS
Obtain physical therapy    See back specialist  [address] #[phone]    Obtain MRI of back    Please call Lina to help arrange and authorize any tests and/or referrals.   Her # ge [phone]     Central Scheduling at [facility] is #[phone]    Stop medrol and tramadol    Use meloxicam with food    May also use tylenol for pain

## 2018-06-26 NOTE — PROGRESS NOTES
1. Have you been to the ER, urgent care clinic since your last visit? Hospitalized since your last visit? No    2. Have you seen or consulted any other health care providers outside of the [facility] since your last visit? Include any pap smears or colon screening. No    Chief Complaint   Patient presents with    Other     X-ray results    Letter for School/Work       Patient states Tramadol makes her feel weird so she does not take it. Patient states has pain 7/10 from lower back down to knee on left leg. Blood pressure 141/84, pulse 80, temperature 98.9 °F (37.2 °C), temperature source Oral, resp. rate 18, height 5' 7.5\" (1.715 m), weight 318 lb (144.2 kg), last menstrual period 06/10/2013, SpO2 94 %.
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
## 2018-07-22 DIAGNOSIS — F41.9 ANXIETY: ICD-10-CM

## 2018-07-23 RX ORDER — LORAZEPAM 1 MG/1
1 TABLET ORAL
Qty: 10 TAB | Refills: 0 | OUTPATIENT
Start: 2018-07-23

## 2018-07-23 NOTE — TELEPHONE ENCOUNTER
From: Reji George  To: Tatiana Bloch, MD  Sent: 7/22/2018 6:52 PM EDT  Subject: Medication Renewal Request    Original authorizing provider: Tatiana Bloch, MD    [facility]  [facility] the [facility]:  LORazepam (ATIVAN) 1 mg tablet Tatiana Bloch, MD]    Preferred pharmacy: [facility] [address]    Comment:  i only have one left

## 2018-07-25 ENCOUNTER — OFFICE VISIT (OUTPATIENT)
Dept: SURGERY | Age: 48
End: 2018-07-25

## 2018-07-25 VITALS
RESPIRATION RATE: 20 BRPM | WEIGHT: 293 LBS | HEIGHT: 68 IN | BODY MASS INDEX: 44.41 KG/M2 | TEMPERATURE: 98.7 F | OXYGEN SATURATION: 96 % | SYSTOLIC BLOOD PRESSURE: 140 MMHG | HEART RATE: 72 BPM | DIASTOLIC BLOOD PRESSURE: 100 MMHG

## 2018-07-25 DIAGNOSIS — E66.01 MORBID OBESITY WITH BMI OF 45.0-49.9, ADULT (HCC): Primary | ICD-10-CM

## 2018-07-25 NOTE — LETTER
7/26/2018 8:52 AM 
 
Patient:  Ana Paula Taylor YOB: 1970 Date of Visit: 7/25/2018 Dear MD Manuela Sahasus [address] [facility] [phone] VIA In Basket 
 : Thank you for referring Ms. Doug Yepez to me for evaluation/treatment. Below are the relevant portions of my assessment and plan of care. If you have questions, please do not hesitate to call me. I look forward to following Ms. Arrieta along with you. Sincerely, Tacos Celeste MD

## 2018-07-25 NOTE — LETTER
7/26/2018 8:51 AM 
 
Patient:  Serene Lennox YOB: 1970 Date of Visit: 7/25/2018 Dear MD Manuela Velozsus [address] [phone] VIA In Basket 
 : Thank you for referring Ms. Sonu Wells to me for evaluation/treatment. Below are the relevant portions of my assessment and plan of care. If you have questions, please do not hesitate to call me. I look forward to following Ms. Arrieta along with you. Sincerely, Aparna Srivastava MD

## 2018-07-25 NOTE — PROGRESS NOTES
1. Have you been to the ER, urgent care clinic since your last visit? Hospitalized since your last visit? No    2. Have you seen or consulted any other health care providers outside of the [address] [place] [address] since your last visit? Include any pap smears or colon screening.  No

## 2018-07-25 NOTE — MR AVS SNAPSHOT
[address] [place] [address] [address] 
[phone] Patient: Libertad Null MRN: [id number] Visit Information Date & Time Provider Department Dept. Phone Encounter #  
 2018  3:00 PM MD Evert Montesm[address]joan [phone] [id number] Follow-up Instructions Routing History Your Appointments 2018  9:20 AM  
Any with Peewee Gallardo MD  
[address] ([address]) Appt Note: 1st adh, bring machine [address] [facility] [address] [place] [id number] [address] [facility] [address] Upcoming Health Maintenance Date Due  
 PAP AKA CERVICAL CYTOLOGY 2019* Influenza Age 5 to Adult 2018 DTaP/Tdap/Td series (2 - Td) 2025 *Topic was postponed. The date shown is not the original due date. Allergies as of 2018  Review Complete On: 2018 By: Ainsley Golden LPN Severity Noted Reaction Type Reactions Pcn [Penicillins] High 2010   Systemic Anaphylaxis, Rash Amlodipine  2013   Side Effect Other (comments) Fatigue/drowsy Current Immunizations  Reviewed on 2018 Name Date Hep A Vaccine (Adult) 10/21/2015 Hep B Vaccine (Adult) 2016, 10/21/2015 Influenza Vaccine (Quad) PF 10/13/2017, 2017 Influenza Vaccine Split 10/7/2012 Pneumococcal Polysaccharide (PPSV-23) 2013 Tdap 2015 Not reviewed this visit You Were Diagnosed With   
  
 Codes Comments Morbid obesity with BMI of 45.0-49.9, adult ([facility] [address]    -  Primary ICD-10-CM: E66.01, Z68.42 
ICD-9-CM: 278.01, V85.42 Vitals BP Pulse Temp Resp Height(growth percentile) Weight(growth percentile) (!) 140/100 72 98.7 °F (37.1 °C) (Oral) 20 5' 7.5\" (1.715 m) 312 lb 9.6 oz (141.8 kg) LMP SpO2 BMI OB Status Smoking Status 06/10/2013 96% 48.24 kg/m2 Hysterectomy Former Smoker Vitals History BMI and BSA Data Body Mass Index Body Surface Area  
 48.24 kg/m 2 2.6 m 2 Preferred Pharmacy Pharmacy Name Phone [facility] [address] [facility] Deandre Haji, [address] [place] [address] [phone] Your Updated Medication List  
  
   
This list is accurate as of 7/25/18 11:59 PM.  Always use your most recent med list.  
  
  
  
  
 diphenhydrAMINE 50 mg tablet Commonly known as:  BENADRYL Take 50 mg by mouth nightly as needed for Sleep. LORazepam 1 mg tablet Commonly known as:  ATIVAN Take 1 Tab by mouth daily as needed for Anxiety (Panic Attack). losartan 25 mg tablet Commonly known as:  COZAAR  
TAKE 1 TABLET BY MOUTH DAILY PARoxetine 40 mg tablet Commonly known as:  PAXIL TAKE 1 TABLET BY MOUTH EVERY DAY Patient Instructions Learning About Bariatric Surgery What is bariatric surgery? Bariatric surgery is surgery to help you lose weight. This type of surgery is only used for people who are very overweight and have not been able to lose weight with diet and exercise. This surgery makes the stomach smaller. Some types of surgery also change the connection between your stomach and intestines. How is bariatric surgery done? Bariatric surgery may be either \"open\" or \"laparoscopic. \" Open surgery is done through a large cut (incision) in the belly. Laparoscopic surgery is done through several small cuts. The doctor puts a lighted tube, or scope, and other surgical tools through small cuts in your belly. The doctor is able to see your organs with the scope. There are different types of bariatric surgery. Gastric sleeve surgery The surgery is usually done through several small incisions in the belly. The doctor removes more than half of your stomach.  This leaves a thin sleeve, or tube, that is about the size of a banana. Because part of your stomach has been removed, this can't be reversed. Ren-en-Y gastric bypass surgery Ren-en-Y (say \"glendy-en-why\") surgery changes the connection between the stomach and the intestines. The doctor separates a section of your stomach from the rest of your stomach. This makes a small pouch. The new pouch will hold the food you eat. The doctor connects the stomach pouch to the middle part of the small intestine. Gastric banding surgery The surgery is usually done through several small incisions in the belly. The doctor wraps a band around the upper part of the stomach. This creates a small pouch. The small size of the pouch means that you will get full after you eat just a small amount of food. The doctor can inflate or deflate the band to adjust the size. This lets the doctor adjust how quickly food passes from the new pouch into the stomach. It does not change the connection between the stomach and the intestines. What can you expect after the surgery? You may stay in the hospital for one or more days after the surgery. How long you stay depends on the type of surgery you had. Most people need 2 to 4 weeks before they are ready to get back to their usual routine. For the first 2 to 6 weeks after surgery, you probably will need to follow a liquid or soft diet. Bit by bit, you will be able to eat more solid foods. Your doctor may advise you to work with a dietitian. This way you'll be sure to get enough protein, vitamins, and minerals while you are losing weight. Even with a healthy diet, you may need to take vitamin and mineral supplements. After surgery, you will not be able to eat very much at one time. You will get full quickly. Try not to eat too much at one time or eat foods that are high in fat or sugar. If you do, you may vomit, get stomach pain, or have diarrhea. You probably will lose weight very quickly in the first few months after surgery. As time goes on, your weight loss will slow down. You will have regular doctor visits to check how you are doing. Think of bariatric surgery as a tool to help you lose weight. It isn't an instant fix. You will still need to eat a healthy diet and get regular exercise. This will help you reach your weight goal and avoid regaining the weight you lose. Follow-up care is a key part of your treatment and safety. Be sure to make and go to all appointments, and call your doctor if you are having problems. It's also a good idea to know your test results and keep a list of the medicines you take. Where can you learn more? Go to http://reyna-perri.info/. Enter G469 in the search box to learn more about \"Learning About Bariatric Surgery. \" Current as of: October 9, 2017 Content Version: 11.7 © 7407-8357 [employer]. Care instructions adapted under license by [employer] (which disclaims liability or warranty for this information). If you have questions about a medical condition or this instruction, always ask your healthcare professional. [place] [address] any warranty or liability for your use of this information. Introducing [facility]! Dear Tri Haynes: Thank you for requesting a Make Works account. Our records indicate that you already have an active Make Works account. You can access your account anytime at [email or web address] Did you know that you can access your hospital and ER discharge instructions at any time in Make Works? You can also review all of your test results from your hospital stay or ER visit. Additional Information If you have questions, please visit the Frequently Asked Questions section of the Make Works website at [email or web address] Remember, Make Works is NOT to be used for urgent needs.  For medical emergencies, dial 911. Now available from your iPhone and Android! Please provide this summary of care documentation to your next provider. Your primary care clinician is listed as Quirino Askew. If you have any questions after today's visit, please call [phone].

## 2018-07-26 NOTE — PATIENT INSTRUCTIONS
Learning About Bariatric Surgery  What is bariatric surgery? Bariatric surgery is surgery to help you lose weight. This type of surgery is only used for people who are very overweight and have not been able to lose weight with diet and exercise. This surgery makes the stomach smaller. Some types of surgery also change the connection between your stomach and intestines. How is bariatric surgery done? Bariatric surgery may be either \"open\" or \"laparoscopic. \" Open surgery is done through a large cut (incision) in the belly. Laparoscopic surgery is done through several small cuts. The doctor puts a lighted tube, or scope, and other surgical tools through small cuts in your belly. The doctor is able to see your organs with the scope. There are different types of bariatric surgery. Gastric sleeve surgery  The surgery is usually done through several small incisions in the belly. The doctor removes more than half of your stomach. This leaves a thin sleeve, or tube, that is about the size of a banana. Because part of your stomach has been removed, this can't be reversed. Ren-en-Y gastric bypass surgery  Ren-en-Y (say \"glendy-en-why\") surgery changes the connection between the stomach and the intestines. The doctor separates a section of your stomach from the rest of your stomach. This makes a small pouch. The new pouch will hold the food you eat. The doctor connects the stomach pouch to the middle part of the small intestine. Gastric banding surgery  The surgery is usually done through several small incisions in the belly. The doctor wraps a band around the upper part of the stomach. This creates a small pouch. The small size of the pouch means that you will get full after you eat just a small amount of food. The doctor can inflate or deflate the band to adjust the size. This lets the doctor adjust how quickly food passes from the new pouch into the stomach.  It does not change the connection between the stomach and the intestines. What can you expect after the surgery? You may stay in the hospital for one or more days after the surgery. How long you stay depends on the type of surgery you had. Most people need 2 to 4 weeks before they are ready to get back to their usual routine. For the first 2 to 6 weeks after surgery, you probably will need to follow a liquid or soft diet. Bit by bit, you will be able to eat more solid foods. Your doctor may advise you to work with a dietitian. This way you'll be sure to get enough protein, vitamins, and minerals while you are losing weight. Even with a healthy diet, you may need to take vitamin and mineral supplements. After surgery, you will not be able to eat very much at one time. You will get full quickly. Try not to eat too much at one time or eat foods that are high in fat or sugar. If you do, you may vomit, get stomach pain, or have diarrhea. You probably will lose weight very quickly in the first few months after surgery. As time goes on, your weight loss will slow down. You will have regular doctor visits to check how you are doing. Think of bariatric surgery as a tool to help you lose weight. It isn't an instant fix. You will still need to eat a healthy diet and get regular exercise. This will help you reach your weight goal and avoid regaining the weight you lose. Follow-up care is a key part of your treatment and safety. Be sure to make and go to all appointments, and call your doctor if you are having problems. It's also a good idea to know your test results and keep a list of the medicines you take. Where can you learn more? Go to http://reyna-perri.info/. Enter G469 in the search box to learn more about \"Learning About Bariatric Surgery. \"  Current as of: October 9, 2017  Content Version: 11.7  © 3685-6730 [employer].  Care instructions adapted under license by [employer] (which disclaims liability or warranty for this information). If you have questions about a medical condition or this instruction, always ask your healthcare professional. [place] [address] any warranty or liability for your use of this information.

## 2018-07-26 NOTE — PROGRESS NOTES
Bariatric Surgery Consult    Cleveland Santiago is a 50 y.o. female with a history of morbid obesity. Her Height: 5' 7.5\" (171.5 cm), Weight: 312 lb 9.6 oz (141.8 kg). Body mass index is 48.24 kg/(m^2). She reports that she has been trying to lose weight for 10 years. Her maximum weight was 320 pounds. She has attended our bariatric surgery information seminar. Horacio Brock wants to consider laparoscopic gastric bypass surgery. Pt is referred by:  Laly Gonzalez MD.        Comorbidities:     Bariatric comorbidities present: hypertension, chronic back problems, osteoarthritis and obstructive sleep apnea    Ambulatory status: independent    The patient's reported level of exercise: moderately active.       Patient Active Problem List    Diagnosis Date Noted    Elevated glucose 05/02/2018    Moderate major depression ([address] 02/16/2018    MAK (nonalcoholic steatohepatitis) 10/20/2015    Smoker 10/13/2015    H/O: hysterectomy 07/06/2015    Morbid obesity ([address] 02/28/2011    HTN (hypertension), benign 02/28/2011    Panic attacks 07/21/2010     Past Medical History:   Diagnosis Date    Anemia     Anxiety     Current smoker     Depression     Hypertension     Menorrhagia     Morbid obesity ([address]     Snoring       Past Surgical History:   Procedure Laterality Date    BIOPSY  2010    polyp, 1/2 cm - negative, per patient   [address]    HX CHOLECYSTECTOMY  2006    RI CYSTOURETHROSCOPY  7/10/2013    CYSTOSCOPY performed by Joycelyn Liriano MD at [address] [place] [address] <=250 GRAM  W TUBE/OVARY  7/10/2013    HYSTERECTOMY ROBOTIC ASSISTED performed by Joycelyn Liriano MD at [facility] MAIN OR      Social History   Substance Use Topics    Smoking status: Former Smoker     Packs/day: 0.50     Years: 28.00     Types: Cigarettes     Quit date: 6/4/2018    Smokeless tobacco: Never Used      Comment: trying to quit    Alcohol use No Comment: rarely      Family History   Problem Relation Age of Onset    Heart Disease Mother      heart bypass    Heart Attack Mother     Diabetes Brother     Stroke Maternal Grandmother     Malignant Hyperthermia Neg Hx     Pseudocholinesterase Deficiency Neg Hx     Delayed Awakening Neg Hx     Post-op Nausea/Vomiting Neg Hx     Emergence Delirium Neg Hx     Post-op Cognitive Dysfunction Neg Hx     Other Neg Hx       . Current Outpatient Prescriptions   Medication Sig    diphenhydrAMINE (BENADRYL) 50 mg tablet Take 50 mg by mouth nightly as needed for Sleep.  LORazepam (ATIVAN) 1 mg tablet Take 1 Tab by mouth daily as needed for Anxiety (Panic Attack).  losartan (COZAAR) 25 mg tablet TAKE 1 TABLET BY MOUTH DAILY    PARoxetine (PAXIL) 40 mg tablet TAKE 1 TABLET BY MOUTH EVERY DAY     No current facility-administered medications for this visit.        Allergies   Allergen Reactions    Pcn [Penicillins] Anaphylaxis and Rash    Amlodipine Other (comments)     Fatigue/drowsy         Review of Systems:    Constitutional: negative  Ears, Nose, Mouth, Throat, and Face: negative  Respiratory: negative  Cardiovascular: negative  Gastrointestinal: negative  Genitourinary:negative  Integument/Breast: negative  Hematologic/Lymphatic: negative  Musculoskeletal:positive for stiff joints and back pain  Neurological: negative  Behavioral/Psychiatric: negative    Objective:     Visit Vitals    BP (!) 140/100    Pulse 72    Temp 98.7 °F (37.1 °C) (Oral)    Resp 20    Ht 5' 7.5\" (1.715 m)    Wt 312 lb 9.6 oz (141.8 kg)    LMP 06/10/2013    SpO2 96%    BMI 48.24 kg/m2        Physical Exam:    General:  alert, no distress, morbidly obese   Eyes:  conjunctivae and sclerae normal, pupils equal, round, reactive to light, extraocular movements intact without nystagmus   Throat & Neck: no erythema or exudates noted and neck supple and symmetrical; no palpable masses   Lungs:   clear to auscultation bilaterally Heart:  Regular rate and rhythm   Abdomen:   obese, soft, nontender, nondistended, no masses or organomegaly,    Extremities: no edema,  no gait disturbances   Skin: Normal.       Assessment:     1. Morbid obesity (Body mass index is 48.24 kg/(m^2). ) with multiple comorbidities. The patient meets criteria established by the NIH for weight loss surgery candidates. Without weight reduction, co-morbidities will escalate as well as increase risk of early mortality. Our recommendation is the patient could be served with laparoscopic gastric bypass surgery. I explained to the patient differences between laparoscopic gastric bypass, laparoscopic adjustable gastric banding, and laparoscopic vertical sleeve gastrectomy with respect to expected weight loss, resolution of comorbidities and risks. Ms. Keyon Vela has attended one our informational meetings and has seen our educational materials. She has requested Dr. Jordon Rincon to perform her procedure. I reviewed the role for this procedure as a tool to help her achieve her weight loss goals. I reminded her that effective weight loss comes from lifelong adherence to changes in dietary choices, eating habits and exercise. Recommendation: We will request approval for laparoscopic gastric bypass surgery. She is a good candidate for laparoscopic gastric bypass surgery. She was quit smoking a few months ago. Signed By: Aparna Srivastava MD     July 26, 2018       Greater than half of the time: 30 minutes was used in counciling the patient about bariatric surgery and the steps she needs to take to move forward with her surgery. Ms. Keyon Vela has a reminder for a \"due or due soon\" health maintenance. I have asked that she contact her primary care provider for follow-up on this health maintenance.

## 2018-07-30 DIAGNOSIS — F41.9 ANXIETY: ICD-10-CM

## 2018-07-30 RX ORDER — LORAZEPAM 1 MG/1
1 TABLET ORAL
Qty: 10 TAB | Refills: 0 | Status: SHIPPED | OUTPATIENT
Start: 2018-07-30 | End: 2018-10-01 | Stop reason: SDUPTHER

## 2018-09-20 NOTE — ADDENDUM NOTE
Addended by: Mirian Luz on: 2/1/2017 11:35 AM     Modules accepted: [place] Dressing: dry sterile dressing

## 2018-09-26 ENCOUNTER — OFFICE VISIT (OUTPATIENT)
Dept: SURGERY | Age: 48
End: 2018-09-26

## 2018-09-26 ENCOUNTER — HOSPITAL ENCOUNTER (OUTPATIENT)
Dept: GENERAL RADIOLOGY | Age: 48
Discharge: HOME | End: 2018-09-26
Payer: COMMERCIAL

## 2018-09-26 ENCOUNTER — HOSPITAL ENCOUNTER (OUTPATIENT)
Dept: PREADMISSION TESTING | Age: 48
Discharge: HOME | End: 2018-09-26
Payer: COMMERCIAL

## 2018-09-26 VITALS
OXYGEN SATURATION: 98 % | HEART RATE: 70 BPM | HEIGHT: 67 IN | DIASTOLIC BLOOD PRESSURE: 71 MMHG | SYSTOLIC BLOOD PRESSURE: 131 MMHG | BODY MASS INDEX: 45.99 KG/M2 | RESPIRATION RATE: 18 BRPM | TEMPERATURE: 97.8 F | WEIGHT: 293 LBS

## 2018-09-26 VITALS
OXYGEN SATURATION: 98 % | WEIGHT: 293 LBS | HEIGHT: 67 IN | RESPIRATION RATE: 18 BRPM | DIASTOLIC BLOOD PRESSURE: 71 MMHG | SYSTOLIC BLOOD PRESSURE: 131 MMHG | TEMPERATURE: 97.8 F | HEART RATE: 70 BPM | BODY MASS INDEX: 45.99 KG/M2

## 2018-09-26 VITALS
BODY MASS INDEX: 45.99 KG/M2 | DIASTOLIC BLOOD PRESSURE: 71 MMHG | TEMPERATURE: 97.8 F | WEIGHT: 293 LBS | SYSTOLIC BLOOD PRESSURE: 139 MMHG | HEIGHT: 67 IN

## 2018-09-26 DIAGNOSIS — I10 HTN (HYPERTENSION), BENIGN: ICD-10-CM

## 2018-09-26 DIAGNOSIS — E66.01 MORBID OBESITY (HCC): Primary | ICD-10-CM

## 2018-09-26 DIAGNOSIS — E66.01 MORBID OBESITY WITH BMI OF 45.0-49.9, ADULT (HCC): Primary | ICD-10-CM

## 2018-09-26 LAB
ALBUMIN SERPL-MCNC: 3.3 G/DL (ref 3.5–5)
ALBUMIN/GLOB SERPL: 0.9 {RATIO} (ref 1.1–2.2)
ALP SERPL-CCNC: 96 U/L (ref 45–117)
ALT SERPL-CCNC: 24 U/L (ref 12–78)
ANION GAP SERPL CALC-SCNC: 7 MMOL/L (ref 5–15)
AST SERPL-CCNC: 13 U/L (ref 15–37)
BASOPHILS # BLD: 0 K/UL (ref 0–0.1)
BASOPHILS NFR BLD: 0 % (ref 0–1)
BILIRUB SERPL-MCNC: 0.4 MG/DL (ref 0.2–1)
BUN SERPL-MCNC: 12 MG/DL (ref 6–20)
BUN/CREAT SERPL: 17 (ref 12–20)
CALCIUM SERPL-MCNC: 8.8 MG/DL (ref 8.5–10.1)
CHLORIDE SERPL-SCNC: 106 MMOL/L (ref 97–108)
CK SERPL-CCNC: 71 U/L (ref 26–192)
CO2 SERPL-SCNC: 30 MMOL/L (ref 21–32)
CREAT SERPL-MCNC: 0.71 MG/DL (ref 0.55–1.02)
DIFFERENTIAL METHOD BLD: NORMAL
EOSINOPHIL # BLD: 0.2 K/UL (ref 0–0.4)
EOSINOPHIL NFR BLD: 3 % (ref 0–7)
ERYTHROCYTE [DISTWIDTH] IN BLOOD BY AUTOMATED COUNT: 13.5 % (ref 11.5–14.5)
GLOBULIN SER CALC-MCNC: 3.8 G/DL (ref 2–4)
GLUCOSE SERPL-MCNC: 107 MG/DL (ref 65–100)
HCT VFR BLD AUTO: 37.4 % (ref 35–47)
HGB BLD-MCNC: 11.8 G/DL (ref 11.5–16)
IMM GRANULOCYTES # BLD: 0 K/UL (ref 0–0.04)
IMM GRANULOCYTES NFR BLD AUTO: 0 % (ref 0–0.5)
INR PPP: 1 (ref 0.9–1.1)
IRON SERPL-MCNC: 41 UG/DL (ref 35–150)
LDH SERPL L TO P-CCNC: 125 U/L (ref 81–246)
LYMPHOCYTES # BLD: 2.3 K/UL (ref 0.8–3.5)
LYMPHOCYTES NFR BLD: 29 % (ref 12–49)
MAGNESIUM SERPL-MCNC: 2.1 MG/DL (ref 1.6–2.4)
MCH RBC QN AUTO: 27.4 PG (ref 26–34)
MCHC RBC AUTO-ENTMCNC: 31.6 G/DL (ref 30–36.5)
MCV RBC AUTO: 87 FL (ref 80–99)
MONOCYTES # BLD: 0.4 K/UL (ref 0–1)
MONOCYTES NFR BLD: 5 % (ref 5–13)
NEUTS SEG # BLD: 4.9 K/UL (ref 1.8–8)
NEUTS SEG NFR BLD: 63 % (ref 32–75)
NRBC # BLD: 0 K/UL (ref 0–0.01)
NRBC BLD-RTO: 0 PER 100 WBC
PLATELET # BLD AUTO: 259 K/UL (ref 150–400)
PMV BLD AUTO: 10.6 FL (ref 8.9–12.9)
POTASSIUM SERPL-SCNC: 4.2 MMOL/L (ref 3.5–5.1)
PROT SERPL-MCNC: 7.1 G/DL (ref 6.4–8.2)
PROTHROMBIN TIME: 9.7 SEC (ref 9–11.1)
RBC # BLD AUTO: 4.3 M/UL (ref 3.8–5.2)
SODIUM SERPL-SCNC: 143 MMOL/L (ref 136–145)
WBC # BLD AUTO: 7.8 K/UL (ref 3.6–11)

## 2018-09-26 PROCEDURE — 80053 COMPREHEN METABOLIC PANEL: CPT | Performed by: SURGERY

## 2018-09-26 PROCEDURE — 86677 HELICOBACTER PYLORI ANTIBODY: CPT | Performed by: SURGERY

## 2018-09-26 PROCEDURE — 36415 COLL VENOUS BLD VENIPUNCTURE: CPT | Performed by: SURGERY

## 2018-09-26 PROCEDURE — 71046 X-RAY EXAM CHEST 2 VIEWS: CPT

## 2018-09-26 PROCEDURE — 83540 ASSAY OF IRON: CPT | Performed by: SURGERY

## 2018-09-26 PROCEDURE — 83615 LACTATE (LD) (LDH) ENZYME: CPT | Performed by: SURGERY

## 2018-09-26 PROCEDURE — 83735 ASSAY OF MAGNESIUM: CPT | Performed by: SURGERY

## 2018-09-26 PROCEDURE — 85610 PROTHROMBIN TIME: CPT | Performed by: SURGERY

## 2018-09-26 PROCEDURE — 82550 ASSAY OF CK (CPK): CPT | Performed by: SURGERY

## 2018-09-26 PROCEDURE — 85025 COMPLETE CBC W/AUTO DIFF WBC: CPT | Performed by: SURGERY

## 2018-09-26 PROCEDURE — 93005 ELECTROCARDIOGRAM TRACING: CPT

## 2018-09-26 RX ORDER — OMEPRAZOLE 20 MG/1
20 CAPSULE, DELAYED RELEASE ORAL DAILY
Qty: 90 CAP | Refills: 1 | Status: SHIPPED | OUTPATIENT
Start: 2018-09-26 | End: 2019-09-03

## 2018-09-26 RX ORDER — ONDANSETRON 4 MG/1
4 TABLET, ORALLY DISINTEGRATING ORAL
Qty: 30 TAB | Refills: 0 | Status: SHIPPED | OUTPATIENT
Start: 2018-09-26 | End: 2019-03-27

## 2018-09-26 NOTE — LETTER
9/27/2018 3:13 PM 
 
Patient:  Bill Miller YOB: 1970 Date of Visit: 9/26/2018 Dear MD Manuela Salgadosus [address] [facility] 99 [address] VIA In Basket 
 : Thank you for referring Ms. Wagner Sarmiento to me for evaluation/treatment. Below are the relevant portions of my assessment and plan of care. If you have questions, please do not hesitate to call me. I look forward to following Ms. Arrieta along with you. Sincerely, Priscilla Zimmer MD

## 2018-09-26 NOTE — PATIENT INSTRUCTIONS
Body Mass Index: Care Instructions  Your Care Instructions    Body mass index (BMI) can help you see if your weight is raising your risk for health problems. It uses a formula to compare how much you weigh with how tall you are. · A BMI lower than 18.5 is considered underweight. · A BMI between 18.5 and 24.9 is considered healthy. · A BMI between 25 and 29.9 is considered overweight. A BMI of 30 or higher is considered obese. If your BMI is in the normal range, it means that you have a lower risk for weight-related health problems. If your BMI is in the overweight or obese range, you may be at increased risk for weight-related health problems, such as high blood pressure, heart disease, stroke, arthritis or joint pain, and diabetes. If your BMI is in the underweight range, you may be at increased risk for health problems such as fatigue, lower protection (immunity) against illness, muscle loss, bone loss, hair loss, and hormone problems. BMI is just one measure of your risk for weight-related health problems. You may be at higher risk for health problems if you are not active, you eat an unhealthy diet, or you drink too much alcohol or use tobacco products. Follow-up care is a key part of your treatment and safety. Be sure to make and go to all appointments, and call your doctor if you are having problems. It's also a good idea to know your test results and keep a list of the medicines you take. How can you care for yourself at home? · Practice healthy eating habits. This includes eating plenty of fruits, vegetables, whole grains, lean protein, and low-fat dairy. · If your doctor recommends it, get more exercise. Walking is a good choice. Bit by bit, increase the amount you walk every day. Try for at least 30 minutes on most days of the week. · Do not smoke. Smoking can increase your risk for health problems. If you need help quitting, talk to your doctor about stop-smoking programs and medicines. These can increase your chances of quitting for good. · Limit alcohol to 2 drinks a day for men and 1 drink a day for women. Too much alcohol can cause health problems. If you have a BMI higher than 25  · Your doctor may do other tests to check your risk for weight-related health problems. This may include measuring the distance around your waist. A waist measurement of more than 40 inches in men or 35 inches in women can increase the risk of weight-related health problems. · Talk with your doctor about steps you can take to stay healthy or improve your health. You may need to make lifestyle changes to lose weight and stay healthy, such as changing your diet and getting regular exercise. If you have a BMI lower than 18.5  · Your doctor may do other tests to check your risk for health problems. · Talk with your doctor about steps you can take to stay healthy or improve your health. You may need to make lifestyle changes to gain or maintain weight and stay healthy, such as getting more healthy foods in your diet and doing exercises to build muscle. Where can you learn more? Go to http://reyna-perri.info/. Enter S176 in the search box to learn more about \"Body Mass Index: Care Instructions. \"  Current as of: October 9, 2017  Content Version: 11.7  © 2852-1889 [employer]. Care instructions adapted under license by [employer] (which disclaims liability or warranty for this information). If you have questions about a medical condition or this instruction, always ask your healthcare professional. [address] any warranty or liability for your use of this information.

## 2018-09-26 NOTE — MR AVS SNAPSHOT
[address] [facility] [address] 
[phone] Patient: Angelia Diaz MRN: [id number] :1970 Visit Information Date & Time Provider Department Dept. Phone Encounter #  
 2018  1:20 PM Theodore Wu NP [facility] 22 [phone] [id number] Your Appointments 2018  2:20 PM  
ESTABLISHED PATIENT with MD Sangeetha Hernandez 137 317 ([address]) Appt Note: sign consents for lap RY 10/15/18; see Charisse Hays [facility]  
 [address] [address]  
[facility] 7 [address]  
  
    
 10/29/2018  9:40 AM  
POST OP 10 MIN with Theodore Wu NP  
[facility] 22 487 ([address]) Appt Note: Ep/ 2 week Po  
 [address] [facility] [address]  
[address] [address]  
  
    
 2018  9:40 AM  
POST OP 10 MIN with Theodore Wu NP  
[facility] 22 137 ([address]) Appt Note: Ep/ 4 week Po  
 [address] 63 [facility] [address] [phone]  
  
    
 2018  9:40 AM  
POST OP 10 MIN with Theodore Wu NP  
[facility] 22 071 ([address]) Appt Note: Ep/ 6 week Po  
 [address] 63 [facility] 7 [address] [phone] Upcoming Health Maintenance Date Due Influenza Age 5 to Adult 2018 PAP AKA CERVICAL CYTOLOGY 2019* DTaP/Tdap/Td series (2 - Td) 2025 *Topic was postponed. The date shown is not the original due date. Allergies as of 2018  Review Complete On: 2018 By: Theodore Wu NP Severity Noted Reaction Type Reactions Pcn [Penicillins] High 2010   Systemic Anaphylaxis, Rash Amlodipine  2013   Side Effect Other (comments) Fatigue/drowsy Current Immunizations  Reviewed on 6/26/2018 Name Date Hep A Vaccine (Adult) 10/21/2015 Hep B Vaccine (Adult) 1/20/2016, 10/21/2015 Influenza Vaccine (Quad) PF 10/13/2017, 2/1/2017 Influenza Vaccine Split 10/7/2012 Pneumococcal Polysaccharide (PPSV-23) 11/18/2013 Tdap 7/6/2015 Not reviewed this visit You Were Diagnosed With   
  
 Codes Comments Morbid obesity ([facility]    -  Primary ICD-10-CM: E66.01 
ICD-9-CM: 278.01   
 BMI 45.0-49.9, adult (HCC)     ICD-10-CM: Y33.00 
ICD-9-CM: V85.42   
 HTN (hypertension), benign     ICD-10-CM: I10 
ICD-9-CM: 401.1 Vitals BP Pulse Temp Resp Height(growth percentile) Weight(growth percentile) 131/71 (BP 1 Location: Left arm, BP Patient Position: Sitting) 70 97.8 °F (36.6 °C) (Oral) 18 5' 7\" (1.702 m) 313 lb (142 kg) LMP SpO2 BMI OB Status Smoking Status 06/10/2013 98% 49.02 kg/m2 Hysterectomy Former Smoker BMI and BSA Data Body Mass Index Body Surface Area 49.02 kg/m 2 2.59 m 2 Preferred Pharmacy [facility]acy Name Phone [facility] [address], [address] [place] [address] [phone] Your Updated Medication List  
  
   
This list is accurate as of 9/26/18  1:43 PM.  Always use your most recent med list.  
  
  
  
  
 diphenhydrAMINE 50 mg tablet Commonly known as:  BENADRYL Take 50 mg by mouth nightly as needed for Sleep. LORazepam 1 mg tablet Commonly known as:  ATIVAN Take 1 Tab by mouth daily as needed for Anxiety (Panic Attack). losartan 25 mg tablet Commonly known as:  COZAAR  
TAKE 1 TABLET BY MOUTH DAILY  
  
 omeprazole 20 mg capsule Commonly known as:  PRILOSEC Take 1 Cap by mouth daily. ondansetron 4 mg disintegrating tablet Commonly known as:  ZOFRAN ODT Take 1 Tab by mouth every eight (8) hours as needed for Nausea. PARoxetine 40 mg tablet Commonly known as:  PAXIL TAKE 1 TABLET BY MOUTH EVERY DAY  
 Prescriptions Sent to Pharmacy Refills  
 omeprazole (PRILOSEC) 20 mg capsule 1 Sig: Take 1 Cap by mouth daily. Class: Normal  
 Pharmacy: [address] #: [phone] Route: Oral  
 ondansetron (ZOFRAN ODT) 4 mg disintegrating tablet 0 Sig: Take 1 Tab by mouth every eight (8) hours as needed for Nausea. Class: Normal  
 Pharmacy: [address] #: [phone] Route: Oral  
  
Patient Instructions Body Mass Index: Care Instructions Your Care Instructions Body mass index (BMI) can help you see if your weight is raising your risk for health problems. It uses a formula to compare how much you weigh with how tall you are. · A BMI lower than 18.5 is considered underweight. · A BMI between 18.5 and 24.9 is considered healthy. · A BMI between 25 and 29.9 is considered overweight. A BMI of 30 or higher is considered obese. If your BMI is in the normal range, it means that you have a lower risk for weight-related health problems. If your BMI is in the overweight or obese range, you may be at increased risk for weight-related health problems, such as high blood pressure, heart disease, stroke, arthritis or joint pain, and diabetes. If your BMI is in the underweight range, you may be at increased risk for health problems such as fatigue, lower protection (immunity) against illness, muscle loss, bone loss, hair loss, and hormone problems. BMI is just one measure of your risk for weight-related health problems. You may be at higher risk for health problems if you are not active, you eat an unhealthy diet, or you drink too much alcohol or use tobacco products. Follow-up care is a key part of your treatment and safety.  Be sure to make and go to all appointments, and call your doctor if you are having problems. It's also a good idea to know your test results and keep a list of the medicines you take. How can you care for yourself at home? · Practice healthy eating habits. This includes eating plenty of fruits, vegetables, whole grains, lean protein, and low-fat dairy. · If your doctor recommends it, get more exercise. Walking is a good choice. Bit by bit, increase the amount you walk every day. Try for at least 30 minutes on most days of the week. · Do not smoke. Smoking can increase your risk for health problems. If you need help quitting, talk to your doctor about stop-smoking programs and medicines. These can increase your chances of quitting for good. · Limit alcohol to 2 drinks a day for men and 1 drink a day for women. Too much alcohol can cause health problems. If you have a BMI higher than 25 · Your doctor may do other tests to check your risk for weight-related health problems. This may include measuring the distance around your waist. A waist measurement of more than 40 inches in men or 35 inches in women can increase the risk of weight-related health problems. · Talk with your doctor about steps you can take to stay healthy or improve your health. You may need to make lifestyle changes to lose weight and stay healthy, such as changing your diet and getting regular exercise. If you have a BMI lower than 18.5 · Your doctor may do other tests to check your risk for health problems. · Talk with your doctor about steps you can take to stay healthy or improve your health. You may need to make lifestyle changes to gain or maintain weight and stay healthy, such as getting more healthy foods in your diet and doing exercises to build muscle. Where can you learn more? Go to http://reyna-perri.info/. Enter S176 in the search box to learn more about \"Body Mass Index: Care Instructions. \" Current as of: October 9, 2017 Content Version: 11.7 © 3235-3959 Healthwise, Incorporated. Care instructions adapted under license by [employer] (which disclaims liability or warranty for this information). If you have questions about a medical condition or this instruction, always ask your healthcare professional. [address] any warranty or liability for your use of this information. Introducing [facility] & HEALTH SERVICES! Dear Joan Gonzalez: Thank you for requesting a [employer] account. Our records indicate that you already have an active [employer] account. You can access your account anytime at [email or web address]. [email or web address]/[employer] Did you know that you can access your hospital and ER discharge instructions at any time in [employer]? You can also review all of your test results from your hospital stay or ER visit. Additional Information If you have questions, please visit the Frequently Asked Questions section of the [employer] website at [email or web address] Remember, [employer]oon is NOT to be used for urgent needs. For medical emergencies, dial 911. Now available from your iPhone and Android! Please provide this summary of care documentation to your next provider. Your primary care clinician is listed as Nacho Santiago. If you have any questions after today's visit, please call [phone].

## 2018-09-26 NOTE — PERIOP NOTES
Patient verbalizes understanding of preoperative instructions:  Given skin prep chlorhexidine wipes-given written and verbal instructions on use.

## 2018-09-26 NOTE — PROGRESS NOTES
1. Have you been to the ER, urgent care clinic since your last visit? Hospitalized since your last visit? No    2. Have you seen or consulted any other health care providers outside of the [address] [place] [address] since your last visit? Include any pap smears or colon screening.  No

## 2018-09-26 NOTE — MR AVS SNAPSHOT
[address] [facility] 63 [facility] [address] [address] 
[phone] Patient: Sarah Beth Alex MRN: [id number] :1970 Visit Information Date & Time Provider Department Dept. Phone Encounter #  
 2018  2:20 PM Zachary Perez MD [address] [facility] [address] 091 [phone] [id number] Your Appointments 2018  2:20 PM  
ESTABLISHED PATIENT with Zachary Perez MD  
[address] 480 ([address]) Appt Note: sign consents for lap RY 10/15/18; see [place] first  
 [address] [facility] 63 [facility] [address]  
[facility] [address] [address]  
  
    
 10/29/2018  9:40 AM  
POST OP 10 MIN with Melisa Wilks NP  
[facility] 22 967 ([address]) Appt Note: Ep/ 2 week Po  
 [address] [facility] [address]  
[address] [address]  
  
    
 2018  9:40 AM  
POST OP 10 MIN with Melisa Wilks NP  
[facility] 22 801 ([address]) Appt Note: Ep/ 4 week Po  
 [address] 63 [facility] [address] [address]  
[phone]  
  
    
 2018  9:40 AM  
POST OP 10 MIN with Melisa Wilks NP  
[facility] 22 791 ([address]) Appt Note: Ep/ 6 week Po  
 [address] 63 [facility] [address] [address] [phone] Upcoming Health Maintenance Date Due Influenza Age 5 to Adult 2018 PAP AKA CERVICAL CYTOLOGY 2019* DTaP/Tdap/Td series (2 - Td) 2025 *Topic was postponed. The date shown is not the original due date. Allergies as of 2018  Review Complete On: 2018 By: Melisa Wilks NP Severity Noted Reaction Type Reactions Pcn [Penicillins] High 2010   Systemic Anaphylaxis, Rash Amlodipine  2013   Side Effect Other (comments) Fatigue/drowsy Current Immunizations  Reviewed on 6/26/2018 Name Date Hep A Vaccine (Adult) 10/21/2015 Hep B Vaccine (Adult) 1/20/2016, 10/21/2015 Influenza Vaccine (Quad) PF 10/13/2017, 2/1/2017 Influenza Vaccine Split 10/7/2012 Pneumococcal Polysaccharide (PPSV-23) 11/18/2013 Tdap 7/6/2015 Not reviewed this visit Vitals BP Pulse Temp Resp Height(growth percentile) Weight(growth percentile) 131/71 (BP 1 Location: Left arm, BP Patient Position: Sitting) 70 97.8 °F (36.6 °C) (Oral) 18 5' 7\" (1.702 m) 313 lb (142 kg) LMP SpO2 BMI OB Status Smoking Status 06/10/2013 98% 49.02 kg/m2 Hysterectomy Former Smoker BMI and BSA Data Body Mass Index Body Surface Area 49.02 kg/m 2 2.59 m 2 Preferred Pharmacy [facility] Phone [facility] [address] [place] [address] [phone] Your Updated Medication List  
  
   
This list is accurate as of 9/26/18  2:14 PM.  Always use your most recent med list.  
  
  
  
  
 diphenhydrAMINE 50 mg tablet Commonly known as:  BENADRYL Take 50 mg by mouth nightly as needed for Sleep. LORazepam 1 mg tablet Commonly known as:  ATIVAN Take 1 Tab by mouth daily as needed for Anxiety (Panic Attack). losartan 25 mg tablet Commonly known as:  COZAAR  
TAKE 1 TABLET BY MOUTH DAILY  
  
 omeprazole 20 mg capsule Commonly known as:  PRILOSEC Take 1 Cap by mouth daily. ondansetron 4 mg disintegrating tablet Commonly known as:  ZOFRAN ODT Take 1 Tab by mouth every eight (8) hours as needed for Nausea. PARoxetine 40 mg tablet Commonly known as:  PAXIL TAKE 1 TABLET BY MOUTH EVERY DAY Introducing [facility] & HEALTH SERVICES! Dear Bernarda Klinefelter: Thank you for requesting a [employer] account. Our records indicate that you already have an active [employer] account. You can access your account anytime at [email or web address]. [place]/[employer] Did you know that you can access your hospital and ER discharge instructions at any time in [employer]? You can also review all of your test results from your hospital stay or ER visit. Additional Information If you have questions, please visit the Frequently Asked Questions section of the [employer] website at [email or web address]. [employer]/. Remember, [employer] is NOT to be used for urgent needs. For medical emergencies, dial 911. Now available from your iPhone and Android! Please provide this summary of care documentation to your next provider. Your primary care clinician is listed as Reinaldo Galeas. If you have any questions after today's visit, please call [phone].

## 2018-09-26 NOTE — PROGRESS NOTES
1. Have you been to the ER, urgent care clinic since your last visit? Hospitalized since your last visit? No    2. Have you seen or consulted any other health care providers outside of the [address] [place] [address] since your last visit? Include any pap smears or colon screening.  No

## 2018-09-27 ENCOUNTER — TELEPHONE (OUTPATIENT)
Dept: SURGERY | Age: 48
End: 2018-09-27

## 2018-09-27 LAB
ATRIAL RATE: 56 BPM
CALCULATED P AXIS, ECG09: 13 DEGREES
CALCULATED R AXIS, ECG10: 8 DEGREES
CALCULATED T AXIS, ECG11: 11 DEGREES
DIAGNOSIS, 93000: NORMAL
P-R INTERVAL, ECG05: 192 MS
Q-T INTERVAL, ECG07: 446 MS
QRS DURATION, ECG06: 90 MS
QTC CALCULATION (BEZET), ECG08: 430 MS
VENTRICULAR RATE, ECG03: 56 BPM
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
## 2018-09-28 LAB — H PYLORI IGM SER-ACNC: <9 UNITS (ref 0–8.9)

## 2018-10-01 ENCOUNTER — OFFICE VISIT (OUTPATIENT)
Dept: FAMILY MEDICINE CLINIC | Age: 48
End: 2018-10-01

## 2018-10-01 VITALS
OXYGEN SATURATION: 99 % | HEART RATE: 61 BPM | DIASTOLIC BLOOD PRESSURE: 81 MMHG | HEIGHT: 67 IN | BODY MASS INDEX: 45.99 KG/M2 | RESPIRATION RATE: 16 BRPM | SYSTOLIC BLOOD PRESSURE: 146 MMHG | WEIGHT: 293 LBS | TEMPERATURE: 96.2 F

## 2018-10-01 DIAGNOSIS — F41.9 ANXIETY: Primary | ICD-10-CM

## 2018-10-01 DIAGNOSIS — Z23 ENCOUNTER FOR IMMUNIZATION: ICD-10-CM

## 2018-10-01 DIAGNOSIS — F33.0 MILD EPISODE OF RECURRENT MAJOR DEPRESSIVE DISORDER (HCC): ICD-10-CM

## 2018-10-01 RX ORDER — LORAZEPAM 1 MG/1
1 TABLET ORAL
Qty: 30 TAB | Refills: 0 | Status: SHIPPED | OUTPATIENT
Start: 2018-10-01 | End: 2018-11-21 | Stop reason: SDUPTHER

## 2018-10-01 NOTE — PROGRESS NOTES
Chief Complaint   Patient presents with    Anxiety    Medication Refill     1. Have you been to the ER, urgent care clinic since your last visit? Hospitalized since your last visit? No    2. Have you seen or consulted any other health care providers outside of the [address] [place] [address] since your last visit? Include any pap smears or colon screening.  No

## 2018-10-01 NOTE — MR AVS SNAPSHOT
[address] [phone] Patient: Bam Adams MRN: [id number] :1970 Visit Information Date & Time Provider Department Dept. Phone Encounter #  
 10/1/2018 10:35 AM Celia Vasquez MD [address] [place] [address] [phone] [id number] Follow-up Instructions Return in about 6 months (around 2019) for anxiety follow up . Your Appointments 10/29/2018  9:40 AM  
POST OP 10 MIN with Mahamed Lynch NP  
[place] [address] ([address]) Appt Note: Ep/ 2 week Po  
 [address] [facility] [address]  
[address] [phone]  
  
    
 2018  9:40 AM  
POST OP 10 MIN with Mahamed Lynch NP  
[place] [address] ([address]) Appt Note: Ep/ 4 week Po  
 [address] [facility] [address]  
[phone]  
  
    
 2018  9:40 AM  
POST OP 10 MIN with Mahamed Lynch NP  
[place] [address] ([address]) Appt Note: Ep/ 6 week Po  
 [address] [facility] [address]  
[phone] Upcoming Health Maintenance Date Due Influenza Age 5 to Adult 2018 PAP AKA CERVICAL CYTOLOGY 2019* DTaP/Tdap/Td series (2 - Td) 2025 *Topic was postponed. The date shown is not the original due date. Allergies as of 10/1/2018  Review Complete On: 10/1/2018 By: Meghan Dominguez LPN Severity Noted Reaction Type Reactions Pcn [Penicillins] High 2010   Systemic Anaphylaxis, Rash Amlodipine  2013   Side Effect Other (comments) Fatigue/drowsy Current Immunizations  Reviewed on 10/1/2018 Name Date Hep A Vaccine (Adult) 10/21/2015 Hep B Vaccine (Adult) 2016, 10/21/2015 Influenza Vaccine (Quad) PF 10/1/2018, 10/13/2017, 2017 Influenza Vaccine Split 10/7/2012 Pneumococcal Polysaccharide (PPSV-23) 11/18/2013 Tdap 7/6/2015 Reviewed by Surya Zaidi LPN on 76/2/0560 at 76:91 AM  
You Were Diagnosed With   
  
 Codes Comments Anxiety    -  Primary ICD-10-CM: F41.9 ICD-9-CM: 300.00 Mild episode of recurrent major depressive disorder (HCC)     ICD-10-CM: F33.0 ICD-9-CM: 296.31 Encounter for immunization     ICD-10-CM: O88 ICD-9-CM: V03.89 BMI 45.0-49.9, adult [facility])     ICD-10-CM: B80.56 
ICD-9-CM: V85.42 Vitals BP Pulse Temp Resp Height(growth percentile) Weight(growth percentile) 155/81 61 96.2 °F (35.7 °C) (Oral) 16 5' 7\" (1.702 m) 313 lb (142 kg) LMP SpO2 BMI OB Status Smoking Status 06/10/2013 99% 49.02 kg/m2 Hysterectomy Former Smoker Vitals History BMI and BSA Data Body Mass Index Body Surface Area 49.02 kg/m 2 2.59 m 2 Preferred Pharmacy Pharmacy Name Phone [facility] [address] [place], [address] [phone] Your Updated Medication List  
  
   
This list is accurate as of 10/1/18 11:00 AM.  Always use your most recent med list.  
  
  
  
  
 diphenhydrAMINE 50 mg tablet Commonly known as:  BENADRYL Take 50 mg by mouth nightly as needed for Sleep. LORazepam 1 mg tablet Commonly known as:  ATIVAN Take 1 Tab by mouth daily as needed for Anxiety (Panic Attack). losartan 25 mg tablet Commonly known as:  COZAAR  
TAKE 1 TABLET BY MOUTH DAILY  
  
 omeprazole 20 mg capsule Commonly known as:  PRILOSEC Take 1 Cap by mouth daily. ondansetron 4 mg disintegrating tablet Commonly known as:  ZOFRAN ODT Take 1 Tab by mouth every eight (8) hours as needed for Nausea. PARoxetine 40 mg tablet Commonly known as:  PAXIL TAKE 1 TABLET BY MOUTH EVERY DAY Prescriptions Printed Refills  LORazepam (ATIVAN) 1 mg tablet 0  
 Sig: Take 1 Tab by mouth daily as needed for Anxiety (Panic Attack). Class: Print Route: Oral  
  
We Performed the Following BEHAV ASSMT W/SCORE & DOCD/STAND INSTRUMENT [id number] CPT(R)] INFLUENZA VIRUS VAC QUAD,SPLIT,PRESV FREE SYRINGE IM [id number] CPT(R)] AK IMMUNIZ ADMIN,1 SINGLE/COMB VAC/TOXOID [id number] CPT(R)] Follow-up Instructions Return in about 6 months (around 4/1/2019) for anxiety follow up . Patient Instructions Anxiety Disorder: Care Instructions Your Care Instructions Anxiety is a normal reaction to stress. Difficult situations can cause you to have symptoms such as sweaty palms and a nervous feeling. In an anxiety disorder, the symptoms are far more severe. Constant worry, muscle tension, trouble sleeping, nausea and diarrhea, and other symptoms can make normal daily activities difficult or impossible. These symptoms may occur for no reason, and they can affect your work, school, or social life. Medicines, counseling, and self-care can all help. Follow-up care is a key part of your treatment and safety. Be sure to make and go to all appointments, and call your doctor if you are having problems. It's also a good idea to know your test results and keep a list of the medicines you take. How can you care for yourself at home? · Take medicines exactly as directed. Call your doctor if you think you are having a problem with your medicine. · Go to your counseling sessions and follow-up appointments. · Recognize and accept your anxiety. Then, when you are in a situation that makes you anxious, say to yourself, \"This is not an emergency. I feel uncomfortable, but I am not in danger. I can keep going even if I feel anxious. \" · Be kind to your body: ¨ Relieve tension with exercise or a massage. ¨ Get enough rest. 
¨ Avoid alcohol, caffeine, nicotine, and illegal drugs. They can increase your anxiety level and cause sleep problems. ¨ Learn and do relaxation techniques. See below for more about these techniques. · Engage your mind. Get out and do something you enjoy. Go to a funny movie, or take a walk or hike. Plan your day. Having too much or too little to do can make you anxious. · Keep a record of your symptoms. Discuss your fears with a good friend or family member, or join a support group for people with similar problems. Talking to others sometimes relieves stress. · Get involved in social groups, or volunteer to help others. Being alone sometimes makes things seem worse than they are. · Get at least 30 minutes of exercise on most days of the week to relieve stress. Walking is a good choice. You also may want to do other activities, such as running, swimming, cycling, or playing tennis or team sports. Relaxation techniques Do relaxation exercises 10 to 20 minutes a day. You can play soothing, relaxing music while you do them, if you wish. · Tell others in your house that you are going to do your relaxation exercises. Ask them not to disturb you. · Find a comfortable place, away from all distractions and noise. · Lie down on your back, or sit with your back straight. · Focus on your breathing. Make it slow and steady. · Breathe in through your nose. Breathe out through either your nose or mouth. · Breathe deeply, filling up the area between your navel and your rib cage. Breathe so that your belly goes up and down. · Do not hold your breath. · Breathe like this for 5 to 10 minutes. Notice the feeling of calmness throughout your whole body. As you continue to breathe slowly and deeply, relax by doing the following for another 5 to 10 minutes: · Tighten and relax each muscle group in your body. You can begin at your toes and work your way up to your head. · Imagine your muscle groups relaxing and becoming heavy. · Empty your mind of all thoughts. · Let yourself relax more and more deeply. · Become aware of the state of calmness that surrounds you. · When your relaxation time is over, you can bring yourself back to alertness by moving your fingers and toes and then your hands and feet and then stretching and moving your entire body. Sometimes people fall asleep during relaxation, but they usually wake up shortly afterward. · Always give yourself time to return to full alertness before you drive a car or do anything that might cause an accident if you are not fully alert. Never play a relaxation tape while you drive a car. When should you call for help? Call 911 anytime you think you may need emergency care. For example, call if: 
  · You feel you cannot stop from hurting yourself or someone else.  
Nicole Alexi the numbers for these national suicide hotlines: 6-087-064-TALK (1-311-112-366.354.7105) and 4-233-LLYHRHE (0-724.128.8558). If you or someone you know talks about suicide or feeling hopeless, get help right away. 
 Watch closely for changes in your health, and be sure to contact your doctor if: 
  · You have anxiety or fear that affects your life.  
  · You have symptoms of anxiety that are new or different from those you had before. Where can you learn more? Go to http://reyna-perri.info/. Enter P754 in the search box to learn more about \"Anxiety Disorder: Care Instructions. \" Current as of: December 7, 2017 Content Version: 11.7 © 3871-8243 [employer]. Care instructions adapted under license by [employer] (which disclaims liability or warranty for this information). If you have questions about a medical condition or this instruction, always ask your healthcare professional. [address] [address] any warranty or liability for your use of this information. Introducing \[facility]\"" & HEALTH SERVICES! Dear Ruddy Esteves: Thank you for requesting a [employer] account.   Our records indicate that you already have an active Read[employer] account. You can access your account anytime at [email or web address] Did you know that you can access your hospital and ER discharge instructions at any time in Readbug? You can also review all of your test results from your hospital stay or ER visit. Additional Information If you have questions, please visit the Frequently Asked Questions section of the Read[employer] website at [email or web address] Remember, Readbug is NOT to be used for urgent needs. For medical emergencies, dial 911. Now available from your iPhone and Android! Please provide this summary of care documentation to your next provider. Your primary care clinician is listed as Porsche Tolbert. If you have any questions after today's visit, please call [phone].

## 2018-10-01 NOTE — PATIENT INSTRUCTIONS

## 2018-10-01 NOTE — PROGRESS NOTES
HPI     CC: medication refill     Frances Luis is a 50 y.o. female with hx of anxiety and depression who presents for medication refill. Taking ativan as needed; takes 1 tablet 1-2 times/ week. Last had ativan about 3 weeks ago. Is anxious for her gastric bypass surgery on 10/15/18 with Dr. Francheska Castano. Also stressed from taking care of her parents, who require a lot of assistance.  appropriate; last filled Ativan 1mg x 10 tablets on 8/19/18. On Paxil 40 mg for anxiety and depression. GAD7 score of 7, PHQ9 score of 3 without SI or HI. Last smoked cigarette 3 months ago. PMHx - Reviewed  Past Medical History:   Diagnosis Date    Anemia     Anxiety     Arthritis     LEFT KNEE    Current smoker     Depression     Hypertension     Menorrhagia     Morbid obesity ([address]     Nicotine vapor product user     SMALL AMOUNT OF NICOTINE 0.3    Snoring        Meds - Reviewed  Current Outpatient Prescriptions   Medication Sig Dispense Refill    LORazepam (ATIVAN) 1 mg tablet Take 1 Tab by mouth daily as needed for Anxiety (Panic Attack). 30 Tab 0    diphenhydrAMINE (BENADRYL) 50 mg tablet Take 50 mg by mouth nightly as needed for Sleep.  losartan (COZAAR) 25 mg tablet TAKE 1 TABLET BY MOUTH DAILY 90 Tab 1    PARoxetine (PAXIL) 40 mg tablet TAKE 1 TABLET BY MOUTH EVERY DAY 90 Tab 1    omeprazole (PRILOSEC) 20 mg capsule Take 1 Cap by mouth daily. 90 Cap 1    ondansetron (ZOFRAN ODT) 4 mg disintegrating tablet Take 1 Tab by mouth every eight (8) hours as needed for Nausea.  30 Tab 0       Allergies - Reviewed  Allergies   Allergen Reactions    Pcn [Penicillins] Anaphylaxis and Rash    Amlodipine Other (comments)     Fatigue/drowsy       Smoker - Reviewed  History   Smoking Status    Former Smoker    Packs/day: 0.50    Years: 28.00    Types: Cigarettes    Quit date: 6/4/2018   Smokeless Tobacco    Never Used       ETOH - Reviewed  History   Alcohol Use No     Comment: rarely       FH - Reviewed  Family History   Problem Relation Age of Onset    Heart Disease Mother      heart bypass    Heart Attack Mother     Diabetes Brother     Stroke Maternal Grandmother     No Known Problems Father     Malignant Hyperthermia Neg Hx     Pseudocholinesterase Deficiency Neg Hx     Delayed Awakening Neg Hx     Post-op Nausea/Vomiting Neg Hx     Emergence Delirium Neg Hx     Post-op Cognitive Dysfunction Neg Hx     Other Neg Hx     Anesth Problems Neg Hx        ROS:  Review of Systems   Constitutional: Negative for activity change, appetite change, chills, diaphoresis, fever and unexpected weight change. Eyes: Negative for visual disturbance. Respiratory: Negative for chest tightness and shortness of breath. Cardiovascular: Negative for chest pain. Gastrointestinal: Negative for abdominal pain, nausea and vomiting. Neurological: Negative for dizziness, light-headedness and headaches. Psychiatric/Behavioral: Positive for dysphoric mood and sleep disturbance. Negative for agitation, behavioral problems, confusion, decreased concentration, hallucinations, self-injury and suicidal ideas. The patient is nervous/anxious. The patient is not hyperactive. Physical Exam:  Visit Vitals    /81    Pulse 61    Temp 96.2 °F (35.7 °C) (Oral)    Resp 16    Ht 5' 7\" (1.702 m)    Wt 313 lb (142 kg)    LMP 06/10/2013    SpO2 99%    BMI 49.02 kg/m2       Wt Readings from Last 3 Encounters:   10/01/18 313 lb (142 kg)   09/26/18 313 lb (142 kg)   09/26/18 313 lb (142 kg)     BP Readings from Last 3 Encounters:   10/01/18 146/81   09/26/18 131/71   09/26/18 131/71        Physical Exam   Constitutional: She is oriented to person, place, and time. She appears well-developed and well-nourished. No distress. HENT:   Head: Normocephalic and atraumatic. Mouth/Throat: Oropharynx is clear and moist.   Eyes: No scleral icterus. Cardiovascular: Normal rate and regular rhythm.     Pulmonary/Chest: Effort normal and breath sounds normal. No respiratory distress. She has no wheezes. Neurological: She is alert and oriented to person, place, and time. She exhibits normal muscle tone. Coordination normal.   Skin: Skin is warm and dry. She is not diaphoretic. Psychiatric: She has a normal mood and affect. Her behavior is normal. Judgment and thought content normal.   Nursing note and vitals reviewed. Assessment     50 y.o. female presents with:  Patient Active Problem List   Diagnosis Code    Panic attacks F41.0    Morbid obesity ([facility] E66.01    HTN (hypertension), benign I10    H/O: hysterectomy Z90.710    Smoker F17.200    MAK (nonalcoholic steatohepatitis) K75.81    Moderate major depression ([facility] F32.1    Elevated glucose R73.09       Today's diagnoses are:    ICD-10-CM ICD-9-CM    1. Anxiety F41.9 300.00 BEHAV ASSMT W/SCORE & DOCD/STAND INSTRUMENT      LORazepam (ATIVAN) 1 mg tablet   2. Mild episode of recurrent major depressive disorder (HCC) F33.0 296.31 BEHAV ASSMT W/SCORE & DOCD/STAND INSTRUMENT   3. Encounter for immunization Z23 V03.89 INFLUENZA VIRUS VAC QUAD,SPLIT,PRESV FREE SYRINGE IM      VA IMMUNIZ ADMIN,1 SINGLE/COMB VAC/TOXOID   4. BMI 45.0-49.9, adult ([facility] Z68.42 V85.42               Plan     1. Anxiety - On Ativan and Paxil  - GAD7 score of 7.   - continue Paxil 40 mg daily  - refill Ativan 1 mg PRN.  appropriate   - discussed that if Paxil is not controlling anxiety appropriately, patient to return for discussion of medication adjustment. Patient agreed   - follow up in 6 months. Sooner if anxiety worsens    2. Depression - on Paxil  - PHQ9 score of 3, no SI or HI   - continue Paxil 40 mg daily     3. Hypertension - 146/81 today; no improvement on recheck. Asymptomatic. Likely elevated from anxiety, as previous BPs wnl  - continue Losartan 25 mg   - continue to monitor; if remains elevated, will adjust BP medication regimen     4. Body mass index is 49.02 kg/(m^2). - scheduled for gastric bypass 10/15 with Dr. Anthony Sims    5. Encounter for immunization  - flu vaccine today         Prior labs and imaging were reviewed. I have discussed the diagnosis with the patient and the intended plan as seen in the above orders. The patient has received an after-visit summary and questions were answered concerning future plans. I have discussed medication side effects and warnings with the patient as well. Patient seen and discussed with Dr. Antonio Mojica, Attending Physician.     Jefferson Lott MD, PGY3  Family Medicine Resident

## 2018-10-08 ENCOUNTER — TELEPHONE (OUTPATIENT)
Dept: SURGERY | Age: 48
End: 2018-10-08

## 2018-10-08 NOTE — TELEPHONE ENCOUNTER
Patient stated she is not feeling well. She states she is doing a protein shake for breakfast, protein bar for lunch and 40z of chicken and veggies. She states she cannot function at work she is doing a lot of heavy lifting and doesn't fel good. Advised patient to drink plenty of water throughout the day. Her surgery is not until 10/15 and she can not be taken out of work when its not medically necessary.  Advised to increase protein intake and I will have BENJI Escamilla follow up by phone

## 2018-10-08 NOTE — TELEPHONE ENCOUNTER
Patient called asking to speak to Chanel Starr. She states she's on the pre-op diet and is not feeling so great.

## 2018-10-12 ENCOUNTER — ANESTHESIA EVENT (OUTPATIENT)
Dept: SURGERY | Age: 48
DRG: 621 | End: 2018-10-12
Payer: COMMERCIAL

## 2018-10-12 NOTE — ANESTHESIA PREPROCEDURE EVALUATION
Anesthetic History No history of anesthetic complications Review of Systems / Medical History Patient summary reviewed, nursing notes reviewed and pertinent labs reviewed Pulmonary Sleep apnea: No treatment Smoker Neuro/Psych Psychiatric history Cardiovascular Hypertension GI/Hepatic/Renal 
Within defined limits Endo/Other Morbid obesity, arthritis and anemia Other Findings Physical Exam 
 
Airway Mallampati: II 
TM Distance: > 6 cm Neck ROM: normal range of motion Mouth opening: Normal 
 
 Cardiovascular Regular rate and rhythm,  S1 and S2 normal,  no murmur, click, rub, or gallop Dental 
 
Dentition: Upper dentition intact and Lower dentition intact Pulmonary Breath sounds clear to auscultation Abdominal 
GI exam deferred Other Findings Anesthetic Plan ASA: 3 Anesthesia type: general 
 
 
 
 
Induction: Intravenous Anesthetic plan and risks discussed with: Patient

## 2018-10-15 ENCOUNTER — HOSPITAL ENCOUNTER (INPATIENT)
Age: 48
LOS: 2 days | Discharge: HOME | DRG: 621 | End: 2018-10-17
Attending: SURGERY | Admitting: SURGERY
Payer: COMMERCIAL

## 2018-10-15 ENCOUNTER — ANESTHESIA (OUTPATIENT)
Dept: SURGERY | Age: 48
DRG: 621 | End: 2018-10-15
Payer: COMMERCIAL

## 2018-10-15 DIAGNOSIS — E66.01 MORBID OBESITY WITH BMI OF 45.0-49.9, ADULT (HCC): Primary | ICD-10-CM

## 2018-10-15 PROCEDURE — 77030009965 HC RELD STPLR ENDOS COVD -D: Performed by: SURGERY

## 2018-10-15 PROCEDURE — 77030008684 HC TU ET CUF COVD -B: Performed by: ANESTHESIOLOGY

## 2018-10-15 PROCEDURE — 77030035051: Performed by: SURGERY

## 2018-10-15 PROCEDURE — 77030002933 HC SUT MCRYL J&J -A: Performed by: SURGERY

## 2018-10-15 PROCEDURE — 0D164ZA BYPASS STOMACH TO JEJUNUM, PERCUTANEOUS ENDOSCOPIC APPROACH: ICD-10-PCS | Performed by: SURGERY

## 2018-10-15 PROCEDURE — 77030013567 HC DRN WND RESERV BARD -A: Performed by: SURGERY

## 2018-10-15 PROCEDURE — 74011250636 HC RX REV CODE- 250/636

## 2018-10-15 PROCEDURE — 77030038157 HC DEV PWR CNTR DISP SIGNIA COVD -C: Performed by: SURGERY

## 2018-10-15 PROCEDURE — 77030020053 HC ELECTRD LAPSCP COVD -B: Performed by: SURGERY

## 2018-10-15 PROCEDURE — 74011250636 HC RX REV CODE- 250/636: Performed by: ANESTHESIOLOGY

## 2018-10-15 PROCEDURE — 77030035045 HC TRCR ENDOSC VRSPRT BLDLSS COVD -B: Performed by: SURGERY

## 2018-10-15 PROCEDURE — 77030018846 HC SOL IRR STRL H20 ICUM -A: Performed by: SURGERY

## 2018-10-15 PROCEDURE — 77030020747 HC TU INSUF ENDOSC TELE -A: Performed by: SURGERY

## 2018-10-15 PROCEDURE — 77030011640 HC PAD GRND REM COVD -A: Performed by: SURGERY

## 2018-10-15 PROCEDURE — 77030012029 HC APPL CLP LIG COVD -C: Performed by: SURGERY

## 2018-10-15 PROCEDURE — 77030009852 HC PCH RTVR ENDOSC COVD -B: Performed by: SURGERY

## 2018-10-15 PROCEDURE — 77030002895 HC DEV VASC CLOSR COVD -B: Performed by: SURGERY

## 2018-10-15 PROCEDURE — 74011000250 HC RX REV CODE- 250: Performed by: SURGERY

## 2018-10-15 PROCEDURE — 77030031139 HC SUT VCRL2 J&J -A: Performed by: SURGERY

## 2018-10-15 PROCEDURE — 77030037367 HC STPLR ENDO TRI-STPLR COVD -D: Performed by: SURGERY

## 2018-10-15 PROCEDURE — 77030022764 HC INTRO ENDOSC EEA COVD -B: Performed by: SURGERY

## 2018-10-15 PROCEDURE — 74011250636 HC RX REV CODE- 250/636: Performed by: SURGERY

## 2018-10-15 PROCEDURE — 77030008756 HC TU IRR SUC STRY -B: Performed by: SURGERY

## 2018-10-15 PROCEDURE — 77030022704 HC SUT VLOC COVD -B: Performed by: SURGERY

## 2018-10-15 PROCEDURE — 74011000250 HC RX REV CODE- 250

## 2018-10-15 PROCEDURE — 77030034821 HC DEV ENDOSC DST ORVIL COVD -C: Performed by: SURGERY

## 2018-10-15 PROCEDURE — 77030013079 HC BLNKT BAIR HGGR 3M -A: Performed by: ANESTHESIOLOGY

## 2018-10-15 PROCEDURE — 0DJ08ZZ INSPECTION OF UPPER INTESTINAL TRACT, VIA NATURAL OR ARTIFICIAL OPENING ENDOSCOPIC: ICD-10-PCS | Performed by: SURGERY

## 2018-10-15 PROCEDURE — 77030008728 HC TU GAST LAPSCP APOL -C: Performed by: SURGERY

## 2018-10-15 PROCEDURE — 65660000000 HC RM CCU STEPDOWN

## 2018-10-15 PROCEDURE — 76010000133 HC OR TIME 3 TO 3.5 HR: Performed by: SURGERY

## 2018-10-15 PROCEDURE — 76060000037 HC ANESTHESIA 3 TO 3.5 HR: Performed by: SURGERY

## 2018-10-15 PROCEDURE — 77030019908 HC STETH ESOPH SIMS -A: Performed by: ANESTHESIOLOGY

## 2018-10-15 PROCEDURE — 77030032435: Performed by: SURGERY

## 2018-10-15 PROCEDURE — 77030012407 HC DRN WND BARD -B: Performed by: SURGERY

## 2018-10-15 PROCEDURE — 77030026438 HC STYL ET INTUB CARD -A: Performed by: ANESTHESIOLOGY

## 2018-10-15 PROCEDURE — 77030018836 HC SOL IRR NACL ICUM -A: Performed by: SURGERY

## 2018-10-15 PROCEDURE — 77030016151 HC PROTCTR LNS DFOG COVD -B: Performed by: SURGERY

## 2018-10-15 PROCEDURE — 77030037032 HC INSRT SCIS CLICKLLINE DISP STOR -B: Performed by: SURGERY

## 2018-10-15 PROCEDURE — 77030035048 HC TRCR ENDOSC OPTCL COVD -B: Performed by: SURGERY

## 2018-10-15 PROCEDURE — 77030030826 HC RETRCTR WND ALEXS AMR -B: Performed by: SURGERY

## 2018-10-15 PROCEDURE — 77030034701 HC DSCRTR CRDLS U/S DISP COVD -F: Performed by: SURGERY

## 2018-10-15 PROCEDURE — 74011250637 HC RX REV CODE- 250/637: Performed by: SURGERY

## 2018-10-15 PROCEDURE — 77030020263 HC SOL INJ SOD CL0.9% LFCR 1000ML: Performed by: SURGERY

## 2018-10-15 PROCEDURE — 76210000016 HC OR PH I REC 1 TO 1.5 HR: Performed by: SURGERY

## 2018-10-15 PROCEDURE — 77030002916 HC SUT ETHLN J&J -A: Performed by: SURGERY

## 2018-10-15 PROCEDURE — 77030035042 HC TRCR ENDOSC OPTCL BLDLSS COVD -D: Performed by: SURGERY

## 2018-10-15 PROCEDURE — 77030039266 HC ADH SKN EXOFIN S2SG -A: Performed by: SURGERY

## 2018-10-15 PROCEDURE — 77030038593 HC SUT VLOC ABSRB COVD -B: Performed by: SURGERY

## 2018-10-15 PROCEDURE — 88300 SURGICAL PATH GROSS: CPT | Performed by: SURGERY

## 2018-10-15 PROCEDURE — 77030008771 HC TU NG SALEM SUMP -A: Performed by: ANESTHESIOLOGY

## 2018-10-15 PROCEDURE — 77030032490 HC SLV COMPR SCD KNE COVD -B: Performed by: SURGERY

## 2018-10-15 RX ORDER — PROPOFOL 10 MG/ML
INJECTION, EMULSION INTRAVENOUS AS NEEDED
Status: DISCONTINUED | OUTPATIENT
Start: 2018-10-15 | End: 2018-10-15 | Stop reason: HOSPADM

## 2018-10-15 RX ORDER — HYDROMORPHONE HYDROCHLORIDE 2 MG/ML
INJECTION, SOLUTION INTRAMUSCULAR; INTRAVENOUS; SUBCUTANEOUS AS NEEDED
Status: DISCONTINUED | OUTPATIENT
Start: 2018-10-15 | End: 2018-10-15 | Stop reason: HOSPADM

## 2018-10-15 RX ORDER — ONDANSETRON 2 MG/ML
4 INJECTION INTRAMUSCULAR; INTRAVENOUS EVERY 6 HOURS
Status: DISCONTINUED | OUTPATIENT
Start: 2018-10-15 | End: 2018-10-15

## 2018-10-15 RX ORDER — FENTANYL CITRATE 50 UG/ML
50 INJECTION, SOLUTION INTRAMUSCULAR; INTRAVENOUS AS NEEDED
Status: DISCONTINUED | OUTPATIENT
Start: 2018-10-15 | End: 2018-10-15 | Stop reason: HOSPADM

## 2018-10-15 RX ORDER — ROPIVACAINE HYDROCHLORIDE 5 MG/ML
150 INJECTION, SOLUTION EPIDURAL; INFILTRATION; PERINEURAL AS NEEDED
Status: DISCONTINUED | OUTPATIENT
Start: 2018-10-15 | End: 2018-10-15 | Stop reason: HOSPADM

## 2018-10-15 RX ORDER — ACETAMINOPHEN 10 MG/ML
INJECTION, SOLUTION INTRAVENOUS AS NEEDED
Status: DISCONTINUED | OUTPATIENT
Start: 2018-10-15 | End: 2018-10-15 | Stop reason: HOSPADM

## 2018-10-15 RX ORDER — FENTANYL CITRATE 50 UG/ML
50 INJECTION, SOLUTION INTRAMUSCULAR; INTRAVENOUS
Status: DISCONTINUED | OUTPATIENT
Start: 2018-10-15 | End: 2018-10-15

## 2018-10-15 RX ORDER — ONDANSETRON 2 MG/ML
INJECTION INTRAMUSCULAR; INTRAVENOUS AS NEEDED
Status: DISCONTINUED | OUTPATIENT
Start: 2018-10-15 | End: 2018-10-15 | Stop reason: HOSPADM

## 2018-10-15 RX ORDER — SODIUM CHLORIDE 0.9 % (FLUSH) 0.9 %
5-10 SYRINGE (ML) INJECTION EVERY 8 HOURS
Status: DISCONTINUED | OUTPATIENT
Start: 2018-10-15 | End: 2018-10-17 | Stop reason: HOSPADM

## 2018-10-15 RX ORDER — ROCURONIUM BROMIDE 10 MG/ML
INJECTION, SOLUTION INTRAVENOUS AS NEEDED
Status: DISCONTINUED | OUTPATIENT
Start: 2018-10-15 | End: 2018-10-15 | Stop reason: HOSPADM

## 2018-10-15 RX ORDER — SODIUM CHLORIDE, SODIUM LACTATE, POTASSIUM CHLORIDE, CALCIUM CHLORIDE 600; 310; 30; 20 MG/100ML; MG/100ML; MG/100ML; MG/100ML
100 INJECTION, SOLUTION INTRAVENOUS CONTINUOUS
Status: DISCONTINUED | OUTPATIENT
Start: 2018-10-15 | End: 2018-10-15 | Stop reason: HOSPADM

## 2018-10-15 RX ORDER — SODIUM CHLORIDE 0.9 % (FLUSH) 0.9 %
5-10 SYRINGE (ML) INJECTION AS NEEDED
Status: DISCONTINUED | OUTPATIENT
Start: 2018-10-15 | End: 2018-10-15 | Stop reason: HOSPADM

## 2018-10-15 RX ORDER — KETAMINE HYDROCHLORIDE 10 MG/ML
INJECTION, SOLUTION INTRAMUSCULAR; INTRAVENOUS AS NEEDED
Status: DISCONTINUED | OUTPATIENT
Start: 2018-10-15 | End: 2018-10-15 | Stop reason: HOSPADM

## 2018-10-15 RX ORDER — LIDOCAINE HYDROCHLORIDE 10 MG/ML
0.1 INJECTION, SOLUTION EPIDURAL; INFILTRATION; INTRACAUDAL; PERINEURAL AS NEEDED
Status: DISCONTINUED | OUTPATIENT
Start: 2018-10-15 | End: 2018-10-15 | Stop reason: HOSPADM

## 2018-10-15 RX ORDER — NEOSTIGMINE METHYLSULFATE 1 MG/ML
INJECTION INTRAVENOUS AS NEEDED
Status: DISCONTINUED | OUTPATIENT
Start: 2018-10-15 | End: 2018-10-15 | Stop reason: HOSPADM

## 2018-10-15 RX ORDER — DEXAMETHASONE SODIUM PHOSPHATE 4 MG/ML
INJECTION, SOLUTION INTRA-ARTICULAR; INTRALESIONAL; INTRAMUSCULAR; INTRAVENOUS; SOFT TISSUE AS NEEDED
Status: DISCONTINUED | OUTPATIENT
Start: 2018-10-15 | End: 2018-10-15 | Stop reason: HOSPADM

## 2018-10-15 RX ORDER — LORAZEPAM 2 MG/ML
1 INJECTION INTRAMUSCULAR
Status: DISCONTINUED | OUTPATIENT
Start: 2018-10-15 | End: 2018-10-17 | Stop reason: HOSPADM

## 2018-10-15 RX ORDER — GLYCOPYRROLATE 0.2 MG/ML
INJECTION INTRAMUSCULAR; INTRAVENOUS AS NEEDED
Status: DISCONTINUED | OUTPATIENT
Start: 2018-10-15 | End: 2018-10-15 | Stop reason: HOSPADM

## 2018-10-15 RX ORDER — SODIUM CHLORIDE, SODIUM LACTATE, POTASSIUM CHLORIDE, CALCIUM CHLORIDE 600; 310; 30; 20 MG/100ML; MG/100ML; MG/100ML; MG/100ML
1000 INJECTION, SOLUTION INTRAVENOUS CONTINUOUS
Status: DISCONTINUED | OUTPATIENT
Start: 2018-10-15 | End: 2018-10-15 | Stop reason: HOSPADM

## 2018-10-15 RX ORDER — DIPHENHYDRAMINE HYDROCHLORIDE 50 MG/ML
12.5 INJECTION, SOLUTION INTRAMUSCULAR; INTRAVENOUS
Status: ACTIVE | OUTPATIENT
Start: 2018-10-15 | End: 2018-10-16

## 2018-10-15 RX ORDER — MORPHINE SULFATE 10 MG/ML
2 INJECTION, SOLUTION INTRAMUSCULAR; INTRAVENOUS
Status: DISCONTINUED | OUTPATIENT
Start: 2018-10-15 | End: 2018-10-15 | Stop reason: HOSPADM

## 2018-10-15 RX ORDER — OXYCODONE HYDROCHLORIDE 5 MG/1
5 TABLET ORAL AS NEEDED
Status: DISCONTINUED | OUTPATIENT
Start: 2018-10-15 | End: 2018-10-15 | Stop reason: HOSPADM

## 2018-10-15 RX ORDER — SODIUM CHLORIDE 0.9 % (FLUSH) 0.9 %
5-10 SYRINGE (ML) INJECTION AS NEEDED
Status: DISCONTINUED | OUTPATIENT
Start: 2018-10-15 | End: 2018-10-17 | Stop reason: HOSPADM

## 2018-10-15 RX ORDER — DIPHENHYDRAMINE HYDROCHLORIDE 50 MG/ML
12.5 INJECTION, SOLUTION INTRAMUSCULAR; INTRAVENOUS AS NEEDED
Status: DISCONTINUED | OUTPATIENT
Start: 2018-10-15 | End: 2018-10-15 | Stop reason: HOSPADM

## 2018-10-15 RX ORDER — HYOSCYAMINE SULFATE 0.12 MG/1
0.12 TABLET SUBLINGUAL
Status: DISCONTINUED | OUTPATIENT
Start: 2018-10-15 | End: 2018-10-17 | Stop reason: HOSPADM

## 2018-10-15 RX ORDER — MIDAZOLAM HYDROCHLORIDE 1 MG/ML
0.5 INJECTION, SOLUTION INTRAMUSCULAR; INTRAVENOUS
Status: DISCONTINUED | OUTPATIENT
Start: 2018-10-15 | End: 2018-10-15 | Stop reason: HOSPADM

## 2018-10-15 RX ORDER — MIDAZOLAM HYDROCHLORIDE 1 MG/ML
1 INJECTION, SOLUTION INTRAMUSCULAR; INTRAVENOUS AS NEEDED
Status: DISCONTINUED | OUTPATIENT
Start: 2018-10-15 | End: 2018-10-15 | Stop reason: HOSPADM

## 2018-10-15 RX ORDER — KETOROLAC TROMETHAMINE 30 MG/ML
15 INJECTION, SOLUTION INTRAMUSCULAR; INTRAVENOUS EVERY 6 HOURS
Status: COMPLETED | OUTPATIENT
Start: 2018-10-15 | End: 2018-10-16

## 2018-10-15 RX ORDER — SODIUM CHLORIDE, SODIUM LACTATE, POTASSIUM CHLORIDE, CALCIUM CHLORIDE 600; 310; 30; 20 MG/100ML; MG/100ML; MG/100ML; MG/100ML
INJECTION, SOLUTION INTRAVENOUS
Status: DISCONTINUED | OUTPATIENT
Start: 2018-10-15 | End: 2018-10-15 | Stop reason: HOSPADM

## 2018-10-15 RX ORDER — BUPIVACAINE HYDROCHLORIDE AND EPINEPHRINE 5; 5 MG/ML; UG/ML
30 INJECTION, SOLUTION EPIDURAL; INTRACAUDAL; PERINEURAL ONCE
Status: COMPLETED | OUTPATIENT
Start: 2018-10-15 | End: 2018-10-15

## 2018-10-15 RX ORDER — ENOXAPARIN SODIUM 100 MG/ML
40 INJECTION SUBCUTANEOUS EVERY 24 HOURS
Status: COMPLETED | OUTPATIENT
Start: 2018-10-15 | End: 2018-10-15

## 2018-10-15 RX ORDER — SODIUM CHLORIDE 9 MG/ML
25 INJECTION, SOLUTION INTRAVENOUS CONTINUOUS
Status: DISCONTINUED | OUTPATIENT
Start: 2018-10-15 | End: 2018-10-15 | Stop reason: HOSPADM

## 2018-10-15 RX ORDER — FENTANYL CITRATE 50 UG/ML
INJECTION, SOLUTION INTRAMUSCULAR; INTRAVENOUS AS NEEDED
Status: DISCONTINUED | OUTPATIENT
Start: 2018-10-15 | End: 2018-10-15 | Stop reason: HOSPADM

## 2018-10-15 RX ORDER — FENTANYL CITRATE 50 UG/ML
50-100 INJECTION, SOLUTION INTRAMUSCULAR; INTRAVENOUS
Status: DISCONTINUED | OUTPATIENT
Start: 2018-10-15 | End: 2018-10-17 | Stop reason: HOSPADM

## 2018-10-15 RX ORDER — SUCCINYLCHOLINE CHLORIDE 20 MG/ML
INJECTION INTRAMUSCULAR; INTRAVENOUS AS NEEDED
Status: DISCONTINUED | OUTPATIENT
Start: 2018-10-15 | End: 2018-10-15 | Stop reason: HOSPADM

## 2018-10-15 RX ORDER — ONDANSETRON 2 MG/ML
4 INJECTION INTRAMUSCULAR; INTRAVENOUS AS NEEDED
Status: DISCONTINUED | OUTPATIENT
Start: 2018-10-15 | End: 2018-10-15 | Stop reason: HOSPADM

## 2018-10-15 RX ORDER — SODIUM CHLORIDE, SODIUM LACTATE, POTASSIUM CHLORIDE, CALCIUM CHLORIDE 600; 310; 30; 20 MG/100ML; MG/100ML; MG/100ML; MG/100ML
125 INJECTION, SOLUTION INTRAVENOUS CONTINUOUS
Status: DISCONTINUED | OUTPATIENT
Start: 2018-10-15 | End: 2018-10-17 | Stop reason: HOSPADM

## 2018-10-15 RX ORDER — MIDAZOLAM HYDROCHLORIDE 1 MG/ML
INJECTION, SOLUTION INTRAMUSCULAR; INTRAVENOUS AS NEEDED
Status: DISCONTINUED | OUTPATIENT
Start: 2018-10-15 | End: 2018-10-15 | Stop reason: HOSPADM

## 2018-10-15 RX ORDER — LIDOCAINE HYDROCHLORIDE 20 MG/ML
INJECTION, SOLUTION EPIDURAL; INFILTRATION; INTRACAUDAL; PERINEURAL AS NEEDED
Status: DISCONTINUED | OUTPATIENT
Start: 2018-10-15 | End: 2018-10-15 | Stop reason: HOSPADM

## 2018-10-15 RX ORDER — ONDANSETRON 2 MG/ML
4 INJECTION INTRAMUSCULAR; INTRAVENOUS EVERY 12 HOURS
Status: DISCONTINUED | OUTPATIENT
Start: 2018-10-15 | End: 2018-10-17 | Stop reason: HOSPADM

## 2018-10-15 RX ORDER — SODIUM CHLORIDE 0.9 % (FLUSH) 0.9 %
5-10 SYRINGE (ML) INJECTION EVERY 8 HOURS
Status: DISCONTINUED | OUTPATIENT
Start: 2018-10-15 | End: 2018-10-15 | Stop reason: HOSPADM

## 2018-10-15 RX ORDER — PROCHLORPERAZINE EDISYLATE 5 MG/ML
5 INJECTION INTRAMUSCULAR; INTRAVENOUS EVERY 6 HOURS
Status: DISCONTINUED | OUTPATIENT
Start: 2018-10-15 | End: 2018-10-15

## 2018-10-15 RX ORDER — NALOXONE HYDROCHLORIDE 0.4 MG/ML
0.4 INJECTION, SOLUTION INTRAMUSCULAR; INTRAVENOUS; SUBCUTANEOUS AS NEEDED
Status: DISCONTINUED | OUTPATIENT
Start: 2018-10-15 | End: 2018-10-17 | Stop reason: HOSPADM

## 2018-10-15 RX ORDER — ENOXAPARIN SODIUM 100 MG/ML
40 INJECTION SUBCUTANEOUS EVERY 24 HOURS
Status: DISCONTINUED | OUTPATIENT
Start: 2018-10-16 | End: 2018-10-17 | Stop reason: HOSPADM

## 2018-10-15 RX ORDER — SCOLOPAMINE TRANSDERMAL SYSTEM 1 MG/1
1 PATCH, EXTENDED RELEASE TRANSDERMAL
Status: DISCONTINUED | OUTPATIENT
Start: 2018-10-15 | End: 2018-10-17 | Stop reason: HOSPADM

## 2018-10-15 RX ORDER — FENTANYL CITRATE 50 UG/ML
25 INJECTION, SOLUTION INTRAMUSCULAR; INTRAVENOUS
Status: COMPLETED | OUTPATIENT
Start: 2018-10-15 | End: 2018-10-15

## 2018-10-15 RX ADMIN — DEXAMETHASONE SODIUM PHOSPHATE 8 MG: 4 INJECTION, SOLUTION INTRA-ARTICULAR; INTRALESIONAL; INTRAMUSCULAR; INTRAVENOUS; SOFT TISSUE at 07:39

## 2018-10-15 RX ADMIN — KETAMINE HYDROCHLORIDE 25 MG: 10 INJECTION, SOLUTION INTRAMUSCULAR; INTRAVENOUS at 07:36

## 2018-10-15 RX ADMIN — CEFAZOLIN 3 G: 1 INJECTION, POWDER, FOR SOLUTION INTRAMUSCULAR; INTRAVENOUS; PARENTERAL at 07:53

## 2018-10-15 RX ADMIN — FENTANYL CITRATE 50 MCG: 50 INJECTION, SOLUTION INTRAMUSCULAR; INTRAVENOUS at 14:59

## 2018-10-15 RX ADMIN — FENTANYL CITRATE 100 MCG: 50 INJECTION, SOLUTION INTRAMUSCULAR; INTRAVENOUS at 07:36

## 2018-10-15 RX ADMIN — MIDAZOLAM HYDROCHLORIDE 3 MG: 1 INJECTION, SOLUTION INTRAMUSCULAR; INTRAVENOUS at 07:27

## 2018-10-15 RX ADMIN — ONDANSETRON 4 MG: 2 INJECTION INTRAMUSCULAR; INTRAVENOUS at 07:39

## 2018-10-15 RX ADMIN — HYDROMORPHONE HYDROCHLORIDE 0.5 MG: 2 INJECTION, SOLUTION INTRAMUSCULAR; INTRAVENOUS; SUBCUTANEOUS at 10:03

## 2018-10-15 RX ADMIN — FENTANYL CITRATE 25 MCG: 50 INJECTION, SOLUTION INTRAMUSCULAR; INTRAVENOUS at 12:14

## 2018-10-15 RX ADMIN — SODIUM CHLORIDE 5 MG: 9 INJECTION INTRAMUSCULAR; INTRAVENOUS; SUBCUTANEOUS at 15:00

## 2018-10-15 RX ADMIN — FENTANYL CITRATE 100 MCG: 50 INJECTION, SOLUTION INTRAMUSCULAR; INTRAVENOUS at 21:01

## 2018-10-15 RX ADMIN — ROCURONIUM BROMIDE 10 MG: 10 INJECTION, SOLUTION INTRAVENOUS at 07:36

## 2018-10-15 RX ADMIN — PROPOFOL 200 MG: 10 INJECTION, EMULSION INTRAVENOUS at 07:36

## 2018-10-15 RX ADMIN — SODIUM CHLORIDE, SODIUM LACTATE, POTASSIUM CHLORIDE, AND CALCIUM CHLORIDE 125 ML/HR: 600; 310; 30; 20 INJECTION, SOLUTION INTRAVENOUS at 11:41

## 2018-10-15 RX ADMIN — ONDANSETRON 4 MG: 2 INJECTION INTRAMUSCULAR; INTRAVENOUS at 21:00

## 2018-10-15 RX ADMIN — FENTANYL CITRATE 100 MCG: 50 INJECTION, SOLUTION INTRAMUSCULAR; INTRAVENOUS at 18:19

## 2018-10-15 RX ADMIN — FENTANYL CITRATE 25 MCG: 50 INJECTION, SOLUTION INTRAMUSCULAR; INTRAVENOUS at 12:25

## 2018-10-15 RX ADMIN — FENTANYL CITRATE 50 MCG: 50 INJECTION, SOLUTION INTRAMUSCULAR; INTRAVENOUS at 10:42

## 2018-10-15 RX ADMIN — KETOROLAC TROMETHAMINE 15 MG: 30 INJECTION, SOLUTION INTRAMUSCULAR; INTRAVENOUS at 23:09

## 2018-10-15 RX ADMIN — FENTANYL CITRATE 50 MCG: 50 INJECTION, SOLUTION INTRAMUSCULAR; INTRAVENOUS at 08:03

## 2018-10-15 RX ADMIN — MIDAZOLAM HYDROCHLORIDE 2 MG: 1 INJECTION, SOLUTION INTRAMUSCULAR; INTRAVENOUS at 07:29

## 2018-10-15 RX ADMIN — HYOSCYAMINE SULFATE 0.12 MG: 0.12 TABLET ORAL; SUBLINGUAL at 18:19

## 2018-10-15 RX ADMIN — KETOROLAC TROMETHAMINE 15 MG: 30 INJECTION, SOLUTION INTRAMUSCULAR; INTRAVENOUS at 18:19

## 2018-10-15 RX ADMIN — KETOROLAC TROMETHAMINE 15 MG: 30 INJECTION, SOLUTION INTRAMUSCULAR; INTRAVENOUS at 11:32

## 2018-10-15 RX ADMIN — SODIUM CHLORIDE, SODIUM LACTATE, POTASSIUM CHLORIDE, AND CALCIUM CHLORIDE 125 ML/HR: 600; 310; 30; 20 INJECTION, SOLUTION INTRAVENOUS at 23:05

## 2018-10-15 RX ADMIN — FENTANYL CITRATE 25 MCG: 50 INJECTION, SOLUTION INTRAMUSCULAR; INTRAVENOUS at 11:40

## 2018-10-15 RX ADMIN — SUCCINYLCHOLINE CHLORIDE 160 MG: 20 INJECTION INTRAMUSCULAR; INTRAVENOUS at 07:36

## 2018-10-15 RX ADMIN — FENTANYL CITRATE 25 MCG: 50 INJECTION, SOLUTION INTRAMUSCULAR; INTRAVENOUS at 12:20

## 2018-10-15 RX ADMIN — FENTANYL CITRATE 50 MCG: 50 INJECTION, SOLUTION INTRAMUSCULAR; INTRAVENOUS at 09:57

## 2018-10-15 RX ADMIN — GLYCOPYRROLATE 0.6 MG: 0.2 INJECTION INTRAMUSCULAR; INTRAVENOUS at 09:57

## 2018-10-15 RX ADMIN — Medication 10 ML: at 15:01

## 2018-10-15 RX ADMIN — NEOSTIGMINE METHYLSULFATE 4 MG: 1 INJECTION INTRAVENOUS at 09:57

## 2018-10-15 RX ADMIN — ACETAMINOPHEN 1000 MG: 10 INJECTION, SOLUTION INTRAVENOUS at 08:07

## 2018-10-15 RX ADMIN — LIDOCAINE HYDROCHLORIDE 60 MG: 20 INJECTION, SOLUTION EPIDURAL; INFILTRATION; INTRACAUDAL; PERINEURAL at 07:36

## 2018-10-15 RX ADMIN — ROCURONIUM BROMIDE 20 MG: 10 INJECTION, SOLUTION INTRAVENOUS at 08:51

## 2018-10-15 RX ADMIN — KETAMINE HYDROCHLORIDE 25 MG: 10 INJECTION, SOLUTION INTRAMUSCULAR; INTRAVENOUS at 08:02

## 2018-10-15 RX ADMIN — SODIUM CHLORIDE, SODIUM LACTATE, POTASSIUM CHLORIDE, CALCIUM CHLORIDE: 600; 310; 30; 20 INJECTION, SOLUTION INTRAVENOUS at 07:23

## 2018-10-15 RX ADMIN — ENOXAPARIN SODIUM 40 MG: 40 INJECTION SUBCUTANEOUS at 06:42

## 2018-10-15 RX ADMIN — ROCURONIUM BROMIDE 40 MG: 10 INJECTION, SOLUTION INTRAVENOUS at 07:46

## 2018-10-15 RX ADMIN — Medication 10 ML: at 21:01

## 2018-10-15 NOTE — OP NOTES
[facility]  MR#: [id number]  : 1970  ACCOUNT #: [de-identified]   DATE OF SERVICE: 10/15/2018    PREOPERATIVE DIAGNOSIS:  Morbid obesity. POSTOPERATIVE DIAGNOSIS:  Morbid obesity. PROCEDURE PERFORMED:  Laparoscopic Ren-en-Y gastric bypass with esophagogastroduodenoscopy. SURGEON:  Nasra Burks MD    ASSISTANT:  JACKELIN Bhardwaj    INDICATION FOR THE OPERATION:  The patient is a 52-year-old female with a history of morbid obesity with a BMI of 48, with bariatric comorbidities including osteoarthritis, hypertension, obstructive sleep apnea, who has been preoperatively evaluated for bariatric surgery. She has been through the necessary preoperative education and is now ready for bariatric surgery. My assistant, Myron Amado, was necessary for the operation due to the complex nature of the bariatric operation, high BMI, and was necessary for operation of the camera, retraction and firing of the stapler and for intraoperative decision making. DESCRIPTION OF OPERATION:  The patient was met in the preop holding area, the H and P was updated. Consent was signed. All risks and benefits were explained to the patient prior to start of the operation. She was taken back to the operating room. She was lying in a supine position. The abdomen was prepped and draped in standard sterile fashion. Timeout was called. Antibiotics were given. SCDs were on lower extremities. I started the operation by making a 5 mm incision into the left upper quadrant, inserting a Visiport trocar into the intraabdominal cavity, insufflating to 15 mmHg. We then placed a 5 mm trocar superior and to the left of the umbilicus, a 12 mm port superior and to the right of the umbilicus in the 5 mm right upper quadrant port. We then placed a 15 mm left lateral port and then a subxiphoid liver retractor lifting the liver up and out of the way.   We had the patient placed into steep reverse Trendelenburg position. We had a good look at the stomach. The stomach had a normal appearance and there was no evidence of any hiatal hernia. We then had the patient placed flat again and then started taking down the transverse colon, omentum dividing that where the Ren limb would go up over top of the colon in the antecolic fashion. Then, once we had divided that made a clear area for our Ren limb to go over the colon. We then pushed the colon up and out of the way. We could see the ligament of Treitz very clearly. We then measured 50 cm distal to the ligament of Treitz and divided the small intestine. We then divided the mesentery all the way down to the root and had plenty of length for our Ren limb to come up to the upper abdomen. We then took the Ren limb and measured 150 cm distal on the Ren limb and then created a stapled side-to-side anastomosis with our biliopancreatic limb and our Ren limb to make the JJ anastomosis. We then closed the common enterotomy with a 3-0 V-Loc suture in a running fashion in a double layer closure and we also used a tan HubCast Endo-YENNY stapler to make the anastomosis. The anastomosis was patent and completed. We then closed the JJ mesenteric defect with a running 2-0 V-Loc suture with a permanent suture. The defect was closed. We then placed the Ren limb underneath the transverse colon and then made sure it was not twisted and it was not. We then had the patient placed back into reverse Trendelenburg position. We then took down the area around the angle of His, taking down those posterior gastric fibers. We then found a suitable area around the area of the second gastric vessel to create the horizontal firing of our gastric pouch. We dissected into the lesser sac underneath the stomach along the lesser curve around the area of the second gastric vessel.   Once into that area, we then used a Adap.tv purple load with reinforcement high drive stapler to create the horizontal portion of the gastric pouch. We fired the stapler and created that. We then started creating the vertical portion of the pouch. We had the stapler on the pouch and then had the lap band sizing tube placed down the mouth into the stomach into the gastric pouch. We then fired vertically making sure that we were not on the tube. We then used one more firing to create the last portion of our gastric pouch after dissecting through and underneath the angle of His, completing the vertical portion of the gastric pouch with the purple load stapler with reinforcing strip. We then completed our gastric pouch. The gastric pouch was perfectly created. We then placed the wound protector device through the 15 mm port site and then had the lap band sizing tube removed from the gastric pouch and then placed the 25 mm EEA OrVil anvil placed down the mouth into the esophagus and down into the gastric pouch. We made a small gastrotomy on the anterior portion of the gastric pouch and pulled that tube with the anvil through the gastric pouch and out of the stomach. The anvil was suited and in the   corner of the gastric pouch. We disconnected that and passed off the tubing. We then took the end of the Ren limb, again, making sure the Ren limb mesentery was not twisted and it was not. We then opened up the end of the Ren limb and took the 25 mm EEA stapler and placed it into the end of the Ren limb putting it down distally into the Ren limb to a suitable area to create our stapled anastomosis. We then fired the spike through the Ren limb and then made the spike on the EEA stapler and the anvil in the pouch. We then created our [address] [place] [address] anastomosis mating that and then firing the stapler. The stapler fired correctly and then we removed our stapler, inspecting our donuts, which were all intact.   We passed that off the field and divided the mesentery end of the Ren limb just past our enterotomy for the stapler and then divided the end of the Ren limb with a short candy cane end with a Ovo Cosmico tan load Endo GI stapler. That was created and completed, and we then removed the end of the Ren limb from an EndoCatch bag that was passed off the field as a portion of the small bowel. We then oversewed the anterior portion of the gastric pouch with multiple interrupted 3-0 Vicryl sutures and also sutured the Ren limb to the gastric antrum to prevent any twisting of the Ren limb. We then performed our endoscopy. We placed the endoscope down the mouth into the esophagus and down to the gastric pouch. The gastric pouch was completely patent. There was no bleeding. We were able to get down into the Ren limb without any difficulty at all. We insufflated and had saline solution in the upper abdomen. There was no leak, no bubbles and good insufflation. We then desufflated the Ren limb and the gastric pouch and then removed the endoscope and completed that, we then closed our Retro-Ren Basurto's defect, closing the Ren limb mesentery to the mesentery of the transverse colon with a running 2-0 permanent V-Loc suture. We then placed a 19-Kiswahili round Walter drain into the upper abdomen, removed our liver retractor and then closed the 15 mm port fascial defect with an EndoClose suture passing device in interrupted fashion. We also did place some 5 mm clips on the end of the biliopancreatic limb of the JJ anastomosis. There was a small amount of oozing there. We placed those clips and the area was hemostatic. We surveyed the rest of the abdominal cavity and everything was hemostatic. We then desufflated the abdominal cavity, removed the trocars and closed the skin with 4-0 Monocryl and Dermabond to complete the operation. Dr. Margurette Canavan was present and scrubbed during the entire operation. The counts were correct. ANESTHESIA:  General.    ESTIMATED BLOOD LOSS:  20 mL.     SPECIMENS REMOVED: Portion of small bowel and anastomotic rings. FINDINGS:  Normal antecolic antegastric bypass. Normal postop EGD, negative leak test for 150 cm Ren limb. COMPLICATIONS:  None. IMPLANTS:  None.       MD GEORGIA Parker / STEPHANE  D: 10/15/2018 10:17     T: 10/15/2018 13:09  JOB #: [id number]

## 2018-10-15 NOTE — PERIOP NOTES
TRANSFER - OUT REPORT: 
 
Verbal report given to JUAN Valderrama on [employer]  being transferred to [phone] for routine post - op Report consisted of patients Situation, Background, Assessment and  
Recommendations(SBAR). Time Pre op antibiotic [id number] Anesthesia Stop time: [id number] Noble Present on Transfer to floor:no Order for Noble on Chart:no Discharge Prescriptions with Chart:no Information from the following report(s) SBAR, OR Summary, Intake/Output and MAR was reviewed with the receiving nurse. Opportunity for questions and clarification was provided. Is the patient on 02? YES 
     L/Min 2 Other 0 Is the patient on a monitor? YES but not for transport Is the nurse transporting with the patient? NO not for transport Surgical Waiting Area notified of patient's transfer from PACU? YES The following personal items collected during your admission accompanied patient upon transfer:  
Dental Appliance: Dental Appliances: None Vision:   
Hearing Aid:   
Jewelry:   
Clothing: Clothing: Other (comment) (clothing bag to pacu) Other Valuables:   
Valuables sent to safe:

## 2018-10-15 NOTE — BRIEF OP NOTE
BRIEF OPERATIVE NOTE Date of Procedure: 10/15/2018 Preoperative Diagnosis: MORBID OBESITY Postoperative Diagnosis: MORBID OBESITY Procedure(s): LAPAROSCOPIC MAMIE EN Y GASTRIC BYPASS WITH ESOPHAGOGASTRODUODENOSCOPY (EGD) Surgeon(s) and Role: Stephanie Wong MD - Primary Surgical Assistant: JACKELIN May Surgical Staff: 
Circ-1: Tessie Lazaro RN 
Circ-Relief: Deidre Segura RN Physician Assistant: JACKELIN May Scrub Tech-1: Alvera Phlegm Event Time In Incision Start 0414 Incision Close Anesthesia: General  
Estimated Blood Loss: 20cc Specimens:  
ID Type Source Tests Collected by Time Destination 1 : portion small bowel and anastomotic rings Fresh Other                  Geo MD Gunnar 10/15/2018 1508 Pathology Findings: normal antecolic antegastric bypass, normal post op EGD, negative leak test, 150cm Mamie limb Complications: none Implants: * No implants in log *

## 2018-10-15 NOTE — PROGRESS NOTES
Clinical Care Lead Arrival Summary Name: Michele Santo  MRN: [id number]  Date: 10/15/2018 11:56 AM 
Admission Date: 10/15/2018  : 1970 Situation: 
10/15/18 LAP GASTRIC BYPASS WITH EGD WITH DR. Lauren Fiore Background: 
  
Medical History Past Medical History Date Comments Anemia [D64.9] Anxiety [F41.9] Arthritis [M19.90]  LEFT KNEE Current smoker [F17.200] Depression [F32.9] Hypertension [I10] Menorrhagia [N92.0] Morbid obesity ([address] [E66.01] Nicotine vapor product user [Z78.9]  SMALL AMOUNT OF NICOTINE 0.3 Snoring [R06.83] Surgical History Past Surgical History Laterality Date Comments CA BLOOD TRANSFUSION SERVICE[[id number]]   [facility]   polyp, 1/2 cm - negative, per patient CA LAPAROSCOPY W TOT HYSTERECTUTERUS <=250 Christopher Seal TUBE/OVARY[[id number]]  7/10/2013 HYSTERECTOMY ROBOTIC ASSISTED performed by Armin Romeo MD at [facility]  7/10/2013 CYSTOSCOPY performed by Armin Romeo MD at [address]  2006 HX GYN[[id number]]   PARTIAL HYSTERECTOMY Social History Category History Smoking Status Former Smoker; Start date: ; Quit date: 2018; 0.5 packs/day for 28 years (14 pk-yrs); Types: Cigarettes Smokeless Tobacco Status Never Used Alcohol Use No; (rarely) Drug Use No  
Sexually Active Yes; Male partners; Birth Ctrl/Protection: Surgical  
ADL Not Asked The Beta blocker the patient is taking is [unfilled], COZAAR 25 MG TABLET 10/14/18. STAND: POSTOP IF INDICATED ON ADMISSION DATABASE Voice quality/secretions: Hx of dysphagia:   
O2 sat:   
Alertness:   
 
Flu vaccination received for current season: HAD FLU SHOT THIS YEAR 
 
 
VTE Documentation-BOTH CHEMICAL AND MECHANICAL 
VTE Risk Assessment VTE Risk Assessment: Surgery within month Mechanical VTE orders: Mechanical VTE Orders: Yes Venous Foot Pump:   
 Graduated Compression Stockings:   
Sequential Compression Devices: Sequential Compression Device: Bilateral 
Patient Refused VTE:   
 
 
Diet:   
           
Assessment: 
 
Lines/Drains/Airways:  
Peripheral IV 10/15/18 Right Arm (Active) Site Assessment Clean, dry, & intact 10/15/2018 10:57 AM  
Phlebitis Assessment 0 10/15/2018 10:57 AM  
Infiltration Assessment 0 10/15/2018 10:57 AM  
Dressing Status Occlusive 10/15/2018 10:57 AM  
Hub Color/Line Status Pink; Infusing 10/15/2018 10:57 AM  
   
Levorn Nail #1 10/15/18 Left Abdomen (Active) Site Assessment Clean, dry, & intact 10/15/2018 10:57 AM  
Dressing Status Clean, dry, & intact 10/15/2018 10:57 AM  
Drainage Description Serosanguinous 10/15/2018 10:57 AM  
Status Patent; Charged 10/15/2018 10:57 AM  
Output (ml) 50 ml 10/15/2018 11:41 AM  
   
Wound Abdomen (Active) DRESSING STATUS Clean, dry, and intact 10/15/2018 11:41 AM  
DRESSING TYPE Topical skin adhesive/glue 10/15/2018 10:56 AM  
 
 
Last 3 Weights: 
Last 3 Recorded Weights in this Encounter 10/15/18 1980 Weight: 142 kg (313 lb) DAILY STANDING WEIGHT BETWEEN 12 MIDNIGHT AND 0700 Recommendations:POST GASTRIC BYPASS Consult Orders:  
Recent orders:  VERIFY CONSENT 
APPLY/MAINTAIN SEQUENTIAL COMPRESSION DEVICE 
NOTIFY ANESTHESIA 
APPLY/MAINTAIN SEQUENTIAL COMPRESSION DEVICE 
SALINE LOCK IV INITIAL PHYSICIAN ORDER: INPATIENT 
TRANSFER PATIENT 
DIET NPO 
POC GLUCOSE 
VITAL SIGNS PER UNIT ROUTINE 
UP AD TOMMY 
ELEVATE HEAD OF BED 
NOTIFY PROVIDER: VITAL SIGNS CHANGES 
CARDIAC MONITORING 
BLADDER CHECKS INTAKE AND OUTPUT [facility] TO [facility] 
SALINE LOCK IV 
IP CONSULT TO NUTRITION SERVICES Core Measures Compliant CHF:NA Pneumonia:NA Vaccines:HAD FLU SHOT  
SCIP:YES Noble:NA AMI:NA

## 2018-10-15 NOTE — PERIOP NOTES
Endoscope was pre-cleaned at bedside immediately following procedure by willem . Button lot #see notes. (if applicable)

## 2018-10-15 NOTE — DISCHARGE INSTRUCTIONS
[address] [facility] Surgery at [facility]   Gastric Bypass Instructions    Procedure:  Laparoscopic gastric bypass  You have  a 2 week follow-up appointment. Future Appointments  Date Time Provider Em Sullivan   10/29/2018 9:40 AM BENJI Donnelly SCHED   11/12/2018 9:40 AM BENJI Donnelly SCHED   11/26/2018 9:40 AM Linda Tidwell NP [facility]     Office address is: [facility] Yamileth Anamika LAO/ René Arthur [address] [facility], [address] [place] [address]  [phone]    Please remember that you are on ONLY LIQUIDS for the first 2 weeks after surgery until you are seen in our office. You should be drinking plenty of no calorie, non carbonated liquids through out the day and your meals are going to be a shake, the same type you did for your 2 week pre-op diet. Do not advance your diet until you are seen in our office. Please take your medication for nausea if needed. Laparoscopic Ren-en-Y Gastric Bypass: What to Expect at Home  Your Recovery  A Ren-en-Y (say \"glendy-en-why\") gastric bypass is surgery to make the stomach smaller and change the connection between the stomach and the intestines. It is done to help people lose weight. The surgery limits the amount of food the stomach can hold. This helps you eat less and feel full sooner. The cuts (incisions) the doctor made in your belly will probably be sore for several days after the surgery. You probably will lose weight very quickly in the first few months after surgery. As time goes on, your weight loss will slow down. You can expect most of your weight loss to happen in the first 12 months after your surgery. You will have regular doctor's appointments during this time to check how you are doing. It is important to think of this surgery as a tool to help you lose weight. It is not an instant fix. You will still need to eat a healthy diet and get regular exercise.  This will help you reach your weight goal and avoid regaining the weight you lose. It is common to have many different emotions after this surgery. You may feel happy or excited as you begin to lose weight. But you may also feel overwhelmed or frustrated by the changes that you have to make in your diet, activity, and lifestyle. Talk with your doctor if you have concerns or questions. This care sheet gives you a general idea about how long it will take for you to recover. But each person recovers at a different pace. Follow the steps below to get better as quickly as possible. How can you care for yourself at home? Activity  · Rest when you feel tired. Getting enough sleep will help you recover. · Try to walk each day. Start by walking a little more than you did the day before. Bit by bit, increase the amount you walk. Walking boosts blood flow and helps prevent pneumonia and constipation. · Avoid strenuous activities, such as bicycle riding, jogging, weight lifting, or aerobic exercise, until your doctor says it is okay. · Until your doctor says it is okay, avoid lifting anything that would make you strain. This may include heavy grocery bags and milk containers, a heavy briefcase or backpack, cat litter or dog food bags, a vacuum , or a child. · Hold a pillow over your incisions when you cough or take deep breaths. This will support your belly and decrease your pain. · Do breathing exercises at home as instructed by your doctor. This will help prevent pneumonia. · You can drive when you are no longer taking narcotic pain medications and feel comfortable sitting in a car. · You will probably need to take 2 to 4 weeks off from work. It depends on the type of work you do and how you feel. You will probably return to normal activities within 3 to 5 weeks. · You may shower, if your doctor okays it. Pat the incisions dry. Do not take a bath for the first 2 weeks, or until your doctor tells you it is okay.   Diet  · Your doctor will give you specific instructions about what to eat after the surgery. For the first 2 weeks, you will need to follow a liquid diet only. DO NOT ADVANCE TO SOFT FOOD UNTIL CLEARED BY YOUR DOCTOR. · Your doctor may recommend that you work with a dietitian to plan healthy meals that give you enough protein, vitamins, and minerals while you are losing weight. Even with a healthy diet, you probably will need to take vitamin and mineral supplements for the rest of your life. · At first you may feel full after just a few sips of water or other liquid. It is important to try to sip water throughout the day to avoid becoming dehydrated. · You may notice that your bowel movements are not regular right after your surgery. This is common. Try to avoid constipation and straining with bowel movements. · Sometimes the stomach empties food into the small intestine too quickly. This is called dumping syndrome. It can cause diarrhea and make you feel faint, shaky, and nauseated. It also can make it hard for your body to get enough nutrition. ¨ High-sugar foods such as desserts, soda pop, and fruit juices are most likely to cause dumping syndrome. Avoid high-sugar foods, or use products that have artificial sweeteners if sugar gives you a problem. ¨ Do not drink liquids within a half hour before eating and up to an hour after eating. Liquids move food even more quickly into the small intestine. Quick emptying of the stomach increases the chance of diarrhea. ¨ Eat slowly. Try to chew each bite about 20 times. Allow 20 to 30 minutes for each meal.  ¨ Eat 5 or 6 small meals or snacks a day. This may keep you from feeling too full after eating and may reduce problems with diarrhea and dumping syndrome. Medicines  · Take pain medicines exactly as directed. ¨ If the doctor gave you a prescription medicine for pain, take it as prescribed.   ¨ If you are not taking a prescription pain medicine, ask your doctor if you can take an over-the-counter medicine. ¨ Do not take two or more pain medicines at the same time unless the doctor told you to. Many pain medicines have acetaminophen, which is Tylenol. Too much acetaminophen (Tylenol) can be harmful. · If you think your pain medicine is making you sick to your stomach:  ¨ Take your medicine after meals (unless your doctor has told you not to). Incision care  · Your incisions have a waterproof clue dressing on them that will peel off in 2 weeks. · Wash the area daily with warm, soapy water, and pat it dry. Other cleaning products, such as hydrogen peroxide, can make the wound heal more slowly. You may cover the area with a gauze bandage if it weeps or rubs against clothing. Change the bandage every day. · Keep the area clean and dry. Follow-up care is a key part of your treatment and safety. Be sure to make and go to all appointments, and call your doctor if you are having problems. Itâs also a good idea to know your test results and keep a list of the medicines you take. When should you call for help? Call 911 anytime you think you may need emergency care. For example, call if:  · You pass out (lose consciousness). · You have severe trouble breathing. · You have sudden chest pain and shortness of breath, or you cough up blood. · You have severe belly pain. Call your doctor now or seek immediate medical care if:  · You are sick to your stomach or cannot keep fluids down. · You have pain that does not get better after you take pain medicine. · You have a fever over 101°F.  · You have loose stitches, or any of your incisions come open. · Bright red blood has soaked through the bandages over your incisions. · You have signs of infection, such as:  ¨ Increased pain, swelling, warmth, or redness. ¨ Red streaks leading from the incisions. ¨ Pus draining from the incision. ¨ Swollen lymph nodes in your neck, armpits, or groin. ¨ A fever.   · You have signs of needing more fluids. You have sunken eyes and a dry mouth, and you pass only a little dark urine. Watch closely for changes in your health, and be sure to contact your doctor if you have any problems. DISCHARGE SUMMARY from Nurse    PATIENT INSTRUCTIONS:    After general anesthesia or intravenous sedation, for 24 hours or while taking prescription Narcotics:  · Limit your activities  · Do not drive and operate hazardous machinery  · Do not make important personal or business decisions  · Do  not drink alcoholic beverages  · If you have not urinated within 8 hours after discharge, please contact your surgeon on call. Report the following to your surgeon:  · Excessive pain, swelling, redness or odor of or around the surgical area  · Temperature over 100.5  · Nausea and vomiting lasting longer than 4 hours or if unable to take medications  · Any signs of decreased circulation or nerve impairment to extremity: change in color, persistent  numbness, tingling, coldness or increase pain  · Any questions    What to do at Home:    *  Please give a list of your current medications to your Primary Care Provider. *  Please update this list whenever your medications are discontinued, doses are      changed, or new medications (including over-the-counter products) are added. *  Please carry medication information at all times in case of emergency situations. These are general instructions for a healthy lifestyle:    No smoking/ No tobacco products/ Avoid exposure to second hand smoke  Surgeon General's Warning:  Quitting smoking now greatly reduces serious risk to your health.     Obesity, smoking, and sedentary lifestyle greatly increases your risk for illness    A healthy diet, regular physical exercise & weight monitoring are important for maintaining a healthy lifestyle    You may be retaining fluid if you have a history of heart failure or if you experience any of the following symptoms:  Weight gain of 3 pounds or more overnight or 5 pounds in a week, increased swelling in our hands or feet or shortness of breath while lying flat in bed. Please call your doctor as soon as you notice any of these symptoms; do not wait until your next office visit. Recognize signs and symptoms of STROKE:    F-face looks uneven    A-arms unable to move or move unevenly    S-speech slurred or non-existent    T-time-call 911 as soon as signs and symptoms begin-DO NOT go       Back to bed or wait to see if you get better-TIME IS BRAIN. Warning Signs of HEART ATTACK     Call 911 if you have these symptoms:   Chest discomfort. Most heart attacks involve discomfort in the center of the chest that lasts more than a few minutes, or that goes away and comes back. It can feel like uncomfortable pressure, squeezing, fullness, or pain.  Discomfort in other areas of the upper body. Symptoms can include pain or discomfort in one or both arms, the back, neck, jaw, or stomach.  Shortness of breath with or without chest discomfort.  Other signs may include breaking out in a cold sweat, nausea, or lightheadedness. Don't wait more than five minutes to call 911 - MINUTES MATTER! Fast action can save your life. Calling 911 is almost always the fastest way to get lifesaving treatment. Emergency Medical Services staff can begin treatment when they arrive -- up to an hour sooner than if someone gets to the hospital by car. The discharge information has been reviewed with the patient. The patient verbalized understanding. Discharge medications reviewed with the patient and appropriate educational materials and side effects teaching were provided. Where can you learn more? Go to [employer]. Enter Y354 in the search box to learn more about \"Laparoscopic Ren-en-Y Gastric Bypass: What to Expect at Home. \"   © 1828-4985 [employer].  Care instructions adapted under license by Abby Roberson (which disclaims liability or warranty for this information). This care instruction is for use with your licensed healthcare professional. If you have questions about a medical condition or this instruction, always ask your healthcare professional. Gustavo Deven any warranty or liability for your use of this information.

## 2018-10-15 NOTE — INTERVAL H&P NOTE
H&P Update: 
Logan Munoz was seen and examined. History and physical has been reviewed. The patient has been examined. There have been no significant clinical changes since the completion of the originally dated History and Physical. 
Patient identified by surgeon; surgical site was confirmed by patient and surgeon.  
 
Signed By: Lyna Hammans, MD   
 October 15, 2018 7:22 AM
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
## 2018-10-15 NOTE — PERIOP NOTES
Patient: Anastasia Bernstein MRN: [id number]  SSN: [id number] YOB: 1970  Age: 50 y.o. Sex: female Patient is status post Procedure(s): LAPAROSCOPIC GASTRIC BYPASS WITH ENDO 
ESOPHAGOGASTRODUODENOSCOPY (EGD). Surgeon(s) and Role: Mary Grace Dumont MD - Primary Local/Dose/Irrigation: see MAR Peripheral IV 10/15/18 Right Arm (Active) Airway - Endotracheal Tube 10/15/18 Oral (Active) Dressing/Packing:  Wound Abdomen-DRESSING TYPE: Adhesive wound dressing (Band-Aid); Topical skin adhesive/glue (x5 trocar sites and drain) (10/15/18 0900) Splint/Cast:  ] Other:

## 2018-10-15 NOTE — ANESTHESIA POSTPROCEDURE EVALUATION
Post-Anesthesia Evaluation and Assessment Patient: Misti Martines MRN: [id number]  SSN: [id number] YOB: 1970  Age: 50 y.o. Sex: female Cardiovascular Function/Vital Signs Visit Vitals  /80  Pulse 89  Temp 36.7 °C (98 °F)  Resp 13  Ht 5' 7\" (1.702 m)  Wt 142 kg (313 lb)  SpO2 94%  BMI 49.02 kg/m2 Patient is status post general anesthesia for Procedure(s): LAPAROSCOPIC GASTRIC BYPASS WITH ENDO 
ESOPHAGOGASTRODUODENOSCOPY (EGD). Nausea/Vomiting: None Postoperative hydration reviewed and adequate. Pain: 
Pain Scale 1:  (\"feeling a little sharp pain\"; CRNA gave pain med) (10/15/18 1047) Pain Intensity 1: 0 (10/15/18 0622) Managed Neurological Status:  
Neuro (WDL): Exceptions to WDL (drowsy) (10/15/18 1047) Neuro LUE Motor Response: Purposeful (10/15/18 1047) LLE Motor Response: Purposeful (10/15/18 1047) RUE Motor Response: Purposeful (10/15/18 1047) RLE Motor Response: Purposeful (10/15/18 1047) At baseline Mental Status and Level of Consciousness: Arousable Pulmonary Status:  
O2 Device: Nasal cannula (10/15/18 1047) Adequate oxygenation and airway patent Complications related to anesthesia: None Post-anesthesia assessment completed. No concerns Signed By: Manny Miller MD   
 October 15, 2018

## 2018-10-16 LAB
ANION GAP SERPL CALC-SCNC: 9 MMOL/L (ref 5–15)
BUN SERPL-MCNC: 13 MG/DL (ref 6–20)
BUN/CREAT SERPL: 19 (ref 12–20)
CALCIUM SERPL-MCNC: 8.4 MG/DL (ref 8.5–10.1)
CHLORIDE SERPL-SCNC: 106 MMOL/L (ref 97–108)
CO2 SERPL-SCNC: 24 MMOL/L (ref 21–32)
CREAT SERPL-MCNC: 0.67 MG/DL (ref 0.55–1.02)
ERYTHROCYTE [DISTWIDTH] IN BLOOD BY AUTOMATED COUNT: 14 % (ref 11.5–14.5)
GLUCOSE SERPL-MCNC: 98 MG/DL (ref 65–100)
HCT VFR BLD AUTO: 35.5 % (ref 35–47)
HGB BLD-MCNC: 11.5 G/DL (ref 11.5–16)
MCH RBC QN AUTO: 27.5 PG (ref 26–34)
MCHC RBC AUTO-ENTMCNC: 32.4 G/DL (ref 30–36.5)
MCV RBC AUTO: 84.9 FL (ref 80–99)
NRBC # BLD: 0 K/UL (ref 0–0.01)
NRBC BLD-RTO: 0 PER 100 WBC
PLATELET # BLD AUTO: 279 K/UL (ref 150–400)
PMV BLD AUTO: 10.6 FL (ref 8.9–12.9)
POTASSIUM SERPL-SCNC: 4 MMOL/L (ref 3.5–5.1)
RBC # BLD AUTO: 4.18 M/UL (ref 3.8–5.2)
SODIUM SERPL-SCNC: 139 MMOL/L (ref 136–145)
WBC # BLD AUTO: 12.9 K/UL (ref 3.6–11)

## 2018-10-16 PROCEDURE — 65660000000 HC RM CCU STEPDOWN

## 2018-10-16 PROCEDURE — 85027 COMPLETE CBC AUTOMATED: CPT | Performed by: SURGERY

## 2018-10-16 PROCEDURE — 80048 BASIC METABOLIC PNL TOTAL CA: CPT | Performed by: SURGERY

## 2018-10-16 PROCEDURE — 74011250637 HC RX REV CODE- 250/637: Performed by: SURGERY

## 2018-10-16 PROCEDURE — 74011250636 HC RX REV CODE- 250/636: Performed by: SURGERY

## 2018-10-16 PROCEDURE — 74011000250 HC RX REV CODE- 250: Performed by: SURGERY

## 2018-10-16 PROCEDURE — 36415 COLL VENOUS BLD VENIPUNCTURE: CPT | Performed by: SURGERY

## 2018-10-16 RX ORDER — OXYCODONE AND ACETAMINOPHEN 5; 325 MG/1; MG/1
1-2 TABLET ORAL
Status: DISCONTINUED | OUTPATIENT
Start: 2018-10-16 | End: 2018-10-16

## 2018-10-16 RX ORDER — LOSARTAN POTASSIUM 25 MG/1
25 TABLET ORAL DAILY
Status: DISCONTINUED | OUTPATIENT
Start: 2018-10-16 | End: 2018-10-17 | Stop reason: HOSPADM

## 2018-10-16 RX ORDER — PAROXETINE HYDROCHLORIDE 20 MG/1
40 TABLET, FILM COATED ORAL DAILY
Status: DISCONTINUED | OUTPATIENT
Start: 2018-10-16 | End: 2018-10-17 | Stop reason: HOSPADM

## 2018-10-16 RX ORDER — HYDROMORPHONE HYDROCHLORIDE 4 MG/1
4 TABLET ORAL
Status: DISCONTINUED | OUTPATIENT
Start: 2018-10-16 | End: 2018-10-17 | Stop reason: HOSPADM

## 2018-10-16 RX ADMIN — ONDANSETRON 4 MG: 2 INJECTION INTRAMUSCULAR; INTRAVENOUS at 20:30

## 2018-10-16 RX ADMIN — Medication 10 ML: at 05:25

## 2018-10-16 RX ADMIN — Medication 10 ML: at 03:03

## 2018-10-16 RX ADMIN — LOSARTAN POTASSIUM 25 MG: 25 TABLET, FILM COATED ORAL at 08:48

## 2018-10-16 RX ADMIN — ONDANSETRON 4 MG: 2 INJECTION INTRAMUSCULAR; INTRAVENOUS at 08:48

## 2018-10-16 RX ADMIN — SODIUM CHLORIDE 5 MG: 9 INJECTION INTRAMUSCULAR; INTRAVENOUS; SUBCUTANEOUS at 02:55

## 2018-10-16 RX ADMIN — HYDROMORPHONE HYDROCHLORIDE 4 MG: 4 TABLET ORAL at 08:58

## 2018-10-16 RX ADMIN — FENTANYL CITRATE 50 MCG: 50 INJECTION, SOLUTION INTRAMUSCULAR; INTRAVENOUS at 03:02

## 2018-10-16 RX ADMIN — PAROXETINE HYDROCHLORIDE 40 MG: 20 TABLET, FILM COATED ORAL at 08:48

## 2018-10-16 RX ADMIN — HYOSCYAMINE SULFATE 0.12 MG: 0.12 TABLET ORAL; SUBLINGUAL at 13:25

## 2018-10-16 RX ADMIN — SODIUM CHLORIDE, SODIUM LACTATE, POTASSIUM CHLORIDE, AND CALCIUM CHLORIDE 125 ML/HR: 600; 310; 30; 20 INJECTION, SOLUTION INTRAVENOUS at 08:53

## 2018-10-16 RX ADMIN — HYDROMORPHONE HYDROCHLORIDE 4 MG: 4 TABLET ORAL at 13:25

## 2018-10-16 RX ADMIN — Medication 10 ML: at 05:21

## 2018-10-16 RX ADMIN — HYDROMORPHONE HYDROCHLORIDE 4 MG: 4 TABLET ORAL at 17:48

## 2018-10-16 RX ADMIN — Medication 10 ML: at 21:56

## 2018-10-16 RX ADMIN — ENOXAPARIN SODIUM 40 MG: 40 INJECTION SUBCUTANEOUS at 08:48

## 2018-10-16 RX ADMIN — KETOROLAC TROMETHAMINE 15 MG: 30 INJECTION, SOLUTION INTRAMUSCULAR; INTRAVENOUS at 05:21

## 2018-10-16 RX ADMIN — FENTANYL CITRATE 100 MCG: 50 INJECTION, SOLUTION INTRAMUSCULAR; INTRAVENOUS at 05:25

## 2018-10-16 RX ADMIN — SODIUM CHLORIDE, SODIUM LACTATE, POTASSIUM CHLORIDE, AND CALCIUM CHLORIDE 125 ML/HR: 600; 310; 30; 20 INJECTION, SOLUTION INTRAVENOUS at 18:49

## 2018-10-16 RX ADMIN — SODIUM CHLORIDE 5 MG: 9 INJECTION INTRAMUSCULAR; INTRAVENOUS; SUBCUTANEOUS at 13:25

## 2018-10-16 RX ADMIN — Medication 10 ML: at 13:26

## 2018-10-16 RX ADMIN — HYDROMORPHONE HYDROCHLORIDE 4 MG: 4 TABLET ORAL at 21:56

## 2018-10-16 NOTE — PROGRESS NOTES
Problem: Falls - Risk of 
Goal: *Absence of Falls Document Willy Kins Fall Risk and appropriate interventions in the flowsheet. Outcome: Progressing Towards Goal 
Fall Risk Interventions: 
  
 
  
 
Medication Interventions: Patient to call before getting OOB, Teach patient to arise slowly

## 2018-10-16 NOTE — PROGRESS NOTES
Reason for Admission:   Morbid Obesity, s/p LAPAROSCOPIC GASTRIC BYPASS WITH ENDO 
ESOPHAGOGASTRODUODENOSCOPY (EGD) on 10/16/18 RRAT Score:  1 Plan for utilizing home health:   Not indicated Likelihood of Readmission:  Low Transition of Care Plan:   CM met with patient to discuss discharge planning. Patient lives with her  in their private residence. She is independent without any assistive devices. She saw her PCP 2 months ago and uses her local [facility] pharmacy for prescriptions. Her  will provide transportation home and she did not voice any discharge barriers. CM will continue to follow as needed. Margarette Coyne MSA, RN, CRM. Care Management Interventions PCP Verified by CM: Yes 
Palliative Care Criteria Met (RRAT>21 & CHF Dx)?: No 
Mode of Transport at Discharge: Other (see comment) (Private car) Transition of Care Consult (CM Consult): Discharge Planning MyChart Signup: No 
Discharge Durable Medical Equipment: No 
Health Maintenance Reviewed: Yes Physical Therapy Consult: No 
Occupational Therapy Consult: No 
Speech Therapy Consult: No 
Current Support Network: Lives with Spouse Confirm Follow Up Transport: Family Plan discussed with Pt/Family/Caregiver: Yes Discharge Location Discharge Placement: Home with family assistance

## 2018-10-16 NOTE — PROGRESS NOTES
Faculty or Preceptor Review of Student Work 
 
10/16/2018  - Shift times - 1553 to 8242 The student documentation of patient care for Bam Adams has been reviewed and approved. All medications have been administered under the direct supervision of the faculty or preceptor.  
 
Bernardo Cabrera RN

## 2018-10-16 NOTE — PROGRESS NOTES
Bedside shift change report given to Hiram Levine RN (oncoming nurse) by Meg Ojeda RN (offgoing nurse). Report included the following information SBAR, Kardex, Intake/Output, MAR and Cardiac Rhythm NSR.

## 2018-10-16 NOTE — PROGRESS NOTES
Problem: Patient Education: Go to Patient Education Activity Goal: Patient/Family Education Outcome: Resolved/Met Date Met: 10/16/18 NUTRITION Chart reviewed. Post-op bariatric diet instruction completed. Will gladly follow up for additional questions as needed. Thank you.   
 
Lakshmi Farris RD

## 2018-10-16 NOTE — PROGRESS NOTES
General Surgery Daily Progress Note Admit Date: 10/15/2018 Post-Operative Day: 1 Day Post-Op from Procedure(s): LAPAROSCOPIC GASTRIC BYPASS WITH ENDO 
ESOPHAGOGASTRODUODENOSCOPY (EGD) Subjective:  
 
Last 24 hrs: Pt is post-op day one following lap gastric bypass with EGD. She states she did not sleep well due to discomfort from her L-sided drain--aggravated with change of position and alleviated with lying still. She is ambulating the hallways, urinating appropriately and passing flatus. Pt denies fever, chills, nausea or vomiting. Objective:  
 
Blood pressure 162/83, pulse 85, temperature 98.7 °F (37.1 °C), resp. rate 16, height 5' 7\" (1.702 m), weight 308 lb 3.3 oz (139.8 kg), last menstrual period 06/10/2013, SpO2 94 %. Temp (24hrs), Av.3 °F (36.8 °C), Min:97.8 °F (36.6 °C), Max:99 °F (37.2 °C) 
 
 
_____________________ Physical Exam:    
General: Alert and Oriented x 3, in no acute distress Cardiovascular: RRR, negative for peripheral edema Lungs: CTAB, no accessory muscle use Abdomen: obese, hypoactive bowel sounds in all four quadrants. Appro TTP along lap sites. Incisions clean and dry, drain exhibits appropriate serosanguinous fluid Assessment:  
Active Problems: Morbid obesity with BMI of 45.0-49.9, adult ([facility] [address] (10/15/2018) Plan:  
 
Continue pain management PRN--transition to PO dilaudid Voiding spontaneously Start bariatric liquid diet (goal of 4oz per hour) Drain will remain until this afternoon to ensure pt can tolerate oral liquids (discussed and confirmed with Dr. Munir Baig) Continue DVT prophylaxis: Lovenox and regular ambulation Further plan per Dr. Munir Baig Data Review: 
 
Recent Labs 10/16/18 
 0309 WBC  12.9* HGB  11.5 HCT  35.5 PLT  279 Recent Labs 10/16/18 
 0309 NA  139  
K  4.0  
CL  106 CO2  24 GLU  98 BUN  13  
CREA  0.67 CA  8.4* No results for input(s): AML, LPSE in the last 72 hours. ______________________ Medications: 
 
Current Facility-Administered Medications Medication Dose Route Frequency  scopolamine (TRANSDERM-SCOP) 1 mg over 3 days 1 Patch  1 Patch TransDERmal Q72H  lactated Ringers infusion  125 mL/hr IntraVENous CONTINUOUS  
 sodium chloride (NS) flush 5-10 mL  5-10 mL IntraVENous Q8H  
 sodium chloride (NS) flush 5-10 mL  5-10 mL IntraVENous PRN  
 naloxone (NARCAN) injection 0.4 mg  0.4 mg IntraVENous PRN  
 diphenhydrAMINE (BENADRYL) injection 12.5 mg  12.5 mg IntraVENous ONCE PRN  
 LORazepam (ATIVAN) injection 1 mg  1 mg IntraVENous Q6H PRN  
 enoxaparin (LOVENOX) injection 40 mg  40 mg SubCUTAneous Q24H  prochlorperazine (COMPAZINE) with saline injection 5 mg  5 mg IntraVENous Q12H And  
 ondansetron (ZOFRAN) injection 4 mg  4 mg IntraVENous Q12H  
 fentaNYL citrate (PF) injection  mcg   mcg IntraVENous Q2H PRN  
 hyoscyamine SL (LEVSIN/SL) tablet 0.125 mg  0.125 mg SubLINGual Q4H PRN Jayna Joens PA-C 
10/16/2018

## 2018-10-16 NOTE — PROGRESS NOTES
Problem: Falls - Risk of 
Goal: *Absence of Falls Document Natividad Bennett Fall Risk and appropriate interventions in the flowsheet. Outcome: Progressing Towards Goal 
Fall Risk Interventions: 
  
 
  
 
Medication Interventions: Patient to call before getting OOB, Teach patient to arise slowly

## 2018-10-16 NOTE — PROGRESS NOTES
Problem: Laparoscopic Gastric Bypass:Post-Op Day 1 Goal: Activity/Safety Outcome: Progressing Towards Goal 
Pt ambulating in hallway tolerating well Goal: Nutrition/Diet Outcome: Progressing Towards Goal 
Pt tolerating clears and full liquid diet well Goal: *Hemodynamically stable Outcome: Progressing Towards Goal 
VS stable Problem: Falls - Risk of 
Goal: *Absence of Falls Document [facility] Fall Risk and appropriate interventions in the flowsheet. Outcome: Progressing Towards Goal 
Fall Risk Interventions: 
  
 
  
 
Medication Interventions: Patient to call before getting OOB

## 2018-10-16 NOTE — PROGRESS NOTES
Spiritual Care Partner Volunteer visited patient in Rm 437 on 10/16/2018. Documented by: 
Chaplain Sherman MDiv, MS, [address] [place] [id number] PRA ([id number])

## 2018-10-17 VITALS
SYSTOLIC BLOOD PRESSURE: 142 MMHG | HEART RATE: 80 BPM | OXYGEN SATURATION: 96 % | TEMPERATURE: 98.8 F | DIASTOLIC BLOOD PRESSURE: 91 MMHG | WEIGHT: 293 LBS | RESPIRATION RATE: 16 BRPM | BODY MASS INDEX: 45.99 KG/M2 | HEIGHT: 67 IN

## 2018-10-17 PROCEDURE — 74011250636 HC RX REV CODE- 250/636: Performed by: SURGERY

## 2018-10-17 PROCEDURE — 74011250637 HC RX REV CODE- 250/637: Performed by: SURGERY

## 2018-10-17 RX ORDER — HYDROMORPHONE HYDROCHLORIDE 2 MG/1
2-4 TABLET ORAL
Qty: 40 TAB | Refills: 0 | Status: SHIPPED | OUTPATIENT
Start: 2018-10-17 | End: 2018-11-23 | Stop reason: ALTCHOICE

## 2018-10-17 RX ADMIN — SODIUM CHLORIDE 5 MG: 9 INJECTION INTRAMUSCULAR; INTRAVENOUS; SUBCUTANEOUS at 02:00

## 2018-10-17 RX ADMIN — PAROXETINE HYDROCHLORIDE 40 MG: 20 TABLET, FILM COATED ORAL at 09:44

## 2018-10-17 RX ADMIN — ONDANSETRON 4 MG: 2 INJECTION INTRAMUSCULAR; INTRAVENOUS at 09:43

## 2018-10-17 RX ADMIN — SODIUM CHLORIDE, SODIUM LACTATE, POTASSIUM CHLORIDE, AND CALCIUM CHLORIDE 125 ML/HR: 600; 310; 30; 20 INJECTION, SOLUTION INTRAVENOUS at 06:02

## 2018-10-17 RX ADMIN — Medication 10 ML: at 06:02

## 2018-10-17 RX ADMIN — ENOXAPARIN SODIUM 40 MG: 40 INJECTION SUBCUTANEOUS at 09:43

## 2018-10-17 RX ADMIN — LOSARTAN POTASSIUM 25 MG: 25 TABLET, FILM COATED ORAL at 09:44

## 2018-10-17 NOTE — PROGRESS NOTES
Problem: Falls - Risk of 
Goal: *Absence of Falls Document Rob Cagle Fall Risk and appropriate interventions in the flowsheet. Outcome: Progressing Towards Goal 
Fall Risk Interventions: 
  
 
  
 
Medication Interventions: Patient to call before getting OOB, Teach patient to arise slowly

## 2018-10-17 NOTE — DISCHARGE SUMMARY
Physician Discharge Summary     Patient ID:  Inez Moreira  [id number]  50 y.o.  1970    Allergies: Amlodipine and Pcn [penicillins]    Admit Date: 10/15/2018    Discharge Date: 10/17/2018    * Admission Diagnoses: MORBID OBESITY  Morbid obesity with BMI of 45.0-49.9, adult [facility])    * Discharge Diagnoses:    Hospital Problems as of 10/17/2018 Date Reviewed: 10/1/2018          Codes Class Noted - Resolved POA    Morbid obesity with BMI of 45.0-49.9, adult ([facility] [address] ICD-10-CM: E66.01, Z68.42  ICD-9-CM: 278.01, V85.42  10/15/2018 - Present Unknown               Admission Condition: Good    * Discharge Condition: good    * Procedures: Procedure(s):  LAPAROSCOPIC GASTRIC BYPASS WITH ENDO  ESOPHAGOGASTRODUODENOSCOPY (EGD)    * Hospital Course:   Normal hospital course for this procedure. Pt started on clears and advanced to bariatric fulls without issue  Seen by Dietician to review diet and vitamins  Transitioned to oral analgesics and pain well controlled   Pt had CARLINE drain removed and DC to home  POD 2 doing well with follow up appointment in place        Consults: Nutrition     Significant Diagnostic Studies: na    * Disposition: Home    Discharge Medications:   Discharge Medication List as of 10/17/2018 10:04 AM      START taking these medications    Details   HYDROmorphone (DILAUDID) 2 mg tablet Take 1-2 Tabs by mouth every four (4) hours as needed for Pain. Max Daily Amount: 24 mg., Print, Disp-40 Tab, R-0         CONTINUE these medications which have NOT CHANGED    Details   LORazepam (ATIVAN) 1 mg tablet Take 1 Tab by mouth daily as needed for Anxiety (Panic Attack). , Print, Disp-30 Tab, R-0      losartan (COZAAR) 25 mg tablet TAKE 1 TABLET BY MOUTH DAILY, Normal, Disp-90 Tab, R-1      PARoxetine (PAXIL) 40 mg tablet TAKE 1 TABLET BY MOUTH EVERY DAY, Normal, Disp-90 Tab, R-1      omeprazole (PRILOSEC) 20 mg capsule Take 1 Cap by mouth daily. , Normal, Disp-90 Cap, R-1      ondansetron (ZOFRAN ODT) 4 mg disintegrating tablet Take 1 Tab by mouth every eight (8) hours as needed for Nausea., Normal, Disp-30 Tab, R-0      diphenhydrAMINE (BENADRYL) 50 mg tablet Take 50 mg by mouth nightly as needed for Sleep., Historical Med             * Follow-up Care/Patient Instructions:   Activity: No driving while on analgesics and No heavy lifting for 4 weeks  Diet: Bariatric full liquids for 2 weeks post op  Wound Care: Keep wound clean and dry    Future Appointments   Date Time Provider Em Sullivan   10/29/2018  9:40 AM BENJI Harrell   11/12/2018  9:40 AM BENJI Harrell   11/26/2018  9:40 AM BENJI Harrell         Follow-up Information     Follow up With Specialties Details Why Contact Info    Samara Mccray MD General Surgery Go in 2 weeks For wound re-check [address] [place] [address] , [address] [facility] Rita Mcfarlane MD [facility] [address]  [phone]          Follow-up tests/labs na    Signed:  JACKELIN Oneil  10/17/2018  3:59 PM

## 2018-10-17 NOTE — PROGRESS NOTES
[facility] POD #2 Subjective Doing well, tolerating liquid diet well, no N/V, passing flatus, minimal pain Objective Patient Vitals for the past 24 hrs: 
 Temp Pulse Resp BP SpO2  
10/17/18 0757 98.8 °F (37.1 °C) 80 16 (!) 142/91 96 % 10/17/18 0315 98.4 °F (36.9 °C) 88 16 139/71 95 % 10/16/18 2329 98.7 °F (37.1 °C) 89 18 143/63 90 % 10/16/18 1948 98.6 °F (37 °C) 87 17 138/55 92 % 10/16/18 1602 97.7 °F (36.5 °C) 80 18 136/57 96 % 10/16/18 1117 98.4 °F (36.9 °C) 78 18 145/76 94 % 10/16/18 0839 98.4 °F (36.9 °C) 82 16 132/59 95 % Date 10/16/18 0700 - 10/17/18 9121 10/17/18 0700 - 10/18/18 2956 Shift 7843-0986 1327-9715 24 Hour Total 4498-5866 7075-7342 24 Hour Total  
INTAKE  
P.O.  420 420 300  300  
  P. O.  420 420 300  300  
I. V.(mL/kg/hr)  6140. 9(1.8) 3022.9(0.9) Volume (lactated Ringers infusion)  3022.9 3022.9 Shift Total(mL/kg)  3442. 9(24.4) 3442. 9(24.4) 300(2.1)  300(2.1) OUTPUT Urine(mL/kg/hr) 300(0.2) 500(0.3) 800(0.2) Urine Voided 300 500 800 Drains 50  50 Output (ml) ([REMOVED] Walter Drain #1 10/15/18 Left Abdomen) 50  50 Shift Total(mL/kg) 350(2.5) 500(3.5) 850(6) NET -350 2942.9 2592.9 300  300 Weight (kg) 139.8 141.2 141.2 141.2 141.2 141.2 PE 
Pulm - CTAB 
CV - RRR Abd - soft, ND, BS present, incisions c/d/i Labs No results found for this or any previous visit (from the past 12 hour(s)). Assessment Kat Alvarado is a 50 y. o.yr old female s/p gastric bypass Plan Doing well post op DC home today Taking PO well, no N/V Caroline Springer MD

## 2018-10-18 ENCOUNTER — PATIENT OUTREACH (OUTPATIENT)
Dept: FAMILY MEDICINE CLINIC | Age: 48
End: 2018-10-18

## 2018-10-18 NOTE — PROGRESS NOTES
Hospital Discharge Follow-Up Date/Time:  10/18/2018 3:08 PM 
 
Patient was admitted to [facility] on 10/15/18 and discharged on 10/17/18 for Laparoscopic gastric bypass. The physician discharge summary was available at the time of outreach. Patient was contacted within 2 business days of discharge. Nurse Navigator (NN) contacted the patient by telephone to perform post hospital discharge assessment but was unable to reach this patient. Voice message left for this patient to return a call to this office for a discharge assessment and to schedule a follow up appointment with PCP if needed at this time. [facility] follow up appointment(s):  
Future Appointments Date Time Provider Em Sullivan 10/29/2018  9:40 AM BENJI Phoenix  
11/12/2018  9:40 AM BENJI Phoenix  
11/26/2018  9:40 AM BENJI Phoenix

## 2018-10-23 ENCOUNTER — TELEPHONE (OUTPATIENT)
Dept: SURGERY | Age: 48
End: 2018-10-23

## 2018-10-23 NOTE — TELEPHONE ENCOUNTER
Spoke with patient stated she got up from her chair the wrong way and experienced a sharp pain near her belly button area. Advised her to use a heating pad, and tylenol as needed. If pain does not improve to call the office back tomorrow to be added to the schedule.

## 2018-10-23 NOTE — ADT AUTH CERT NOTES
Utilization Reviews Gastric Restrictive Procedure with Gastric Bypass by Laparoscopy - Care Day 2 (10/16/2018) by Mario Yanes RN Review Status Review Entered Completed 10/16/2018 12:37 Criteria Review Care Day: 2 Care Date: 10/16/2018 Level of Care:   
Guideline Day 2 Clinical Status  
(X) * Procedure completed  
(X) * Hemodynamic stability  
(X) * No evidence of postoperative or surgical site infection ( ) * Clear liquid diet tolerated ( ) * Pain absent or managed ( ) * Discharge plans and education understood Activity  
(X) * Ambulatory Routes  
(X) * Oral hydration, medications, and diet (X) Clear liquid diet Medications  
(X) * PCA absent[I] * Milestone Additional Notes Pt is post-op day one following lap gastric bypass with EGD. She states she did not sleep well due to discomfort from her L-sided drain--aggravated with change of position and alleviated with lying still. She is ambulating the hallways, urinating appropriately and passing flatus. Pt denies fever, chills, nausea or vomiting T 98.7 P 85 RR 16 /83 spO2 94% Continue pain management PRN--transition to PO dilaudid Voiding spontaneously Start bariatric liquid diet (goal of 4oz per hour) Drain will remain until this afternoon to ensure pt can tolerate oral liquids (discussed and confirmed with Dr. Umana All) Continue DVT prophylaxis: Lovenox and regular ambulation WBC 12.9, Calcium 8.4 Lovenox sc daily, Fentanyl IV x2, [email or web address] IV, Compazine IV q12h, Zofran IV q12h, Toradol IV q6h

## 2018-10-23 NOTE — TELEPHONE ENCOUNTER
LAP GASTRIC BYPASS 10/15/18--SEAMUS;      Patient is experience some sharp pains near her belly button and wants to know what she needs to do.

## 2018-10-26 ENCOUNTER — TELEPHONE (OUTPATIENT)
Dept: SURGERY | Age: 48
End: 2018-10-26

## 2018-10-26 RX ORDER — TIZANIDINE 4 MG/1
4 TABLET ORAL
Qty: 15 TAB | Refills: 0 | Status: SHIPPED | OUTPATIENT
Start: 2018-10-26 | End: 2018-10-31

## 2018-10-26 NOTE — TELEPHONE ENCOUNTER
C/o burning pain left abdomen with activity   No fever or chills, chest pain or shortness of breath.   No nausea   No vomiting   Pain is with getting in and out of bed, reaching and bending   \"feels awful and burning\"  Better with support and bracing   No redness and no warmth   Incisions are dry and intact   Drinking shakes \"fine\" and fluids are good   Voiding without difficulty   BM little loose, no blood or black stools   No increased pain with po intake   Has not taken anything for pain   Advised ok to take the Dilaudid and she \"has a lot left and thought she wasn't supposed to take\"  May also use Tylenol as directed   Will add Zanaflex 4 mg q 6 hours prn and she can take with Tylenol   Abdominal support helpful and heating pad   appt Monday  Expressed gratitude and \"felt better and reassured\"   If fevers, increased pain despite meds, n/v she needs to call and be seen asap

## 2018-10-29 ENCOUNTER — OFFICE VISIT (OUTPATIENT)
Dept: SURGERY | Age: 48
End: 2018-10-29

## 2018-10-29 VITALS
WEIGHT: 292.6 LBS | OXYGEN SATURATION: 95 % | RESPIRATION RATE: 18 BRPM | DIASTOLIC BLOOD PRESSURE: 77 MMHG | HEIGHT: 67 IN | TEMPERATURE: 98.3 F | SYSTOLIC BLOOD PRESSURE: 134 MMHG | BODY MASS INDEX: 45.92 KG/M2 | HEART RATE: 67 BPM

## 2018-10-29 DIAGNOSIS — E66.01 MORBID OBESITY (HCC): Primary | ICD-10-CM

## 2018-10-29 DIAGNOSIS — Z09 SURGICAL FOLLOWUP: ICD-10-CM

## 2018-10-29 NOTE — PATIENT INSTRUCTIONS
Constipation: Care Instructions  Your Care Instructions    Constipation means that you have a hard time passing stools (bowel movements). People pass stools from 3 times a day to once every 3 days. What is normal for you may be different. Constipation may occur with pain in the rectum and cramping. The pain may get worse when you try to pass stools. Sometimes there are small amounts of bright red blood on toilet paper or the surface of stools. This is because of enlarged veins near the rectum (hemorrhoids). A few changes in your diet and lifestyle may help you avoid ongoing constipation. Your doctor may also prescribe medicine to help loosen your stool. Some medicines can cause constipation. These include pain medicines and antidepressants. Tell your doctor about all the medicines you take. Your doctor may want to make a medicine change to ease your symptoms. Follow-up care is a key part of your treatment and safety. Be sure to make and go to all appointments, and call your doctor if you are having problems. It's also a good idea to know your test results and keep a list of the medicines you take. How can you care for yourself at home? · Drink plenty of fluids, enough so that your urine is light yellow or clear like water. If you have kidney, heart, or liver disease and have to limit fluids, talk with your doctor before you increase the amount of fluids you drink. · Include high-fiber foods in your diet each day. These include fruits, vegetables, beans, and whole grains. · Get at least 30 minutes of exercise on most days of the week. Walking is a good choice. You also may want to do other activities, such as running, swimming, cycling, or playing tennis or team sports. · Take a fiber supplement, such as Citrucel or Metamucil, every day. Read and follow all instructions on the label. · Schedule time each day for a bowel movement. A daily routine may help.  Take your time having your bowel movement. · Support your feet with a small step stool when you sit on the toilet. This helps flex your hips and places your pelvis in a squatting position. · Your doctor may recommend an over-the-counter laxative to relieve your constipation. Examples are Milk of Magnesia and MiraLax. Read and follow all instructions on the label. Do not use laxatives on a long-term basis. When should you call for help? Call your doctor now or seek immediate medical care if:    · You have new or worse belly pain.     · You have new or worse nausea or vomiting.     · You have blood in your stools.    Watch closely for changes in your health, and be sure to contact your doctor if:    · Your constipation is getting worse.     · You do not get better as expected. Where can you learn more? Go to http://reyna-perri.info/. Enter 21 [phone] in the search box to learn more about \"Constipation: Care Instructions. \"  Current as of: November 20, 2017  Content Version: 11.8  © 6793-2311 [employer]. Care instructions adapted under license by [employer] (which disclaims liability or warranty for this information). If you have questions about a medical condition or this instruction, always ask your healthcare professional. [address] any warranty or liability for your use of this information. What you need to know:  1. Advance your diet to soft foods. Follow the handout that you were given today in the office. 2.  Take the recommended vitamins daily  3. No lifting greater than 20 lbs. 4.  You can do light jogging and walking. 5  Follow up in 2 weeks. 6.  You may go into a pool. 7.  If you are not able to tolerate liquids or soft foods. Please call our office. [phone]  8. If you have vomiting and persistent epigastric pain or chest pain. You should call our office, the doctor on-call or go to the emergency room. What to do if you are constipated:   You may  take Milk of Magnesia. Take 2 Tablespoons followed by 16 oz of water then 2 hours later take another 2 tablespoons. If  milk of magnesia does not work then take Rastafarian-Bowdle or Miralax over the counter. Keep in mind that the Benefiber or Miralax may take a day or two to work. If all of the above do not work try a Fleets enema and follow the directions on the box. Soft and Mushy: Phase 1    Below is a list of basic items to purchase for the first phase of the   soft mushy diet. Your surgeon or nurse practitioner will inform you when it is okay   to advance to the next phase. Soft and Mushy Foods: Prepare food to the appropriate texture. ? Everything on clear and full liquid diet  ? Applesauce (no sugar added)  ? Hot & cold cereals (Cream of Wheat, Plain Cheerios®, Special K with protein®, plain oatmeal, grits)  ? Frozen or canned vegetables (carrots, acorn squash, butternut squash, string beans, spinach, broccoli, cauliflower - florets only!)   ? Canned fruit (in natural juice or with Splenda®)  ? Fat-free, cholesterol-free egg substitute (P)  ? Low-fat or fat-free cottage cheese (P)  ? Low-fat or fat-free yogurt (P)  ? Low-fat or fat-free [place] yogurt (P)  ? Fat-free milk or 1% milk (P)  ? Lactaid fat-free or 1% low fat milk (P)  ? Low-fat well-cooked/soft beans (the consistency of refried beans) (P)  ? No sugar added, low fat pudding (no pistachio or other flavor containing nuts)  ? low-fat cream soups  ? Low-fat chicken noodle or chicken rice soup (P)  ? Sugar-free fudgesicles  ? Sugar-free cocoa  ? Fat free whipped or mashed potatoes   ? Herbs and spices  ? Lite butter, margarine, canola oil, olive oil, reduced-fat or fat-free mayonnaise, reduced-fat or fat-free salad dressing, reduced-fat or fat-free cream cheese, reduced-fat or fat-free sour cream.        P designates food sources of protein.  Include a protein at each meal.     If a food does not contain protein, you may want to consider adding protein powder to the food to give it extra protein. For example, mix protein powder in with the following: oatmeal, mashed potatoes, sugar-free pudding, sugar-free gelatin (see recipes in book), no-sugar-added applesauce. Soft Mushy Diet: Phase 1  Time Meal or Snack Soft/Mushy Food Amount (ounces) Protein  (g) Supplement   6:30 am Sip on Fluids Sip on non-carbonated, calorie-free, no sugar added liquids. 8 oz   0 g Take Multivitamin containing 18 mg ferrous sulfate (iron)    7:00 am   Stop drinking fluids 30 minutes before breakfast   7:30 am Breakfast ½ cup sugar-free oatmeal with 1 scoop of protein powder. Add cinnamon, nutmeg, artificial sweeteners as desired for flavor. 4 oz    20-25 g    9:00 Snack  (optional) High Protein Gelatin (see recipe in book) 4oz 10 g    11:30 am Stop drinking liquids 30 minutes before lunch   12:00 pm Lunch Sip low-fat cream of potato soup or low-fat cream of chicken soup mixed with 1 scoop of protein powder 8 oz soup 25 g Take 400 mg calcium citrate   2:00 Snack  (optional) ½ cup high protein pudding (see recipe in book)   or   ½ cup low-fat cottage cheese or yogurt. Can also add protein powder as needed. 4 oz 14 g    or    5 g      3:00 - 5:30 pm   Sip on Fluids   Sip on non-carbonated, calorie-free, no sugar added liquids. 24 - 32 oz   0 g   Take 400 mg calcium citrate. 6:00 pm Dinner ¼ cup low-fat, well cooked beans (ex. black beans, low-fat refried beans)  ¼ cup no-sugar-added applesauce 4 oz 3.5 g Take 400 mg of calcium citrate.    7:00 - 10:00 pm Sip on Fluids Sip on non-carbonated, no sugar added liquids as needed  16-24 oz 0 g Take Multivitamin with 18 mg ferrous sulfate   Total:  80 oz clear fluids 63-77  grams 2 Multivitamins with 18 mg ferrous sulfate, 3281-6164 mg calcium citrate

## 2018-10-29 NOTE — PROGRESS NOTES
2 weeks status post gastric bypass. Pt reports doing well on liquids . She complains of left side pain at the larger incision. Pt reports no nausea and no vomiting  Sheis drinking approximately 50+ oz of water daily  She is drinking 50-60 grams of protein shake daily  +BM      She is taking bariatric vitamins without issue. Total weight loss since surgery 18.4lbs  Weight loss since last visit 18.4lbs  Visit Vitals  /77 (BP 1 Location: Left arm, BP Patient Position: Sitting)   Pulse 67   Temp 98.3 °F (36.8 °C) (Oral)   Resp 18   Ht 5' 7\" (1.702 m)   Wt 292 lb 9.6 oz (132.7 kg)   LMP 06/10/2013   SpO2 95%   BMI 45.83 kg/m²          Patient has an advanced directive:  Not on file. Ms. Iyer Forward has a reminder for a \"due or due soon\" health maintenance. I have asked that she contact her primary care provider for follow-up on this health maintenance. Physical Examination: General appearance - alert, well appearing, and in no distress,  Chest - clear to auscultation bilaterally  Heart - normal rate, regular rhythm, normal S1, S2, no murmurs, rubs, clicks or gallops  Abdomen - soft, tender at the left lateral incision, nondistended  scars from previous incisions healing without erythema or induration    A/P    Doing well 2 wks stats post laparoscopic Gastric Bypass  Diet advanced to soft foods. Focus on 50-60 grams of protein daily. Supplement with unflavored protein powder. Encouraged water intake  Continue PPI  No lifting greater than 20lbs. Follow up in 2 weeks. RTW 12/3/2018. Pt verbalized understanding and questions were answered to the best of my knowledge and ability.       Milly Saucedo NP

## 2018-10-29 NOTE — PROGRESS NOTES
1. Have you been to the ER, urgent care clinic since your last visit? Hospitalized since your last visit? No    2. Have you seen or consulted any other health care providers outside of the [address] [place] [address] since your last visit? Include any pap smears or colon screening.  No

## 2018-10-30 ENCOUNTER — TELEPHONE (OUTPATIENT)
Dept: SURGERY | Age: 48
End: 2018-10-30

## 2018-10-31 RX ORDER — CYCLOBENZAPRINE HCL 10 MG
10 TABLET ORAL
Qty: 30 TAB | Refills: 0 | Status: SHIPPED | OUTPATIENT
Start: 2018-10-31 | End: 2019-03-27

## 2018-11-12 ENCOUNTER — OFFICE VISIT (OUTPATIENT)
Dept: SURGERY | Age: 48
End: 2018-11-12

## 2018-11-12 VITALS
TEMPERATURE: 98.2 F | BODY MASS INDEX: 44.01 KG/M2 | RESPIRATION RATE: 18 BRPM | DIASTOLIC BLOOD PRESSURE: 85 MMHG | OXYGEN SATURATION: 98 % | SYSTOLIC BLOOD PRESSURE: 126 MMHG | WEIGHT: 280.4 LBS | HEIGHT: 67 IN | HEART RATE: 74 BPM

## 2018-11-12 DIAGNOSIS — E66.01 MORBID OBESITY WITH BMI OF 45.0-49.9, ADULT (HCC): Primary | ICD-10-CM

## 2018-11-12 DIAGNOSIS — Z09 SURGICAL FOLLOWUP: ICD-10-CM

## 2018-11-12 NOTE — PROGRESS NOTES
4 weeks status post gastric bypass. Pt reports doing well on liquids . Pt's post op pain is none. Pt reports no nausea and no vomiting  Sheis drinking approximately 48+ oz of water daily  +Bm  She is eating 50-60 grams of protein daily      She is taking bariatric vitamins without issue. Total weight loss since surgery 30.9 l bs  Weight loss since last visit 12.5 lbs  Visit Vitals  /85 (BP 1 Location: Left arm, BP Patient Position: Sitting)   Pulse 74   Temp 98.2 °F (36.8 °C) (Oral)   Resp 18   Ht 5' 7\" (1.702 m)   Wt 280 lb 6.4 oz (127.2 kg)   LMP 06/10/2013   SpO2 98%   BMI 43.92 kg/m²          Patient has an advanced directive: not on file. Ms. Velasquez Cuellar has a reminder for a \"due or due soon\" health maintenance. I have asked that she contact her primary care provider for follow-up on this health maintenance. Physical Examination: General appearance - alert, well appearing, and in no distress,  Chest - clear to auscultation bilaterally  Heart - normal rate, regular rhythm, normal S1, S2, no murmurs, rubs, clicks or gallops  Abdomen - soft, nontender, nondistended  scars from previous incisions healing without erythema or induration    A/P    Doing well 4 wks stats post laparoscopic Gastric Bypass  Diet advanced to soft meats   Focus on 50-60 grams of protein daily  Encouraged water intake  Continue PPI  No lifting greater than 40 lbs. Follow up in 2 weeks. RTW 12/3/2018  Pt verbalized understanding and questions were answered to the best of my knowledge and ability.         Carol Gao NP

## 2018-11-12 NOTE — PROGRESS NOTES
1. Have you been to the ER, urgent care clinic since your last visit? Hospitalized since your last visit? No    2. Have you seen or consulted any other health care providers outside of the [address] [place] [address] since your last visit? Include any pap smears or colon screening.  No

## 2018-11-12 NOTE — PATIENT INSTRUCTIONS
Constipation: Care Instructions  Your Care Instructions    Constipation means that you have a hard time passing stools (bowel movements). People pass stools from 3 times a day to once every 3 days. What is normal for you may be different. Constipation may occur with pain in the rectum and cramping. The pain may get worse when you try to pass stools. Sometimes there are small amounts of bright red blood on toilet paper or the surface of stools. This is because of enlarged veins near the rectum (hemorrhoids). A few changes in your diet and lifestyle may help you avoid ongoing constipation. Your doctor may also prescribe medicine to help loosen your stool. Some medicines can cause constipation. These include pain medicines and antidepressants. Tell your doctor about all the medicines you take. Your doctor may want to make a medicine change to ease your symptoms. Follow-up care is a key part of your treatment and safety. Be sure to make and go to all appointments, and call your doctor if you are having problems. It's also a good idea to know your test results and keep a list of the medicines you take. How can you care for yourself at home? · Drink plenty of fluids, enough so that your urine is light yellow or clear like water. If you have kidney, heart, or liver disease and have to limit fluids, talk with your doctor before you increase the amount of fluids you drink. · Include high-fiber foods in your diet each day. These include fruits, vegetables, beans, and whole grains. · Get at least 30 minutes of exercise on most days of the week. Walking is a good choice. You also may want to do other activities, such as running, swimming, cycling, or playing tennis or team sports. · Take a fiber supplement, such as Citrucel or Metamucil, every day. Read and follow all instructions on the label. · Schedule time each day for a bowel movement. A daily routine may help.  Take your time having your bowel movement. · Support your feet with a small step stool when you sit on the toilet. This helps flex your hips and places your pelvis in a squatting position. · Your doctor may recommend an over-the-counter laxative to relieve your constipation. Examples are Milk of Magnesia and MiraLax. Read and follow all instructions on the label. Do not use laxatives on a long-term basis. When should you call for help? Call your doctor now or seek immediate medical care if:    · You have new or worse belly pain.     · You have new or worse nausea or vomiting.     · You have blood in your stools.    Watch closely for changes in your health, and be sure to contact your doctor if:    · Your constipation is getting worse.     · You do not get better as expected. Where can you learn more? Go to http://reyna-perri.info/. Enter 21 [id number] in the search box to learn more about \"Constipation: Care Instructions. \"  Current as of: November 20, 2017  Content Version: 11.8  © 7115-3365 [employer]. Care instructions adapted under license by [employer] (which disclaims liability or warranty for this information). If you have questions about a medical condition or this instruction, always ask your healthcare professional. [address] any warranty or liability for your use of this information. What you need to know:  1 . Advance your diet to moist meats. Follow the handout that you were given today in the office. 2. Take the recommended vitamins daily  3 No lifting greater than 40 lbs. 4. You can do light jogging, moderate walking and a recumbent bike. 5 Follow up in 2 weeks. 6. You may go into a pool. 7. If you are not able to tolerate liquids, soft foods or moist meats. Please call our office. [phone]  8. If you have vomiting and persistent epigastric pain or chest pain. You should call our office, the doctor on-call or go to the emergency room.          What to do if you are constipated: You may  take Milk of Magnesia. Take 2 Tablespoons followed by 16 oz of water then 2 hours later take another 2 tablespoons. If  milk of magnesia does not work then take [place]-Pray or Miralax over the counter. Keep in mind that the Benefiber or Miralax may take a day or two to work. If all of the above do not work try a Fleets enema and follow the directions on the box. Shopping List Staples     Soft Mushy Diet: Phase 2 - Moist Meats     Below is a list of moist meats that you can now introduce into your diet. Moist Meats: Prepare food to the appropriate texture using low-fat cooking   methods       ? Tuna packed in water (strain before eating)  ? White flaky fish (zhang, cod, flounder, tilapia, salmon)   ? White chicken breast packed in water (strain before eating)  ? 96-99% fat free thinly sliced deli meat (ham, turkey, roast beef)  ? Fat free non-stick spray  ? Silken Tofu  ? Low-fat or vegetarian refried beans  ? Well-cooked beans and lentils  ? Skinless turkey or chicken (prepare to a soft texture)  ? 93% lean pureed beef (round or sirloin only)  ? Lean pork (cooked until very tender, cut into small pieces)  ? Eggs (preferable egg whites)  ? Egg substitutes  ? Herbs and spices  ? Lite butter, margarine, canola oil, olive oil, reduced-fat or fat-free mcgraw, reduced-fat or fat-free salad dressing, reduced-fat or fat-free cream cheese, reduced-fat or fat-free sour cream, lemon juice, salt, pepper, mustard, ketchup, salsa. See patient handbook for more low-fat cooking ideas. ? Be sure to use moist methods of cooking. Avoid microwaving meats because it can dry out the food making it harder to tolerate. Soft Mushy Diet: Phase 2  Time Meal or Snack Soft/Mushy Food Amount (ounces) Protein  (g) Supplement   6:30 am Sip on Fluids Sip on non-carbonated, calorie-free, no sugar added liquids.  8 oz   0 g Take Multivitamin containing 18 mg ferrous sulfate (iron)    7:00 am   Stop drinking fluids 30 minutes before breakfast   7:30 am Breakfast ¼ cup soft scrambled egg or egg substitute   ¼ cup canned fruit (packed in natural juice, strained)  4 oz    7 g    9:00 Snack  (optional) ½ cup low-fat or fat-free yogurt   or   ½ cup cottage cheese  4oz 4g  or  14 g    11:30 am Stop drinking liquids 30 minutes before lunch   12:00 pm Lunch ¼ cup low-fat or lean deli meat  with  ¼ well cooked green beans 4 oz  14 g Take 400 mg calcium citrate   2:00 Snack  (optional) ½ cup high protein, sugar-free pudding with 1 scoop added protein powder (see recipe in book). 4 oz 14 g      3:00 - 5:30 pm   Sip on Fluids   Sip on non-carbonated, calorie-free, no sugar added liquids. 24 - 32 oz   0 g   Take 400 mg calcium citrate. 6:00 pm Dinner ¼ cup soft/flaky fish (tilapia, flounder, tuna)  ¼ cup mashed potatoes or pureed cauliflower mashed potatoes (consider adding protein powder)  4 oz 14 g Take 400 mg of calcium citrate.    7:00 - 10:00 pm Sip on Fluids Sip on non-carbonated, no sugar added liquids as needed  16-24 oz 0 g Take Multivitamin with 18 mg ferrous sulfate   Total:  64 oz fluids 63  grams 2 Multivitamins with 18 mg ferrous sulfate, 2065-6328 mg calcium citrate

## 2018-11-19 ENCOUNTER — TELEPHONE (OUTPATIENT)
Dept: SURGERY | Age: 48
End: 2018-11-19

## 2018-11-21 ENCOUNTER — TELEPHONE (OUTPATIENT)
Dept: FAMILY MEDICINE CLINIC | Age: 48
End: 2018-11-21

## 2018-11-21 DIAGNOSIS — F41.9 ANXIETY: ICD-10-CM

## 2018-11-21 RX ORDER — LORAZEPAM 1 MG/1
1 TABLET ORAL
Qty: 30 TAB | Refills: 0 | OUTPATIENT
Start: 2018-11-21 | End: 2019-02-08 | Stop reason: SDUPTHER

## 2018-11-21 RX ORDER — PAROXETINE HYDROCHLORIDE 40 MG/1
TABLET, FILM COATED ORAL
Qty: 90 TAB | Refills: 1 | Status: SHIPPED | OUTPATIENT
Start: 2018-11-21

## 2018-11-21 NOTE — TELEPHONE ENCOUNTER
Requested Prescriptions     Pending Prescriptions Disp Refills    PARoxetine (PAXIL) 40 mg tablet 90 Tab 1     Sig: TAKE 1 TABLET BY MOUTH EVERY DAY    LORazepam (ATIVAN) 1 mg tablet 30 Tab 0     Sig: Take 1 Tab by mouth daily as needed for Anxiety (Panic Attack).

## 2018-11-21 NOTE — TELEPHONE ENCOUNTER
Called to enquire if patient still taking narcotic pain medication as prescribed after her  Recent surgery. If so, she will need a prescription for Narcan due to concurrent use of benzos. Her SSRI sent electronically and BZD called into pharmacy.

## 2018-11-26 ENCOUNTER — OFFICE VISIT (OUTPATIENT)
Dept: SURGERY | Age: 48
End: 2018-11-26

## 2018-11-26 VITALS
SYSTOLIC BLOOD PRESSURE: 99 MMHG | HEART RATE: 69 BPM | WEIGHT: 272.4 LBS | DIASTOLIC BLOOD PRESSURE: 75 MMHG | BODY MASS INDEX: 42.75 KG/M2 | HEIGHT: 67 IN | RESPIRATION RATE: 18 BRPM | TEMPERATURE: 97.9 F | OXYGEN SATURATION: 95 %

## 2018-11-26 DIAGNOSIS — E66.01 MORBID OBESITY WITH BMI OF 45.0-49.9, ADULT (HCC): Primary | ICD-10-CM

## 2018-11-26 DIAGNOSIS — Z09 SURGICAL FOLLOWUP: ICD-10-CM

## 2018-11-26 NOTE — PROGRESS NOTES
1. Have you been to the ER, urgent care clinic since your last visit? Hospitalized since your last visit? No    2. Have you seen or consulted any other health care providers outside of the [address] [place] [address] since your last visit? Include any pap smears or colon screening.  No

## 2018-11-26 NOTE — PROGRESS NOTES
6 weeks status post gastric bypass. Pt reports doing well on liquids, soft and soft meats    Pt's post op pain is none. Pt reports no nausea. She vomited once when she ate saltines too quickly. Sheis drinking approximately 40+ oz of water daily      She is taking bariatric vitamins without issue. Total weight loss since surgery 38.1bs  Weight loss since last visit 8.2lbs  Visit Vitals  BP 99/75 (BP 1 Location: Left arm, BP Patient Position: Sitting)   Pulse 69   Temp 97.9 °F (36.6 °C) (Oral)   Resp 18   Ht 5' 7\" (1.702 m)   Wt 272 lb 6.4 oz (123.6 kg)   LMP 06/10/2013   SpO2 95%   BMI 42.66 kg/m²          Patient has an advanced directive: NO    Ms. Owen Cordova has a reminder for a \"due or due soon\" health maintenance. I have asked that she contact her primary care provider for follow-up on this health maintenance. Physical Examination: General appearance - alert, well appearing, and in no distress,  Chest - clear to auscultation bilaterally  Heart - normal rate, regular rhythm, normal S1, S2, no murmurs, rubs, clicks or gallops  Abdomen - soft, nontender, nondistended  scars from previous incisions healing without erythema or induration    A/P    Doing well 6 wks stats post laparoscopic Gastric Bypass  Diet advanced to solid foods. Advised patient regard to diet that is high-protein, low-fat, low-sugar, limited carbohydrates. Strive for 50-60 grams of protein daily. If having a snack, foods that are protein or fiber rich. . No eating/drinking together, chew foods well, and portion control. Measure meals. Discussed snacking behavior and to [facility] pay attention to behavioral factor and habits. Drink at least 40-64 ounces of water or non-calorie/non-carbonated beverages daily. Continue vitamin regiment daily. Exercise at least 3 days a week with cardiovascular and strength training. Patient to follow up in 6 weeks. . Advised to call office if any questions/concerns. Letter given for work.  No restrictions. Pt verbalized understanding and questions were answered to the best of my knowledge and ability.           Stephany Tolentino, NP

## 2018-11-26 NOTE — PATIENT INSTRUCTIONS
Eating Healthy Foods: Care Instructions  Your Care Instructions    Eating healthy foods can help lower your risk for disease. Healthy food gives you energy and keeps your heart strong, your brain active, your muscles working, and your bones strong. A healthy diet includes a variety of foods from the basic food groups: grains, vegetables, fruits, milk and milk products, and meat and beans. Some people may eat more of their favorite foods from only one food group and, as a result, miss getting the nutrients they need. So, it is important to pay attention not only to what you eat but also to what you are missing from your diet. You can eat a healthy, balanced diet by making a few small changes. Follow-up care is a key part of your treatment and safety. Be sure to make and go to all appointments, and call your doctor if you are having problems. It's also a good idea to know your test results and keep a list of the medicines you take. How can you care for yourself at home? Look at what you eat  · Keep a food diary for a week or two and record everything you eat or drink. Track the number of servings you eat from each food group. · For a balanced diet every day, eat a variety of:  ? 6 or more ounce-equivalents of grains, such as cereals, breads, crackers, rice, or pasta, every day. An ounce-equivalent is 1 slice of bread, 1 cup of ready-to-eat cereal, or ½ cup of cooked rice, cooked pasta, or cooked cereal.  ? 2½ cups of vegetables, especially:  § Dark-green vegetables such as broccoli and spinach. § Orange vegetables such as carrots and sweet potatoes. § Dry beans (such as miller and kidney beans) and peas (such as lentils). ? 2 cups of fresh, frozen, or canned fruit. A small apple or 1 banana or orange equals 1 cup. ? 3 cups of nonfat or low-fat milk, yogurt, or other milk products. ? 5½ ounces of meat and beans, such as chicken, fish, lean meat, beans, nuts, and seeds.  One egg, 1 tablespoon of peanut butter, ½ ounce nuts or seeds, or ¼ cup of cooked beans equals 1 ounce of meat. · Learn how to read food labels for serving sizes and ingredients. Fast-food and convenience-food meals often contain few or no fruits or vegetables. Make sure you eat some fruits and vegetables to make the meal more nutritious. · Look at your food diary. For each food group, add up what you have eaten and then divide the total by the number of days. This will give you an idea of how much you are eating from each food group. See if you can find some ways to change your diet to make it more healthy. Start small  · Do not try to make dramatic changes to your diet all at once. You might feel that you are missing out on your favorite foods and then be more likely to fail. · Start slowly, and gradually change your habits. Try some of the following:  ? Use whole wheat bread instead of white bread. ? Use nonfat or low-fat milk instead of whole milk. ? Eat brown rice instead of white rice, and eat whole wheat pasta instead of white-flour pasta. ? Try low-fat cheeses and low-fat yogurt. ? Add more fruits and vegetables to meals and have them for snacks. ? Add lettuce, tomato, cucumber, and onion to sandwiches. ? Add fruit to yogurt and cereal.  Enjoy food  · You can still eat your favorite foods. You just may need to eat less of them. If your favorite foods are high in fat, salt, and sugar, limit how often you eat them, but do not cut them out entirely. · Eat a wide variety of foods. Make healthy choices when eating out  · The type of restaurant you choose can help you make healthy choices. Even fast-food chains are now offering more low-fat or healthier choices on the menu. · Choose smaller portions, or take half of your meal home. · When eating out, try:  ? A veggie pizza with a whole wheat crust or grilled chicken (instead of sausage or pepperoni).   ? Pasta with roasted vegetables, grilled chicken, or marinara sauce instead of cream sauce. ? A vegetable wrap or grilled chicken wrap. ? Broiled or poached food instead of fried or breaded items. Make healthy choices easy  · Buy packaged, prewashed, ready-to-eat fresh vegetables and fruits, such as baby carrots, salad mixes, and chopped or shredded broccoli and cauliflower. · Buy packaged, presliced fruits, such as melon or pineapple. · Choose 100% fruit or vegetable juice instead of soda. Limit juice intake to 4 to 6 oz (½ to ¾ cup) a day. · Blend low-fat yogurt, fruit juice, and canned or frozen fruit to make a smoothie for breakfast or a snack. Where can you learn more? Go to http://reyna-perri.info/. Enter T756 in the search box to learn more about \"Eating Healthy Foods: Care Instructions. \"  Current as of: March 29, 2018  Content Version: 11.8  © 2298-1266 [employer]. Care instructions adapted under license by [employer] (which disclaims liability or warranty for this information). If you have questions about a medical condition or this instruction, always ask your healthcare professional. [place] [address] any warranty or liability for your use of this information.

## 2018-11-26 NOTE — LETTER
NOTIFICATION RETURN TO WORK / SCHOOL 
 
11/26/2018 9:59 AM 
 
Ms. Frances Luis [address] [place] [id number] To Whom It May Concern: 
 
Frances Luis is currently under the care of [address] [place] [address]. She is released to work without lifting restrictions. If there are questions or concerns please have the patient contact our office. Sincerely, Mitchell Basurto NP

## 2019-01-17 DIAGNOSIS — F41.9 ANXIETY: ICD-10-CM

## 2019-01-17 RX ORDER — LORAZEPAM 1 MG/1
1 TABLET ORAL
Qty: 30 TAB | Refills: 0 | OUTPATIENT
Start: 2019-01-17

## 2019-01-17 NOTE — TELEPHONE ENCOUNTER
----- Message from [employer] sent at 1/17/2019  2:17 PM EST -----  Regarding: Dr. Alcantara Sports   Pt is requesting a Rx refill for Ativan 1mg and she would like to know when it is ready for . Best contact number is [phone].

## 2019-01-22 NOTE — TELEPHONE ENCOUNTER
Returned call of [employer] message and relayed physician response of appointment required. Dr. Elier Murray: Today   Message Contents   Ibrahima Obregon 77             Pt is requesting a call back in regards to medications pick-up for Ativan. Requested over a week ago.  Best contact [phone]

## 2019-01-25 ENCOUNTER — TELEPHONE (OUTPATIENT)
Dept: SURGERY | Age: 49
End: 2019-01-25

## 2019-01-29 ENCOUNTER — OFFICE VISIT (OUTPATIENT)
Dept: SURGERY | Age: 49
End: 2019-01-29

## 2019-01-29 VITALS
RESPIRATION RATE: 18 BRPM | OXYGEN SATURATION: 98 % | HEART RATE: 70 BPM | TEMPERATURE: 98.4 F | HEIGHT: 67 IN | DIASTOLIC BLOOD PRESSURE: 70 MMHG | WEIGHT: 250 LBS | BODY MASS INDEX: 39.24 KG/M2 | SYSTOLIC BLOOD PRESSURE: 143 MMHG

## 2019-01-29 DIAGNOSIS — Z98.84 S/P GASTRIC BYPASS: ICD-10-CM

## 2019-01-29 DIAGNOSIS — E66.01 MORBID OBESITY (HCC): Primary | ICD-10-CM

## 2019-01-29 RX ORDER — HYOSCYAMINE SULFATE 0.12 MG/1
0.12 TABLET SUBLINGUAL
Qty: 30 TAB | Refills: 0 | Status: SHIPPED | OUTPATIENT
Start: 2019-01-29 | End: 2019-03-27

## 2019-01-29 NOTE — PROGRESS NOTES
1. Have you been to the ER, urgent care clinic since your last visit? Hospitalized since your last visit? No    2. Have you seen or consulted any other health care providers outside of the [address] [place] [address] since your last visit? Include any pap smears or colon screening.  No

## 2019-01-29 NOTE — PATIENT INSTRUCTIONS
Eating Healthy Foods: Care Instructions  Your Care Instructions    Eating healthy foods can help lower your risk for disease. Healthy food gives you energy and keeps your heart strong, your brain active, your muscles working, and your bones strong. A healthy diet includes a variety of foods from the basic food groups: grains, vegetables, fruits, milk and milk products, and meat and beans. Some people may eat more of their favorite foods from only one food group and, as a result, miss getting the nutrients they need. So, it is important to pay attention not only to what you eat but also to what you are missing from your diet. You can eat a healthy, balanced diet by making a few small changes. Follow-up care is a key part of your treatment and safety. Be sure to make and go to all appointments, and call your doctor if you are having problems. It's also a good idea to know your test results and keep a list of the medicines you take. How can you care for yourself at home? Look at what you eat  · Keep a food diary for a week or two and record everything you eat or drink. Track the number of servings you eat from each food group. · For a balanced diet every day, eat a variety of:  ? 6 or more ounce-equivalents of grains, such as cereals, breads, crackers, rice, or pasta, every day. An ounce-equivalent is 1 slice of bread, 1 cup of ready-to-eat cereal, or ½ cup of cooked rice, cooked pasta, or cooked cereal.  ? 2½ cups of vegetables, especially:  § Dark-green vegetables such as broccoli and spinach. § Orange vegetables such as carrots and sweet potatoes. § Dry beans (such as miller and kidney beans) and peas (such as lentils). ? 2 cups of fresh, frozen, or canned fruit. A small apple or 1 banana or orange equals 1 cup. ? 3 cups of nonfat or low-fat milk, yogurt, or other milk products. ? 5½ ounces of meat and beans, such as chicken, fish, lean meat, beans, nuts, and seeds.  One egg, 1 tablespoon of peanut butter, ½ ounce nuts or seeds, or ¼ cup of cooked beans equals 1 ounce of meat. · Learn how to read food labels for serving sizes and ingredients. Fast-food and convenience-food meals often contain few or no fruits or vegetables. Make sure you eat some fruits and vegetables to make the meal more nutritious. · Look at your food diary. For each food group, add up what you have eaten and then divide the total by the number of days. This will give you an idea of how much you are eating from each food group. See if you can find some ways to change your diet to make it more healthy. Start small  · Do not try to make dramatic changes to your diet all at once. You might feel that you are missing out on your favorite foods and then be more likely to fail. · Start slowly, and gradually change your habits. Try some of the following:  ? Use whole wheat bread instead of white bread. ? Use nonfat or low-fat milk instead of whole milk. ? Eat brown rice instead of white rice, and eat whole wheat pasta instead of white-flour pasta. ? Try low-fat cheeses and low-fat yogurt. ? Add more fruits and vegetables to meals and have them for snacks. ? Add lettuce, tomato, cucumber, and onion to sandwiches. ? Add fruit to yogurt and cereal.  Enjoy food  · You can still eat your favorite foods. You just may need to eat less of them. If your favorite foods are high in fat, salt, and sugar, limit how often you eat them, but do not cut them out entirely. · Eat a wide variety of foods. Make healthy choices when eating out  · The type of restaurant you choose can help you make healthy choices. Even fast-food chains are now offering more low-fat or healthier choices on the menu. · Choose smaller portions, or take half of your meal home. · When eating out, try:  ? A veggie pizza with a whole wheat crust or grilled chicken (instead of sausage or pepperoni).   ? Pasta with roasted vegetables, grilled chicken, or marinara sauce instead of cream sauce. ? A vegetable wrap or grilled chicken wrap. ? Broiled or poached food instead of fried or breaded items. Make healthy choices easy  · Buy packaged, prewashed, ready-to-eat fresh vegetables and fruits, such as baby carrots, salad mixes, and chopped or shredded broccoli and cauliflower. · Buy packaged, presliced fruits, such as melon or pineapple. · Choose 100% fruit or vegetable juice instead of soda. Limit juice intake to 4 to 6 oz (½ to ¾ cup) a day. · Blend low-fat yogurt, fruit juice, and canned or frozen fruit to make a smoothie for breakfast or a snack. Where can you learn more? Go to http://reyna-perri.info/. Enter T756 in the search box to learn more about \"Eating Healthy Foods: Care Instructions. \"  Current as of: March 28, 2018  Content Version: 11.9  © 2625-7151 [employer]. Care instructions adapted under license by [employer] (which disclaims liability or warranty for this information). If you have questions about a medical condition or this instruction, always ask your healthcare professional. [place] [address] any warranty or liability for your use of this information.

## 2019-01-29 NOTE — PROGRESS NOTES
HISTORY OF PRESENT ILLNESS  Stephanie Oliva is a 46 y.o. female with previous Malabsorptive Gastric bypass surgery on 10/15/2018. [facility] Golder She has lost a total of 60 pounds since surgery. She  Has lost 22 lbs since the last ov. Body mass index is 39.16 kg/m². She is more nauseated with certain foods. Sometimes she feels like food gets stuck in her throat. No vomiting. Denies Acid reflux/heartburn. . Drinking 64 ounces of water daily. 40+ protein intake daily. + BM's. Pt is walking for exercise. Dietary recall -   9-10 am- cantaloupe  11 am- almonds  2pm- ham and cheese, tuna  5- pb crackers, popcorn  7:30 - chili, pot roast. Thin slice pizza, chicken meatballs. Vitamins:  MVI : yes  Calcium : yes  B-Vit 12: yes    Patient has an advanced directive: NOt on file. Ms. Jose G Zhao has a reminder for a \"due or due soon\" health maintenance. I have asked that she contact her primary care provider for follow-up on this health maintenance. Co-Morbid(s)             COMORBIDITY     SLEEP APNEA                 NO        GERD  (req.meds)            NO  HYPERLIPIDEMIA            NO  HYPERTENSION              YES         DIABETES                         NO           Current Outpatient Medications:     PARoxetine (PAXIL) 40 mg tablet, TAKE 1 TABLET BY MOUTH EVERY DAY, Disp: 90 Tab, Rfl: 1    LORazepam (ATIVAN) 1 mg tablet, Take 1 Tab by mouth daily as needed for Anxiety (Panic Attack). , Disp: 30 Tab, Rfl: 0    cyclobenzaprine (FLEXERIL) 10 mg tablet, Take 1 Tab by mouth three (3) times daily (with meals). , Disp: 30 Tab, Rfl: 0    diphenhydrAMINE (BENADRYL) 50 mg tablet, Take 50 mg by mouth nightly as needed for Sleep., Disp: , Rfl:     omeprazole (PRILOSEC) 20 mg capsule, Take 1 Cap by mouth daily. , Disp: 90 Cap, Rfl: 1    ondansetron (ZOFRAN ODT) 4 mg disintegrating tablet, Take 1 Tab by mouth every eight (8) hours as needed for Nausea., Disp: 30 Tab, Rfl: 0    losartan (COZAAR) 25 mg tablet, TAKE 1 TABLET BY MOUTH DAILY, Disp: 90 Tab, Rfl: 1      Visit Vitals  /70 (BP 1 Location: Left arm, BP Patient Position: Sitting)   Pulse 70   Temp 98.4 °F (36.9 °C) (Oral)   Resp 18   Ht 5' 7\" (1.702 m)   Wt 250 lb (113.4 kg)   LMP 06/10/2013   SpO2 98%   BMI 39.16 kg/m²     HPI    Review of Systems   Constitutional: Negative for chills and fever. Respiratory: Negative for shortness of breath. Cardiovascular: Negative for chest pain. Gastrointestinal: Positive for abdominal pain (gas pains after eating. ) and nausea. Negative for heartburn and vomiting. Physical Exam   Constitutional: She is oriented to person, place, and time. She appears well-developed and well-nourished. Neck: Normal range of motion. Neck supple. Cardiovascular: Normal rate and regular rhythm. Exam reveals no gallop and no friction rub. No murmur heard. Pulmonary/Chest: Effort normal and breath sounds normal.   Abdominal: She exhibits no distension. There is no tenderness. No masses or hernias noted. Neurological: She is alert and oriented to person, place, and time. Skin: Skin is warm and dry. Psychiatric: She has a normal mood and affect. ASSESSMENT and PLAN  Mikhail Batres is a 46 y.o. female with previous Malabsorptive Gastric bypass surgery on 10/15/2018. [facility] She has lost a total of 60 pounds since surgery. She  Has lost 22 lbs since the last ov. Body mass index is 39.16 kg/m². Advised patient regard to diet that is high-protein, low-fat, low-sugar, limited carbohydrates. Strive for 50-60 grams of protein daily. If having a snack, foods that are protein or fiber rich. . No eating/drinking together, chew foods well, and portion control. Measure meals. Discussed snacking behavior and to [facility] pay attention to behavioral factor and habits. Drink at least 40-64 ounces of water or non-calorie/non-carbonated beverages daily. Continue vitamin regiment daily.  Exercise at least 3 days a week with cardiovascular and strength training. Patient to follow up in 3 monthjs Advised to call office if any questions/concerns. We discussed eating small meals and finding what works for those meals. She is snacking too much and maybe drinking too fast. Will add Levsin for cramping. Take Zofran as needed. Pt verbalized understanding and questions were answered to the best of my knowledge and ability.

## 2019-02-04 DIAGNOSIS — I10 ESSENTIAL HYPERTENSION: ICD-10-CM

## 2019-02-05 ENCOUNTER — TELEPHONE (OUTPATIENT)
Dept: FAMILY MEDICINE CLINIC | Age: 49
End: 2019-02-05

## 2019-02-05 DIAGNOSIS — F41.9 ANXIETY: ICD-10-CM

## 2019-02-05 NOTE — TELEPHONE ENCOUNTER
Patient is requesting a refill of her Ativan, patient states she does not see a specialist for this medication. Please advise as patient is completely out, thank you!

## 2019-02-07 RX ORDER — LOSARTAN POTASSIUM 25 MG/1
TABLET ORAL
Qty: 90 TAB | Refills: 0 | OUTPATIENT
Start: 2019-02-07

## 2019-02-08 RX ORDER — LORAZEPAM 1 MG/1
1 TABLET ORAL
Qty: 30 TAB | Refills: 0 | Status: SHIPPED | OUTPATIENT
Start: 2019-02-08

## 2019-02-08 RX ORDER — LOSARTAN POTASSIUM 25 MG/1
TABLET ORAL
Qty: 90 TAB | Refills: 0 | Status: SHIPPED | OUTPATIENT
Start: 2019-02-08 | End: 2019-09-03

## 2019-02-08 NOTE — TELEPHONE ENCOUNTER
Patient needs to schedule an appointment. Will provide temporary prescription so she is able to get an appointment scheduled. Please relay this message and let patient know that her medication is ready for pickup.

## 2019-02-11 NOTE — TELEPHONE ENCOUNTER
I called and spoke with patient and let her know that her ativan was ready for  and that she will need an appt for future refills. Patient verbalized understanding. Patient did say she already picked up the rx.

## 2019-02-14 ENCOUNTER — TELEPHONE (OUTPATIENT)
Dept: SURGERY | Age: 49
End: 2019-02-14

## 2019-03-21 ENCOUNTER — HOSPITAL ENCOUNTER (OUTPATIENT)
Dept: VASCULAR SURGERY | Age: 49
Discharge: HOME | End: 2019-03-21
Attending: FAMILY MEDICINE
Payer: COMMERCIAL

## 2019-03-21 ENCOUNTER — TELEPHONE (OUTPATIENT)
Dept: SURGERY | Age: 49
End: 2019-03-21

## 2019-03-21 DIAGNOSIS — R60.0 EDEMA LEG: ICD-10-CM

## 2019-03-21 PROCEDURE — 93971 EXTREMITY STUDY: CPT

## 2019-03-21 NOTE — TELEPHONE ENCOUNTER
Patient called and said she went to her PCP for not feeling good and they did blood work, called her and said her hemoglobin was 9.9 and was told to call Dr. Mahamed Ni to let him know, please call patient.

## 2019-03-21 NOTE — TELEPHONE ENCOUNTER
Spoke with patient regarding hemoglobin 9.9 per PCP. She wnats to make Dr. Maxine Traylor aware. Informed patient I will speak with Kelsy Montana NP and return call. Kelsy Montana will call patient regarding hemoglobin.

## 2019-03-21 NOTE — TELEPHONE ENCOUNTER
Spoke with patient. She state that the only lab result was the hgb that was 9.9. She does not know any other the other results. She went to see her PCP due to back pain and feeling tired. She has been taking 2 ibuprofen's a day to help with back pain. Advised to stopping ibuprofen and that she cannot take it with gastric bypass. She may take Tylenol. She needs to resume iron ( she was only taking it every other day).    Pt in agreement

## 2019-03-27 ENCOUNTER — HOSPITAL ENCOUNTER (INPATIENT)
Age: 49
LOS: 15 days | Discharge: HOME HEALTH | DRG: 371 | End: 2019-04-11
Attending: EMERGENCY MEDICINE | Admitting: FAMILY MEDICINE
Payer: COMMERCIAL

## 2019-03-27 ENCOUNTER — APPOINTMENT (OUTPATIENT)
Dept: CT IMAGING | Age: 49
DRG: 371 | End: 2019-03-27
Attending: SURGERY
Payer: COMMERCIAL

## 2019-03-27 ENCOUNTER — TELEPHONE (OUTPATIENT)
Dept: SURGERY | Age: 49
End: 2019-03-27

## 2019-03-27 DIAGNOSIS — I31.39 PERICARDIAL EFFUSION: ICD-10-CM

## 2019-03-27 DIAGNOSIS — K75.81 NASH (NONALCOHOLIC STEATOHEPATITIS): ICD-10-CM

## 2019-03-27 DIAGNOSIS — D49.2: ICD-10-CM

## 2019-03-27 DIAGNOSIS — R19.00 ABDOMINAL MASS, UNSPECIFIED ABDOMINAL LOCATION: ICD-10-CM

## 2019-03-27 DIAGNOSIS — R42 DIZZINESS: Primary | ICD-10-CM

## 2019-03-27 DIAGNOSIS — R22.2 CHEST MASS: ICD-10-CM

## 2019-03-27 DIAGNOSIS — E66.01 MORBID OBESITY (HCC): ICD-10-CM

## 2019-03-27 PROBLEM — R55 NEAR SYNCOPE: Status: ACTIVE | Noted: 2019-03-27

## 2019-03-27 PROBLEM — J90 PLEURAL EFFUSION, LEFT: Status: ACTIVE | Noted: 2019-03-27

## 2019-03-27 LAB
ALBUMIN SERPL-MCNC: 2.3 G/DL (ref 3.5–5)
ALBUMIN/GLOB SERPL: 0.4 {RATIO} (ref 1.1–2.2)
ALP SERPL-CCNC: 243 U/L (ref 45–117)
ALT SERPL-CCNC: 16 U/L (ref 12–78)
ANION GAP SERPL CALC-SCNC: 7 MMOL/L (ref 5–15)
APPEARANCE UR: ABNORMAL
AST SERPL-CCNC: 17 U/L (ref 15–37)
ATRIAL RATE: 123 BPM
BACTERIA URNS QL MICRO: ABNORMAL /HPF
BASOPHILS # BLD: 0 K/UL (ref 0–0.1)
BASOPHILS NFR BLD: 0 % (ref 0–1)
BILIRUB SERPL-MCNC: 0.8 MG/DL (ref 0.2–1)
BILIRUB UR QL CFM: NEGATIVE
BUN SERPL-MCNC: 10 MG/DL (ref 6–20)
BUN/CREAT SERPL: 13 (ref 12–20)
CALCIUM SERPL-MCNC: 9.2 MG/DL (ref 8.5–10.1)
CALCULATED P AXIS, ECG09: 64 DEGREES
CALCULATED R AXIS, ECG10: 67 DEGREES
CALCULATED T AXIS, ECG11: 56 DEGREES
CHLORIDE SERPL-SCNC: 102 MMOL/L (ref 97–108)
CO2 SERPL-SCNC: 26 MMOL/L (ref 21–32)
COLOR UR: ABNORMAL
COMMENT, HOLDF: NORMAL
CREAT SERPL-MCNC: 0.77 MG/DL (ref 0.55–1.02)
DIAGNOSIS, 93000: NORMAL
DIFFERENTIAL METHOD BLD: ABNORMAL
EOSINOPHIL # BLD: 0 K/UL (ref 0–0.4)
EOSINOPHIL NFR BLD: 0 % (ref 0–7)
EPITH CASTS URNS QL MICRO: ABNORMAL /LPF
ERYTHROCYTE [DISTWIDTH] IN BLOOD BY AUTOMATED COUNT: 14.1 % (ref 11.5–14.5)
GLOBULIN SER CALC-MCNC: 6 G/DL (ref 2–4)
GLUCOSE SERPL-MCNC: 123 MG/DL (ref 65–100)
GLUCOSE UR STRIP.AUTO-MCNC: NEGATIVE MG/DL
HCT VFR BLD AUTO: 32.5 % (ref 35–47)
HGB BLD-MCNC: 10.1 G/DL (ref 11.5–16)
HGB UR QL STRIP: NEGATIVE
IMM GRANULOCYTES # BLD AUTO: 0.1 K/UL (ref 0–0.04)
IMM GRANULOCYTES NFR BLD AUTO: 0 % (ref 0–0.5)
KETONES UR QL STRIP.AUTO: 15 MG/DL
LEUKOCYTE ESTERASE UR QL STRIP.AUTO: ABNORMAL
LIPASE SERPL-CCNC: 46 U/L (ref 73–393)
LYMPHOCYTES # BLD: 1.4 K/UL (ref 0.8–3.5)
LYMPHOCYTES NFR BLD: 11 % (ref 12–49)
MAGNESIUM SERPL-MCNC: 2 MG/DL (ref 1.6–2.4)
MCH RBC QN AUTO: 25.3 PG (ref 26–34)
MCHC RBC AUTO-ENTMCNC: 31.1 G/DL (ref 30–36.5)
MCV RBC AUTO: 81.5 FL (ref 80–99)
MONOCYTES # BLD: 0.8 K/UL (ref 0–1)
MONOCYTES NFR BLD: 6 % (ref 5–13)
MUCOUS THREADS URNS QL MICRO: ABNORMAL /LPF
NEUTS SEG # BLD: 11.2 K/UL (ref 1.8–8)
NEUTS SEG NFR BLD: 83 % (ref 32–75)
NITRITE UR QL STRIP.AUTO: NEGATIVE
NRBC # BLD: 0 K/UL (ref 0–0.01)
NRBC BLD-RTO: 0 PER 100 WBC
P-R INTERVAL, ECG05: 132 MS
PH UR STRIP: 5.5 [PH] (ref 5–8)
PLATELET # BLD AUTO: 383 K/UL (ref 150–400)
PMV BLD AUTO: 10.3 FL (ref 8.9–12.9)
POTASSIUM SERPL-SCNC: 3.8 MMOL/L (ref 3.5–5.1)
PROT SERPL-MCNC: 8.3 G/DL (ref 6.4–8.2)
PROT UR STRIP-MCNC: 100 MG/DL
Q-T INTERVAL, ECG07: 328 MS
QRS DURATION, ECG06: 84 MS
QTC CALCULATION (BEZET), ECG08: 469 MS
RBC # BLD AUTO: 3.99 M/UL (ref 3.8–5.2)
RBC #/AREA URNS HPF: ABNORMAL /HPF (ref 0–5)
SAMPLES BEING HELD,HOLD: NORMAL
SODIUM SERPL-SCNC: 135 MMOL/L (ref 136–145)
SP GR UR REFRACTOMETRY: 1.03 (ref 1–1.03)
T4 FREE SERPL-MCNC: 1.5 NG/DL (ref 0.8–1.5)
TROPONIN I SERPL-MCNC: <0.05 NG/ML
TSH SERPL DL<=0.05 MIU/L-ACNC: 1.37 UIU/ML (ref 0.36–3.74)
UR CULT HOLD, URHOLD: NORMAL
UROBILINOGEN UR QL STRIP.AUTO: 1 EU/DL (ref 0.2–1)
VENTRICULAR RATE, ECG03: 123 BPM
WBC # BLD AUTO: 13.5 K/UL (ref 3.6–11)
WBC URNS QL MICRO: ABNORMAL /HPF (ref 0–4)

## 2019-03-27 PROCEDURE — 74011250637 HC RX REV CODE- 250/637: Performed by: SURGERY

## 2019-03-27 PROCEDURE — 74177 CT ABD & PELVIS W/CONTRAST: CPT

## 2019-03-27 PROCEDURE — 74011636320 HC RX REV CODE- 636/320: Performed by: RADIOLOGY

## 2019-03-27 PROCEDURE — 36415 COLL VENOUS BLD VENIPUNCTURE: CPT

## 2019-03-27 PROCEDURE — 94761 N-INVAS EAR/PLS OXIMETRY MLT: CPT

## 2019-03-27 PROCEDURE — 74011250636 HC RX REV CODE- 250/636: Performed by: EMERGENCY MEDICINE

## 2019-03-27 PROCEDURE — 74011000250 HC RX REV CODE- 250: Performed by: SURGERY

## 2019-03-27 PROCEDURE — 99285 EMERGENCY DEPT VISIT HI MDM: CPT

## 2019-03-27 PROCEDURE — 96366 THER/PROPH/DIAG IV INF ADDON: CPT

## 2019-03-27 PROCEDURE — 80053 COMPREHEN METABOLIC PANEL: CPT

## 2019-03-27 PROCEDURE — 96365 THER/PROPH/DIAG IV INF INIT: CPT

## 2019-03-27 PROCEDURE — 65660000000 HC RM CCU STEPDOWN

## 2019-03-27 PROCEDURE — 83735 ASSAY OF MAGNESIUM: CPT

## 2019-03-27 PROCEDURE — 74011000258 HC RX REV CODE- 258: Performed by: RADIOLOGY

## 2019-03-27 PROCEDURE — 81001 URINALYSIS AUTO W/SCOPE: CPT

## 2019-03-27 PROCEDURE — 84484 ASSAY OF TROPONIN QUANT: CPT

## 2019-03-27 PROCEDURE — 93005 ELECTROCARDIOGRAM TRACING: CPT

## 2019-03-27 PROCEDURE — 96361 HYDRATE IV INFUSION ADD-ON: CPT

## 2019-03-27 PROCEDURE — 84439 ASSAY OF FREE THYROXINE: CPT

## 2019-03-27 PROCEDURE — 74011250636 HC RX REV CODE- 250/636: Performed by: FAMILY MEDICINE

## 2019-03-27 PROCEDURE — 84443 ASSAY THYROID STIM HORMONE: CPT

## 2019-03-27 PROCEDURE — 83690 ASSAY OF LIPASE: CPT

## 2019-03-27 PROCEDURE — 74011250636 HC RX REV CODE- 250/636: Performed by: SURGERY

## 2019-03-27 PROCEDURE — 85025 COMPLETE CBC W/AUTO DIFF WBC: CPT

## 2019-03-27 PROCEDURE — 74011250637 HC RX REV CODE- 250/637: Performed by: FAMILY MEDICINE

## 2019-03-27 RX ORDER — PAROXETINE HYDROCHLORIDE 20 MG/1
40 TABLET, FILM COATED ORAL DAILY
Status: DISCONTINUED | OUTPATIENT
Start: 2019-03-28 | End: 2019-04-12 | Stop reason: HOSPADM

## 2019-03-27 RX ORDER — ONDANSETRON 2 MG/ML
4 INJECTION INTRAMUSCULAR; INTRAVENOUS
Status: DISCONTINUED | OUTPATIENT
Start: 2019-03-27 | End: 2019-04-12 | Stop reason: HOSPADM

## 2019-03-27 RX ORDER — SODIUM CHLORIDE 0.9 % (FLUSH) 0.9 %
10 SYRINGE (ML) INJECTION
Status: COMPLETED | OUTPATIENT
Start: 2019-03-27 | End: 2019-03-27

## 2019-03-27 RX ORDER — SODIUM CHLORIDE 9 MG/ML
125 INJECTION, SOLUTION INTRAVENOUS CONTINUOUS
Status: DISCONTINUED | OUTPATIENT
Start: 2019-03-27 | End: 2019-03-31

## 2019-03-27 RX ORDER — SODIUM CHLORIDE 0.9 % (FLUSH) 0.9 %
5-40 SYRINGE (ML) INJECTION EVERY 8 HOURS
Status: DISCONTINUED | OUTPATIENT
Start: 2019-03-27 | End: 2019-04-12 | Stop reason: HOSPADM

## 2019-03-27 RX ORDER — OMEPRAZOLE 20 MG/1
20 CAPSULE, DELAYED RELEASE ORAL DAILY
Status: DISCONTINUED | OUTPATIENT
Start: 2019-03-28 | End: 2019-03-27 | Stop reason: CLARIF

## 2019-03-27 RX ORDER — LOSARTAN POTASSIUM 25 MG/1
25 TABLET ORAL DAILY
Status: DISCONTINUED | OUTPATIENT
Start: 2019-03-28 | End: 2019-04-12 | Stop reason: HOSPADM

## 2019-03-27 RX ORDER — SODIUM CHLORIDE 0.9 % (FLUSH) 0.9 %
5-40 SYRINGE (ML) INJECTION AS NEEDED
Status: DISCONTINUED | OUTPATIENT
Start: 2019-03-27 | End: 2019-04-12 | Stop reason: HOSPADM

## 2019-03-27 RX ORDER — ACETAMINOPHEN 325 MG/1
650 TABLET ORAL
Status: DISCONTINUED | OUTPATIENT
Start: 2019-03-27 | End: 2019-04-12 | Stop reason: HOSPADM

## 2019-03-27 RX ORDER — PANTOPRAZOLE SODIUM 40 MG/1
40 TABLET, DELAYED RELEASE ORAL DAILY
Status: DISCONTINUED | OUTPATIENT
Start: 2019-03-28 | End: 2019-04-12 | Stop reason: HOSPADM

## 2019-03-27 RX ORDER — LORAZEPAM 0.5 MG/1
1 TABLET ORAL
Status: DISCONTINUED | OUTPATIENT
Start: 2019-03-27 | End: 2019-03-28

## 2019-03-27 RX ADMIN — LORAZEPAM 1 MG: 0.5 TABLET ORAL at 23:06

## 2019-03-27 RX ADMIN — SODIUM CHLORIDE 100 ML: 900 INJECTION, SOLUTION INTRAVENOUS at 17:45

## 2019-03-27 RX ADMIN — LIDOCAINE HYDROCHLORIDE 40 ML: 20 SOLUTION ORAL; TOPICAL at 15:28

## 2019-03-27 RX ADMIN — SODIUM CHLORIDE 125 ML/HR: 900 INJECTION, SOLUTION INTRAVENOUS at 22:54

## 2019-03-27 RX ADMIN — Medication 10 ML: at 23:00

## 2019-03-27 RX ADMIN — IOPAMIDOL 100 ML: 755 INJECTION, SOLUTION INTRAVENOUS at 17:45

## 2019-03-27 RX ADMIN — Medication 10 ML: at 17:45

## 2019-03-27 RX ADMIN — SODIUM CHLORIDE 1000 ML: 900 INJECTION, SOLUTION INTRAVENOUS at 12:16

## 2019-03-27 RX ADMIN — FOLIC ACID: 5 INJECTION, SOLUTION INTRAMUSCULAR; INTRAVENOUS; SUBCUTANEOUS at 17:09

## 2019-03-27 NOTE — ED TRIAGE NOTES
Pt states she has been feeling weak, fatigued and c/o \"pains\". Had gastric bypass October 2018 per Dr. Jackson Warren. Taking Ibuprofen for pains and stated hgb recently was 9.9. Pt states, \"I almost passed out at work\". Ambulatory, anxious but otherwise in no visible distress. Pt states she is taking iron and multivitamin supplements.
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
## 2019-03-27 NOTE — CONSULTS
General Surgery ER Consultation    Admit Date: 3/27/2019  Reason for Consultation: epigastric pain - s/p gastric bypass 10/18    HPI:  Emir Osborne is a 46 y.o. female who is s/p lap RYGB in Oct of 2018 by Dr Omar Aguila presents to the ED from work w/ c/o diarrhea, weakness to the point of almost passing out, and fatigue. For the past 3-4 weeks she has not felt like herself. She hasn't had her usual appetite or energy. She has been taking ibuprofen 2-4 pills a day for back pain and not taking her PPI. She was recently restarted on her iron. She also has occasional epigastric pain and heaviness in her chest; +belching w/ drinking. Some nausea, No vomiting. Diarrhea was non-bloody this am.  She does not smoke - quit 6 months ago. Labs reviewed - noted to have WBC of 13.5. She does feel better after receiving a liter of fluids. Patient Active Problem List    Diagnosis Date Noted    Morbid obesity with BMI of 45.0-49.9, adult ([address] 10/15/2018    Elevated glucose 05/02/2018    Moderate major depression ([address] 02/16/2018    MAK (nonalcoholic steatohepatitis) 10/20/2015    Smoker 10/13/2015    H/O: hysterectomy 07/06/2015    Morbid obesity ([address] 02/28/2011    HTN (hypertension), benign 02/28/2011    Panic attacks 07/21/2010     Past Medical History:   Diagnosis Date    Anemia     Anxiety     Arthritis     LEFT KNEE    Current smoker     Depression     Hypertension     Menorrhagia     Morbid obesity ([address]     Nicotine vapor product user     SMALL AMOUNT OF NICOTINE 0.3    Snoring       Past Surgical History:   Procedure Laterality Date    BIOPSY  2010    polyp, 1/2 cm - negative, per patient   [address] [facility]  2010    [facility]    HX CHOLECYSTECTOMY  2006    HX GYN      PARTIAL HYSTERECTOMY    HX LAP GASTRIC BYPASS  10/15/2018    Lap Gastric Bypass with Endo by Dr. Supriya Buckner.      [place] CYSTOURETHROSCOPY  7/10/2013    CYSTOSCOPY performed by Bertin Rodriguez MD at [facility] MAIN OR    [place] LAPAROSCOPY W TOT HYSTERECTUTERUS <=250 GRAM  W TUBE/OVARY  7/10/2013    HYSTERECTOMY ROBOTIC ASSISTED performed by Sriram Garcia MD at [facility] MAIN OR      Social History     Tobacco Use    Smoking status: Former Smoker     Packs/day: 0.50     Years: 28.00     Pack years: 14.00     Types: Cigarettes     Last attempt to quit: 2018     Years since quittin.8    Smokeless tobacco: Never Used   Substance Use Topics    Alcohol use: No     Comment: rarely      Family History   Problem Relation Age of Onset    Heart Disease Mother         heart bypass    Heart Attack Mother     Diabetes Brother     Stroke Maternal Grandmother     No Known Problems Father     Malignant Hyperthermia Neg Hx     Pseudocholinesterase Deficiency Neg Hx     Delayed Awakening Neg Hx     Post-op Nausea/Vomiting Neg Hx     Emergence Delirium Neg Hx     Post-op Cognitive Dysfunction Neg Hx     Other Neg Hx     Anesth Problems Neg Hx       Prior to Admission medications    Medication Sig Start Date End Date Taking? Authorizing Provider   losartan (COZAAR) 25 mg tablet TAKE 1 TABLET BY MOUTH DAILY 19   Erin Benson MD   LORazepam (ATIVAN) 1 mg tablet Take 1 Tab by mouth daily as needed for Anxiety (Panic Attack). 19   Roxanne Gonzalez MD   hyoscyamine SL (LEVSIN/SL) 0.125 mg SL tablet 1 Tab by SubLINGual route every four (4) hours as needed for Cramping. 19   Quang Zarate NP   PARoxetine (PAXIL) 40 mg tablet TAKE 1 TABLET BY MOUTH EVERY DAY 18   Roxanne Gonzalez MD   cyclobenzaprine (FLEXERIL) 10 mg tablet Take 1 Tab by mouth three (3) times daily (with meals). 10/31/18   Quang Zarate NP   omeprazole (PRILOSEC) 20 mg capsule Take 1 Cap by mouth daily. 18   Quang Zarate NP   ondansetron (ZOFRAN ODT) 4 mg disintegrating tablet Take 1 Tab by mouth every eight (8) hours as needed for Nausea.  18   Quang Zarate NP   diphenhydrAMINE (BENADRYL) 50 mg tablet Take 50 mg by mouth nightly as needed for Sleep. Provider, Historical     Allergies   Allergen Reactions    Amlodipine Other (comments)     Fatigue/drowsy    Pcn [Penicillins] Rash     Per Patient Childhood Allergy - Unsure of reaction. Mother told her not to take it. Subjective:     Review of Systems:    A comprehensive review of systems was negative except for that written in the History of Present Illness. Objective:     Blood pressure 109/68, pulse 92, temperature 99.9 °F (37.7 °C), resp. rate 22, last menstrual period 06/10/2013, SpO2 97 %. Recent Results (from the past 24 hour(s))   EKG, 12 LEAD, INITIAL    Collection Time: 03/27/19 11:28 AM   Result Value Ref Range    Ventricular Rate 123 BPM    Atrial Rate 123 BPM    P-R Interval 132 ms    QRS Duration 84 ms    Q-T Interval 328 ms    QTC Calculation (Bezet) 469 ms    Calculated P Axis 64 degrees    Calculated R Axis 67 degrees    Calculated T Axis 56 degrees    Diagnosis       Sinus tachycardia  When compared with ECG of 26-SEP-2018 09:41,  Vent. rate has increased BY  67 BPM  Questionable change in QRS axis  Nonspecific T wave abnormality now evident in Anterolateral leads     CBC WITH AUTOMATED DIFF    Collection Time: 03/27/19 11:52 AM   Result Value Ref Range    WBC 13.5 (H) 3.6 - 11.0 K/uL    RBC 3.99 3.80 - 5.20 M/uL    HGB 10.1 (L) 11.5 - 16.0 g/dL    HCT 32.5 (L) 35.0 - 47.0 %    MCV 81.5 80.0 - 99.0 FL    MCH 25.3 (L) 26.0 - 34.0 PG    MCHC 31.1 30.0 - 36.5 g/dL    RDW 14.1 11.5 - 14.5 %    PLATELET 727 921 - 312 K/uL    MPV 10.3 8.9 - 12.9 FL    NRBC 0.0 0  WBC    ABSOLUTE NRBC 0.00 0.00 - 0.01 K/uL    NEUTROPHILS 83 (H) 32 - 75 %    LYMPHOCYTES 11 (L) 12 - 49 %    MONOCYTES 6 5 - 13 %    EOSINOPHILS 0 0 - 7 %    BASOPHILS 0 0 - 1 %    IMMATURE GRANULOCYTES 0 0.0 - 0.5 %    ABS. NEUTROPHILS 11.2 (H) 1.8 - 8.0 K/UL    ABS. LYMPHOCYTES 1.4 0.8 - 3.5 K/UL    ABS. MONOCYTES 0.8 0.0 - 1.0 K/UL    ABS.  EOSINOPHILS 0.0 0.0 - 0.4 K/UL ABS. BASOPHILS 0.0 0.0 - 0.1 K/UL    ABS. IMM. GRANS. 0.1 (H) 0.00 - 0.04 K/UL    DF AUTOMATED     METABOLIC PANEL, COMPREHENSIVE    Collection Time: 03/27/19 11:52 AM   Result Value Ref Range    Sodium 135 (L) 136 - 145 mmol/L    Potassium 3.8 3.5 - 5.1 mmol/L    Chloride 102 97 - 108 mmol/L    CO2 26 21 - 32 mmol/L    Anion gap 7 5 - 15 mmol/L    Glucose 123 (H) 65 - 100 mg/dL    BUN 10 6 - 20 MG/DL    Creatinine 0.77 0.55 - 1.02 MG/DL    BUN/Creatinine ratio 13 12 - 20      GFR est AA >60 >60 ml/min/1.73m2    GFR est non-AA >60 >60 ml/min/1.73m2    Calcium 9.2 8.5 - 10.1 MG/DL    Bilirubin, total 0.8 0.2 - 1.0 MG/DL    ALT (SGPT) 16 12 - 78 U/L    AST (SGOT) 17 15 - 37 U/L    Alk. phosphatase 243 (H) 45 - 117 U/L    Protein, total 8.3 (H) 6.4 - 8.2 g/dL    Albumin 2.3 (L) 3.5 - 5.0 g/dL    Globulin 6.0 (H) 2.0 - 4.0 g/dL    A-G Ratio 0.4 (L) 1.1 - 2.2     LIPASE    Collection Time: 03/27/19 11:52 AM   Result Value Ref Range    Lipase 46 (L) 73 - 393 U/L   MAGNESIUM    Collection Time: 03/27/19 11:52 AM   Result Value Ref Range    Magnesium 2.0 1.6 - 2.4 mg/dL   TROPONIN I    Collection Time: 03/27/19 11:52 AM   Result Value Ref Range    Troponin-I, Qt. <0.05 <0.05 ng/mL   SAMPLES BEING HELD    Collection Time: 03/27/19 11:52 AM   Result Value Ref Range    SAMPLES BEING HELD 1BLUE     COMMENT        Add-on orders for these samples will be processed based on acceptable specimen integrity and analyte stability, which may vary by analyte.    TSH 3RD GENERATION    Collection Time: 03/27/19 11:52 AM   Result Value Ref Range    TSH 1.37 0.36 - 3.74 uIU/mL   T4, FREE    Collection Time: 03/27/19 11:52 AM   Result Value Ref Range    T4, Free 1.5 0.8 - 1.5 NG/DL   URINALYSIS W/ RFLX MICROSCOPIC    Collection Time: 03/27/19 12:17 PM   Result Value Ref Range    Color 0-3     Appearance CLOUDY (A) CLEAR      Specific gravity 1.026 1.003 - 1.030      pH (UA) 5.5 5.0 - 8.0      Protein 100 (A) NEG mg/dL    Glucose NEGATIVE  NEG mg/dL    Ketone 15 (A) NEG mg/dL    Blood NEGATIVE  NEG      Urobilinogen 1.0 0.2 - 1.0 EU/dL    Nitrites NEGATIVE  NEG      Leukocyte Esterase SMALL (A) NEG      WBC 0-4 0 - 4 /hpf    RBC 0-5 0 - 5 /hpf    Epithelial cells MANY (A) FEW /lpf    Bacteria 2+ (A) NEG /hpf    Mucus 1+ (A) NEG /lpf   URINE CULTURE HOLD SAMPLE    Collection Time: 03/27/19 12:17 PM   Result Value Ref Range    Urine culture hold        URINE ON HOLD IN MICROBIOLOGY DEPT FOR 3 DAYS. IF UNPRESERVED URINE IS SUBMITTED, IT CANNOT BE USED FOR ADDITIONAL TESTING AFTER 24 HRS, RECOLLECTION WILL BE REQUIRED. BILIRUBIN, CONFIRM    Collection Time: 03/27/19 12:17 PM   Result Value Ref Range    Bilirubin UA, confirm NEGATIVE  NEG       _____________________  Physical Exam:     General:  Alert, cooperative, no distress, appears stated age. Eyes:   Sclera clear. Throat: Lips, mucosa, and tongue normal.   Neck: Supple, symmetrical, trachea midline. Lungs:   Clear to auscultation bilaterally. Heart:  Regular rate and rhythm. Abdomen:   Soft, non-tender. Bowel sounds normal. No masses,  No organomegaly. Extremities: Extremities normal, atraumatic, no cyanosis or edema. Skin: Skin color, texture, turgor normal. No rashes or lesions. Assessment:   Active Problems:    * No active hospital problems. *          Plan:     Stat CT of abd/pelvis - further plans after resulted  D/w Dr Omar Aguila      Total time spent with patient: 25 minutes. Signed By: Wallace New NP     March 27, 2019        I have independently examined the patient and have reviewed the chart. I agree with the above plan. She does not appear to have an obvious SBO or internal hernia from her history of exam.  She seems likely to have a combination of dehydration and possibly an ulcer. I have ordered a GI cocktail and I have ordered a CT to rule out any other surgical issue.   Her WBC is slightly elevated but does not have any fever or abdominal TTP on exam. We will follow up on the CT. If the CT is negative she will be sent home with PPI and carafate with follow with GI and EGD. Decision pending on CT scan.     Jacqui Nieto MD

## 2019-03-27 NOTE — H&P
History & Physical 
 
Date of admission: 3/27/2019 Patient name: Jacki Fitzpatrick MRN: [id number] YOB: 1970 Age: 46 y.o. Primary care provider:  Rosie Velazquez MD  
 
Source of Information: patient, ED and electronic medical records Chief complaint:  
 
History of present illness Jacki Fitzpatrick is a 46 y.o. white female with past medical history of anemia, anxiety, depression, arthritis, hypertension, menorrhagia, morbid obesity, s/p gastric bypass presented to the ED from work via EMS with chief complaint of generalized weakness, fatigue, feeling like she was going to pass out (near syncope), and diarrhea. Patient reportedly had not been feeling subjectively well over the last ~ 4 weeks with intermittent epigastric abdominal pain, burping, fatigue. She reportedly was taking Motrin and Omeprazole with no relief of symptoms. Patient is s/p laparoscopic rosalina-en-y gastric bypass on 10/15/2018 with no reports of complications after surgery. Patient notedly had near syncopal episode this morning feeling like she was going to pass out with generalized weakness. Pain in lower chest pain/ epigastric region was described as a fullness. Symptoms were moderate to severe, initially constant without specific aggravating or alleviating factors. On arrival in the ED, initial recorded vital signs were BP= 109/68, HR= 92, RR= 22, O2sat= 98% on room air. Workup including CT abdomen/ pelvis with IV contrast showing a mass between the inferior pericardium and left hepatic lobe measuring 10.5 x 9.4 x 5.1cm, centered at the left diaphragm, with other findings of moderate pleural effusion, small left loculated pleural effusion, and a 1.8 cm right adrenal nodule. ED MD consulted general surgeon with plans for eventual biopsy, oncologist and thoracic surgery consultations.   Our hospitalist service are asked to admit patient to the medical service. There were no reports of new onset syncope, loss of consciousness, headache, neck pain, visual disturbance, numbness, paresthesias, focal weakness, slurred speech, facial droop, chest pain, shortness of breath, cough, congestion, palpitations,  nausea, vomiting, diarrhea, melena, dysuria, hematuria, calf pain, increased leg swelling/ edema, fever, chills, or rash. [facility] (formerly [facility]) PAST MEDICAL HISTORY: 
Past Medical History:  
Diagnosis Date  Anemia  Anxiety  Arthritis LEFT KNEE  
 Current smoker  Depression  Hypertension  Menorrhagia  Morbid obesity ([facility] [address]  Nicotine vapor product user SMALL AMOUNT OF NICOTINE 0.3  Snoring PAST SURGICAL HISTORY: 
Past Surgical History:  
Procedure Laterality Date  BIOPSY  2010  
 polyp, 1/2 cm - negative, per patient [address] [facility]  [facility]  
 HX CHOLECYSTECTOMY  2006  HX GYN    
 PARTIAL HYSTERECTOMY  HX LAP GASTRIC BYPASS  10/15/2018 Lap Gastric Bypass with Endo by Dr. Herminia Cheung.  IA CYSTOURETHROSCOPY  7/10/2013 CYSTOSCOPY performed by Viral Salinas MD at [address] [facility] [address] TUBE/OVARY  7/10/2013 HYSTERECTOMY ROBOTIC ASSISTED performed by Viral Salinas MD at [address]: 
Prior to Admission medications Medication Sig Start Date End Date Taking? Authorizing Provider  
losartan (COZAAR) 25 mg tablet TAKE 1 TABLET BY MOUTH DAILY 2/8/19   Rory Mercedes MD  
LORazepam (ATIVAN) 1 mg tablet Take 1 Tab by mouth daily as needed for Anxiety (Panic Attack).  2/8/19   Jose Muñoz MD  
hyoscyamine SL (LEVSIN/SL) 0.125 mg SL tablet 1 Tab by SubLINGual route every four (4) hours as needed for Cramping. 1/29/19   Simran Weber NP  
PARoxetine (PAXIL) 40 mg tablet TAKE 1 TABLET BY MOUTH EVERY DAY 11/21/18   Jose Muñoz MD  
 cyclobenzaprine (FLEXERIL) 10 mg tablet Take 1 Tab by mouth three (3) times daily (with meals). 10/31/18   Eric Tan NP  
omeprazole (PRILOSEC) 20 mg capsule Take 1 Cap by mouth daily. 18   Eric Tan NP  
ondansetron (ZOFRAN ODT) 4 mg disintegrating tablet Take 1 Tab by mouth every eight (8) hours as needed for Nausea. 18   Eric Tan NP  
diphenhydrAMINE (BENADRYL) 50 mg tablet Take 50 mg by mouth nightly as needed for Sleep. Provider, Historical  
 
ALLERGIES: 
Allergies Allergen Reactions  Amlodipine Other (comments) Fatigue/drowsy  Pcn [Penicillins] Rash Per Patient Childhood Allergy - Unsure of reaction. Mother told her not to take it. FAMILY HISTORY: 
Family History Problem Relation Age of Onset  Heart Disease Mother   
     heart bypass  Heart Attack Mother  Diabetes Brother  Stroke Maternal Grandmother  No Known Problems Father  Malignant Hyperthermia Neg Hx  Pseudocholinesterase Deficiency Neg Hx  Delayed Awakening Neg Hx  Post-op Nausea/Vomiting Neg Hx  Emergence Delirium Neg Hx  Post-op Cognitive Dysfunction Neg Hx   
 Other Neg Hx  Anesth Problems Neg Hx Social history Patient resides X  Independently Ambulates X  Independently Alcohol history X  None Smoking history X  Current smoker Social History Tobacco Use Smoking Status Former Smoker  Packs/day: 0.50  Years: 28.00  
 Pack years: 14.00  Types: Cigarettes  Last attempt to quit: 2018  Years since quittin.8 Smokeless Tobacco Never Used Code status X  Full code Review of systems Pertinent positives as noted in HPI. All other systems were reviewed and were negative Physical Examination Visit Vitals /68 (BP Patient Position: At rest) Pulse 92 Temp 99.9 °F (37.7 °C) Resp 22 LMP 06/10/2013 SpO2 97% O2 Device: Room air General:  Alert, cooperative, no distress Head:  Normocephalic, without obvious abnormality, atraumatic Eyes:  Conjunctivae/corneas clear. PERRL, EOMs intact E/N/M/T: Nares normal. Septum midline. No nasal drainage or sinus tenderness Lips, mucosa, and tongue normal  
Teeth and gums normal 
Clear oropharynx Neck: Normal appearance and movements, symmetrical, trachea midline No palpable adenopathy No thyroid enlargement, tenderness or nodules No carotid bruit No JVD Lungs:   Symmetrical chest expansion and respiratory effort Clear to auscultation bilaterally Chest wall:  No tenderness or deformity Heart:  Regular rate and rhythm Sounds normal; no murmur, click, rub or gallop Abdomen:   Soft, no tenderness No rebound, guarding, or rigidity Non-distended Bowel sounds normal 
No masses or hepatosplenomegaly No hernias present Back: No CVA tenderness Extremities: Extremities normal, atraumatic No cyanosis or edema No DVT signs Pulses 2+ radial/ 1+ DP bilateral pulses Skin: No rashes or ulcers Warm/ dry Musculo- 
    skeletal: Gait not tested Neuro: GCS 15. Moves all extremities x 4. No slurred speech. No facial droop. Sensation grossly intact. Psych: Alert, oriented x 3 Data Review 24 Hour Results: 
Recent Results (from the past 24 hour(s)) EKG, 12 LEAD, INITIAL Collection Time: 03/27/19 11:28 AM  
Result Value Ref Range Ventricular Rate 123 BPM  
 Atrial Rate 123 BPM  
 P-R Interval 132 ms QRS Duration 84 ms Q-T Interval 328 ms QTC Calculation (Bezet) 469 ms Calculated P Axis 64 degrees Calculated R Axis 67 degrees Calculated T Axis 56 degrees Diagnosis Sinus tachycardia When compared with ECG of 26-SEP-2018 09:41, 
Vent. rate has increased BY  67 BPM 
Questionable change in QRS axis Nonspecific T wave abnormality now evident in Anterolateral leads CBC WITH AUTOMATED DIFF  
 Collection Time: 03/27/19 11:52 AM  
Result Value Ref Range WBC 13.5 (H) 3.6 - 11.0 K/uL  
 RBC 3.99 3.80 - 5.20 M/uL  
 HGB 10.1 (L) 11.5 - 16.0 g/dL HCT 32.5 (L) 35.0 - 47.0 % MCV 81.5 80.0 - 99.0 FL  
 MCH 25.3 (L) 26.0 - 34.0 PG  
 MCHC 31.1 30.0 - 36.5 g/dL  
 RDW 14.1 11.5 - 14.5 % PLATELET 240 544 - 617 K/uL MPV 10.3 8.9 - 12.9 FL  
 NRBC 0.0 0  WBC ABSOLUTE NRBC 0.00 0.00 - 0.01 K/uL NEUTROPHILS 83 (H) 32 - 75 % LYMPHOCYTES 11 (L) 12 - 49 % MONOCYTES 6 5 - 13 % EOSINOPHILS 0 0 - 7 % BASOPHILS 0 0 - 1 % IMMATURE GRANULOCYTES 0 0.0 - 0.5 % ABS. NEUTROPHILS 11.2 (H) 1.8 - 8.0 K/UL  
 ABS. LYMPHOCYTES 1.4 0.8 - 3.5 K/UL  
 ABS. MONOCYTES 0.8 0.0 - 1.0 K/UL  
 ABS. EOSINOPHILS 0.0 0.0 - 0.4 K/UL  
 ABS. BASOPHILS 0.0 0.0 - 0.1 K/UL  
 ABS. IMM. GRANS. 0.1 (H) 0.00 - 0.04 K/UL  
 DF AUTOMATED METABOLIC PANEL, COMPREHENSIVE Collection Time: 03/27/19 11:52 AM  
Result Value Ref Range Sodium 135 (L) 136 - 145 mmol/L Potassium 3.8 3.5 - 5.1 mmol/L Chloride 102 97 - 108 mmol/L  
 CO2 26 21 - 32 mmol/L Anion gap 7 5 - 15 mmol/L Glucose 123 (H) 65 - 100 mg/dL BUN 10 6 - 20 MG/DL Creatinine 0.77 0.55 - 1.02 MG/DL  
 BUN/Creatinine ratio 13 12 - 20 GFR est AA >60 >60 ml/min/1.73m2 GFR est non-AA >60 >60 ml/min/1.73m2 Calcium 9.2 8.5 - 10.1 MG/DL Bilirubin, total 0.8 0.2 - 1.0 MG/DL  
 ALT (SGPT) 16 12 - 78 U/L  
 AST (SGOT) 17 15 - 37 U/L Alk. phosphatase 243 (H) 45 - 117 U/L Protein, total 8.3 (H) 6.4 - 8.2 g/dL Albumin 2.3 (L) 3.5 - 5.0 g/dL Globulin 6.0 (H) 2.0 - 4.0 g/dL A-G Ratio 0.4 (L) 1.1 - 2.2 LIPASE Collection Time: 03/27/19 11:52 AM  
Result Value Ref Range Lipase 46 (L) 73 - 393 U/L MAGNESIUM Collection Time: 03/27/19 11:52 AM  
Result Value Ref Range Magnesium 2.0 1.6 - 2.4 mg/dL TROPONIN I Collection Time: 03/27/19 11:52 AM  
Result Value Ref Range Troponin-I, Qt. <0.05 <0.05 ng/mL SAMPLES BEING HELD Collection Time: 03/27/19 11:52 AM  
Result Value Ref Range SAMPLES BEING HELD 1BLUE   
 COMMENT Add-on orders for these samples will be processed based on acceptable specimen integrity and analyte stability, which may vary by analyte. TSH 3RD GENERATION Collection Time: 03/27/19 11:52 AM  
Result Value Ref Range TSH 1.37 0.36 - 3.74 uIU/mL T4, FREE Collection Time: 03/27/19 11:52 AM  
Result Value Ref Range T4, Free 1.5 0.8 - 1.5 NG/DL URINALYSIS W/ RFLX MICROSCOPIC Collection Time: 03/27/19 12:17 PM  
Result Value Ref Range Color 0-3 Appearance CLOUDY (A) CLEAR Specific gravity 1.026 1.003 - 1.030    
 pH (UA) 5.5 5.0 - 8.0 Protein 100 (A) NEG mg/dL Glucose NEGATIVE  NEG mg/dL Ketone 15 (A) NEG mg/dL Blood NEGATIVE  NEG Urobilinogen 1.0 0.2 - 1.0 EU/dL Nitrites NEGATIVE  NEG Leukocyte Esterase SMALL (A) NEG    
 WBC 0-4 0 - 4 /hpf  
 RBC 0-5 0 - 5 /hpf Epithelial cells MANY (A) FEW /lpf Bacteria 2+ (A) NEG /hpf Mucus 1+ (A) NEG /lpf URINE CULTURE HOLD SAMPLE Collection Time: 03/27/19 12:17 PM  
Result Value Ref Range Urine culture hold URINE ON HOLD IN MICROBIOLOGY DEPT FOR 3 DAYS. IF UNPRESERVED URINE IS SUBMITTED, IT CANNOT BE USED FOR ADDITIONAL TESTING AFTER 24 HRS, RECOLLECTION WILL BE REQUIRED. BILIRUBIN, CONFIRM Collection Time: 03/27/19 12:17 PM  
Result Value Ref Range Bilirubin UA, confirm NEGATIVE  NEG Recent Labs  
  03/27/19 
1152 WBC 13.5* HGB 10.1* HCT 32.5*  
 Recent Labs  
  03/27/19 
1152 * K 3.8  CO2 26 * BUN 10  
CREA 0.77 CA 9.2 MG 2.0 ALB 2.3* TBILI 0.8 SGOT 17 ALT 16 Imaging CT ABD PELV W CONT 
  
INDICATION: Near syncope, Abdominal pain, weakness, and fatigue. Gastric bypass 
surgery in October, 2018.  Hysterectomy and cholecystectomy. 
  
 COMPARISON: Ultrasound abdomen on 10/20/2015  
  
CONTRAST: 100 mL of Isovue-370. 
  
TECHNIQUE:  
Following the uneventful intravenous administration of contrast, thin axial 
images were obtained through the abdomen and pelvis. Coronal and sagittal 
reconstructions were generated. Oral contrast was not administered. CT dose 
reduction was achieved through use of a standardized protocol tailored for this 
examination and automatic exposure control for dose modulation. 
  
FINDINGS:  
LUNG BASES: Small loculated left pleural effusion is partially imaged. Heterogeneous compressive atelectasis of the left lower lobe. Right lung base is 
clear. INCIDENTALLY IMAGED HEART AND MEDIASTINUM: Normal cardiac size. Moderate 
pericardial effusion measures 13 Hounsfield units. Heterogeneous enhancing mass 
between the inferior pericardium and left hepatic lobe measures 10.5 x 9.4 x 5.1 
cm. Mass is centered at the left diaphragm. LIVER: Mass described above invades the hepatic segment 2. Heterogeneous 
enhancement in segment 5 and 8 of the liver is nonspecific. Liver surface is 
smooth. GALLBLADDER: Cholecystectomy. CBD is not dilated. SPLEEN: Upper normal size. No mass. PANCREAS: No mass or ductal dilatation. ADRENALS: Right adrenal nodule contains a calcification, measures 1.8 cm in 
diameter, and 53 Hounsfield units KIDNEYS: No mass, calculus, or hydronephrosis. STOMACH: Gastric bypass surgery. No obstruction. SMALL BOWEL: No dilatation or wall thickening. COLON: Nondistended, limited evaluation. APPENDIX: Unremarkable. PERITONEUM: No ascites or pneumoperitoneum. RETROPERITONEUM: No lymphadenopathy or aortic aneurysm. REPRODUCTIVE ORGANS: Hysterectomy. No pelvic mass. URINARY BLADDER: No mass or calculus. BONES: No destructive bone lesion.  
ADDITIONAL COMMENTS: N/A 
  
IMPRESSION: 
  
1. 10.5 x 9.4 x 5.1 cm enhancing mass centered at the left diaphragm abuts the 
 inferior pericardium and extends into segment 2 of the liver. Differential 
diagnosis includes sarcoma versus metastatic disease. The lesion is amenable to 
nonemergent CT or ultrasound-guided percutaneous biopsy. 2. Moderate pericardial effusion. Small loculated left pleural effusion. 3. Nonspecific partially calcified right adrenal 1.8 cm nodule. Recommendation: CT chest with IV contrast following at least 12 hours of 
hydration. 
  
 
 
Assessment and Plan 1. 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 
inferior pericardium extending into segment 2 of the liver. Differential 
diagnosis includes sarcoma versus metastatic disease. The lesion is amenable to 
nonemergent CT or ultrasound-guided percutaneous biopsy per radiologist report. Plan for biopsy. Consults to general surgery, thoracic surgery, and oncology services. 2.  Moderate pleural effusion. Order complete 2d echocardiogram in a.m. Continue telemetry monitoring and vital sign checks. 3.  Small left loculated pleural effusion. Plan as above. 4.  Near syncope. Place on neurovascular checks and fall precautions. Order orthostatic vital signs. 5.  Right adrenal nodule. Plan as above. 6.  Morbid obesity- s/p gastric bypass. General surgery service following. 7.  Generalized weakness. Plan as noted. 8.  Anxiety. Continue home medications. 9.   Diarrhea. Order stool for c diff assay and stool occult blood test. 
 
10.  Anemia. Order stool occult blood test, iron profile, B12, and folate levels. Repeat CBC in a.m. 11. VTE prophylaxis. SCDs to BLEs. Diet: cardiac Activity: ambulate as tolerated with assistance DVT prophylaxis:  SCDs Isolation precautions: none Signed by: Aleja Medina MD  
 March 27, 2019 at 7:16 PM

## 2019-03-28 ENCOUNTER — APPOINTMENT (OUTPATIENT)
Dept: CT IMAGING | Age: 49
DRG: 371 | End: 2019-03-28
Attending: SURGERY
Payer: COMMERCIAL

## 2019-03-28 LAB
ANION GAP SERPL CALC-SCNC: 6 MMOL/L (ref 5–15)
BASOPHILS # BLD: 0 K/UL (ref 0–0.1)
BASOPHILS NFR BLD: 0 % (ref 0–1)
BUN SERPL-MCNC: 9 MG/DL (ref 6–20)
BUN/CREAT SERPL: 16 (ref 12–20)
CALCIUM SERPL-MCNC: 8.7 MG/DL (ref 8.5–10.1)
CHLORIDE SERPL-SCNC: 105 MMOL/L (ref 97–108)
CO2 SERPL-SCNC: 27 MMOL/L (ref 21–32)
CREAT SERPL-MCNC: 0.56 MG/DL (ref 0.55–1.02)
DIFFERENTIAL METHOD BLD: ABNORMAL
EOSINOPHIL # BLD: 0.1 K/UL (ref 0–0.4)
EOSINOPHIL NFR BLD: 1 % (ref 0–7)
ERYTHROCYTE [DISTWIDTH] IN BLOOD BY AUTOMATED COUNT: 14.3 % (ref 11.5–14.5)
GLUCOSE SERPL-MCNC: 81 MG/DL (ref 65–100)
HCT VFR BLD AUTO: 28.7 % (ref 35–47)
HGB BLD-MCNC: 8.8 G/DL (ref 11.5–16)
IMM GRANULOCYTES # BLD AUTO: 0.1 K/UL (ref 0–0.04)
IMM GRANULOCYTES NFR BLD AUTO: 1 % (ref 0–0.5)
LYMPHOCYTES # BLD: 2.7 K/UL (ref 0.8–3.5)
LYMPHOCYTES NFR BLD: 25 % (ref 12–49)
MCH RBC QN AUTO: 25.4 PG (ref 26–34)
MCHC RBC AUTO-ENTMCNC: 30.7 G/DL (ref 30–36.5)
MCV RBC AUTO: 82.7 FL (ref 80–99)
MONOCYTES # BLD: 0.7 K/UL (ref 0–1)
MONOCYTES NFR BLD: 6 % (ref 5–13)
NEUTS SEG # BLD: 7.4 K/UL (ref 1.8–8)
NEUTS SEG NFR BLD: 67 % (ref 32–75)
NRBC # BLD: 0 K/UL (ref 0–0.01)
NRBC BLD-RTO: 0 PER 100 WBC
PLATELET # BLD AUTO: 318 K/UL (ref 150–400)
PMV BLD AUTO: 10.3 FL (ref 8.9–12.9)
POTASSIUM SERPL-SCNC: 3.2 MMOL/L (ref 3.5–5.1)
RBC # BLD AUTO: 3.47 M/UL (ref 3.8–5.2)
SODIUM SERPL-SCNC: 138 MMOL/L (ref 136–145)
WBC # BLD AUTO: 10.9 K/UL (ref 3.6–11)

## 2019-03-28 PROCEDURE — 80048 BASIC METABOLIC PNL TOTAL CA: CPT

## 2019-03-28 PROCEDURE — 85025 COMPLETE CBC W/AUTO DIFF WBC: CPT

## 2019-03-28 PROCEDURE — 71260 CT THORAX DX C+: CPT

## 2019-03-28 PROCEDURE — 74011250637 HC RX REV CODE- 250/637: Performed by: FAMILY MEDICINE

## 2019-03-28 PROCEDURE — 94760 N-INVAS EAR/PLS OXIMETRY 1: CPT

## 2019-03-28 PROCEDURE — 36415 COLL VENOUS BLD VENIPUNCTURE: CPT

## 2019-03-28 PROCEDURE — 74011250636 HC RX REV CODE- 250/636: Performed by: FAMILY MEDICINE

## 2019-03-28 PROCEDURE — 74011636320 HC RX REV CODE- 636/320: Performed by: RADIOLOGY

## 2019-03-28 PROCEDURE — 74011000258 HC RX REV CODE- 258: Performed by: RADIOLOGY

## 2019-03-28 PROCEDURE — 65660000000 HC RM CCU STEPDOWN

## 2019-03-28 PROCEDURE — 74011250637 HC RX REV CODE- 250/637: Performed by: INTERNAL MEDICINE

## 2019-03-28 RX ORDER — POTASSIUM CHLORIDE 750 MG/1
40 TABLET, FILM COATED, EXTENDED RELEASE ORAL
Status: COMPLETED | OUTPATIENT
Start: 2019-03-28 | End: 2019-03-28

## 2019-03-28 RX ORDER — SODIUM CHLORIDE 0.9 % (FLUSH) 0.9 %
10 SYRINGE (ML) INJECTION
Status: COMPLETED | OUTPATIENT
Start: 2019-03-28 | End: 2019-03-28

## 2019-03-28 RX ORDER — LORAZEPAM 0.5 MG/1
1 TABLET ORAL
Status: DISCONTINUED | OUTPATIENT
Start: 2019-03-28 | End: 2019-04-12 | Stop reason: HOSPADM

## 2019-03-28 RX ORDER — POTASSIUM CHLORIDE 1.5 G/1.77G
40 POWDER, FOR SOLUTION ORAL
Status: ACTIVE | OUTPATIENT
Start: 2019-03-28 | End: 2019-03-29

## 2019-03-28 RX ORDER — LORAZEPAM 0.5 MG/1
1 TABLET ORAL
Status: DISCONTINUED | OUTPATIENT
Start: 2019-03-28 | End: 2019-03-28

## 2019-03-28 RX ADMIN — PANTOPRAZOLE SODIUM 40 MG: 40 TABLET, DELAYED RELEASE ORAL at 08:57

## 2019-03-28 RX ADMIN — Medication 10 ML: at 15:02

## 2019-03-28 RX ADMIN — SODIUM CHLORIDE 125 ML/HR: 900 INJECTION, SOLUTION INTRAVENOUS at 15:02

## 2019-03-28 RX ADMIN — Medication 10 ML: at 13:58

## 2019-03-28 RX ADMIN — Medication 10 ML: at 06:00

## 2019-03-28 RX ADMIN — LORAZEPAM 1 MG: 0.5 TABLET ORAL at 09:58

## 2019-03-28 RX ADMIN — PAROXETINE HYDROCHLORIDE 40 MG: 20 TABLET, FILM COATED ORAL at 08:57

## 2019-03-28 RX ADMIN — LORAZEPAM 1 MG: 1 TABLET ORAL at 20:25

## 2019-03-28 RX ADMIN — IOPAMIDOL 100 ML: 612 INJECTION, SOLUTION INTRAVENOUS at 13:58

## 2019-03-28 RX ADMIN — SODIUM CHLORIDE 100 ML: 900 INJECTION, SOLUTION INTRAVENOUS at 13:58

## 2019-03-28 RX ADMIN — LOSARTAN POTASSIUM 25 MG: 25 TABLET, FILM COATED ORAL at 08:58

## 2019-03-28 RX ADMIN — POTASSIUM CHLORIDE 40 MEQ: 750 TABLET, EXTENDED RELEASE ORAL at 19:21

## 2019-03-28 NOTE — PROGRESS NOTES
Problem: Falls - Risk of 
Goal: *Absence of Falls Description Document Durenda Demarco Fall Risk and appropriate interventions in the flowsheet. Outcome: Progressing Towards Goal 
  
Problem: Pressure Injury - Risk of 
Goal: *Prevention of pressure injury Description Document Parrish Scale and appropriate interventions in the flowsheet. Outcome: Progressing Towards Goal 
  
Problem: Patient Education: Go to Patient Education Activity Goal: Patient/Family Education Outcome: Progressing Towards Goal

## 2019-03-28 NOTE — PROGRESS NOTES
Bedside verbal shift change report given to oncoming nurse Sonia Turcios R.N. Opportunity for questions and clarifications provided.

## 2019-03-28 NOTE — PROGRESS NOTES
Hospitalist Progress Note Miki Black MD 
     
                             Answering service: [phone] OR [phone] from in house phone Date of Service:  3/28/2019 NAME:  Sarah Beth Alex :  1970 MRN:  [id number] Admission Summary:  
 
46 y.o. white female with past medical history of anemia, anxiety Disorder, depression, arthritis, hypertension, menorrhagia, morbid obesity, s/p gastric bypass surgery was brought to the ED from work via EMS with chief complaint of generalized weakness, fatigue, feeling like she was going to pass out and diarrhea. Reason for follow up:  
 CC: Generalized weakness, fatigue, and near passing out. Patient denied any worsening of symptoms on afternoon rounds. C/O feeling anxious about results. Assessment & Plan:  
 
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 For CT-guided biopsy 3/29/19. Thoracic surgical eval noted and appreciated. 2) Resp: Small left pleural effusion without any respiratory failure. As needed oxygen and diuresis. 3) CVS: Near Syncope. Orthostatic readings. Controlled Primary hypertension. Pericardial effusion. 2DECHO check. As needed Cardiology consult. 4) Hematology: Probable anemia of chronicity. R/O Iron deficiency. Iron profile and Ferritin level. 5) Endocrine: Morbid obesity with a BMI greater than 40. H/O Gastric Bypass surgery. Therapeutic lifestyle changes counselling done. 6) JOHANNA: Generalized weakness. PT as tolerated. 7) Psych: Anxiety Disorder. Depression. As needed anxiolytics. 8) Prophylaxis: DVT. 9) Directives: Full Code with a guarded prognosis. D/W patient and family. 10) Plan: Anticipate D/C to home after biopsy with outpatient F/U. Discussed with patient and patient's  Karley Ramirez who can be reached at [phone]. D/W RN. 
[facility] Problems  Date Reviewed: 3/27/2019 Codes Class Noted POA Pericardial effusion ICD-10-CM: I31.3 ICD-9-CM: 423.9  3/27/2019 Unknown Pleural effusion, left ICD-10-CM: J90 ICD-9-CM: 511.9  3/27/2019 Unknown Near syncope ICD-10-CM: R55 
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  
 
 Last 24hrs VS reviewed since prior progress note. Most recent are: 
Visit Vitals /85 Pulse 84 Temp 99 °F (37.2 °C) Resp 18 SpO2 95% Constitutional:  No acute distress, cooperative, pleasant   
HEENT: Head is a traumatic, Un icteric sclera. Pink conjunctiva,no erythema or discharge. Oral mucous moist, oropharynx benign. Neck supple, Resp:  Decreased air entry B/L. CV:  Regular rhythm, normal rate, no murmurs, gallops, rubs GI:  Soft, obese, non distended, non tender. normoactive bowel sounds, no hepatosplenomegaly :  No CVA or suprapubic tenderness Skin  :  No erythema,rash,bullae,dipigmentation Musculoskeletal:  No edema, warm, 2+ pulses throughout Neurologic:  AAOx3, CN II-XII reviewed. Moves all extremities. Intake/Output Summary (Last 24 hours) at 3/28/2019 1733 Last data filed at 3/28/2019 0800 Gross per 24 hour Intake 225 ml Output  Net 225 ml Labs:  
 
Recent Labs  
  03/28/19 
0314 03/27/19 
1152 WBC 10.9 13.5* HGB 8.8* 10.1* HCT 28.7* 32.5*  
 383 Recent Labs  
  03/28/19 
0314 03/27/19 
1152  135* K 3.2* 3.8  102 CO2 27 26 BUN 9 10 CREA 0.56 0.77 GLU 81 123* CA 8.7 9.2 MG  --  2.0 Recent Labs  
  03/27/19 
1152 SGOT 17 ALT 16  
* TBILI 0.8 TP 8.3* ALB 2.3*  
 GLOB 6.0*  
LPSE 46* No results for input(s): INR, PTP, APTT in the last 72 hours. No lab exists for component: INREXT No results for input(s): FE, TIBC, PSAT, FERR in the last 72 hours. Lab Results Component Value Date/Time Folate 8.0 04/10/2009 06:00 PM  
  
No results for input(s): PH, PCO2, PO2 in the last 72 hours. Recent Labs  
  03/27/19 
1152 TROIQ <0.05 Lab Results Component Value Date/Time Cholesterol, total 211 (H) 10/13/2017 04:23 PM  
 HDL Cholesterol 44 10/13/2017 04:23 PM  
 LDL, calculated 113 (H) 10/13/2017 04:23 PM  
 Triglyceride 268 (H) 10/13/2017 04:23 PM  
 
Lab Results Component Value Date/Time Glucose (POC) 97 01/31/2010 01:10 PM  
 Glucose  05/14/2012 10:55 AM  
 
Lab Results Component Value Date/Time Color 0-3 03/27/2019 12:17 PM  
 Appearance CLOUDY (A) 03/27/2019 12:17 PM  
 Specific gravity 1.026 03/27/2019 12:17 PM  
 pH (UA) 5.5 03/27/2019 12:17 PM  
 Protein 100 (A) 03/27/2019 12:17 PM  
 Glucose NEGATIVE  03/27/2019 12:17 PM  
 Ketone 15 (A) 03/27/2019 12:17 PM  
 Bilirubin NEGATIVE  03/10/2015 02:45 PM  
 Urobilinogen 1.0 03/27/2019 12:17 PM  
 Nitrites NEGATIVE  03/27/2019 12:17 PM  
 Leukocyte Esterase SMALL (A) 03/27/2019 12:17 PM  
 Epithelial cells MANY (A) 03/27/2019 12:17 PM  
 Bacteria 2+ (A) 03/27/2019 12:17 PM  
 WBC 0-4 03/27/2019 12:17 PM  
 RBC 0-5 03/27/2019 12:17 PM  
 
 
 
Medications Reviewed:  
 
Current Facility-Administered Medications Medication Dose Route Frequency  LORazepam (ATIVAN) tablet 1 mg  1 mg Oral BID PRN  
 losartan (COZAAR) tablet 25 mg  25 mg Oral DAILY  PARoxetine (PAXIL) tablet 40 mg  40 mg Oral DAILY  sodium chloride (NS) flush 5-40 mL  5-40 mL IntraVENous Q8H  
 sodium chloride (NS) flush 5-40 mL  5-40 mL IntraVENous PRN  
 ondansetron (ZOFRAN) injection 4 mg  4 mg IntraVENous Q6H PRN  
 acetaminophen (TYLENOL) tablet 650 mg  650 mg Oral Q6H PRN  
  0.9% sodium chloride infusion  125 mL/hr IntraVENous CONTINUOUS  
 pantoprazole (PROTONIX) tablet 40 mg  40 mg Oral DAILY  
 
______________________________________________________________________ EXPECTED LENGTH OF STAY: 3d 16h ACTUAL LENGTH OF STAY:          1 Royal Carter MD

## 2019-03-28 NOTE — PROGRESS NOTES
Bedside shift change report given to [facility] (oncoming nurse) by Rusty Ramos RN (offgoing nurse). Report included the following information SBAR, Kardex, ED Summary, Intake/Output, MAR and Recent Results.

## 2019-03-28 NOTE — CONSULTS
[facility] at [address] [place] [address] [facility] [address]  W: [phone]  F: [phone]    Reason for Visit:   Edwar Morton is a 46 y.o. female who is seen in consultation at the request of Dr. Mingo Clement for evaluation of Left Diaphragm Mass found on CT A/P. Treatment History:   · CT Abd/Pelv 3/27/19: 10.5 x 9.4 x 5.1 cm enhancing mass centered at the left diaphragm abuts the inferior pericardium and extends into segment 2 of the liver, moderate pericardial effusion, small left pleural effusion, and nonspecific partially calcified right adrenal 1.8 cm nodule  · CT Chest 3/28/19: Large complex mass centered on the left hemidiaphragm, with likely invasion of the inferior left pericardium with associated pericardial effusion and dome of the left liver. Enlarged left internal mammmary artery node. History of Present Illness:   Edwar Morton is a 46 y.o. female with a past medical history of Anemia, Anxiety/Depression, Arthritis, HTN, and Morbid Obesity s/p Gastric Bypass 10/18, who presented to the ER yesterday with fatigue and generalized weakness for the past 2 weeks. She also reported intermittent epigastric abdominal pain. She reports that she felt like she was going to [employer]" at work. CT Abd/Pelv showed a 10.5 x 9.4 x 5.1 cm enhancing mass centered at the left diaphragm abuts the inferior pericardium and extends into segment 2 of the liver, moderate pericardial effusion, small left pleural effusion, and nonspecific partially calcified right adrenal 1.8 cm nodule. Patient had CT Chest today which again showed a large complex mass centered on the left hemidiaphragm, with likely invasion of the inferior left pericardium with associated pericardial effusion and dome of the left liver and an enlarged left internal mammmary artery node. General Surgery and Thoracic surgery have been consulted. Plans for CT guided biopsy tomorrow.  Oncology was consulted for further evaluation/management of mass. Patient resting in bed upon visit. Denies personal or family history of cancer. Denies current complaints. Anxious about what mass might be.  and daughter at bedside. She works at [employer]. Past Medical History:   Diagnosis Date    Anemia     Anxiety     Arthritis     LEFT KNEE    Current smoker     Depression     Hypertension     Menorrhagia     Morbid obesity ([address]     Nicotine vapor product user     SMALL AMOUNT OF NICOTINE 0.3    Snoring       Past Surgical History:   Procedure Laterality Date    BIOPSY      polyp, 1/2 cm - negative, per patient   [facility]  2010    [facility]    HX CHOLECYSTECTOMY  2006    HX GYN      PARTIAL HYSTERECTOMY    HX LAP GASTRIC BYPASS  10/15/2018    Lap Gastric Bypass with Endo by Dr. Jacy Wayne.      ND CYSTOURETHROSCOPY  7/10/2013    CYSTOSCOPY performed by Ngozi Munson MD at [address] [place] [address] <=[address] [facility] TUBE/OVARY  7/10/2013    HYSTERECTOMY ROBOTIC ASSISTED performed by Ngozi Munson MD at [facility]      Social History     Tobacco Use    Smoking status: Former Smoker     Packs/day: 0.50     Years: 28.00     Pack years: 14.00     Types: Cigarettes     Last attempt to quit: 2018     Years since quittin.8    Smokeless tobacco: Never Used   Substance Use Topics    Alcohol use: No     Comment: rarely      Family History   Problem Relation Age of Onset    Heart Disease Mother         heart bypass    Heart Attack Mother     Diabetes Brother     Stroke Maternal Grandmother     No Known Problems Father     Malignant Hyperthermia Neg Hx     Pseudocholinesterase Deficiency Neg Hx     Delayed Awakening Neg Hx     Post-op Nausea/Vomiting Neg Hx     Emergence Delirium Neg Hx     Post-op Cognitive Dysfunction Neg Hx     Other Neg Hx     Anesth Problems Neg Hx      Current Facility-Administered Medications   Medication Dose Route Frequency    LORazepam (ATIVAN) tablet 1 mg  1 mg Oral BID PRN    losartan (COZAAR) tablet 25 mg  25 mg Oral DAILY    PARoxetine (PAXIL) tablet 40 mg  40 mg Oral DAILY    sodium chloride (NS) flush 5-40 mL  5-40 mL IntraVENous Q8H    sodium chloride (NS) flush 5-40 mL  5-40 mL IntraVENous PRN    ondansetron (ZOFRAN) injection 4 mg  4 mg IntraVENous Q6H PRN    acetaminophen (TYLENOL) tablet 650 mg  650 mg Oral Q6H PRN    0.9% sodium chloride infusion  125 mL/hr IntraVENous CONTINUOUS    pantoprazole (PROTONIX) tablet 40 mg  40 mg Oral DAILY      Allergies   Allergen Reactions    Amlodipine Other (comments)     Fatigue/drowsy    Pcn [Penicillins] Rash     Per Patient Childhood Allergy - Unsure of reaction. Mother told her not to take it. Review of Systems: A complete review of systems was obtained, negative except as described above. Physical Exam:     Visit Vitals  /88 (BP 1 Location: Left arm, BP Patient Position: At rest)   Pulse 80   Temp 99.3 °F (37.4 °C)   Resp 18   LMP 06/10/2013   SpO2 97%     ECOG PS: 0  General: No distress  Eyes: PERRLA, anicteric sclerae  HENT: Atraumatic, OP clear  Neck: Supple  Respiratory: CTAB, normal respiratory effort  CV: Normal rate, regular rhythm, no murmurs  GI: Soft, nontender, nondistended  MS: Normal gait and station. Digits without clubbing or cyanosis  Skin: Dry and warm  Psych: Alert, oriented, appropriate affect, normal judgment/insight    Results:     Lab Results   Component Value Date/Time    WBC 10.9 03/28/2019 03:14 AM    HGB 8.8 (L) 03/28/2019 03:14 AM    HCT 28.7 (L) 03/28/2019 03:14 AM    PLATELET 427 87/66/4088 03:14 AM    MCV 82.7 03/28/2019 03:14 AM    ABS.  NEUTROPHILS 7.4 03/28/2019 03:14 AM    Hemoglobin (POC) 9.6 05/14/2012 10:55 AM    Hemoglobin (POC) 11.2 (L) 01/31/2010 01:10 PM    Hematocrit (POC) 33 (L) 01/31/2010 01:10 PM     Lab Results   Component Value Date/Time    Sodium 138 03/28/2019 03:14 AM    Potassium 3.2 (L) 03/28/2019 03:14 AM    Chloride 105 03/28/2019 03:14 AM    CO2 27 03/28/2019 03:14 AM    Glucose 81 03/28/2019 03:14 AM    BUN 9 03/28/2019 03:14 AM    Creatinine 0.56 03/28/2019 03:14 AM    GFR est AA >60 03/28/2019 03:14 AM    GFR est non-AA >60 03/28/2019 03:14 AM    Calcium 8.7 03/28/2019 03:14 AM    Sodium (POC) 138 01/31/2010 01:10 PM    Potassium (POC) 3.8 01/31/2010 01:10 PM    Chloride (POC) 104 01/31/2010 01:10 PM    Glucose (POC) 97 01/31/2010 01:10 PM    Glucose  05/14/2012 10:55 AM    BUN (POC) 6 (L) 01/31/2010 01:10 PM    Creatinine (POC) 0.6 01/31/2010 01:10 PM    Calcium, ionized (POC) 1.11 (L) 01/31/2010 01:10 PM     Lab Results   Component Value Date/Time    Bilirubin, total 0.8 03/27/2019 11:52 AM    ALT (SGPT) 16 03/27/2019 11:52 AM    AST (SGOT) 17 03/27/2019 11:52 AM    Alk. phosphatase 243 (H) 03/27/2019 11:52 AM    Protein, total 8.3 (H) 03/27/2019 11:52 AM    Albumin 2.3 (L) 03/27/2019 11:52 AM    Globulin 6.0 (H) 03/27/2019 11:52 AM     CT Abd/Pelv 3/27/19  IMPRESSION:  1. 10.5 x 9.4 x 5.1 cm enhancing mass centered at the left diaphragm abuts the  inferior pericardium and extends into segment 2 of the liver. Differential  diagnosis includes sarcoma versus metastatic disease. The lesion is amenable to  nonemergent CT or ultrasound-guided percutaneous biopsy. 2. Moderate pericardial effusion. Small loculated left pleural effusion. 3. Nonspecific partially calcified right adrenal 1.8 cm nodule. Recommendation: CT chest with IV contrast following at least 12 hours of hydration. CT Chest 3/28/19  1. Again demonstrated is the large complex mass centered on the left  hemidiaphragm. There is likely invasion of the inferior left pericardium with  associated pericardial effusion and dome of the left liver. Differential  diagnosis is unchanged including sarcoma and metastatic disease  2. Enlarged left internal mammary artery node    Records reviewed and summarized above.   Pathology report(s) reviewed above. Radiology report(s) reviewed above. Assessment/Plan:   1) Mediastinal/Diaphragmatic/Pericardial Mass  10.5 x 9.4 x 5.1 cm enhancing mass centered at the left diaphragm abuts the inferior pericardium and extends into liver noted on CT A/P 3/27/19. Likely malignant but need biopsy for diagnosis. CT chest pending. General Surgery and Thoracic Surgery are following. Plans for CT-Guided Biopsy with Thoracic tomorrow. Reviewed scan with pt and family at bedside today. Discussed possibility of cancer dx. Pt clinically stable. Will await pathology to determine further management. 2) Moderate Pericardial Effusion/Left Pleural Effusion  Noted on CT C/A/P  ECHO per primary team.    3) Morbid Obesity  S/p Gastric Bypass surgery 10/18 with Dr. Bob Wang. Mass was not seen at time of surgery per Dr. Bob Wang. EGD at time of surgery was also normal per surgery. Pre-op CXR was also normal.    4) Epigastric Pain  On PPI but pain likely from mass    5) Psychosocial  Anxious about what mass might be. Works at [employer] and likes job. Good family support.  and daughter at bedside. Will follow. Call if questions. Case d/w pt/ family , surgery and thoracic today. This patient was seen in conjunction with Noe Cisse NP. I appreciate the opportunity to participate in Ms. Daija Arrieta's care.     Signed By: Fab Sosa NP

## 2019-03-28 NOTE — ROUTINE PROCESS
TRANSFER - OUT REPORT: 
 
Verbal report given to JUAN Bryan(name) on IVDesk  being transferred to [address] [place] [address](unit) for routine progression of care Report consisted of patients Situation, Background, Assessment and  
Recommendations(SBAR). Information from the following report(s) SBAR, ED Summary, Intake/Output, MAR, Recent Results and Cardiac Rhythm sinus tach was reviewed with the receiving nurse. Lines:  
Peripheral IV 03/27/19 Left Forearm (Active) Site Assessment Clean, dry, & intact 3/27/2019  9:40 PM  
Phlebitis Assessment 0 3/27/2019  9:40 PM  
Infiltration Assessment 0 3/27/2019  9:40 PM  
Dressing Status Clean, dry, & intact 3/27/2019  9:40 PM  
Dressing Type Transparent 3/27/2019  9:40 PM  
Hub Color/Line Status Pink;Patent; Flushed 3/27/2019  9:40 PM  
  
 
Opportunity for questions and clarification was provided. Patient transported with: 
 transport

## 2019-03-28 NOTE — PROGRESS NOTES
Admission Medication Reconciliation: 
 
Information obtained from: patient Significant PMH/Disease States:  
Past Medical History:  
Diagnosis Date  Anemia  Anxiety  Arthritis LEFT KNEE  
 Current smoker  Depression  Hypertension  Menorrhagia  Morbid obesity ([address]  Nicotine vapor product user SMALL AMOUNT OF NICOTINE 0.3  Snoring Chief Complaint for this Admission:  dizziness Allergies:  Amlodipine and Pcn [penicillins] Prior to Admission Medications:  
Prior to Admission Medications Prescriptions Last Dose Informant Patient Reported? Taking? LORazepam (ATIVAN) 1 mg tablet 3/26/2019 at Unknown time  No Yes Sig: Take 1 Tab by mouth daily as needed for Anxiety (Panic Attack). PARoxetine (PAXIL) 40 mg tablet 3/27/2019 at Unknown time  No Yes Sig: TAKE 1 TABLET BY MOUTH EVERY DAY  
losartan (COZAAR) 25 mg tablet 3/27/2019 at Unknown time  No Yes Sig: TAKE 1 TABLET BY MOUTH DAILY  
omeprazole (PRILOSEC) 20 mg capsule 3/27/2019 at Unknown time  No Yes Sig: Take 1 Cap by mouth daily. Facility-Administered Medications: None Comments/Recommendations: Medication review done with patient. Removed cyclobenzaprine, diphenhydramine, hyoscyamine, ondansetron. She did not mention any nutritional supplements as indicated in MD note. Allergies reviewed.

## 2019-03-28 NOTE — PROGRESS NOTES
[address] [facility] Surgery Subjective No acute events, pt feeling a bit depressed today, some mild epigastric pain Objective Patient Vitals for the past 24 hrs: 
 Temp Pulse Resp BP SpO2  
03/28/19 0636 98.3 °F (36.8 °C) 72 18 133/84 98 % 03/27/19 2228 98.5 °F (36.9 °C) 75 18 (!) 137/93 97 % 03/27/19 2139 98 °F (36.7 °C) 78 20 137/88 99 % 03/27/19 1141 99.9 °F (37.7 °C) 92 22 109/68 97 % 03/27/19 1119  82   98 % Date 03/27/19 0700 - 03/28/19 3921 03/28/19 0700 - 03/29/19 9066 Shift 9712-0226 2831-6708 24 Hour Total 2141-8340 5207-2474 24 Hour Total  
INTAKE  
I.V.  100 100 Volume (sodium chloride 0.9 % bolus infusion 100 mL)  100 100 Shift Total  100 100 OUTPUT Shift Total        
NET  100 100 Weight (kg) PE 
Pulm - CTAB 
CV - RRR Abd - soft, ND, BS present, NTTP Labs Recent Results (from the past 12 hour(s)) METABOLIC PANEL, BASIC Collection Time: 03/28/19  3:14 AM  
Result Value Ref Range Sodium 138 136 - 145 mmol/L Potassium 3.2 (L) 3.5 - 5.1 mmol/L Chloride 105 97 - 108 mmol/L  
 CO2 27 21 - 32 mmol/L Anion gap 6 5 - 15 mmol/L Glucose 81 65 - 100 mg/dL BUN 9 6 - 20 MG/DL Creatinine 0.56 0.55 - 1.02 MG/DL  
 BUN/Creatinine ratio 16 12 - 20 GFR est AA >60 >60 ml/min/1.73m2 GFR est non-AA >60 >60 ml/min/1.73m2 Calcium 8.7 8.5 - 10.1 MG/DL  
CBC WITH AUTOMATED DIFF Collection Time: 03/28/19  3:14 AM  
Result Value Ref Range WBC 10.9 3.6 - 11.0 K/uL  
 RBC 3.47 (L) 3.80 - 5.20 M/uL HGB 8.8 (L) 11.5 - 16.0 g/dL HCT 28.7 (L) 35.0 - 47.0 % MCV 82.7 80.0 - 99.0 FL  
 MCH 25.4 (L) 26.0 - 34.0 PG  
 MCHC 30.7 30.0 - 36.5 g/dL  
 RDW 14.3 11.5 - 14.5 % PLATELET 963 440 - 002 K/uL MPV 10.3 8.9 - 12.9 FL  
 NRBC 0.0 0  WBC ABSOLUTE NRBC 0.00 0.00 - 0.01 K/uL NEUTROPHILS 67 32 - 75 % LYMPHOCYTES 25 12 - 49 % MONOCYTES 6 5 - 13 % EOSINOPHILS 1 0 - 7 % BASOPHILS 0 0 - 1 % IMMATURE GRANULOCYTES 1 (H) 0.0 - 0.5 % ABS. NEUTROPHILS 7.4 1.8 - 8.0 K/UL  
 ABS. LYMPHOCYTES 2.7 0.8 - 3.5 K/UL  
 ABS. MONOCYTES 0.7 0.0 - 1.0 K/UL  
 ABS. EOSINOPHILS 0.1 0.0 - 0.4 K/UL  
 ABS. BASOPHILS 0.0 0.0 - 0.1 K/UL  
 ABS. IMM. GRANS. 0.1 (H) 0.00 - 0.04 K/UL  
 DF AUTOMATED    
 
CT - TECHNIQUE:  
Following the uneventful intravenous administration of contrast, thin axial 
images were obtained through the abdomen and pelvis. Coronal and sagittal 
reconstructions were generated. Oral contrast was not administered. CT dose 
reduction was achieved through use of a standardized protocol tailored for this 
examination and automatic exposure control for dose modulation. 
  
FINDINGS:  
LUNG BASES: Small loculated left pleural effusion is partially imaged. Heterogeneous compressive atelectasis of the left lower lobe. Right lung base is 
clear. INCIDENTALLY IMAGED HEART AND MEDIASTINUM: Normal cardiac size. Moderate 
pericardial effusion measures 13 Hounsfield units. Heterogeneous enhancing mass 
between the inferior pericardium and left hepatic lobe measures 10.5 x 9.4 x 5.1 
cm. Mass is centered at the left diaphragm. LIVER: Mass described above invades the hepatic segment 2. Heterogeneous 
enhancement in segment 5 and 8 of the liver is nonspecific. Liver surface is 
smooth. GALLBLADDER: Cholecystectomy. CBD is not dilated. SPLEEN: Upper normal size. No mass. PANCREAS: No mass or ductal dilatation. ADRENALS: Right adrenal nodule contains a calcification, measures 1.8 cm in 
diameter, and 53 Hounsfield units KIDNEYS: No mass, calculus, or hydronephrosis. STOMACH: Gastric bypass surgery. No obstruction. SMALL BOWEL: No dilatation or wall thickening. COLON: Nondistended, limited evaluation. APPENDIX: Unremarkable. PERITONEUM: No ascites or pneumoperitoneum. RETROPERITONEUM: No lymphadenopathy or aortic aneurysm. REPRODUCTIVE ORGANS: Hysterectomy. No pelvic mass. URINARY BLADDER: No mass or calculus. BONES: No destructive bone lesion. ADDITIONAL COMMENTS: N/A 
  
IMPRESSION IMPRESSION: 
  
1. 10.5 x 9.4 x 5.1 cm enhancing mass centered at the left diaphragm abuts the 
inferior pericardium and extends into segment 2 of the liver. Differential 
diagnosis includes sarcoma versus metastatic disease. The lesion is amenable to 
nonemergent CT or ultrasound-guided percutaneous biopsy. 2. Moderate pericardial effusion. Small loculated left pleural effusion. 3. Nonspecific partially calcified right adrenal 1.8 cm nodule. Recommendation: CT chest with IV contrast following at least 12 hours of 
hydration. Assessment April Mcallister is a 46 y. o.yr old female with large mediastinal/diaphragmatic/pericardial mass Plan I have consulted thoracic surgery today and will discuss the pt with Dr Daniel Holden to see what the work up pathway would be I have also consulted oncology as well The mass does appear to be malignant but biopsy of it will be needed The location of the mass is certainly unusual and seems like a difficult area to resect Nothing was seen at the time of surgery 6 months ago, we could see the abdominal portion of the diaphragm and there was no mass then. EGD at the time of surgery was also normal.  Preop CXR was normal 
I will continue her on PPI for now due to possibility of having ulcer but seems most likely the cause of the pain is the mass I will be following Melani Grewal MD

## 2019-03-28 NOTE — CONSULTS
Consult    Subjective:     Katharyn Mortimer is a 46 y.o.  female who is being seen for large mediastinal mass. Onset of symptoms was gradual over the past 3-4 weeks. She describes feeling fatigued, loss of appetite, and light headedness and \"just not her self\". She present to the [facility] ED yesterday after feeling she was going to \"pass out\" while at work. She underwent gastric bypass in Oct 2018, at that time her preop CXR was clear. CT of abd/pelvis yesterday reveals a 10.5x9.4x5.1 cm mediastinal mass. On exam she is sitting up on the bed, in no apparent distress. She is anxious and concerned as to what this mass might be. Past Medical History:   Diagnosis Date    Anemia     Anxiety     Arthritis     LEFT KNEE    Current smoker     Depression     Hypertension     Menorrhagia     Morbid obesity ([address]     Nicotine vapor product user     SMALL AMOUNT OF NICOTINE 0.3    Snoring       Past Surgical History:   Procedure Laterality Date    BIOPSY  2010    polyp, 1/2 cm - negative, per patient   [facility]  2010    [facility]    HX CHOLECYSTECTOMY  2006    HX GYN      PARTIAL HYSTERECTOMY    HX LAP GASTRIC BYPASS  10/15/2018    Lap Gastric Bypass with Endo by Dr. Jacy Wayne.      TN CYSTOURETHROSCOPY  7/10/2013    CYSTOSCOPY performed by Ngozi Munson MD at [address] [place] [address] <=[address] TUBE/OVARY  7/10/2013    HYSTERECTOMY ROBOTIC ASSISTED [facility]     Family History   Problem Relation Age of Onset    Heart Disease Mother         heart bypass    Heart Attack Mother     Diabetes Brother     Stroke Maternal Grandmother     No Known Problems Father     Malignant Hyperthermia Neg Hx     Pseudocholinesterase Deficiency Neg Hx     Delayed Awakening Neg Hx     Post-op Nausea/Vomiting Neg Hx     Emergence Delirium Neg Hx     Post-op Cognitive Dysfunction Neg Hx     Other Neg Hx     Anesth Problems Neg Hx       Social History     Tobacco Use    Smoking status: Former Smoker     Packs/day: 0.50     Years: 28.00     Pack years: 14.00     Types: Cigarettes     Last attempt to quit: 2018     Years since quittin.8    Smokeless tobacco: Never Used   Substance Use Topics    Alcohol use: No     Comment: rarely       Current Facility-Administered Medications   Medication Dose Route Frequency    LORazepam (ATIVAN) tablet 1 mg  1 mg Oral BID PRN    losartan (COZAAR) tablet 25 mg  25 mg Oral DAILY    PARoxetine (PAXIL) tablet 40 mg  40 mg Oral DAILY    sodium chloride (NS) flush 5-40 mL  5-40 mL IntraVENous Q8H    sodium chloride (NS) flush 5-40 mL  5-40 mL IntraVENous PRN    ondansetron (ZOFRAN) injection 4 mg  4 mg IntraVENous Q6H PRN    acetaminophen (TYLENOL) tablet 650 mg  650 mg Oral Q6H PRN    0.9% sodium chloride infusion  125 mL/hr IntraVENous CONTINUOUS    pantoprazole (PROTONIX) tablet 40 mg  40 mg Oral DAILY      Allergies   Allergen Reactions    Amlodipine Other (comments)     Fatigue/drowsy    Pcn [Penicillins] Rash     Per Patient Childhood Allergy - Unsure of reaction. Mother told her not to take it. Review of Systems:  A comprehensive review of systems was negative except for that written in the History of Present Illness. Objective: Intake and Output:    No intake/output data recorded.  1901 -  0700  In: 100 [I.V.:100]  Out: -     Physical Exam:   Visit Vitals  /84 (BP 1 Location: Right arm, BP Patient Position: At rest)   Pulse 72   Temp 98.3 °F (36.8 °C)   Resp 18   LMP 06/10/2013   SpO2 98%     General appearance: alert, cooperative, no distress, appears stated age  Lungs: clear to auscultation bilaterally  Heart: regular rate and rhythm  Abdomen: soft, non-tender.  Bowel sounds normal. No masses,  no organomegaly  Extremities: extremities normal, atraumatic, no cyanosis or edema  Skin: Skin color, texture, turgor normal. No rashes or lesions        Data Review:   Recent Results (from the past 24 hour(s))   CBC WITH AUTOMATED DIFF    Collection Time: 03/27/19 11:52 AM   Result Value Ref Range    WBC 13.5 (H) 3.6 - 11.0 K/uL    RBC 3.99 3.80 - 5.20 M/uL    HGB 10.1 (L) 11.5 - 16.0 g/dL    HCT 32.5 (L) 35.0 - 47.0 %    MCV 81.5 80.0 - 99.0 FL    MCH 25.3 (L) 26.0 - 34.0 PG    MCHC 31.1 30.0 - 36.5 g/dL    RDW 14.1 11.5 - 14.5 %    PLATELET 412 916 - 730 K/uL    MPV 10.3 8.9 - 12.9 FL    NRBC 0.0 0  WBC    ABSOLUTE NRBC 0.00 0.00 - 0.01 K/uL    NEUTROPHILS 83 (H) 32 - 75 %    LYMPHOCYTES 11 (L) 12 - 49 %    MONOCYTES 6 5 - 13 %    EOSINOPHILS 0 0 - 7 %    BASOPHILS 0 0 - 1 %    IMMATURE GRANULOCYTES 0 0.0 - 0.5 %    ABS. NEUTROPHILS 11.2 (H) 1.8 - 8.0 K/UL    ABS. LYMPHOCYTES 1.4 0.8 - 3.5 K/UL    ABS. MONOCYTES 0.8 0.0 - 1.0 K/UL    ABS. EOSINOPHILS 0.0 0.0 - 0.4 K/UL    ABS. BASOPHILS 0.0 0.0 - 0.1 K/UL    ABS. IMM. GRANS. 0.1 (H) 0.00 - 0.04 K/UL    DF AUTOMATED     METABOLIC PANEL, COMPREHENSIVE    Collection Time: 03/27/19 11:52 AM   Result Value Ref Range    Sodium 135 (L) 136 - 145 mmol/L    Potassium 3.8 3.5 - 5.1 mmol/L    Chloride 102 97 - 108 mmol/L    CO2 26 21 - 32 mmol/L    Anion gap 7 5 - 15 mmol/L    Glucose 123 (H) 65 - 100 mg/dL    BUN 10 6 - 20 MG/DL    Creatinine 0.77 0.55 - 1.02 MG/DL    BUN/Creatinine ratio 13 12 - 20      GFR est AA >60 >60 ml/min/1.73m2    GFR est non-AA >60 >60 ml/min/1.73m2    Calcium 9.2 8.5 - 10.1 MG/DL    Bilirubin, total 0.8 0.2 - 1.0 MG/DL    ALT (SGPT) 16 12 - 78 U/L    AST (SGOT) 17 15 - 37 U/L    Alk.  phosphatase 243 (H) 45 - 117 U/L    Protein, total 8.3 (H) 6.4 - 8.2 g/dL    Albumin 2.3 (L) 3.5 - 5.0 g/dL    Globulin 6.0 (H) 2.0 - 4.0 g/dL    A-G Ratio 0.4 (L) 1.1 - 2.2     LIPASE    Collection Time: 03/27/19 11:52 AM   Result Value Ref Range    Lipase 46 (L) 73 - 393 U/L   MAGNESIUM    Collection Time: 03/27/19 11:52 AM   Result Value Ref Range    Magnesium 2.0 1.6 - 2.4 mg/dL TROPONIN I    Collection Time: 03/27/19 11:52 AM   Result Value Ref Range    Troponin-I, Qt. <0.05 <0.05 ng/mL   SAMPLES BEING HELD    Collection Time: 03/27/19 11:52 AM   Result Value Ref Range    SAMPLES BEING HELD 1BLUE     COMMENT        Add-on orders for these samples will be processed based on acceptable specimen integrity and analyte stability, which may vary by analyte. TSH 3RD GENERATION    Collection Time: 03/27/19 11:52 AM   Result Value Ref Range    TSH 1.37 0.36 - 3.74 uIU/mL   T4, FREE    Collection Time: 03/27/19 11:52 AM   Result Value Ref Range    T4, Free 1.5 0.8 - 1.5 NG/DL   URINALYSIS W/ RFLX MICROSCOPIC    Collection Time: 03/27/19 12:17 PM   Result Value Ref Range    Color 0-3     Appearance CLOUDY (A) CLEAR      Specific gravity 1.026 1.003 - 1.030      pH (UA) 5.5 5.0 - 8.0      Protein 100 (A) NEG mg/dL    Glucose NEGATIVE  NEG mg/dL    Ketone 15 (A) NEG mg/dL    Blood NEGATIVE  NEG      Urobilinogen 1.0 0.2 - 1.0 EU/dL    Nitrites NEGATIVE  NEG      Leukocyte Esterase SMALL (A) NEG      WBC 0-4 0 - 4 /hpf    RBC 0-5 0 - 5 /hpf    Epithelial cells MANY (A) FEW /lpf    Bacteria 2+ (A) NEG /hpf    Mucus 1+ (A) NEG /lpf   URINE CULTURE HOLD SAMPLE    Collection Time: 03/27/19 12:17 PM   Result Value Ref Range    Urine culture hold        URINE ON HOLD IN MICROBIOLOGY DEPT FOR 3 DAYS. IF UNPRESERVED URINE IS SUBMITTED, IT CANNOT BE USED FOR ADDITIONAL TESTING AFTER 24 HRS, RECOLLECTION WILL BE REQUIRED.    BILIRUBIN, CONFIRM    Collection Time: 03/27/19 12:17 PM   Result Value Ref Range    Bilirubin UA, confirm NEGATIVE  NEG     METABOLIC PANEL, BASIC    Collection Time: 03/28/19  3:14 AM   Result Value Ref Range    Sodium 138 136 - 145 mmol/L    Potassium 3.2 (L) 3.5 - 5.1 mmol/L    Chloride 105 97 - 108 mmol/L    CO2 27 21 - 32 mmol/L    Anion gap 6 5 - 15 mmol/L    Glucose 81 65 - 100 mg/dL    BUN 9 6 - 20 MG/DL    Creatinine 0.56 0.55 - 1.02 MG/DL    BUN/Creatinine ratio 16 12 - 20 GFR est AA >60 >60 ml/min/1.73m2    GFR est non-AA >60 >60 ml/min/1.73m2    Calcium 8.7 8.5 - 10.1 MG/DL   CBC WITH AUTOMATED DIFF    Collection Time: 03/28/19  3:14 AM   Result Value Ref Range    WBC 10.9 3.6 - 11.0 K/uL    RBC 3.47 (L) 3.80 - 5.20 M/uL    HGB 8.8 (L) 11.5 - 16.0 g/dL    HCT 28.7 (L) 35.0 - 47.0 %    MCV 82.7 80.0 - 99.0 FL    MCH 25.4 (L) 26.0 - 34.0 PG    MCHC 30.7 30.0 - 36.5 g/dL    RDW 14.3 11.5 - 14.5 %    PLATELET 525 923 - 745 K/uL    MPV 10.3 8.9 - 12.9 FL    NRBC 0.0 0  WBC    ABSOLUTE NRBC 0.00 0.00 - 0.01 K/uL    NEUTROPHILS 67 32 - 75 %    LYMPHOCYTES 25 12 - 49 %    MONOCYTES 6 5 - 13 %    EOSINOPHILS 1 0 - 7 %    BASOPHILS 0 0 - 1 %    IMMATURE GRANULOCYTES 1 (H) 0.0 - 0.5 %    ABS. NEUTROPHILS 7.4 1.8 - 8.0 K/UL    ABS. LYMPHOCYTES 2.7 0.8 - 3.5 K/UL    ABS. MONOCYTES 0.7 0.0 - 1.0 K/UL    ABS. EOSINOPHILS 0.1 0.0 - 0.4 K/UL    ABS. BASOPHILS 0.0 0.0 - 0.1 K/UL    ABS. IMM. GRANS. 0.1 (H) 0.00 - 0.04 K/UL    DF AUTOMATED         CT ABD/PELVIS: 1. 10.5x9.4x5.1 cm enhancing mass centered at the diaphragm abute the inferior pericardium and extends into segment 2 of the liver. Differential diagnosis includes sarcoma versus metastatic dieases. The lesion is amenable to nonemergent CT or ultrasound guided biopsy. 2. Moderate pericardial effusion. Small loculated left pleural effusion,  3. Nonspecific partially calcified right adrenal 1.8 cm nodule. Recommendation: CT chest with IV contrast following at least 12 hours of hydration.     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

## 2019-03-28 NOTE — PROGRESS NOTES
Care Management Interventions Mode of Transport at Discharge: Other (see comment) Transition of Care Consult (CM Consult): Other(None) MyChart Signup: No 
Discharge Durable Medical Equipment: No 
Physical Therapy Consult: No 
Occupational Therapy Consult: No 
Speech Therapy Consult: No 
Current Support Network: Lives with Spouse Confirm Follow Up Transport: Family Plan discussed with Pt/Family/Caregiver: Yes Freedom of Choice Offered: (N/A) Discharge Location Discharge Placement: Home with family assistance Reason for Admission:   Neoplasm of the diaphram 
                
RRAT Score:            9 Plan for utilizing home health:      None Current Advanced Directive/Advance Care Plan:  Not on file. Likelihood of Readmission:  Low Transition of Care Plan:                   
CM met with patient and her  with whom she lives. Patient also lives with her parents and a 32year old son. They have 3 additional supportive adult children locally. Patient reports good family /social support. Patient is ambulatory and expects return to independent functioning. Patient confirmed PCP, health insurance, and prescription coverage. Transport at discharge will be provided by family. Patient anticipates surgical procedure for biopsy tomorrow 3/29/19. CM to follow for discharge planning needs.

## 2019-03-29 ENCOUNTER — APPOINTMENT (OUTPATIENT)
Dept: CT IMAGING | Age: 49
DRG: 371 | End: 2019-03-29
Attending: SURGERY
Payer: COMMERCIAL

## 2019-03-29 ENCOUNTER — APPOINTMENT (OUTPATIENT)
Dept: NON INVASIVE DIAGNOSTICS | Age: 49
DRG: 371 | End: 2019-03-29
Attending: INTERNAL MEDICINE
Payer: COMMERCIAL

## 2019-03-29 LAB
ECHO AV AREA PEAK VELOCITY: 2.1 CM2
ECHO AV PEAK GRADIENT: 8.4 MMHG
ECHO AV PEAK VELOCITY: 144.84 CM/S
ECHO EST RA PRESSURE: 15 MMHG
ECHO LA MAJOR AXIS: 4.56 CM
ECHO LV E' LATERAL VELOCITY: 4.55 CM/S
ECHO LV E' SEPTAL VELOCITY: 7.02 CM/S
ECHO LV INTERNAL DIMENSION DIASTOLIC: 4.52 CM (ref 3.9–5.3)
ECHO LV INTERNAL DIMENSION SYSTOLIC: 2.83 CM
ECHO LV IVSD: 0.94 CM (ref 0.6–0.9)
ECHO LV MASS 2D: 178.6 G (ref 67–162)
ECHO LV POSTERIOR WALL DIASTOLIC: 1.06 CM (ref 0.6–0.9)
ECHO LVOT DIAM: 2.05 CM
ECHO LVOT PEAK GRADIENT: 3.4 MMHG
ECHO LVOT PEAK VELOCITY: 92.32 CM/S
ECHO MV A VELOCITY: 68.81 CM/S
ECHO MV AREA PHT: 5.7 CM2
ECHO MV E DECELERATION TIME (DT): 132.2 MS
ECHO MV E VELOCITY: 74.11 CM/S
ECHO MV E/A RATIO: 1.08
ECHO MV E/E' LATERAL: 16.29
ECHO MV E/E' RATIO (AVERAGED): 13.42
ECHO MV E/E' SEPTAL: 10.56
ECHO MV PRESSURE HALF TIME (PHT): 38.3 MS
ECHO PULMONARY ARTERY SYSTOLIC PRESSURE (PASP): 44.7 MMHG
ECHO RIGHT VENTRICULAR SYSTOLIC PRESSURE (RVSP): 44.7 MMHG
ECHO RV INTERNAL DIMENSION: 2.87 CM
ECHO RVOT PEAK VELOCITY: 81.39 CM/S
ECHO TV REGURGITANT MAX VELOCITY: 272.35 CM/S
ECHO TV REGURGITANT PEAK GRADIENT: 29.7 MMHG
INR PPP: 1.2 (ref 0.9–1.1)
PROTHROMBIN TIME: 12 SEC (ref 9–11.1)

## 2019-03-29 PROCEDURE — 77030003503 HC NDL BIOP TISS BD -B

## 2019-03-29 PROCEDURE — 65660000000 HC RM CCU STEPDOWN

## 2019-03-29 PROCEDURE — 77030014115

## 2019-03-29 PROCEDURE — 87205 SMEAR GRAM STAIN: CPT

## 2019-03-29 PROCEDURE — 88307 TISSUE EXAM BY PATHOLOGIST: CPT

## 2019-03-29 PROCEDURE — 74011000250 HC RX REV CODE- 250: Performed by: RADIOLOGY

## 2019-03-29 PROCEDURE — 74011250636 HC RX REV CODE- 250/636: Performed by: FAMILY MEDICINE

## 2019-03-29 PROCEDURE — 93306 TTE W/DOPPLER COMPLETE: CPT

## 2019-03-29 PROCEDURE — 85610 PROTHROMBIN TIME: CPT

## 2019-03-29 PROCEDURE — 77030003666 HC NDL SPINAL BD -A

## 2019-03-29 PROCEDURE — 87176 TISSUE HOMOGENIZATION CULTR: CPT

## 2019-03-29 PROCEDURE — 74011250637 HC RX REV CODE- 250/637: Performed by: INTERNAL MEDICINE

## 2019-03-29 PROCEDURE — 99152 MOD SED SAME PHYS/QHP 5/>YRS: CPT

## 2019-03-29 PROCEDURE — 0FB23ZX EXCISION OF LEFT LOBE LIVER, PERCUTANEOUS APPROACH, DIAGNOSTIC: ICD-10-PCS | Performed by: RADIOLOGY

## 2019-03-29 PROCEDURE — 36415 COLL VENOUS BLD VENIPUNCTURE: CPT

## 2019-03-29 PROCEDURE — 88333 PATH CONSLTJ SURG CYTO XM 1: CPT

## 2019-03-29 PROCEDURE — 93005 ELECTROCARDIOGRAM TRACING: CPT

## 2019-03-29 PROCEDURE — 74011250636 HC RX REV CODE- 250/636: Performed by: RADIOLOGY

## 2019-03-29 PROCEDURE — 74011250637 HC RX REV CODE- 250/637: Performed by: FAMILY MEDICINE

## 2019-03-29 RX ORDER — MIDAZOLAM HYDROCHLORIDE 1 MG/ML
5 INJECTION, SOLUTION INTRAMUSCULAR; INTRAVENOUS
Status: DISCONTINUED | OUTPATIENT
Start: 2019-03-29 | End: 2019-03-29

## 2019-03-29 RX ORDER — SODIUM BICARBONATE 42 MG/ML
10 INJECTION, SOLUTION INTRAVENOUS
Status: COMPLETED | OUTPATIENT
Start: 2019-03-29 | End: 2019-03-29

## 2019-03-29 RX ORDER — LIDOCAINE HYDROCHLORIDE 10 MG/ML
INJECTION, SOLUTION EPIDURAL; INFILTRATION; INTRACAUDAL; PERINEURAL
Status: DISCONTINUED
Start: 2019-03-29 | End: 2019-03-29 | Stop reason: WASHOUT

## 2019-03-29 RX ORDER — LANOLIN ALCOHOL/MO/W.PET/CERES
500 CREAM (GRAM) TOPICAL DAILY
Status: DISCONTINUED | OUTPATIENT
Start: 2019-03-30 | End: 2019-04-12 | Stop reason: HOSPADM

## 2019-03-29 RX ORDER — SODIUM CHLORIDE 9 MG/ML
500 INJECTION, SOLUTION INTRAVENOUS CONTINUOUS
Status: DISCONTINUED | OUTPATIENT
Start: 2019-03-29 | End: 2019-03-29

## 2019-03-29 RX ORDER — SODIUM CHLORIDE 0.9 % (FLUSH) 0.9 %
10 SYRINGE (ML) INJECTION AS NEEDED
Status: DISCONTINUED | OUTPATIENT
Start: 2019-03-29 | End: 2019-03-29

## 2019-03-29 RX ORDER — CALCIUM CARBONATE 10GR (648 MG) 648 MG/1
520 TABLET ORAL DAILY
Status: DISCONTINUED | OUTPATIENT
Start: 2019-03-30 | End: 2019-04-12 | Stop reason: HOSPADM

## 2019-03-29 RX ORDER — MELATONIN
3000 DAILY
Status: DISCONTINUED | OUTPATIENT
Start: 2019-03-30 | End: 2019-04-12 | Stop reason: HOSPADM

## 2019-03-29 RX ORDER — FENTANYL CITRATE 50 UG/ML
200 INJECTION, SOLUTION INTRAMUSCULAR; INTRAVENOUS
Status: DISCONTINUED | OUTPATIENT
Start: 2019-03-29 | End: 2019-03-29

## 2019-03-29 RX ADMIN — FENTANYL CITRATE 25 MCG: 50 INJECTION, SOLUTION INTRAMUSCULAR; INTRAVENOUS at 09:31

## 2019-03-29 RX ADMIN — SODIUM CHLORIDE 500 ML: 900 INJECTION, SOLUTION INTRAVENOUS at 09:00

## 2019-03-29 RX ADMIN — Medication 10 ML: at 23:29

## 2019-03-29 RX ADMIN — Medication 10 ML: at 14:00

## 2019-03-29 RX ADMIN — FENTANYL CITRATE 50 MCG: 50 INJECTION, SOLUTION INTRAMUSCULAR; INTRAVENOUS at 09:22

## 2019-03-29 RX ADMIN — SODIUM CHLORIDE 125 ML/HR: 900 INJECTION, SOLUTION INTRAVENOUS at 23:30

## 2019-03-29 RX ADMIN — MIDAZOLAM HYDROCHLORIDE 1 MG: 1 INJECTION, SOLUTION INTRAMUSCULAR; INTRAVENOUS at 08:54

## 2019-03-29 RX ADMIN — Medication 10 ML: at 08:58

## 2019-03-29 RX ADMIN — SODIUM BICARBONATE 210 MG: 42 INJECTION, SOLUTION INTRAVENOUS at 09:28

## 2019-03-29 RX ADMIN — ACETAMINOPHEN 650 MG: 325 TABLET ORAL at 20:29

## 2019-03-29 RX ADMIN — PANTOPRAZOLE SODIUM 40 MG: 40 TABLET, DELAYED RELEASE ORAL at 11:57

## 2019-03-29 RX ADMIN — SODIUM CHLORIDE 125 ML/HR: 900 INJECTION, SOLUTION INTRAVENOUS at 17:08

## 2019-03-29 RX ADMIN — MIDAZOLAM HYDROCHLORIDE 1.5 MG: 1 INJECTION, SOLUTION INTRAMUSCULAR; INTRAVENOUS at 09:23

## 2019-03-29 RX ADMIN — LOSARTAN POTASSIUM 25 MG: 25 TABLET, FILM COATED ORAL at 11:57

## 2019-03-29 RX ADMIN — LORAZEPAM 1 MG: 1 TABLET ORAL at 21:18

## 2019-03-29 RX ADMIN — ACETAMINOPHEN 650 MG: 325 TABLET ORAL at 13:19

## 2019-03-29 RX ADMIN — SODIUM CHLORIDE 125 ML/HR: 900 INJECTION, SOLUTION INTRAVENOUS at 23:29

## 2019-03-29 RX ADMIN — FENTANYL CITRATE 25 MCG: 50 INJECTION, SOLUTION INTRAMUSCULAR; INTRAVENOUS at 09:35

## 2019-03-29 RX ADMIN — PAROXETINE HYDROCHLORIDE 40 MG: 20 TABLET, FILM COATED ORAL at 11:57

## 2019-03-29 RX ADMIN — MIDAZOLAM HYDROCHLORIDE 0.5 MG: 1 INJECTION, SOLUTION INTRAMUSCULAR; INTRAVENOUS at 09:36

## 2019-03-29 NOTE — PROGRESS NOTES
S/P CT Bx On exam she is resting on the bed. No real complaints except for being \"a little sore\" in the biopsy area. Agree with letting her go home tomorrow, as the path won't be back for several days. F/U with Dr Lyly Sherman and he will let us know if and when we are needed.

## 2019-03-29 NOTE — PROGRESS NOTES
Bedside and Verbal shift change report given to Augusto Seaman RN (oncoming nurse) by Brissa Garcia RN (offgoing nurse). Report included the following information SBAR, Kardex, ED Summary, Intake/Output, MAR and Recent Results.

## 2019-03-29 NOTE — PROGRESS NOTES
Heme/ONC Stopped by to see pt and she was in biopsy. If d/c'd today, we will follow up in office. Otherwise will see pt tmw.
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
## 2019-03-29 NOTE — PROGRESS NOTES
3/29/19; 10:30 -  
CM participated in IDRs on patient. Patient should be on bariatric diet. Patient's spouse is at bedside and is supportive. Plan for today includes: CT guided biopsy and oncology consult. CRM: Angela Lynn, MPH, [address] [place] [address]; Z: [phone]

## 2019-03-29 NOTE — PROGRESS NOTES
Bedside shift change report given to Darby Light RN (oncoming nurse) by David Cormier RN (offgoing nurse). Report included the following information SBAR, Kardex, ED Summary, MAR, Accordion and Recent Results.

## 2019-03-29 NOTE — PROGRESS NOTES
· Surgery Progress Note 3/29/2019 Admit Date: 3/27/2019 Subjective:  
 
  Pt has complaint of poor sleep, feeling a bit anxious, no acute issues overnight, . Pt denies pain at present   No SOB but says her breathing \"feels heavy \" at times . No CP. Pt is ambulating. Patient 's current diet is Regular, limited appetite . Loletta Nulaura Pt reports  no nausea and no vomiting. Pt reports no fever or chills Objective:  
 
Blood pressure (!) 139/97, pulse 88, temperature 98.9 °F (37.2 °C), resp. rate 18, last menstrual period 06/10/2013, SpO2 97 %. No intake/output data recorded. 03/27 1901 - 03/29 0700 In: 7722 [P.O.:200; I.V.:1225] Out: - General appearance: alert, cooperative, no distress, appears stated age Lungs: clear anterior Heart: regular rate and rhythm Abd:soft, nondistended, nontender, without guarding, without rebound Extremities: no edema Neurologic: Grossly normal 
 
Data Review Recent Results (from the past 12 hour(s)) PROTHROMBIN TIME + INR Collection Time: 03/29/19  2:10 AM  
Result Value Ref Range INR 1.2 (H) 0.9 - 1.1 Prothrombin time 12.0 (H) 9.0 - 11.1 sec Assessment:  
 
Principal Problem: 
  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 
  Pericardial effusion (3/27/2019) Pleural effusion, left (3/27/2019) Near syncope (3/27/2019) 
 
s/p lap gastric bypass 10/2018 Plan:  
Diet-- resume bariatric  diet after todays procedure GI Prophylaxis OOB/ ambulate Thoracic and oncology following For perc bx today in IR to guide treatment plan Further plan per Dr. Lauren Hsu PA-C

## 2019-03-30 ENCOUNTER — APPOINTMENT (OUTPATIENT)
Dept: GENERAL RADIOLOGY | Age: 49
DRG: 371 | End: 2019-03-30
Attending: INTERNAL MEDICINE
Payer: COMMERCIAL

## 2019-03-30 ENCOUNTER — APPOINTMENT (OUTPATIENT)
Dept: NON INVASIVE DIAGNOSTICS | Age: 49
DRG: 371 | End: 2019-03-30
Attending: INTERNAL MEDICINE
Payer: COMMERCIAL

## 2019-03-30 LAB
ANION GAP SERPL CALC-SCNC: 4 MMOL/L (ref 5–15)
APPEARANCE FLD: ABNORMAL
ATRIAL RATE: 85 BPM
BUN SERPL-MCNC: 7 MG/DL (ref 6–20)
BUN/CREAT SERPL: 13 (ref 12–20)
CALCIUM SERPL-MCNC: 8.6 MG/DL (ref 8.5–10.1)
CALCULATED P AXIS, ECG09: 4 DEGREES
CALCULATED R AXIS, ECG10: 28 DEGREES
CALCULATED T AXIS, ECG11: 20 DEGREES
CHLORIDE SERPL-SCNC: 108 MMOL/L (ref 97–108)
CO2 SERPL-SCNC: 25 MMOL/L (ref 21–32)
COLOR FLD: ABNORMAL
CREAT SERPL-MCNC: 0.56 MG/DL (ref 0.55–1.02)
DIAGNOSIS, 93000: NORMAL
EOSINOPHIL NFR FLD MANUAL: 2 %
GLUCOSE FLD-MCNC: 66 MG/DL
GLUCOSE SERPL-MCNC: 91 MG/DL (ref 65–100)
LYMPHOCYTES NFR FLD: 34 %
MONOS+MACROS NFR FLD: 6 %
NEUTROPHILS NFR FLD: 58 %
NUC CELL # FLD: 2485 /CU MM
P-R INTERVAL, ECG05: 122 MS
POTASSIUM SERPL-SCNC: 3.9 MMOL/L (ref 3.5–5.1)
PROT FLD-MCNC: 5.4 G/DL
Q-T INTERVAL, ECG07: 384 MS
QRS DURATION, ECG06: 90 MS
QTC CALCULATION (BEZET), ECG08: 456 MS
RBC # FLD: >100 /CU MM
SODIUM SERPL-SCNC: 137 MMOL/L (ref 136–145)
SPECIMEN SOURCE FLD: ABNORMAL
SPECIMEN SOURCE FLD: NORMAL
SPECIMEN SOURCE FLD: NORMAL
VENTRICULAR RATE, ECG03: 85 BPM

## 2019-03-30 PROCEDURE — 87116 MYCOBACTERIA CULTURE: CPT

## 2019-03-30 PROCEDURE — 74011250636 HC RX REV CODE- 250/636: Performed by: FAMILY MEDICINE

## 2019-03-30 PROCEDURE — 93005 ELECTROCARDIOGRAM TRACING: CPT

## 2019-03-30 PROCEDURE — 82945 GLUCOSE OTHER FLUID: CPT

## 2019-03-30 PROCEDURE — 71045 X-RAY EXAM CHEST 1 VIEW: CPT

## 2019-03-30 PROCEDURE — 93308 TTE F-UP OR LMTD: CPT

## 2019-03-30 PROCEDURE — 74011250636 HC RX REV CODE- 250/636

## 2019-03-30 PROCEDURE — 84157 ASSAY OF PROTEIN OTHER: CPT

## 2019-03-30 PROCEDURE — 33010 HC PERICARDIOCENTESIS INITIAL: CPT | Performed by: INTERNAL MEDICINE

## 2019-03-30 PROCEDURE — 99152 MOD SED SAME PHYS/QHP 5/>YRS: CPT | Performed by: INTERNAL MEDICINE

## 2019-03-30 PROCEDURE — 99153 MOD SED SAME PHYS/QHP EA: CPT | Performed by: INTERNAL MEDICINE

## 2019-03-30 PROCEDURE — 80048 BASIC METABOLIC PNL TOTAL CA: CPT

## 2019-03-30 PROCEDURE — 74011000258 HC RX REV CODE- 258: Performed by: INTERNAL MEDICINE

## 2019-03-30 PROCEDURE — 0W9D30Z DRAINAGE OF PERICARDIAL CAVITY WITH DRAINAGE DEVICE, PERCUTANEOUS APPROACH: ICD-10-PCS | Performed by: INTERNAL MEDICINE

## 2019-03-30 PROCEDURE — 89050 BODY FLUID CELL COUNT: CPT

## 2019-03-30 PROCEDURE — 74011000250 HC RX REV CODE- 250: Performed by: INTERNAL MEDICINE

## 2019-03-30 PROCEDURE — 77030038269 HC DRN EXT URIN PURWCK BARD -A

## 2019-03-30 PROCEDURE — 36415 COLL VENOUS BLD VENIPUNCTURE: CPT

## 2019-03-30 PROCEDURE — 74011250637 HC RX REV CODE- 250/637: Performed by: INTERNAL MEDICINE

## 2019-03-30 PROCEDURE — 74011250636 HC RX REV CODE- 250/636: Performed by: INTERNAL MEDICINE

## 2019-03-30 PROCEDURE — 74011250637 HC RX REV CODE- 250/637: Performed by: FAMILY MEDICINE

## 2019-03-30 PROCEDURE — 65620000000 HC RM CCU GENERAL

## 2019-03-30 PROCEDURE — 87205 SMEAR GRAM STAIN: CPT

## 2019-03-30 PROCEDURE — 77030013785 HC KT PERICARDCENT BSC -C: Performed by: INTERNAL MEDICINE

## 2019-03-30 RX ORDER — MIDAZOLAM HYDROCHLORIDE 1 MG/ML
INJECTION, SOLUTION INTRAMUSCULAR; INTRAVENOUS AS NEEDED
Status: DISCONTINUED | OUTPATIENT
Start: 2019-03-30 | End: 2019-03-30 | Stop reason: HOSPADM

## 2019-03-30 RX ORDER — FENTANYL CITRATE 50 UG/ML
INJECTION, SOLUTION INTRAMUSCULAR; INTRAVENOUS AS NEEDED
Status: DISCONTINUED | OUTPATIENT
Start: 2019-03-30 | End: 2019-03-30 | Stop reason: HOSPADM

## 2019-03-30 RX ORDER — HEPARIN SODIUM 200 [USP'U]/100ML
INJECTION, SOLUTION INTRAVENOUS
Status: COMPLETED | OUTPATIENT
Start: 2019-03-30 | End: 2019-03-30

## 2019-03-30 RX ORDER — LIDOCAINE HYDROCHLORIDE 10 MG/ML
INJECTION INFILTRATION; PERINEURAL AS NEEDED
Status: DISCONTINUED | OUTPATIENT
Start: 2019-03-30 | End: 2019-03-30 | Stop reason: HOSPADM

## 2019-03-30 RX ORDER — DILTIAZEM HYDROCHLORIDE 5 MG/ML
10 INJECTION INTRAVENOUS ONCE
Status: COMPLETED | OUTPATIENT
Start: 2019-03-30 | End: 2019-03-30

## 2019-03-30 RX ADMIN — ACETAMINOPHEN 650 MG: 325 TABLET ORAL at 21:11

## 2019-03-30 RX ADMIN — SODIUM CHLORIDE 125 ML/HR: 900 INJECTION, SOLUTION INTRAVENOUS at 06:09

## 2019-03-30 RX ADMIN — LORAZEPAM 1 MG: 1 TABLET ORAL at 07:53

## 2019-03-30 RX ADMIN — DILTIAZEM HYDROCHLORIDE 10 MG: 5 INJECTION INTRAVENOUS at 15:33

## 2019-03-30 RX ADMIN — LOSARTAN POTASSIUM 25 MG: 25 TABLET, FILM COATED ORAL at 08:26

## 2019-03-30 RX ADMIN — DILTIAZEM HYDROCHLORIDE 5 MG/HR: 5 INJECTION INTRAVENOUS at 16:21

## 2019-03-30 RX ADMIN — Medication 10 ML: at 06:09

## 2019-03-30 RX ADMIN — Medication 10 ML: at 15:20

## 2019-03-30 RX ADMIN — LORAZEPAM 1 MG: 1 TABLET ORAL at 21:11

## 2019-03-30 NOTE — PROGRESS NOTES
3/30/2019 Admit Date: 3/27/2019 Admit Diagnosis: Neoplasm of diaphragm [D49.2] Pericardial effusion [I31.3] Pleural effusion, left [J90] Near syncope [R55] Principal Problem: 
  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 
  Pericardial effusion (3/27/2019) Pleural effusion, left (3/27/2019) Near syncope (3/27/2019) Assessment/Plan: 
Large pericardial effusion. No tamponade but patient is very dyspneic with any activity Diaphragmatic mass: bx results pending Plan: I spoke with Dr Silvino Hanson yesterday evening. He would prefer that the patient have pericardiocentesis and avoid a thoracotomy at this time. I spoke with the patient and her  and recommended that we proceed with pericardiocentesis today with flouro and US guidance. The risks were reviewed with the patient and her  and they agree to proceed Subjective: 
Feels ok while stationary Santiago Journey admits to chest pressure/discomfort, dyspnea. Objective:  
 
Visit Vitals BP (!) 142/110 (BP 1 Location: Left arm, BP Patient Position: At rest) Pulse 87 Temp 98.6 °F (37 °C) Resp 18 Ht 5' 7\" (1.702 m) Wt 115 kg (253 lb 8.5 oz) LMP 06/10/2013 SpO2 95% BMI 39.71 kg/m² Physical Exam: 
Neck: supple, symmetrical, trachea midline, no adenopathy, thyroid: not enlarged, symmetric, no tenderness/mass/nodules, no carotid bruit and no JVD Heart: regular rate and rhythm, S1, S2 normal 
Lungs: clear to auscultation bilaterally, normal percussion bilaterally Abdomen: soft, non-tender. Bowel sounds normal. No masses,  no organomegaly Extremities: extremities normal, atraumatic, no cyanosis or edema Pulses: 2+ and symmetric Neurologic: Grossly normal 
 
 
Labs:   
Recent Labs  
  03/27/19 
1152 TROIQ <0.05 Recent Labs  
  03/30/19 
1147   
K 3.9  BUN 7  
CREA 0.56 GLU 91  
CA 8.6 Recent Labs  
  03/28/19 
3097 WBC 10.9 HGB 8.8* HCT 28.7*  
  No results for input(s): CHOL, LDLC in the last 72 hours. No lab exists for component: TGL, HDLC,  HBA1C Telemetry: normal sinus rhythm Data Review: current meds, labs,recent radiology, intake/output/weight and problem list reviewed

## 2019-03-30 NOTE — PROGRESS NOTES
0730 Bedside shift change report given to LÁZARO Mccoy (oncoming nurse) by ISADORA Talbert (offgoing nurse). Report included the following information SBAR, Procedure Summary, Intake/Output, MAR and Recent Results.

## 2019-03-30 NOTE — PROGRESS NOTES
[facility] [place] [address] [address], [address]: [phone]  F: [phone] for Visit:  
Serene Lennox is a 46 y.o. female who is seen in consultation at the request of Dr. Jordon Rincon for evaluation of Left Diaphragm Mass found on CT A/P. Treatment History: · CT Abd/Pelv 3/27/19: 10.5 x 9.4 x 5.1 cm enhancing mass centered at the left diaphragm abuts the inferior pericardium and extends into segment 2 of the liver, moderate pericardial effusion, small left pleural effusion, and nonspecific partially calcified right adrenal 1.8 cm nodule · CT Chest 3/28/19: Large complex mass centered on the left hemidiaphragm, with likely invasion of the inferior left pericardium with associated pericardial effusion and dome of the left liver. Enlarged left internal mammmary artery node. History of Present Illness:  
Serene Lennox is a 46 y.o. female with a past medical history of Anemia, Anxiety/Depression, Arthritis, HTN, and Morbid Obesity s/p Gastric Bypass 10/18, who presented to the ER yesterday with fatigue and generalized weakness for the past 2 weeks. She also reported intermittent epigastric abdominal pain. She reports that she felt like she was going to [employer]" at work. CT Abd/Pelv showed a 10.5 x 9.4 x 5.1 cm enhancing mass centered at the left diaphragm abuts the inferior pericardium and extends into segment 2 of the liver, moderate pericardial effusion, small left pleural effusion, and nonspecific partially calcified right adrenal 1.8 cm nodule. Patient had CT Chest today which again showed a large complex mass centered on the left hemidiaphragm, with likely invasion of the inferior left pericardium with associated pericardial effusion and dome of the left liver and an enlarged left internal mammmary artery node. General Surgery and Thoracic surgery have been consulted. Plans for CT guided biopsy tomorrow.  Oncology was consulted for further evaluation/management of mass. Patient resting in bed upon visit. Denies personal or family history of cancer. Denies current complaints. Anxious about what mass might be.  and daughter at bedside. She works at [employer]. Interim Hx:  Pt seen today for f/u of mass with path pending. Pt now has pericardial effusion and needs a drain. Pt is anxious about procedure.  at bedside. Pt is comfortable. Past Medical History:  
Diagnosis Date  Anemia  Anxiety  Arthritis LEFT KNEE  
 Current smoker  Depression  Hypertension  Menorrhagia  Morbid obesity ([address]  Nicotine vapor product user SMALL AMOUNT OF NICOTINE 0.3  Snoring Past Surgical History:  
Procedure Laterality Date  BIOPSY    
 polyp, 1/2 cm - negative, per patient [facility]  [address]  
 HX CHOLECYSTECTOMY    HX GYN    
 PARTIAL HYSTERECTOMY  HX LAP GASTRIC BYPASS  10/15/2018 Lap Gastric Bypass with Endo by Dr. Hakeem Gloria.  OR CYSTOURETHROSCOPY  7/10/2013 CYSTOSCOPY performed by Natalee Cárdenas MD at [facility] <=[address] TUBE/OVARY  7/10/2013 HYSTERECTOMY ROBOTIC ASSISTED performed by Natalee Cárdenas MD at [facility] Social History Tobacco Use  Smoking status: Former Smoker Packs/day: 0.50 Years: 28.00 Pack years: 14.00 Types: Cigarettes Last attempt to quit: 2018 Years since quittin.8  Smokeless tobacco: Never Used Substance Use Topics  Alcohol use: No  
  Comment: rarely Family History Problem Relation Age of Onset  Heart Disease Mother   
     heart bypass  Heart Attack Mother  Diabetes Brother  Stroke Maternal Grandmother  No Known Problems Father  Malignant Hyperthermia Neg Hx  Pseudocholinesterase Deficiency Neg Hx  Delayed Awakening Neg Hx  Post-op Nausea/Vomiting Neg Hx  Emergence Delirium Neg Hx  Post-op Cognitive Dysfunction Neg Hx   
 Other Neg Hx  Anesth Problems Neg Hx Current Facility-Administered Medications Medication Dose Route Frequency  multivitamin, tx-iron-ca-min (THERA-M w/ IRON) tablet 1 Tab  1 Tab Oral DAILY  calcium carbonate tablet 520 mg [elemental]  520 mg Oral DAILY  cholecalciferol (VITAMIN D3) tablet 3,000 Units  3,000 Units Oral DAILY  cyanocobalamin (VITAMIN B12) tablet 500 mcg  500 mcg Oral DAILY  LORazepam (ATIVAN) tablet 1 mg  1 mg Oral TID PRN  
 losartan (COZAAR) tablet 25 mg  25 mg Oral DAILY  PARoxetine (PAXIL) tablet 40 mg  40 mg Oral DAILY  sodium chloride (NS) flush 5-40 mL  5-40 mL IntraVENous Q8H  
 sodium chloride (NS) flush 5-40 mL  5-40 mL IntraVENous PRN  
 ondansetron (ZOFRAN) injection 4 mg  4 mg IntraVENous Q6H PRN  
 acetaminophen (TYLENOL) tablet 650 mg  650 mg Oral Q6H PRN  
 0.9% sodium chloride infusion  125 mL/hr IntraVENous CONTINUOUS  
 pantoprazole (PROTONIX) tablet 40 mg  40 mg Oral DAILY Allergies Allergen Reactions  Amlodipine Other (comments) Fatigue/drowsy  Pcn [Penicillins] Rash Per Patient Childhood Allergy - Unsure of reaction. Mother told her not to take it. Review of Systems: A complete review of systems was obtained, negative except as described above. Physical Exam:  
 
Visit Vitals BP (!) 142/110 (BP 1 Location: Left arm, BP Patient Position: At rest) Pulse 87 Temp 98.6 °F (37 °C) Resp 18 Ht 5' 7\" (1.702 m) Wt 253 lb 8.5 oz (115 kg) LMP 06/10/2013 SpO2 95% BMI 39.71 kg/m² ECOG PS: 0 General: No distress Eyes:  anicteric sclerae HENT: Atraumatic Neck: Supple MS: in bed, moving Skin: Dry and warm Psych: Alert, oriented, appropriate affect, normal judgment/insight Results:  
 
Lab Results Component Value Date/Time  WBC 10.9 03/28/2019 03:14 AM  
 HGB 8.8 (L) 03/28/2019 03:14 AM  
 HCT 28.7 (L) 03/28/2019 03:14 AM  
 PLATELET 386 12/77/7193 03:14 AM  
 MCV 82.7 03/28/2019 03:14 AM  
 ABS. NEUTROPHILS 7.4 03/28/2019 03:14 AM  
 Hemoglobin (POC) 9.6 05/14/2012 10:55 AM  
 Hemoglobin (POC) 11.2 (L) 01/31/2010 01:10 PM  
 Hematocrit (POC) 33 (L) 01/31/2010 01:10 PM  
 
Lab Results Component Value Date/Time Sodium 137 03/30/2019 06:05 AM  
 Potassium 3.9 03/30/2019 06:05 AM  
 Chloride 108 03/30/2019 06:05 AM  
 CO2 25 03/30/2019 06:05 AM  
 Glucose 91 03/30/2019 06:05 AM  
 BUN 7 03/30/2019 06:05 AM  
 Creatinine 0.56 03/30/2019 06:05 AM  
 GFR est AA >60 03/30/2019 06:05 AM  
 GFR est non-AA >60 03/30/2019 06:05 AM  
 Calcium 8.6 03/30/2019 06:05 AM  
 Sodium (POC) 138 01/31/2010 01:10 PM  
 Potassium (POC) 3.8 01/31/2010 01:10 PM  
 Chloride (POC) 104 01/31/2010 01:10 PM  
 Glucose (POC) 97 01/31/2010 01:10 PM  
 Glucose  05/14/2012 10:55 AM  
 BUN (POC) 6 (L) 01/31/2010 01:10 PM  
 Creatinine (POC) 0.6 01/31/2010 01:10 PM  
 Calcium, ionized (POC) 1.11 (L) 01/31/2010 01:10 PM  
 
Lab Results Component Value Date/Time Bilirubin, total 0.8 03/27/2019 11:52 AM  
 ALT (SGPT) 16 03/27/2019 11:52 AM  
 AST (SGOT) 17 03/27/2019 11:52 AM  
 Alk. phosphatase 243 (H) 03/27/2019 11:52 AM  
 Protein, total 8.3 (H) 03/27/2019 11:52 AM  
 Albumin 2.3 (L) 03/27/2019 11:52 AM  
 Globulin 6.0 (H) 03/27/2019 11:52 AM  
 
CT Abd/Pelv 3/27/19 IMPRESSION: 
1. 10.5 x 9.4 x 5.1 cm enhancing mass centered at the left diaphragm abuts the 
inferior pericardium and extends into segment 2 of the liver. Differential 
diagnosis includes sarcoma versus metastatic disease. The lesion is amenable to 
nonemergent CT or ultrasound-guided percutaneous biopsy. 2. Moderate pericardial effusion. Small loculated left pleural effusion. 3. Nonspecific partially calcified right adrenal 1.8 cm nodule. Recommendation: CT chest with IV contrast following at least 12 hours of hydration. CT Chest 3/28/19 1. Again demonstrated is the large complex mass centered on the left 
hemidiaphragm. There is likely invasion of the inferior left pericardium with 
associated pericardial effusion and dome of the left liver. Differential 
diagnosis is unchanged including sarcoma and metastatic disease 2. Enlarged left internal mammary artery node Records reviewed and summarized above. Pathology report(s) reviewed above. Radiology report(s) reviewed above. Assessment/Plan:  
1) Mediastinal/Diaphragmatic/Pericardial Mass 10.5 x 9.4 x 5.1 cm enhancing mass centered at the left diaphragm abuts the inferior pericardium and extends into liver noted on CT A/P 3/27/19. Likely malignant but need biopsy for diagnosis. CT chest pending. General Surgery and Thoracic Surgery are following. Had CT-Guided Biopsy and path pending. Has pericardial effusion and seen by cardio for drain via IR today. Pt clinically stable. Will await pathology to determine further management. 2) Moderate Pericardial Effusion/Left Pleural Effusion Noted on CT C/A/P 
ECHO shows effusion/ needs drain. 3) Morbid Obesity S/p Gastric Bypass surgery 10/18 with Dr. Lauren Medeiros. Mass was not seen at time of surgery per Dr. Lauren Medeiros. EGD at time of surgery was also normal per surgery. Pre-op CXR was also normal. 
 
4) Epigastric Pain On PPI but pain likely from mass 5) Psychosocial 
Anxious about what mass might be. Works at [employer] and likes job. Good family support.  and daughter at bedside. Will follow. Call if questions. I appreciate the opportunity to participate in Ms. Daija Arrieta's care.  
 
Signed By: Jose Choudhary,

## 2019-03-30 NOTE — PROGRESS NOTES
TRANSFER - OUT REPORT: 
 
Verbal report given to Dorie Keyes RN(name) on [employer]  being transferred to [facility](unit) for routine progression of care Report consisted of patients Situation, Background, Assessment and  
Recommendations(SBAR). Information from the following report(s) SBAR, Kardex, Procedure Summary, Intake/Output, MAR and Recent Results was reviewed with the receiving nurse. Lines:  
Peripheral IV 03/28/19 Right Antecubital (Active) Site Assessment Clean, dry, & intact 3/29/2019 11:28 PM  
Phlebitis Assessment 0 3/29/2019 11:28 PM  
Infiltration Assessment 0 3/29/2019 11:28 PM  
Dressing Status Clean, dry, & intact 3/29/2019 11:28 PM  
Dressing Type Transparent 3/29/2019 11:28 PM  
Hub Color/Line Status Blue; Infusing 3/29/2019 11:28 PM  
Action Taken Open ports on tubing capped 3/29/2019  8:25 PM  
Alcohol Cap Used Yes 3/29/2019  8:25 PM  
  
 
Opportunity for questions and clarification was provided. Patient transported with: 
 Registered Nurse

## 2019-03-30 NOTE — PROGRESS NOTES
1500 TRANSFER - IN REPORT: 
Verbal report received from CVSU/Cath lab (name) on Porsche García  being received from Cath lab(unit) for routine post - op Report consisted of patient?s Situation, Background, Assessment and  
Recommendations(SBAR). Information from the following report(s) SBAR, Kardex, Procedure Summary, Intake/Output, MAR, Recent Results and Cardiac Rhythm NSR  was reviewed with the receiving nurse. Opportunity for questions and clarification was provided. Assessment completed upon patient?s arrival to unit and care assumed. Primary Nurse Mike Pedraza RN and Abraham Madrid RN performed a dual skin assessment on this patient No impairment noted Parrish score is 23 Patient resting on [employer] bed. 
 
 
1500 Pt in afib. Dr. Afsaneh Arrieta paged. Orders received 1800 Pt in sinus arrhythmia with frequent pacs. 1930 Bedside shift change report given to Tan (oncoming nurse) by Sara (offgoing nurse). Report included the following information SBAR, Kardex, Procedure Summary, Intake/Output, MAR, Recent Results and Cardiac Rhythm Sinus arrhythmia .

## 2019-03-30 NOTE — PROCEDURES
Cardiac Catheterization Procedure Note   Patient: Sarah Beth Alex  MRN: [id number]  SSN: [id number]   YOB: 1970 Age: 46 y.o.   Sex: female    Date of Procedure: 3/30/2019   Pre-procedure Diagnosis: large pericardial effusion  Post-procedure Diagnosis: virtually resolved pericardial effusion  Procedure: pericardiocenteis  :  Dr. Gabe Brownlee MD    Assistant(s):  None  Anesthesia: Moderate Sedation   Estimated Blood Loss: Less than 10 mL   Specimens Removed: None  Findings: serosanguinous fluid was drained from the pericardium; well tolerated; the drain was left in place   Complications: None   Implants:  None  Signed by:  Gabe Brownlee MD  3/30/2019  2:47 PM

## 2019-03-30 NOTE — PROGRESS NOTES
Hospitalist Progress Note Jacky Isbell MD 
     
                             Answering service: [phone] or [phone] from in house phone Date of Service:  3/30/2019 NAME:  Santiago Yoo :  1970 MRN:  [id number] Admission Summary:  
 
46 y.o. white female with past medical history of anemia, anxiety Disorder, depression, arthritis, hypertension, menorrhagia, morbid obesity, s/p gastric bypass surgery was brought to the ED from work via EMS with chief complaint of generalized weakness, fatigue, feeling like she was going to pass out and diarrhea. Reason for follow up:  
 CC: Generalized weakness, fatigue, and near passing out. Patient appeared very anxious on am rounds. However, was not in any acute distress. Assessment & Plan:  
 
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 S/P CT-guided biopsy done on 3/29/19. Thoracic surgical F/Us noted and appreciated. 2) Resp: Small left pleural effusion without any respiratory failure. As needed oxygen and diuresis. 3) CVS: Near Syncope on admission. Controlled Primary hypertension. 2D ECHO with moderate to large pericardial effusion. Low threshold for Cardiac Tamponade. Pericardiocentesis 3/30/19. Cardiology eval noted and appreciated. 4) Hematology: Probable anemia of chronicity. 5) Electrolytes: Resolved Hypokalemia. 6) Endocrine: Morbid obesity with a BMI greater than 40. H/O Gastric Bypass surgery. Therapeutic lifestyle changes counselling done. 7) JOHANNA: Generalized weakness. PT as tolerated. 8) Psych: Anxiety Disorder. Depression. As needed anxiolytics. 9) Prophylaxis: DVT. 10) Directives: Full Code with a guarded prognosis. D/W patient and family. 11) Plan: Anticipate D/C to home in 2-3 days. Discussed with patient and patient's  Tuan Dumont who can be reached at [phone]. D/W RN. 
[facility] Problems  Date Reviewed: 3/29/2019 Codes Class Noted POA Pericardial effusion ICD-10-CM: I31.3 ICD-9-CM: 423.9  3/27/2019 Unknown Pleural effusion, left ICD-10-CM: J90 ICD-9-CM: 511.9  3/27/2019 Unknown Near syncope ICD-10-CM: R55 
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  
 
 Last 24hrs VS reviewed since prior progress note. Most recent are: 
Visit Vitals BP (!) 142/110 (BP 1 Location: Left arm, BP Patient Position: At rest) Pulse 87 Temp 98.6 °F (37 °C) Resp 18 Ht 5' 7\" (1.702 m) Wt 115 kg (253 lb 8.5 oz) SpO2 95% BMI 39.71 kg/m² Constitutional:  No acute distress, cooperative, pleasant   
HEENT: Head is a traumatic, Un icteric sclera. Pink conjunctiva,no erythema or discharge. Oral mucous moist, oropharynx benign. Neck supple, Resp:  Decreased air entry B/L. CV:  S1,S2 Tachycardic GI:  Soft, obese, non distended, non tender. normoactive bowel sounds, no hepatosplenomegaly :  No CVA or suprapubic tenderness Skin  :  No erythema,rash,bullae,dipigmentation Musculoskeletal:  Bipedal edema. Neurologic:  AAOx3, CN II-XII reviewed. Moves all extremities. Intake/Output Summary (Last 24 hours) at 3/30/2019 1041 Last data filed at 3/30/2019 9211 Gross per 24 hour Intake 4458.75 ml Output 200 ml Net 4258.75 ml Labs:  
 
Recent Labs  
  03/28/19 
0314 03/27/19 
1152 WBC 10.9 13.5* HGB 8.8* 10.1* HCT 28.7* 32.5*  
 383 Recent Labs  
  03/30/19 
0605 03/28/19 
0314 03/27/19 
1152  138 135* K 3.9 3.2* 3.8  105 102 CO2 25 27 26 BUN 7 9 10 CREA 0.56 0.56 0.77 GLU 91 81 123* CA 8.6 8.7 9.2 MG  --   --  2.0 Recent Labs  
  03/27/19 
1152 SGOT 17 ALT 16  
* TBILI 0.8 TP 8.3* ALB 2.3*  
GLOB 6.0*  
LPSE 46* Recent Labs  
  03/29/19 
0210 INR 1.2* PTP 12.0* No results for input(s): FE, TIBC, PSAT, FERR in the last 72 hours. Lab Results Component Value Date/Time Folate 8.0 04/10/2009 06:00 PM  
  
No results for input(s): PH, PCO2, PO2 in the last 72 hours. Recent Labs  
  03/27/19 
1152 TROIQ <0.05 Lab Results Component Value Date/Time Cholesterol, total 211 (H) 10/13/2017 04:23 PM  
 HDL Cholesterol 44 10/13/2017 04:23 PM  
 LDL, calculated 113 (H) 10/13/2017 04:23 PM  
 Triglyceride 268 (H) 10/13/2017 04:23 PM  
 
Lab Results Component Value Date/Time Glucose (POC) 97 01/31/2010 01:10 PM  
 Glucose  05/14/2012 10:55 AM  
 
Lab Results Component Value Date/Time Color 0-3 03/27/2019 12:17 PM  
 Appearance CLOUDY (A) 03/27/2019 12:17 PM  
 Specific gravity 1.026 03/27/2019 12:17 PM  
 pH (UA) 5.5 03/27/2019 12:17 PM  
 Protein 100 (A) 03/27/2019 12:17 PM  
 Glucose NEGATIVE  03/27/2019 12:17 PM  
 Ketone 15 (A) 03/27/2019 12:17 PM  
 Bilirubin NEGATIVE  03/10/2015 02:45 PM  
 Urobilinogen 1.0 03/27/2019 12:17 PM  
 Nitrites NEGATIVE  03/27/2019 12:17 PM  
 Leukocyte Esterase SMALL (A) 03/27/2019 12:17 PM  
 Epithelial cells MANY (A) 03/27/2019 12:17 PM  
 Bacteria 2+ (A) 03/27/2019 12:17 PM  
 WBC 0-4 03/27/2019 12:17 PM  
 RBC 0-5 03/27/2019 12:17 PM  
 
 
 
Medications Reviewed:  
 
Current Facility-Administered Medications Medication Dose Route Frequency  multivitamin, tx-iron-ca-min (THERA-M w/ IRON) tablet 1 Tab  1 Tab Oral DAILY  calcium carbonate tablet 520 mg [elemental]  520 mg Oral DAILY  cholecalciferol (VITAMIN D3) tablet 3,000 Units  3,000 Units Oral DAILY  cyanocobalamin (VITAMIN B12) tablet 500 mcg  500 mcg Oral DAILY  LORazepam (ATIVAN) tablet 1 mg  1 mg Oral TID PRN  
  losartan (COZAAR) tablet 25 mg  25 mg Oral DAILY  PARoxetine (PAXIL) tablet 40 mg  40 mg Oral DAILY  sodium chloride (NS) flush 5-40 mL  5-40 mL IntraVENous Q8H  
 sodium chloride (NS) flush 5-40 mL  5-40 mL IntraVENous PRN  
 ondansetron (ZOFRAN) injection 4 mg  4 mg IntraVENous Q6H PRN  
 acetaminophen (TYLENOL) tablet 650 mg  650 mg Oral Q6H PRN  
 0.9% sodium chloride infusion  125 mL/hr IntraVENous CONTINUOUS  
 pantoprazole (PROTONIX) tablet 40 mg  40 mg Oral DAILY  
 
______________________________________________________________________ EXPECTED LENGTH OF STAY: 3d 16h ACTUAL LENGTH OF STAY:          3 Dustin Flower MD

## 2019-03-30 NOTE — CONSULTS
Cardiology Note dictated #[id number]  Impressions:  1. The patient has a large pericardial effusion with early signs of tamponade (pre tamponade). With activity her HR accelerates to 170's with increased dyspnea (I spoke with the monitor tech who indicates that this was a sinus tachycardia). She does not have hypotension,neck vein distension, pulsus paradoxus,  orthopnea or resting tachycardia. 2. Transient AF and A Flutter noted on telemetry related to the effusion  3. On CT there is a large mass in the left side of the diaphragm which is invading the pericardium and the liver. A liver biopsy was done today, results pending. 4. Status post Ren-en-Y gastric bypass in October 2018. Recommendations:  Would not discharge the patient with early signs of cardiac tamponade  Transfer to a telemetry unit for closer observation. Pericardiocentesis is not indicated at this time and in any event, I am concerned about the tumor invasion into the left hemidiaphragm and the inferior pericarium where a needle would typically insert for pericardial drainage. This situation would probably be better suited for a thoracotomy. I will speak with the thoracic surgeon on call to discuss. Further recommendations to follow.    Thank you for this referral.  Dennis Alexander MD

## 2019-03-30 NOTE — PROGRESS NOTES
2203 TRANSFER - IN REPORT: 
 
Verbal report received from 5W RN (name) on Judi [place]  being received from 5W (unit) for routine progression of care Report consisted of patients Situation, Background, Assessment and  
Recommendations(SBAR). Information from the following report(s) SBAR, Procedure Summary, Intake/Output, MAR and Recent Results was reviewed with the receiving nurse. Opportunity for questions and clarification was provided. Assessment completed upon patients arrival to unit and care assumed.

## 2019-03-30 NOTE — CONSULTS
John Head    Name:  Coni Del Toro  MR#:  [id number]  :  1970  ACCOUNT #:  [de-identified]  DATE OF SERVICE:  2019      REFERRING PHYSICIAN:  Graciela Kincaid MD    REASON FOR CONSULTATION:  Consult was requested when the echo report came back demonstrating a large pericardial effusion. HISTORY OF PRESENT ILLNESS:  The patient came to the hospital with shortness of breath, weakness and fatigue, near syncope, and breathlessness with any or no exertion. She was found to have a large mass involving the diaphragm, left side invading the left portion of the liver and invading the inferior pericardium. The echocardiogram does demonstrate a large pericardial effusion without compression of the right ventricle at this time. The patient has been feeling poorly for about a month. Prior to that, she had been doing well. She had gastric bypass surgery by Dr. Jordon Rincon on 10/2018 and has lost a total of 70 pounds so far. She has had some recent fevers and chills, but nothing consistent. On questioning, she does not have orthopnea or PND, diaphoresis or allyssa syncope or exertional chest discomfort. PAST MEDICAL HISTORY:  Otherwise, significant for:  1. Arthritis. 2.  Depression. 3.  Hypertension. 4.  Morbid obesity. SOCIAL HISTORY:  The patient smoked up until  of last year. ALLERGIES:  TO AMLODIPINE AND PENICILLIN. MEDICATIONS PRIOR TO ADMISSION:  Include,  1. Losartan 25 mg daily. 2.  Lorazepam 1 mg as needed. 3.  Paroxetine 40 mg daily. 4.  Omeprazole 20 mg daily. REVIEW OF SYSTEMS:  Otherwise unremarkable. PHYSICAL EXAMINATION:  GENERAL:  This is a well-developed, very pleasant, moderately obese woman.   Her , her brother, and other family members are visiting with her during the interview and exam.  VITAL SIGNS:  As follows:  Temperature is 99.6, respirations normal, sats 95%, blood pressure is 149/82, and heart rate 91 beats per minute. Of interest, the patient got up to get some exercise walking and on the monitor, her heart rate accelerated to 170s, sinus tachycardia. She has also been noted to have some brief episodes of atrial fibrillation. Currently, she is in sinus rhythm according to the monitor tech, where she will be monitored. HEENT:  Unremarkable. NECK:  Supple. No adenopathy. No neck vein distention. No hepatojugular reflux. No Kussmaul sign. LUNGS:  Chest wall is unremarkable to inspection. Decreased breath sounds diffusely, but clear. HEART:  Regular, moderate rhythm, occasional to frequent extra systoles. No S3, gallop, or friction rub. No thrills, lifts, or heaves. ABDOMEN:  Obese and nontender. No bruits. No ascites. No palpable masses. NEUROLOGIC:  The patient is awake, alert, and appropriate. No focal motor signs detected. Normal speech. Normal gait. EXTREMITIES:  No edema. Normal pedal pulses. LABORATORY DATA:  Her EKG demonstrates sinus rhythm, premature atrial contractions, normal voltage, and nondiagnostic ST-T changes. The CT scan result is mentioned in the body of this consult. The echocardiogram demonstrates preserved LV function, preserved RV function, and normal atria. No valvular disease and an enlarged pericardial effusion. Again, there is no compression of the right ventricle or right atrium end diastole. IMPRESSION:  1. This patient has a large pericardial effusion, early signs of tamponade [place], pre-tamponade  including the heart rate, which accelerates to 170s with any activity. She does not have hypotension, pulsus paradoxus or  tachycardia. No other signs of pericardial tamponade. 2.  The patient has a mass involving the left diaphragm with the extension into the left lobe of the liver and the inferior pericardium. 3.  Status post gastric bypass surgery in 10/2018 with a total of 70 pound weight loss so far. RECOMMENDATIONS:  1.   We will transfer the patient to telemetry for closer observation. 2.  We would not discharge the patient since she has signs of early tamponade. 3.  Pericardiocentesis is not indicated at this time since she does not have tamponade or will never compromise in any event and I am concerned that the tumor involving the inferior pericardium in the diaphragm would interfere with the needle insertion for pericardial drainage. This situation might probably would be better served with open thoracotomy. I will speak to the thoracic surgeon on-call tonight. Further recommendations will follow. Thank you for this referral and report. This will be discussed with the patient and her family.       Pierre Randle MD      [id number]  D:  03/29/2019 20:45  T:  03/30/2019 1:09  JOB #:  [id number]  CC:

## 2019-03-30 NOTE — PROGRESS NOTES
0820 - in to assess patient, Dr. Dipesh Hook at the bedside explaining pericardiocentesis, patient reassured regarding procedure, will continue to monitor. 1337 - patient transported to cath lab. 1435 - report called to CCU.

## 2019-03-30 NOTE — PROGRESS NOTES
[facility] [address] 
____________________________________________________________ Admit Date: 3/27/2019 POD/HD * No surgery found * Procedure:  * No surgery found * Subjective:  
 
Patient has complaints of shortness of breath with ambulation . Objective:  
 
Blood pressure (!) 142/110, pulse 87, temperature 98.6 °F (37 °C), resp. rate 18, height 5' 7\" (1.702 m), weight 115 kg (253 lb 8.5 oz), last menstrual period 06/10/2013, SpO2 95 %. Temp (24hrs), Av °F (37.2 °C), Min:98.5 °F (36.9 °C), Max:99.6 °F (37.6 °C) Date 19 07 - 19 9625 Shift 2221-4276 9685-1069 2227-7848 24 Hour Total  
INTAKE  
P.O. 0   0 Shift Total(mL/kg) 0(0)   0(0) OUTPUT Urine(mL/kg/hr) 200   200 Shift Total(mL/kg) 200(1.7)   200(1.7) Weight (kg) 115 115 115 115 Date 19 07 - 19 1392 Shift 3326-2455 6172-5647 7771-5861 24 Hour Total  
INTAKE  
P.O. 0   0 Shift Total(mL/kg) 0(0)   0(0) OUTPUT Urine(mL/kg/hr) 200   200 Shift Total(mL/kg) 200(1.7)   200(1.7) Weight (kg) 115 115 115 115 Physical Exam:  GENERAL: alert, cooperative, no distress, appears stated age, LUNG: clear to auscultation bilaterally, HEART: regular rate and rhythm, S1, S2 normal, no murmur, click, rub or gallop Labs:  
Recent Results (from the past 24 hour(s)) ECHO ADULT COMPLETE Collection Time: 19  4:57 PM  
Result Value Ref Range LV E' Lateral Velocity 4.55 cm/s LV E' Septal Velocity 7.02 cm/s Aortic Valve Systolic Peak Velocity 338.80 cm/s Aortic Valve Area by Continuity of Peak Velocity 2.1 cm2 AoV PG 8.4 mmHg LVIDd 4.52 3.9 - 5.3 cm  
 LVPWd 1.06 (A) 0.6 - 0.9 cm LVIDs 2.83 cm IVSd 0.94 (A) 0.6 - 0.9 cm  
 LVOT d 2.05 cm  
 LVOT Peak Velocity 92.32 cm/s LVOT Peak Gradient 3.4 mmHg MVA (PHT) 5.7 cm2  
 MV A Celestine 68.81 cm/s  
 MV E Celestine 74.11 cm/s  
 MV E/A 1.08   
 RVIDd 2.87 cm Right Ventricular Outflow Track Peak Velocity 81.39 cm/s  
 LV Mass .6 (A) 67 - 162 g  
 E/E' lateral 16.29   
 E/E' septal 10.56   
 RVSP 44.7 mmHg E/E' ratio (averaged) 13.42 Est. RA Pressure 15.0 mmHg Mitral Valve E Wave Deceleration Time 132.2 ms Mitral Valve Pressure Half-time 38.3 ms Left Atrium Major Axis 4.56 cm Triscuspid Valve Regurgitation Peak Gradient 29.7 mmHg  
 TR Max Velocity 272.35 cm/s PASP 44.7 mmHg EKG, 12 LEAD, INITIAL Collection Time: 03/29/19 10:32 PM  
Result Value Ref Range Ventricular Rate 85 BPM  
 Atrial Rate 85 BPM  
 P-R Interval 122 ms QRS Duration 90 ms Q-T Interval 384 ms QTC Calculation (Bezet) 456 ms Calculated P Axis 4 degrees Calculated R Axis 28 degrees Calculated T Axis 20 degrees Diagnosis Normal sinus rhythm Nonspecific T wave abnormality When compared with ECG of 27-MAR-2019 11:28, No significant change was found METABOLIC PANEL, BASIC Collection Time: 03/30/19  6:05 AM  
Result Value Ref Range Sodium 137 136 - 145 mmol/L Potassium 3.9 3.5 - 5.1 mmol/L Chloride 108 97 - 108 mmol/L  
 CO2 25 21 - 32 mmol/L Anion gap 4 (L) 5 - 15 mmol/L Glucose 91 65 - 100 mg/dL BUN 7 6 - 20 MG/DL Creatinine 0.56 0.55 - 1.02 MG/DL  
 BUN/Creatinine ratio 13 12 - 20 GFR est AA >60 >60 ml/min/1.73m2 GFR est non-AA >60 >60 ml/min/1.73m2 Calcium 8.6 8.5 - 10.1 MG/DL Data Review images and reports reviewed Assessment:  
 
Principal Problem: 
  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 
  Pericardial effusion (3/27/2019) Pleural effusion, left (3/27/2019) Near syncope (3/27/2019) Plan/Recommendations/Medical Decision Making:  
 
Appreciate Dr. Shiloh Aleman help with pericardial tap today Will continue to follow Awaiting path report Thank you for allowing us to participate in the care of your patient.  
 
Isha Pimentel MD

## 2019-03-31 ENCOUNTER — APPOINTMENT (OUTPATIENT)
Dept: NON INVASIVE DIAGNOSTICS | Age: 49
DRG: 371 | End: 2019-03-31
Attending: INTERNAL MEDICINE
Payer: COMMERCIAL

## 2019-03-31 LAB
ANION GAP SERPL CALC-SCNC: 4 MMOL/L (ref 5–15)
ATRIAL RATE: 166 BPM
BUN SERPL-MCNC: 6 MG/DL (ref 6–20)
BUN/CREAT SERPL: 12 (ref 12–20)
CALCIUM SERPL-MCNC: 8.6 MG/DL (ref 8.5–10.1)
CALCULATED R AXIS, ECG10: 26 DEGREES
CALCULATED T AXIS, ECG11: -81 DEGREES
CHLORIDE SERPL-SCNC: 107 MMOL/L (ref 97–108)
CO2 SERPL-SCNC: 27 MMOL/L (ref 21–32)
CREAT SERPL-MCNC: 0.51 MG/DL (ref 0.55–1.02)
DIAGNOSIS, 93000: NORMAL
ECHO AO ROOT DIAM: 2.65 CM
ECHO LA MAJOR AXIS: 3.07 CM
ECHO LA TO AORTIC ROOT RATIO: 1.16
ECHO LV E' SEPTAL VELOCITY: 8.45 CM/S
ECHO LV INTERNAL DIMENSION DIASTOLIC: 2.71 CM (ref 3.9–5.3)
ECHO LV INTERNAL DIMENSION SYSTOLIC: 2.57 CM
ECHO LV IVSD: 1.06 CM (ref 0.6–0.9)
ECHO LV MASS 2D: 256.5 G (ref 67–162)
ECHO LV MASS INDEX 2D: 115.9 G/M2 (ref 43–95)
ECHO LV POSTERIOR WALL DIASTOLIC: 2.77 CM (ref 0.6–0.9)
ECHO MV A VELOCITY: 55.35 CM/S
ECHO MV AREA PHT: 5.1 CM2
ECHO MV E DECELERATION TIME (DT): 149.3 MS
ECHO MV E VELOCITY: 103.63 CM/S
ECHO MV E/A RATIO: 1.87
ECHO MV E/E' SEPTAL: 12.26
ECHO MV PRESSURE HALF TIME (PHT): 43.3 MS
ECHO TV REGURGITANT MAX VELOCITY: 252.24 CM/S
ECHO TV REGURGITANT PEAK GRADIENT: 25.4 MMHG
ERYTHROCYTE [DISTWIDTH] IN BLOOD BY AUTOMATED COUNT: 14.3 % (ref 11.5–14.5)
GLUCOSE SERPL-MCNC: 88 MG/DL (ref 65–100)
HCT VFR BLD AUTO: 31.2 % (ref 35–47)
HGB BLD-MCNC: 9.4 G/DL (ref 11.5–16)
MCH RBC QN AUTO: 25.1 PG (ref 26–34)
MCHC RBC AUTO-ENTMCNC: 30.1 G/DL (ref 30–36.5)
MCV RBC AUTO: 83.2 FL (ref 80–99)
NRBC # BLD: 0 K/UL (ref 0–0.01)
NRBC BLD-RTO: 0 PER 100 WBC
PLATELET # BLD AUTO: 379 K/UL (ref 150–400)
PMV BLD AUTO: 10.1 FL (ref 8.9–12.9)
POTASSIUM SERPL-SCNC: 3.8 MMOL/L (ref 3.5–5.1)
Q-T INTERVAL, ECG07: 304 MS
QRS DURATION, ECG06: 84 MS
QTC CALCULATION (BEZET), ECG08: 448 MS
RBC # BLD AUTO: 3.75 M/UL (ref 3.8–5.2)
SODIUM SERPL-SCNC: 138 MMOL/L (ref 136–145)
VENTRICULAR RATE, ECG03: 131 BPM
WBC # BLD AUTO: 12.4 K/UL (ref 3.6–11)

## 2019-03-31 PROCEDURE — 65660000000 HC RM CCU STEPDOWN

## 2019-03-31 PROCEDURE — 36415 COLL VENOUS BLD VENIPUNCTURE: CPT

## 2019-03-31 PROCEDURE — 74011250637 HC RX REV CODE- 250/637: Performed by: INTERNAL MEDICINE

## 2019-03-31 PROCEDURE — 80048 BASIC METABOLIC PNL TOTAL CA: CPT

## 2019-03-31 PROCEDURE — 93306 TTE W/DOPPLER COMPLETE: CPT

## 2019-03-31 PROCEDURE — 74011250636 HC RX REV CODE- 250/636: Performed by: INTERNAL MEDICINE

## 2019-03-31 PROCEDURE — 74011250637 HC RX REV CODE- 250/637: Performed by: FAMILY MEDICINE

## 2019-03-31 PROCEDURE — 74011250637 HC RX REV CODE- 250/637: Performed by: NURSE PRACTITIONER

## 2019-03-31 PROCEDURE — 85027 COMPLETE CBC AUTOMATED: CPT

## 2019-03-31 RX ORDER — BACITRACIN 500 UNIT/G
PACKET (EA) TOPICAL
Status: DISPENSED
Start: 2019-03-31 | End: 2019-03-31

## 2019-03-31 RX ORDER — COLCHICINE 0.6 MG/1
0.6 TABLET ORAL DAILY
Status: DISCONTINUED | OUTPATIENT
Start: 2019-03-31 | End: 2019-04-02

## 2019-03-31 RX ORDER — KETOROLAC TROMETHAMINE 30 MG/ML
30 INJECTION, SOLUTION INTRAMUSCULAR; INTRAVENOUS
Status: COMPLETED | OUTPATIENT
Start: 2019-03-31 | End: 2019-03-31

## 2019-03-31 RX ADMIN — ACETAMINOPHEN 650 MG: 325 TABLET ORAL at 05:10

## 2019-03-31 RX ADMIN — ACETAMINOPHEN 650 MG: 325 TABLET ORAL at 09:45

## 2019-03-31 RX ADMIN — Medication 10 ML: at 05:13

## 2019-03-31 RX ADMIN — MULTIPLE VITAMINS W/ MINERALS TAB 1 TABLET: TAB at 09:08

## 2019-03-31 RX ADMIN — LORAZEPAM 1 MG: 1 TABLET ORAL at 09:54

## 2019-03-31 RX ADMIN — ANTACID TABLETS 520 MG: 648 TABLET, CHEWABLE ORAL at 09:08

## 2019-03-31 RX ADMIN — LOSARTAN POTASSIUM 25 MG: 25 TABLET, FILM COATED ORAL at 09:08

## 2019-03-31 RX ADMIN — Medication 5 ML: at 15:08

## 2019-03-31 RX ADMIN — COLCHICINE 0.6 MG: 0.6 TABLET, FILM COATED ORAL at 11:07

## 2019-03-31 RX ADMIN — LORAZEPAM 1 MG: 1 TABLET ORAL at 20:17

## 2019-03-31 RX ADMIN — Medication 500 MCG: at 09:08

## 2019-03-31 RX ADMIN — KETOROLAC TROMETHAMINE 30 MG: 30 INJECTION, SOLUTION INTRAMUSCULAR; INTRAVENOUS at 11:07

## 2019-03-31 RX ADMIN — PAROXETINE HYDROCHLORIDE 40 MG: 20 TABLET, FILM COATED ORAL at 09:08

## 2019-03-31 RX ADMIN — VITAMIN D 3000 UNITS: 25 TAB ORAL at 09:09

## 2019-03-31 RX ADMIN — PANTOPRAZOLE SODIUM 40 MG: 40 TABLET, DELAYED RELEASE ORAL at 09:09

## 2019-03-31 RX ADMIN — ACETAMINOPHEN 650 MG: 325 TABLET ORAL at 20:17

## 2019-03-31 NOTE — PROGRESS NOTES
[facility] [address] 
____________________________________________________________ Admit Date: 3/27/2019 POD/HD 1 Day Post-Op Procedure:  Procedure(s): PERICARDIOCENTESIS Subjective:  
 
Patient has complaints of pain. Objective:  
 
Blood pressure 111/82, pulse 79, temperature 98.4 °F (36.9 °C), resp. rate 18, height 5' 7\" (1.702 m), weight 112.3 kg (247 lb 9.2 oz), last menstrual period 06/10/2013, SpO2 93 %. Temp (24hrs), Av.6 °F (37 °C), Min:98.2 °F (36.8 °C), Max:98.8 °F (37.1 °C) Physical Exam:  GENERAL: alert, cooperative, no distress, appears stated age, LUNG: clear to auscultation bilaterally, HEART: regular rate and rhythm, S1, S2 normal, no murmur, click, rub or gallop Labs:  
Recent Results (from the past 24 hour(s)) CELL COUNT, BODY FLUID Collection Time: 19  2:21 PM  
Result Value Ref Range BODY FLUID TYPE PERICARDIAL FLUID FLUID COLOR RED    
 FLUID APPEARANCE BLOODY FLUID RBC CT. >100 (H) 0 /cu mm FLUID NUCLEATED CELLS 2,485 /cu mm  
 FLD NEUTROPHILS 58 (A) NRRE % FLD LYMPHS 34 (A) NRRE % FLD MONO/MACROPHAGES 6 (A) NRRE % FLD EOSINS 2 (A) NRRE % GLUCOSE, FLUID Collection Time: 19  2:21 PM  
Result Value Ref Range Fluid Type: PERICARDIAL FLUID Glucose, body fld. 66 MG/DL PROTEIN TOTAL, FLUID Collection Time: 19  2:21 PM  
Result Value Ref Range Fluid Type: PERICARDIAL FLUID Protein total, body fld. 5.4 g/dL CULTURE, BODY FLUID W GRAM STAIN Collection Time: 19  2:21 PM  
Result Value Ref Range Special Requests: NO SPECIAL REQUESTS    
 GRAM STAIN 2+ WBCS SEEN    
 GRAM STAIN NO ORGANISMS SEEN Culture result: PENDING   
EKG, 12 LEAD, INITIAL Collection Time: 19  3:02 PM  
Result Value Ref Range Ventricular Rate 131 BPM  
 Atrial Rate 166 BPM  
 QRS Duration 84 ms Q-T Interval 304 ms QTC Calculation (Bezet) 448 ms Calculated R Axis 26 degrees Calculated T Axis -81 degrees Diagnosis Atrial fibrillation with rapid ventricular response Nonspecific ST and T wave abnormality , probably digitalis effect When compared with ECG of 29-MAR-2019 22:32, Atrial fibrillation has replaced Sinus rhythm Vent. rate has increased BY  46 BPM 
Non-specific change in ST segment in Inferior leads Non-specific change in ST segment in Lateral leads Nonspecific T wave abnormality no longer evident in Anterior leads METABOLIC PANEL, BASIC Collection Time: 03/31/19  4:43 AM  
Result Value Ref Range Sodium 138 136 - 145 mmol/L Potassium 3.8 3.5 - 5.1 mmol/L Chloride 107 97 - 108 mmol/L  
 CO2 27 21 - 32 mmol/L Anion gap 4 (L) 5 - 15 mmol/L Glucose 88 65 - 100 mg/dL BUN 6 6 - 20 MG/DL Creatinine 0.51 (L) 0.55 - 1.02 MG/DL  
 BUN/Creatinine ratio 12 12 - 20 GFR est AA >60 >60 ml/min/1.73m2 GFR est non-AA >60 >60 ml/min/1.73m2 Calcium 8.6 8.5 - 10.1 MG/DL  
CBC W/O DIFF Collection Time: 03/31/19  4:43 AM  
Result Value Ref Range WBC 12.4 (H) 3.6 - 11.0 K/uL  
 RBC 3.75 (L) 3.80 - 5.20 M/uL HGB 9.4 (L) 11.5 - 16.0 g/dL HCT 31.2 (L) 35.0 - 47.0 % MCV 83.2 80.0 - 99.0 FL  
 MCH 25.1 (L) 26.0 - 34.0 PG  
 MCHC 30.1 30.0 - 36.5 g/dL  
 RDW 14.3 11.5 - 14.5 % PLATELET 011 076 - 877 K/uL MPV 10.1 8.9 - 12.9 FL  
 NRBC 0.0 0  WBC ABSOLUTE NRBC 0.00 0.00 - 0.01 K/uL Data Review images and reports reviewed drain in place Assessment:  
 
Principal Problem: 
  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 
  Pericardial effusion (3/27/2019) Pleural effusion, left (3/27/2019) Near syncope (3/27/2019) Plan/Recommendations/Medical Decision Making:  
 
Appreciate Dr Malika Leblanc help with pericardial drainage Breathing improved after draining fluid Awaiting cytology Thank you for allowing us to participate in the care of your patient.  
 
Jennifer Abernathy MD

## 2019-03-31 NOTE — PROGRESS NOTES
Feeling better after pericardiocentesis . Breathing better About 700 was drained. Awaiting path. [id number] Stella Garibay for diet Following.

## 2019-03-31 NOTE — PROGRESS NOTES
Pericardial drain removed without incident. Will transfer to telemetry Continue scheduled colchicine OOB/ambulate Home tomorrow from my view

## 2019-03-31 NOTE — PROGRESS NOTES
Hospitalist Progress Note Sg Fox MD 
     
                             Answering service: [phone] OR [phone] from in house phone Date of Service:  3/31/2019 NAME:  Carly Ford :  1970 MRN:  [id number] Admission Summary:  
 
46 y.o. white female with past medical history of anemia, anxiety Disorder, depression, arthritis, hypertension, menorrhagia, morbid obesity, s/p gastric bypass surgery was brought to the ED from work via EMS with chief complaint of generalized weakness, fatigue, feeling like she was going to pass out and diarrhea. Reason for follow up:  
 CC: Generalized weakness, fatigue, and near passing out. Resting comfortably on am rounds. Denied any SOB or chest pains. Assessment & Plan:  
 
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 S/P CT-guided biopsy done on 3/29/19. Thoracic surgical F/Us noted and appreciated. 2) Resp: Small left pleural effusion without any respiratory failure. As needed oxygen, diuresis and incentive spirometry. 3) CVS: Near Syncope on admission. Controlled Primary hypertension. 2D ECHO with moderate to large pericardial effusion. Low threshold for Cardiac Tamponade. S/P Pericardiocentesis 3/30/19. For F/U 2D ECHO 3/31/19 with possible drain removal. Cardiology F/U noted and appreciated. 4) Hematology: Probable anemia of chronicity. 5) Electrolytes: Resolved Hypokalemia. 6) Endocrine: Morbid obesity with a BMI greater than 40. H/O Gastric Bypass surgery. Therapeutic lifestyle changes counselling done. 7) JOHANNA: Generalized weakness. PT as tolerated. 8) Psych: Anxiety Disorder. Depression. As needed anxiolytics. 9) Prophylaxis: DVT. 10) Directives: Full Code with a guarded prognosis. D/W patient and family. 11) Plan: Possible transfer to Telemetry floor after drain removal. Discussed with patient and patient's  Antonio Olivera who can be reached at [phone]. D/W RN. 
[facility] Problems  Date Reviewed: 3/29/2019 Codes Class Noted POA Pericardial effusion ICD-10-CM: I31.3 ICD-9-CM: 423.9  3/27/2019 Unknown Pleural effusion, left ICD-10-CM: J90 ICD-9-CM: 511.9  3/27/2019 Unknown Near syncope ICD-10-CM: R55 
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  
 
 Last 24hrs VS reviewed since prior progress note. Most recent are: 
Visit Vitals /85 Pulse 80 Temp 98.7 °F (37.1 °C) Resp 14 Ht 5' 7\" (1.702 m) Wt 112.3 kg (247 lb 9.2 oz) SpO2 95% BMI 38.78 kg/m² Constitutional:  No acute distress, cooperative, pleasant   
HEENT: Head is a traumatic, Un icteric sclera. Pink conjunctiva,no erythema or discharge. Oral mucous moist, oropharynx benign. Neck supple, Resp:  Decreased air entry B/L. Drain in situ. CV:  S1,S2 Tachycardic GI:  Soft, obese, non distended, non tender. normoactive bowel sounds, no hepatosplenomegaly :  No CVA or suprapubic tenderness Skin  :  No erythema,rash,bullae,dipigmentation Musculoskeletal:  Bipedal edema. Neurologic:  Awake, alert and oriented. Intake/Output Summary (Last 24 hours) at 3/31/2019 1039 Last data filed at 3/31/2019 0400 Gross per 24 hour Intake 1714 ml Output 960 ml Net 754 ml Labs:  
 
Recent Labs  
  03/31/19 
2767 WBC 12.4* HGB 9.4* HCT 31.2*  
 Recent Labs  
  03/31/19 
0443 03/30/19 
4619  137  
K 3.8 3.9  108 CO2 27 25 BUN 6 7 CREA 0.51* 0.56 GLU 88 91 CA 8.6 8.6 No results for input(s): SGOT, GPT, ALT, AP, TBIL, TBILI, TP, ALB, GLOB, GGT, AML, LPSE in the last 72 hours. No lab exists for component: AMYP, HLPSE Recent Labs  
  03/29/19 
0210 INR 1.2* PTP 12.0* No results for input(s): FE, TIBC, PSAT, FERR in the last 72 hours. Lab Results Component Value Date/Time Folate 8.0 04/10/2009 06:00 PM  
  
No results for input(s): PH, PCO2, PO2 in the last 72 hours. No results for input(s): CPK, CKNDX, TROIQ in the last 72 hours. No lab exists for component: CPKMB Lab Results Component Value Date/Time Cholesterol, total 211 (H) 10/13/2017 04:23 PM  
 HDL Cholesterol 44 10/13/2017 04:23 PM  
 LDL, calculated 113 (H) 10/13/2017 04:23 PM  
 Triglyceride 268 (H) 10/13/2017 04:23 PM  
 
Lab Results Component Value Date/Time Glucose (POC) 97 01/31/2010 01:10 PM  
 Glucose  05/14/2012 10:55 AM  
 
Lab Results Component Value Date/Time Color 0-3 03/27/2019 12:17 PM  
 Appearance CLOUDY (A) 03/27/2019 12:17 PM  
 Specific gravity 1.026 03/27/2019 12:17 PM  
 pH (UA) 5.5 03/27/2019 12:17 PM  
 Protein 100 (A) 03/27/2019 12:17 PM  
 Glucose NEGATIVE  03/27/2019 12:17 PM  
 Ketone 15 (A) 03/27/2019 12:17 PM  
 Bilirubin NEGATIVE  03/10/2015 02:45 PM  
 Urobilinogen 1.0 03/27/2019 12:17 PM  
 Nitrites NEGATIVE  03/27/2019 12:17 PM  
 Leukocyte Esterase SMALL (A) 03/27/2019 12:17 PM  
 Epithelial cells MANY (A) 03/27/2019 12:17 PM  
 Bacteria 2+ (A) 03/27/2019 12:17 PM  
 WBC 0-4 03/27/2019 12:17 PM  
 RBC 0-5 03/27/2019 12:17 PM  
 
 
 
Medications Reviewed:  
 
Current Facility-Administered Medications Medication Dose Route Frequency  colchicine tablet 0.6 mg  0.6 mg Oral DAILY  ketorolac (TORADOL) injection 30 mg  30 mg IntraVENous NOW  dilTIAZem (CARDIZEM) 125 mg in dextrose 5% 125 mL infusion  0-15 mg/hr IntraVENous TITRATE  multivitamin, tx-iron-ca-min (THERA-M w/ IRON) tablet 1 Tab  1 Tab Oral DAILY  calcium carbonate tablet 520 mg [elemental]  520 mg Oral DAILY  cholecalciferol (VITAMIN D3) tablet 3,000 Units  3,000 Units Oral DAILY  cyanocobalamin (VITAMIN B12) tablet 500 mcg  500 mcg Oral DAILY  LORazepam (ATIVAN) tablet 1 mg  1 mg Oral TID PRN  
 losartan (COZAAR) tablet 25 mg  25 mg Oral DAILY  PARoxetine (PAXIL) tablet 40 mg  40 mg Oral DAILY  sodium chloride (NS) flush 5-40 mL  5-40 mL IntraVENous Q8H  
 sodium chloride (NS) flush 5-40 mL  5-40 mL IntraVENous PRN  
 ondansetron (ZOFRAN) injection 4 mg  4 mg IntraVENous Q6H PRN  
 acetaminophen (TYLENOL) tablet 650 mg  650 mg Oral Q6H PRN  
 0.9% sodium chloride infusion  125 mL/hr IntraVENous CONTINUOUS  
 pantoprazole (PROTONIX) tablet 40 mg  40 mg Oral DAILY  
 
______________________________________________________________________ EXPECTED LENGTH OF STAY: 3d 16h ACTUAL LENGTH OF STAY:          4 Promise Cheng MD

## 2019-03-31 NOTE — PROGRESS NOTES
Problem: Falls - Risk of 
Goal: *Absence of Falls Description Document Dain Ramsay Fall Risk and appropriate interventions in the flowsheet. Outcome: Progressing Towards Goal 
  
Problem: Pressure Injury - Risk of 
Goal: *Prevention of pressure injury Description Document Parrish Scale and appropriate interventions in the flowsheet. Outcome: Progressing Towards Goal 
  
Problem: Patient Education: Go to Patient Education Activity Goal: Patient/Family Education Outcome: Progressing Towards Goal 
  
Problem: General Medical Care Plan Goal: *Vital signs within specified parameters Outcome: Progressing Towards Goal 
Goal: *Labs within defined limits Outcome: Progressing Towards Goal 
Goal: *Absence of infection signs and symptoms Outcome: Progressing Towards Goal 
Goal: *Optimal pain control at patient's stated goal 
Outcome: Progressing Towards Goal 
Goal: *Skin integrity maintained Outcome: Progressing Towards Goal 
Goal: *Fluid volume balance Outcome: Progressing Towards Goal 
Goal: *Optimize nutritional status Outcome: Progressing Towards Goal 
Goal: *Anxiety reduced or absent Outcome: Progressing Towards Goal 
Goal: *Progressive mobility and function (eg: ADL's) Outcome: Progressing Towards Goal 
  
Problem: Patient Education: Go to Patient Education Activity Goal: Patient/Family Education Outcome: Progressing Towards Goal 
  
Problem: Arrhythmia Pathway (Adult) Goal: *Absence of arrhythmia Outcome: Progressing Towards Goal 
  
Problem: Patient Education: Go to Patient Education Activity Goal: Patient/Family Education Outcome: Progressing Towards Goal

## 2019-03-31 NOTE — PROGRESS NOTES
TRANSFER - IN REPORT: 
 
Verbal report received from katerin(name) on Larry Councilman  being received from ccu(unit) for routine progression of care Report consisted of patients Situation, Background, Assessment and  
Recommendations(SBAR). Information from the following report(s) Kardex, ED Summary, Procedure Summary, Intake/Output, MAR and Cardiac Rhythm nsr was reviewed with the receiving nurse. Opportunity for questions and clarification was provided. Assessment completed upon patients arrival to unit and care assumed.

## 2019-03-31 NOTE — INTERDISCIPLINARY ROUNDS
IDR/SLIDR Summary Patient: Zulma Reddingchure MRN: [id number]    Age: 46 y.o. YOB: 1970 Room/Bed: [address] [place] [address] Admit Diagnosis: Neoplasm of diaphragm [D49.2] Pericardial effusion [I31.3] Pleural effusion, left [J90] Near syncope [R55]  Principal Diagnosis: Neoplasm of diaphragm Goals: Stable HR Readmission: NO  Quality Measure: Not applicable VTE Prophylaxis: Mechanical 
Influenza Vaccine screening completed? YES Pneumococcal Vaccine screening completed? YES Mobility needs: No   Nutrition plan:No 
Consults: TEODORA Salazar Financial concerns:No  Escalated to CM? NO 
RRAT Score:   11  Interventions:H2H Testing due for pt today? YES 
LOS: 4 days Expected length of stay TBD days Discharge plan: TBD   PCP: Andra Villarreal MD 
Transportation needs: No   
Days before discharge:two or more days before discharge Discharge disposition: Home Signed:  
 
Sonu Blackburn 3/31/2019 
6:40 AM

## 2019-03-31 NOTE — PROGRESS NOTES
3/31/2019 Admit Date: 3/27/2019 Admit Diagnosis: Neoplasm of diaphragm [D49.2] Pericardial effusion [I31.3] Pleural effusion, left [J90] Near syncope [R55] Principal Problem: 
  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 
  Pericardial effusion (3/27/2019) Pleural effusion, left (3/27/2019) Near syncope (3/27/2019) Assessment/Plan: 
Status post pericardiocentesis. There has been no significant additional drainage since coming back to from the cath lab. She has pleuritic chest pain post procedure consistent with pericarditis (expected) Transiently had AF post pericardiocentesis Plan:  
Echo to confirm no reaccumulation of fluid Discontinue pericardial drain Antiinflammatory rx for her pain Transfer to tele after her drain is removed Subjective: 
Chest is sore from drain but she is otherwise ok Porsche García admits to chest pressure/discomfort. Objective:  
 
Visit Vitals /82 Pulse 79 Temp 98.4 °F (36.9 °C) Resp 18 Ht 5' 7\" (1.702 m) Wt 112.3 kg (247 lb 9.2 oz) LMP 06/10/2013 SpO2 93% BMI 38.78 kg/m² Physical Exam: 
Neck: supple, symmetrical, trachea midline, no adenopathy, thyroid: not enlarged, symmetric, no tenderness/mass/nodules, no carotid bruit and no JVD Heart: regular rate and rhythm, S1, S2 normal 
Lungs: clear to auscultation bilaterally, normal percussion bilaterally Abdomen: soft, non-tender. Bowel sounds normal. No masses,  no organomegaly Extremities: extremities normal, atraumatic, no cyanosis or edema Pulses: 2+ and symmetric Neurologic: Grossly normal 
 
 
Labs:   
No results for input(s): CPK, CKMB, TROIQ in the last 72 hours. No lab exists for component: CKQMB, CPKMB Recent Labs  
  03/31/19 
6007   
K 3.8  BUN 6  
CREA 0.51* GLU 88  
CA 8.6 Recent Labs  
  03/31/19 
2401 WBC 12.4* HGB 9.4* HCT 31.2*  
 No results for input(s): CHOL, LDLC in the last 72 hours. No lab exists for component: TGL, HDLC,  HBA1C Telemetry: normal sinus rhythm Data Review: current meds, labs,recent radiology, intake/output/weight and problem list reviewed

## 2019-03-31 NOTE — PROGRESS NOTES
0730 Bedside shift change report given to Steve Morgan (oncoming nurse) by Doc Mendoza (offgoing nurse). Report included the following information Procedure Summary, Intake/Output, MAR, Recent Results and Cardiac Rhythm NSR . [phone]

## 2019-03-31 NOTE — PROGRESS NOTES
[facility] [place] [facility] [place] [address] [address], [address]: [phone]  F: [phone] for Visit:  
Katharyn Mortimer is a 46 y.o. female who is seen in consultation at the request of Dr. Camila Dunn for evaluation of Left Diaphragm Mass found on CT A/P. Treatment History: · CT Abd/Pelv 3/27/19: 10.5 x 9.4 x 5.1 cm enhancing mass centered at the left diaphragm abuts the inferior pericardium and extends into segment 2 of the liver, moderate pericardial effusion, small left pleural effusion, and nonspecific partially calcified right adrenal 1.8 cm nodule · CT Chest 3/28/19: Large complex mass centered on the left hemidiaphragm, with likely invasion of the inferior left pericardium with associated pericardial effusion and dome of the left liver. Enlarged left internal mammmary artery node. History of Present Illness:  
Katharyn Mortimer is a 46 y.o. female with a past medical history of Anemia, Anxiety/Depression, Arthritis, HTN, and Morbid Obesity s/p Gastric Bypass 10/18, who presented to the ER yesterday with fatigue and generalized weakness for the past 2 weeks. She also reported intermittent epigastric abdominal pain. She reports that she felt like she was going to [employer]" at work. CT Abd/Pelv showed a 10.5 x 9.4 x 5.1 cm enhancing mass centered at the left diaphragm abuts the inferior pericardium and extends into segment 2 of the liver, moderate pericardial effusion, small left pleural effusion, and nonspecific partially calcified right adrenal 1.8 cm nodule. Patient had CT Chest today which again showed a large complex mass centered on the left hemidiaphragm, with likely invasion of the inferior left pericardium with associated pericardial effusion and dome of the left liver and an enlarged left internal mammmary artery node. General Surgery and Thoracic surgery have been consulted. Plans for CT guided biopsy tomorrow.  Oncology was consulted for further evaluation/management of mass. Patient resting in bed upon visit. Denies personal or family history of cancer. Denies current complaints. Anxious about what mass might be.  and daughter at bedside. She works at [employer]. Interim Hx:  Pt seen today for f/u of mass with path pending. Pt is in CCU and post pericardial effusion drain.  at bedside. Pt is comfortable. Feeling a little better. [address] CCU staff. Past Medical History:  
Diagnosis Date  Anemia  Anxiety  Arthritis LEFT KNEE  
 Current smoker  Depression  Hypertension  Menorrhagia  Morbid obesity ([address]  Nicotine vapor product user SMALL AMOUNT OF NICOTINE 0.3  Snoring Past Surgical History:  
Procedure Laterality Date  BIOPSY    
 polyp, 1/2 cm - negative, per patient [address] [facility]  [address]  
 HX CHOLECYSTECTOMY  2006  HX GYN    
 PARTIAL HYSTERECTOMY  HX LAP GASTRIC BYPASS  10/15/2018 Lap Gastric Bypass with Endo by Dr. Gilberto Martinez.  TX CYSTOURETHROSCOPY  7/10/2013 CYSTOSCOPY performed by Kristie Joy MD at [address] [address] [place] TUBE/OVARY  7/10/2013 HYSTERECTOMY ROBOTIC ASSISTED performed by Kristie Joy MD at [address] Social History Tobacco Use  Smoking status: Former Smoker Packs/day: 0.50 Years: 28.00 Pack years: 14.00 Types: Cigarettes Last attempt to quit: 2018 Years since quittin.8  Smokeless tobacco: Never Used Substance Use Topics  Alcohol use: No  
  Comment: rarely Family History Problem Relation Age of Onset  Heart Disease Mother   
     heart bypass  Heart Attack Mother  Diabetes Brother  Stroke Maternal Grandmother  No Known Problems Father  Malignant Hyperthermia Neg Hx  Pseudocholinesterase Deficiency Neg Hx  Delayed Awakening Neg Hx  Post-op Nausea/Vomiting Neg Hx  Emergence Delirium Neg Hx  Post-op Cognitive Dysfunction Neg Hx   
 Other Neg Hx  Anesth Problems Neg Hx Current Facility-Administered Medications Medication Dose Route Frequency  colchicine tablet 0.6 mg  0.6 mg Oral DAILY  dilTIAZem (CARDIZEM) 125 mg in dextrose 5% 125 mL infusion  0-15 mg/hr IntraVENous TITRATE  multivitamin, tx-iron-ca-min (THERA-M w/ IRON) tablet 1 Tab  1 Tab Oral DAILY  calcium carbonate tablet 520 mg [elemental]  520 mg Oral DAILY  cholecalciferol (VITAMIN D3) tablet 3,000 Units  3,000 Units Oral DAILY  cyanocobalamin (VITAMIN B12) tablet 500 mcg  500 mcg Oral DAILY  LORazepam (ATIVAN) tablet 1 mg  1 mg Oral TID PRN  
 losartan (COZAAR) tablet 25 mg  25 mg Oral DAILY  PARoxetine (PAXIL) tablet 40 mg  40 mg Oral DAILY  sodium chloride (NS) flush 5-40 mL  5-40 mL IntraVENous Q8H  
 sodium chloride (NS) flush 5-40 mL  5-40 mL IntraVENous PRN  
 ondansetron (ZOFRAN) injection 4 mg  4 mg IntraVENous Q6H PRN  
 acetaminophen (TYLENOL) tablet 650 mg  650 mg Oral Q6H PRN  
 0.9% sodium chloride infusion  125 mL/hr IntraVENous CONTINUOUS  
 pantoprazole (PROTONIX) tablet 40 mg  40 mg Oral DAILY Allergies Allergen Reactions  Amlodipine Other (comments) Fatigue/drowsy  Pcn [Penicillins] Rash Per Patient Childhood Allergy - Unsure of reaction. Mother told her not to take it. Review of Systems: A complete review of systems was obtained, negative except as described above. Physical Exam:  
 
Visit Vitals /85 Pulse 85 Temp 98.7 °F (37.1 °C) Resp 19 Ht 5' 7\" (1.702 m) Wt 247 lb 9.2 oz (112.3 kg) LMP 06/10/2013 SpO2 93% BMI 38.78 kg/m² ECOG PS: 0 General: No distress Eyes:  anicteric sclerae HENT: Atraumatic Neck: Supple CV pericardial drain in place MS: in bed, moving Skin: Dry and warm Psych: Alert, oriented, appropriate affect, normal judgment/insight Results:  
 
Lab Results Component Value Date/Time WBC 12.4 (H) 03/31/2019 04:43 AM  
 HGB 9.4 (L) 03/31/2019 04:43 AM  
 HCT 31.2 (L) 03/31/2019 04:43 AM  
 PLATELET 236 71/97/1809 04:43 AM  
 MCV 83.2 03/31/2019 04:43 AM  
 ABS. NEUTROPHILS 7.4 03/28/2019 03:14 AM  
 Hemoglobin (POC) 9.6 05/14/2012 10:55 AM  
 Hemoglobin (POC) 11.2 (L) 01/31/2010 01:10 PM  
 Hematocrit (POC) 33 (L) 01/31/2010 01:10 PM  
 
Lab Results Component Value Date/Time Sodium 138 03/31/2019 04:43 AM  
 Potassium 3.8 03/31/2019 04:43 AM  
 Chloride 107 03/31/2019 04:43 AM  
 CO2 27 03/31/2019 04:43 AM  
 Glucose 88 03/31/2019 04:43 AM  
 BUN 6 03/31/2019 04:43 AM  
 Creatinine 0.51 (L) 03/31/2019 04:43 AM  
 GFR est AA >60 03/31/2019 04:43 AM  
 GFR est non-AA >60 03/31/2019 04:43 AM  
 Calcium 8.6 03/31/2019 04:43 AM  
 Sodium (POC) 138 01/31/2010 01:10 PM  
 Potassium (POC) 3.8 01/31/2010 01:10 PM  
 Chloride (POC) 104 01/31/2010 01:10 PM  
 Glucose (POC) 97 01/31/2010 01:10 PM  
 Glucose  05/14/2012 10:55 AM  
 BUN (POC) 6 (L) 01/31/2010 01:10 PM  
 Creatinine (POC) 0.6 01/31/2010 01:10 PM  
 Calcium, ionized (POC) 1.11 (L) 01/31/2010 01:10 PM  
 
Lab Results Component Value Date/Time Bilirubin, total 0.8 03/27/2019 11:52 AM  
 ALT (SGPT) 16 03/27/2019 11:52 AM  
 AST (SGOT) 17 03/27/2019 11:52 AM  
 Alk. phosphatase 243 (H) 03/27/2019 11:52 AM  
 Protein, total 8.3 (H) 03/27/2019 11:52 AM  
 Albumin 2.3 (L) 03/27/2019 11:52 AM  
 Globulin 6.0 (H) 03/27/2019 11:52 AM  
 
CT Abd/Pelv 3/27/19 IMPRESSION: 
1. 10.5 x 9.4 x 5.1 cm enhancing mass centered at the left diaphragm abuts the 
inferior pericardium and extends into segment 2 of the liver. Differential 
diagnosis includes sarcoma versus metastatic disease. The lesion is amenable to 
nonemergent CT or ultrasound-guided percutaneous biopsy. 2. Moderate pericardial effusion. Small loculated left pleural effusion. 3. Nonspecific partially calcified right adrenal 1.8 cm nodule. Recommendation: CT chest with IV contrast following at least 12 hours of hydration. CT Chest 3/28/19 1. Again demonstrated is the large complex mass centered on the left 
hemidiaphragm. There is likely invasion of the inferior left pericardium with 
associated pericardial effusion and dome of the left liver. Differential 
diagnosis is unchanged including sarcoma and metastatic disease 2. Enlarged left internal mammary artery node Records reviewed and summarized above. Pathology report(s) reviewed above. Radiology report(s) reviewed above. Assessment/Plan:  
1) Mediastinal/Diaphragmatic/Pericardial Mass 10.5 x 9.4 x 5.1 cm enhancing mass centered at the left diaphragm abuts the inferior pericardium and extends into liver noted on CT A/P 3/27/19. Had CT-Guided Biopsy and path pending. Had pericardial effusion and in CCU post pericardial drain placed. Pt clinically stable. Will await pathology to determine further management. 2) Moderate Pericardial Effusion/Left Pleural Effusion Post drain. Per cardio. 3) Morbid Obesity S/p Gastric Bypass surgery 10/18 with Dr. Lor Katz. Mass was not seen at time of surgery per Dr. Lor Katz. EGD at time of surgery was also normal per surgery. Pre-op CXR was also normal. 
 
4) Epigastric Pain On PPI but pain likely from mass 5) Psychosocial 
Anxious about possible cancer dx. Works at [employer] and likes job. Good family support.  at bedside. Will follow. Call if questions. I appreciate the opportunity to participate in Ms. Daija Arrieta's care.  
 
Signed By: Rosalie George DO

## 2019-03-31 NOTE — PROGRESS NOTES
1930 Bedside shift change report given by Christiano Velasquez RN (offgoing nurse). Report included the following information SBAR, Kardex, Procedure Summary, Intake/Output, MAR, Recent Results and Cardiac Rhythm Sinus Arrythmia w/ PAC's.  
 
2230 CHG bath completed, pt tolerated well 
 
0015 Pt in NSR w, PAC's 
 
0400 pt sleeping, VSS 
 
0730 Bedside shift change report given to Tameraronnie Cohen (oncoming nurse). Report included the following information SBAR, Kardex, Procedure Summary, Intake/Output, Recent Results and Cardiac Rhythm Sinus Arrythmia.

## 2019-04-01 ENCOUNTER — APPOINTMENT (OUTPATIENT)
Dept: GENERAL RADIOLOGY | Age: 49
DRG: 371 | End: 2019-04-01
Attending: NURSE PRACTITIONER
Payer: COMMERCIAL

## 2019-04-01 LAB
ANION GAP SERPL CALC-SCNC: 5 MMOL/L (ref 5–15)
BUN SERPL-MCNC: 7 MG/DL (ref 6–20)
BUN/CREAT SERPL: 12 (ref 12–20)
CALCIUM SERPL-MCNC: 8.5 MG/DL (ref 8.5–10.1)
CHLORIDE SERPL-SCNC: 106 MMOL/L (ref 97–108)
CO2 SERPL-SCNC: 29 MMOL/L (ref 21–32)
CREAT SERPL-MCNC: 0.58 MG/DL (ref 0.55–1.02)
ERYTHROCYTE [DISTWIDTH] IN BLOOD BY AUTOMATED COUNT: 14.4 % (ref 11.5–14.5)
GLUCOSE SERPL-MCNC: 95 MG/DL (ref 65–100)
HCT VFR BLD AUTO: 31.2 % (ref 35–47)
HGB BLD-MCNC: 9.5 G/DL (ref 11.5–16)
MCH RBC QN AUTO: 24.9 PG (ref 26–34)
MCHC RBC AUTO-ENTMCNC: 30.4 G/DL (ref 30–36.5)
MCV RBC AUTO: 81.9 FL (ref 80–99)
NRBC # BLD: 0 K/UL (ref 0–0.01)
NRBC BLD-RTO: 0 PER 100 WBC
PLATELET # BLD AUTO: 409 K/UL (ref 150–400)
PMV BLD AUTO: 9.9 FL (ref 8.9–12.9)
POTASSIUM SERPL-SCNC: 3.3 MMOL/L (ref 3.5–5.1)
RBC # BLD AUTO: 3.81 M/UL (ref 3.8–5.2)
SODIUM SERPL-SCNC: 140 MMOL/L (ref 136–145)
WBC # BLD AUTO: 12.7 K/UL (ref 3.6–11)

## 2019-04-01 PROCEDURE — 36415 COLL VENOUS BLD VENIPUNCTURE: CPT

## 2019-04-01 PROCEDURE — 80048 BASIC METABOLIC PNL TOTAL CA: CPT

## 2019-04-01 PROCEDURE — 74011250637 HC RX REV CODE- 250/637: Performed by: NURSE PRACTITIONER

## 2019-04-01 PROCEDURE — 85027 COMPLETE CBC AUTOMATED: CPT

## 2019-04-01 PROCEDURE — 74011250637 HC RX REV CODE- 250/637: Performed by: INTERNAL MEDICINE

## 2019-04-01 PROCEDURE — 71045 X-RAY EXAM CHEST 1 VIEW: CPT

## 2019-04-01 PROCEDURE — 65660000000 HC RM CCU STEPDOWN

## 2019-04-01 PROCEDURE — 74011250637 HC RX REV CODE- 250/637: Performed by: FAMILY MEDICINE

## 2019-04-01 RX ORDER — TRAMADOL HYDROCHLORIDE 50 MG/1
50 TABLET ORAL
Status: DISCONTINUED | OUTPATIENT
Start: 2019-04-01 | End: 2019-04-04 | Stop reason: SDUPTHER

## 2019-04-01 RX ADMIN — COLCHICINE 0.6 MG: 0.6 TABLET, FILM COATED ORAL at 08:48

## 2019-04-01 RX ADMIN — Medication 10 ML: at 15:37

## 2019-04-01 RX ADMIN — VITAMIN D 3000 UNITS: 25 TAB ORAL at 08:49

## 2019-04-01 RX ADMIN — MULTIPLE VITAMINS W/ MINERALS TAB 1 TABLET: TAB at 08:49

## 2019-04-01 RX ADMIN — PANTOPRAZOLE SODIUM 40 MG: 40 TABLET, DELAYED RELEASE ORAL at 08:49

## 2019-04-01 RX ADMIN — Medication 500 MCG: at 08:49

## 2019-04-01 RX ADMIN — TRAMADOL HYDROCHLORIDE 50 MG: 50 TABLET, FILM COATED ORAL at 19:16

## 2019-04-01 RX ADMIN — PAROXETINE HYDROCHLORIDE 40 MG: 20 TABLET, FILM COATED ORAL at 08:49

## 2019-04-01 RX ADMIN — TRAMADOL HYDROCHLORIDE 50 MG: 50 TABLET, FILM COATED ORAL at 11:57

## 2019-04-01 RX ADMIN — LORAZEPAM 1 MG: 1 TABLET ORAL at 20:32

## 2019-04-01 RX ADMIN — LOSARTAN POTASSIUM 25 MG: 25 TABLET, FILM COATED ORAL at 08:48

## 2019-04-01 RX ADMIN — ANTACID TABLETS 520 MG: 648 TABLET, CHEWABLE ORAL at 08:49

## 2019-04-01 RX ADMIN — Medication 10 ML: at 22:00

## 2019-04-01 NOTE — PROGRESS NOTES
Hospitalist Progress Note Kenneth Garces MD 
     
                             Answering service: [phone] OR [phone] from in house phone Date of Service:  2019 NAME:  Katharyn Mortimer :  1970 MRN:  [id number] Admission Summary:  
 
46 y.o. white female with past medical history of anemia, anxiety Disorder, depression, arthritis, hypertension, menorrhagia, morbid obesity, s/p gastric bypass surgery was brought to the ED from work via EMS with chief complaint of generalized weakness, fatigue, feeling like she was going to pass out and diarrhea. Reason for follow up:  
 CC: Generalized weakness, fatigue, and near passing out. Resting comfortably on am rounds. Denied any SOB or chest pains. Assessment & Plan:  
 
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 S/P CT-guided biopsy done on 3/29/19. Thoracic surgical F/Us noted and appreciated. AWAITING FOR PATH FOR TREATMENT PALN 
 
2) Resp: Small left pleural effusion without any respiratory failure. As needed oxygen, diuresis and incentive spirometry. 3) CVS: Near Syncope on admission. Controlled Primary hypertension. 2D ECHO with moderate to large pericardial effusion. Low threshold for Cardiac Tamponade. S/P Pericardiocentesis 3/30/19. For F/U 2D ECHO 3/31/19 with possible drain removal. Cardiology F/U noted and appreciated. 4) Hematology: Probable anemia of chronicity. 5) Electrolytes: Resolved Hypokalemia. 6) Endocrine: Morbid obesity with a BMI greater than 40. H/O Gastric Bypass surgery. Therapeutic lifestyle changes counselling done. 7) JOHANNA: Generalized weakness. PT as tolerated. 8) Psych: Anxiety Disorder. Depression. As needed anxiolytics. 9) Prophylaxis: DVT. 10) Directives: Full Code with a guarded prognosis. D/W patient and family. 11) Plan: Possible transfer to Telemetry floor after drain removal. Discussed with patient and patient's  Tricia Miller who can be reached at [phone]. D/W RN. 
[facility] Problems  Date Reviewed: 3/29/2019 Codes Class Noted POA Pericardial effusion ICD-10-CM: I31.3 ICD-9-CM: 423.9  3/27/2019 Unknown Pleural effusion, left ICD-10-CM: J90 ICD-9-CM: 511.9  3/27/2019 Unknown Near syncope ICD-10-CM: R55 
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  
 
 Last 24hrs VS reviewed since prior progress note. Most recent are: 
Visit Vitals /85 (BP 1 Location: Left arm, BP Patient Position: At rest) Pulse 80 Temp 99.1 °F (37.3 °C) Resp 18 Ht 5' 7\" (1.702 m) Wt 113.3 kg (249 lb 12.8 oz) SpO2 97% BMI 39.12 kg/m² Constitutional:  No acute distress, cooperative, pleasant   
HEENT: Head is a traumatic, Un icteric sclera. Pink conjunctiva,no erythema or discharge. Oral mucous moist, oropharynx benign. Neck supple, Resp:  Decreased air entry B/L. Drain in situ. CV:  S1,S2 Tachycardic GI:  Soft, obese, non distended, non tender. normoactive bowel sounds, no hepatosplenomegaly :  No CVA or suprapubic tenderness Skin  :  No erythema,rash,bullae,dipigmentation Musculoskeletal:  Bipedal edema. Neurologic:  Awake, alert and oriented. No intake or output data in the 24 hours ending 04/01/19 1812 Labs:  
 
Recent Labs 04/01/19 
3287 03/31/19 
1690 WBC 12.7* 12.4* HGB 9.5* 9.4* HCT 31.2* 31.2*  
* 379 Recent Labs 04/01/19 
6844 03/31/19 
4365 03/30/19 
9185  138 137  
K 3.3* 3.8 3.9  107 108 CO2 29 27 25 BUN 7 6 7 CREA 0.58 0.51* 0.56 GLU 95 88 91 CA 8.5 8.6 8.6 No results for input(s): SGOT, GPT, ALT, AP, TBIL, TBILI, TP, ALB, GLOB, GGT, AML, LPSE in the last 72 hours. No lab exists for component: AMYP, HLPSE No results for input(s): INR, PTP, APTT in the last 72 hours. No lab exists for component: INREXT, INREXT No results for input(s): FE, TIBC, PSAT, FERR in the last 72 hours. Lab Results Component Value Date/Time Folate 8.0 04/10/2009 06:00 PM  
  
No results for input(s): PH, PCO2, PO2 in the last 72 hours. No results for input(s): CPK, CKNDX, TROIQ in the last 72 hours. No lab exists for component: CPKMB Lab Results Component Value Date/Time Cholesterol, total 211 (H) 10/13/2017 04:23 PM  
 HDL Cholesterol 44 10/13/2017 04:23 PM  
 LDL, calculated 113 (H) 10/13/2017 04:23 PM  
 Triglyceride 268 (H) 10/13/2017 04:23 PM  
 
Lab Results Component Value Date/Time Glucose (POC) 97 01/31/2010 01:10 PM  
 Glucose  05/14/2012 10:55 AM  
 
Lab Results Component Value Date/Time Color 0-3 03/27/2019 12:17 PM  
 Appearance CLOUDY (A) 03/27/2019 12:17 PM  
 Specific gravity 1.026 03/27/2019 12:17 PM  
 pH (UA) 5.5 03/27/2019 12:17 PM  
 Protein 100 (A) 03/27/2019 12:17 PM  
 Glucose NEGATIVE  03/27/2019 12:17 PM  
 Ketone 15 (A) 03/27/2019 12:17 PM  
 Bilirubin NEGATIVE  03/10/2015 02:45 PM  
 Urobilinogen 1.0 03/27/2019 12:17 PM  
 Nitrites NEGATIVE  03/27/2019 12:17 PM  
 Leukocyte Esterase SMALL (A) 03/27/2019 12:17 PM  
 Epithelial cells MANY (A) 03/27/2019 12:17 PM  
 Bacteria 2+ (A) 03/27/2019 12:17 PM  
 WBC 0-4 03/27/2019 12:17 PM  
 RBC 0-5 03/27/2019 12:17 PM  
 
 
 
Medications Reviewed:  
 
Current Facility-Administered Medications Medication Dose Route Frequency  traMADol (ULTRAM) tablet 50 mg  50 mg Oral Q4H PRN  
 colchicine tablet 0.6 mg  0.6 mg Oral DAILY  multivitamin, tx-iron-ca-min (THERA-M w/ IRON) tablet 1 Tab  1 Tab Oral DAILY  calcium carbonate tablet 520 mg [elemental]  520 mg Oral DAILY  cholecalciferol (VITAMIN D3) tablet 3,000 Units  3,000 Units Oral DAILY  cyanocobalamin (VITAMIN B12) tablet 500 mcg  500 mcg Oral DAILY  LORazepam (ATIVAN) tablet 1 mg  1 mg Oral TID PRN  
 losartan (COZAAR) tablet 25 mg  25 mg Oral DAILY  PARoxetine (PAXIL) tablet 40 mg  40 mg Oral DAILY  sodium chloride (NS) flush 5-40 mL  5-40 mL IntraVENous Q8H  
 sodium chloride (NS) flush 5-40 mL  5-40 mL IntraVENous PRN  
 ondansetron (ZOFRAN) injection 4 mg  4 mg IntraVENous Q6H PRN  
 acetaminophen (TYLENOL) tablet 650 mg  650 mg Oral Q6H PRN  pantoprazole (PROTONIX) tablet 40 mg  40 mg Oral DAILY  
 
______________________________________________________________________ EXPECTED LENGTH OF STAY: 4d 21h ACTUAL LENGTH OF STAY:          5 Alberto Magana MD

## 2019-04-01 NOTE — PROGRESS NOTES
Reviewed chart for transitions of care, and discussed in rounds. CM noted of previous cm note. Patient is independent with ADLs, has supportive family, and she lives with her family and son who will transport at discharge. CM will follow for any other discharge needs.  
 
[facility]

## 2019-04-01 NOTE — PROGRESS NOTES
Bedside and Verbal shift change report given to Agustin (oncoming nurse) by Bronwyn Marquez (offgoing nurse). Report included the following information SBAR, Kardex, Procedure Summary, Accordion, Recent Results, Cardiac Rhythm NSR and Quality Measures.

## 2019-04-01 NOTE — PROGRESS NOTES
Bedside shift change report given to Samir Benítez (oncoming nurse) by Vida Enamorado RN (offgoing nurse). Report included the following information SBAR, Kardex, Procedure Summary, Intake/Output, MAR, Recent Results and Cardiac Rhythm NSR.

## 2019-04-01 NOTE — PROGRESS NOTES
Thoracic Surgery Simple Progress Note Admit Date: 3/27/2019 POD: 2 Days Post-Op Procedure:  Procedure(s): PERICARDIOCENTESIS Subjective:  
 
Patient has complaints: No significant medical complaints Review of Systems: 
 
CARDIAC: positive for pericardical effusion RESP: negative NEURO:  negative EXT: Denies new swelling or pain in the legs or calves. Objective:  
 
Blood pressure 128/85, pulse 80, temperature 99.1 °F (37.3 °C), resp. rate 18, height 5' 7\" (1.702 m), weight 249 lb 12.8 oz (113.3 kg), last menstrual period 06/10/2013, SpO2 95 %. Temp (24hrs), Av.3 °F (36.8 °C), Min:97.9 °F (36.6 °C), Max:99.1 °F (37.3 °C) Hemodynamics PAP 
  CO 
  CI No intake/output data recorded.  1901 -  0700 In: 1898.8 [P.O.:200; I.V.:1698.8] Out: 320 [Drains:320] EXAM: 
GENERAL: VSS, afrible, alert and cooperative HEART:  regular rate and rhythm. Pericardial effusion was drained on 19 and drain removed on 3/31/19. LUNG: clear to auscultation bilaterally, diminished breath sounds L base NEURO:  normal without focal findings 
mental status, speech normal, alert and oriented x iii EXTREMITIES:No evidence of DVT seen on physical exam. 
GI/: Abd soft, nonterder with + bowel sounds. Voiding Labs: 
Recent Results (from the past 24 hour(s)) ECHO ADULT COMPLETE Collection Time: 19 12:55 PM  
Result Value Ref Range LV E' Septal Velocity 8.45 cm/s Ao Root D 2.65 cm LVIDd 2.71 (A) 3.9 - 5.3 cm  
 LVPWd 2.77 (A) 0.6 - 0.9 cm LVIDs 2.57 cm IVSd 1.06 (A) 0.6 - 0.9 cm  
 MVA (PHT) 5.1 cm2  
 MV A Celestine 55.35 cm/s  
 MV E Celestine 103.63 cm/s  
 MV E/A 1.87 Left Atrium to Aortic Root Ratio 1.16 LV Mass .5 (A) 67 - 162 g  
 LV Mass AL Index 115.9 (A) 43 - 95 g/m2 E/E' septal 12.26 Mitral Valve E Wave Deceleration Time 149.3 ms  
 Mitral Valve Pressure Half-time 43.3 ms Left Atrium Major Axis 3.07 cm Triscuspid Valve Regurgitation Peak Gradient 25.4 mmHg  
 TR Max Velocity 252.24 cm/s METABOLIC PANEL, BASIC Collection Time: 04/01/19  4:47 AM  
Result Value Ref Range Sodium 140 136 - 145 mmol/L Potassium 3.3 (L) 3.5 - 5.1 mmol/L Chloride 106 97 - 108 mmol/L  
 CO2 29 21 - 32 mmol/L Anion gap 5 5 - 15 mmol/L Glucose 95 65 - 100 mg/dL BUN 7 6 - 20 MG/DL Creatinine 0.58 0.55 - 1.02 MG/DL  
 BUN/Creatinine ratio 12 12 - 20 GFR est AA >60 >60 ml/min/1.73m2 GFR est non-AA >60 >60 ml/min/1.73m2 Calcium 8.5 8.5 - 10.1 MG/DL  
CBC W/O DIFF Collection Time: 04/01/19  4:47 AM  
Result Value Ref Range WBC 12.7 (H) 3.6 - 11.0 K/uL  
 RBC 3.81 3.80 - 5.20 M/uL HGB 9.5 (L) 11.5 - 16.0 g/dL HCT 31.2 (L) 35.0 - 47.0 % MCV 81.9 80.0 - 99.0 FL  
 MCH 24.9 (L) 26.0 - 34.0 PG  
 MCHC 30.4 30.0 - 36.5 g/dL  
 RDW 14.4 11.5 - 14.5 % PLATELET 569 (H) 995 - 400 K/uL MPV 9.9 8.9 - 12.9 FL  
 NRBC 0.0 0  WBC ABSOLUTE NRBC 0.00 0.00 - 0.01 K/uL Assessment:  
No evidence of DVT. Principal Problem: 
  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 
  Pericardial effusion (3/27/2019) Pleural effusion, left (3/27/2019) Near syncope (3/27/2019) PATH:   Pending from liver Bx Plan/Recommendations: Will obtain CXR, if effusion has increased, agree with IR thoracentesis. She may also benefit from an abdominal MRI to better determine the origin of her mass. See orders Signed By: Yanick Gillespie [facility]

## 2019-04-01 NOTE — PROGRESS NOTES
4/1/2019 Admit Date: 3/27/2019 Admit Diagnosis: Neoplasm of diaphragm [D49.2] Pericardial effusion [I31.3] Pleural effusion, left [J90] Near syncope [R55] Principal Problem: 
  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 
  Pericardial effusion (3/27/2019) Pleural effusion, left (3/27/2019) Near syncope (3/27/2019) Assessment/Plan: 1. Status post pericardiocentesis with minimal residual effusion. Echo was otherwise normal. 
2. Has a moderate to large L pleural effusion 3. Has pain from the liver bx and pericardiocentesis site Plan: May benefit from thoracentesis. Will ask the hospitalist to pursue Analgesics for pain control Increase activity Should be able to go home soon Subjective: 
Has some subxiphoid pain but is otherwise well Nadyne Ronnie admits to subxiphoid pain. Objective:  
 
Visit Vitals /89 (BP 1 Location: Left arm, BP Patient Position: At rest) Pulse 88 Temp 98.4 °F (36.9 °C) Resp 18 Ht 5' 7\" (1.702 m) Wt 113.3 kg (249 lb 12.8 oz) LMP 06/10/2013 SpO2 93% BMI 39.12 kg/m² Physical Exam: 
Neck: supple, symmetrical, trachea midline, no adenopathy, thyroid: not enlarged, symmetric, no tenderness/mass/nodules, no carotid bruit and no JVD Heart: regular rate and rhythm, S1, S2 normal, no rub Lungs: dullness to percussion L anterior, L base, diminished breath sounds L anterior, L base Abdomen: tenderness in the subxiphoid area without drainage Extremities: extremities normal, atraumatic, no cyanosis or edema Pulses: 2+ and symmetric Neurologic: Grossly normal 
 
 
Labs:   
No results for input(s): CPK, CKMB, TROIQ in the last 72 hours. No lab exists for component: CKQMB, CPKMB Recent Labs 04/01/19 
0447   
K 3.3*  
 BUN 7  
CREA 0.58 GLU 95  
CA 8.5 Recent Labs 04/01/19 
0447 WBC 12.7* HGB 9.5* HCT 31.2*  
* No results for input(s): CHOL, LDLC in the last 72 hours. No lab exists for component: TGL, HDLC,  HBA1C Telemetry: normal sinus rhythm Data Review: current meds, labs,recent radiology, intake/output/weight and problem list reviewed

## 2019-04-01 NOTE — PROGRESS NOTES
[id number] [facility] Surgery Subjective Still has some fullness in her chest, no pain Objective Patient Vitals for the past 24 hrs: 
 Temp Pulse Resp BP SpO2  
04/01/19 1623     97 % 04/01/19 1147 99.1 °F (37.3 °C) 80 18 128/85 95 % 04/01/19 0708 98.4 °F (36.9 °C) 88 18 130/89 93 % 04/01/19 0414 98 °F (36.7 °C) 84 18 130/80 95 % 04/01/19 0021 98 °F (36.7 °C) 82 18 122/86 96 % 03/31/19 1959 98.3 °F (36.8 °C) 79 18 124/80 97 % Date 03/31/19 0700 - 04/01/19 4585 04/01/19 0700 - 04/02/19 7474 Shift 9135-1498 2422-3265 24 Hour Total 2252-4048 6791-0250 24 Hour Total  
INTAKE  
P.O. 200  200     
  P. O. 200  200 Shift Total(mL/kg) 200(1.8)  200(1.8) OUTPUT Drains 110  110 Output (ml) (Pericardial Drain 03/30/19 Mid) 110  110 Shift Total(mL/kg) 110(1)  110(1) NET 90  90 Weight (kg) 112.3 113.3 113.3 113.3 113.3 113.3 PE 
Pulm - CTAB 
CV - RRR Abd - soft, ND, BS present, NTTP Labs Lab Results Component Value Date/Time Sodium 140 04/01/2019 04:47 AM  
 Potassium 3.3 (L) 04/01/2019 04:47 AM  
 Chloride 106 04/01/2019 04:47 AM  
 CO2 29 04/01/2019 04:47 AM  
 Anion gap 5 04/01/2019 04:47 AM  
 Glucose 95 04/01/2019 04:47 AM  
 BUN 7 04/01/2019 04:47 AM  
 Creatinine 0.58 04/01/2019 04:47 AM  
 BUN/Creatinine ratio 12 04/01/2019 04:47 AM  
 GFR est AA >60 04/01/2019 04:47 AM  
 GFR est non-AA >60 04/01/2019 04:47 AM  
 Calcium 8.5 04/01/2019 04:47 AM  
 Bilirubin, total 0.8 03/27/2019 11:52 AM  
 AST (SGOT) 17 03/27/2019 11:52 AM  
 Alk. phosphatase 243 (H) 03/27/2019 11:52 AM  
 Protein, total 8.3 (H) 03/27/2019 11:52 AM  
 Albumin 2.3 (L) 03/27/2019 11:52 AM  
 Globulin 6.0 (H) 03/27/2019 11:52 AM  
 A-G Ratio 0.4 (L) 03/27/2019 11:52 AM  
 ALT (SGPT) 16 03/27/2019 11:52 AM  
 
Lab Results Component Value Date/Time  WBC 12.7 (H) 04/01/2019 04:47 AM  
 WBC 7.2 05/14/2012 10:42 AM  
 Hemoglobin (POC) 9.6 05/14/2012 10:55 AM  
 Hemoglobin (POC) 11.2 (L) 01/31/2010 01:10 PM  
 HGB 9.5 (L) 04/01/2019 04:47 AM  
 Hematocrit (POC) 33 (L) 01/31/2010 01:10 PM  
 HCT 31.2 (L) 04/01/2019 04:47 AM  
 PLATELET 392 (H) 50/98/7476 04:47 AM  
 MCV 81.9 04/01/2019 04:47 AM  
 
 CXR - A portable AP radiograph of the chest was obtained at 1116 hours. Lines and tubes: The patient is on a cardiac monitor. The pericardial drain has 
been removed. There is small amount of residual pneumopericardium. Lungs: There is left lower lobe atelectasis. The right lung is clear. Pleura: The left pleural effusion has increased . Mediastinum: The cardiac and mediastinal contours and pulmonary vascularity are 
normal. 
Bones and soft tissues: The bones and soft tissues are grossly within normal 
limits. 
  
IMPRESSION IMPRESSION: Increased left pleural effusion. Assessment Reji Powell is a 46 y. 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 
following Briana Tai MD

## 2019-04-02 ENCOUNTER — ANESTHESIA EVENT (OUTPATIENT)
Dept: SURGERY | Age: 49
DRG: 371 | End: 2019-04-02
Payer: COMMERCIAL

## 2019-04-02 LAB
BACTERIA SPEC CULT: NORMAL
BACTERIA SPEC CULT: NORMAL
GRAM STN SPEC: NORMAL
GRAM STN SPEC: NORMAL
SERVICE CMNT-IMP: NORMAL

## 2019-04-02 PROCEDURE — 74011250637 HC RX REV CODE- 250/637: Performed by: INTERNAL MEDICINE

## 2019-04-02 PROCEDURE — 74011250637 HC RX REV CODE- 250/637: Performed by: FAMILY MEDICINE

## 2019-04-02 PROCEDURE — 65660000000 HC RM CCU STEPDOWN

## 2019-04-02 PROCEDURE — 74011250637 HC RX REV CODE- 250/637: Performed by: NURSE PRACTITIONER

## 2019-04-02 RX ORDER — POTASSIUM CHLORIDE 750 MG/1
40 TABLET, FILM COATED, EXTENDED RELEASE ORAL
Status: COMPLETED | OUTPATIENT
Start: 2019-04-02 | End: 2019-04-02

## 2019-04-02 RX ADMIN — TRAMADOL HYDROCHLORIDE 50 MG: 50 TABLET, FILM COATED ORAL at 13:15

## 2019-04-02 RX ADMIN — TRAMADOL HYDROCHLORIDE 50 MG: 50 TABLET, FILM COATED ORAL at 17:33

## 2019-04-02 RX ADMIN — VITAMIN D 3000 UNITS: 25 TAB ORAL at 09:42

## 2019-04-02 RX ADMIN — Medication 10 ML: at 20:29

## 2019-04-02 RX ADMIN — LOSARTAN POTASSIUM 25 MG: 25 TABLET, FILM COATED ORAL at 09:41

## 2019-04-02 RX ADMIN — LORAZEPAM 1 MG: 1 TABLET ORAL at 20:29

## 2019-04-02 RX ADMIN — COLCHICINE 0.6 MG: 0.6 TABLET, FILM COATED ORAL at 09:41

## 2019-04-02 RX ADMIN — MULTIPLE VITAMINS W/ MINERALS TAB 1 TABLET: TAB at 09:41

## 2019-04-02 RX ADMIN — ANTACID TABLETS 520 MG: 648 TABLET, CHEWABLE ORAL at 09:41

## 2019-04-02 RX ADMIN — Medication 10 ML: at 13:10

## 2019-04-02 RX ADMIN — ACETAMINOPHEN 325 MG: 325 TABLET ORAL at 17:33

## 2019-04-02 RX ADMIN — Medication 500 MCG: at 09:41

## 2019-04-02 RX ADMIN — PAROXETINE HYDROCHLORIDE 40 MG: 20 TABLET, FILM COATED ORAL at 09:41

## 2019-04-02 RX ADMIN — POTASSIUM CHLORIDE 40 MEQ: 750 TABLET, EXTENDED RELEASE ORAL at 09:41

## 2019-04-02 RX ADMIN — PANTOPRAZOLE SODIUM 40 MG: 40 TABLET, DELAYED RELEASE ORAL at 09:41

## 2019-04-02 NOTE — PROGRESS NOTES
Events noted. I will sign off for now. Please call if needed. Thank you for asking me to see this very nice lady.

## 2019-04-02 NOTE — PROGRESS NOTES
[id number] [facility] Surgery Subjective No acute issues Objective Patient Vitals for the past 24 hrs: 
 Temp Pulse Resp BP SpO2  
04/02/19 1202 98.3 °F (36.8 °C) 92 18 139/86 96 % 04/02/19 0752 98.9 °F (37.2 °C) 85 18 134/88 94 % 04/02/19 0557 99.1 °F (37.3 °C) 89 18 132/87 92 % 04/02/19 0000 99.4 °F (37.4 °C) 92 16 137/90 93 % 04/01/19 2033 99.1 °F (37.3 °C) (!) 105 18 139/83 93 % 04/01/19 1623     97 % PE 
Pulm - CTAB 
CV - RRR Abd - soft, ND, BS Present, NTTP Labs No results found for this or any previous visit (from the past 12 hour(s)). Assessment Larry Councilman is a 46 y. 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 We discussed MRI and WBC scan to see if there is involvement with the liver or if there is an abscess. The quickest way to see if if there is liver/diaphram involvement is to do a diagnostic laparoscopy and EGD. I am planning on this for tomorrow am at 730 to see if there is anything in the liver or any infection related to the bypass, which it does not. I will make her NPO at MN and consent for surgery in the am.  I can also biopsy tomorrow as well if I see anything.    
 
Nupur Martin MD

## 2019-04-02 NOTE — PROGRESS NOTES
Heme/ONC Case discussed at cancer conference today Path from liver biopsy shows ? Abscess no malignancy Pericardial fluid still pending/ culture negative so far Radiology review suggests possible MRI chest/ abd vs tagged wbc scan to r/o infection.   
Team will d/w pt

## 2019-04-02 NOTE — PROGRESS NOTES
Spiritual Care Partner Volunteer visited patient in room on 4.02.19. Documented by: : Rev. Jaylon Grant. Nilay Henry; [facility], to contact [phone] Jozef Funk call: [phone]

## 2019-04-02 NOTE — PROGRESS NOTES
Problem: Falls - Risk of 
Goal: *Absence of Falls Description Document Haysi Kinjal Fall Risk and appropriate interventions in the flowsheet. Outcome: Progressing Towards Goal 
  
Problem: Falls - Risk of 
Goal: *Absence of Falls Description Document Haysi Kinjal Fall Risk and appropriate interventions in the flowsheet. Outcome: Progressing Towards Goal 
  
Problem: Pressure Injury - Risk of 
Goal: *Prevention of pressure injury Description Document Parrish Scale and appropriate interventions in the flowsheet. Outcome: Progressing Towards Goal 
  
Problem: General Medical Care Plan Goal: *Vital signs within specified parameters Outcome: Progressing Towards Goal 
Goal: *Skin integrity maintained Outcome: Progressing Towards Goal

## 2019-04-02 NOTE — PROGRESS NOTES
Hospitalist Progress Note Marti Gonzales MD 
     
                             Answering service: [phone] OR [phone] from in house phone Date of Service:  2019 NAME:  Lauro Bain :  1970 MRN:  [id number] Admission Summary:  
46 y.o. white female with past medical history of anemia, anxiety Disorder, depression, arthritis, hypertension, menorrhagia, morbid obesity, s/p gastric bypass surgery was brought to the ED from work via EMS with chief complaint of generalized weakness, fatigue, feeling like she was going to pass out and diarrhea. Reason for follow up:  
 CC: Generalized weakness, fatigue, and near passing out. Resting comfortably on am rounds. Denied any SOB or chest pains. C/o of flank pain.  at bedside, they were very happy with pathology results ruling out malignancy Assessment & Plan:  
 
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 S/P CT-guided biopsy done on 3/29/19. Thoracic surgical F/Us noted and appreciated. - Pathology results show organizing abscess, no Malignancy seen. - Surgical team following, will probably need resection and long course of abx. Consider ID consult. - Clinically well, no need for abx now. 2) Resp: Small left pleural effusion without any respiratory failure. As needed oxygen, diuresis and incentive spirometry. 3) CVS: Near Syncope on admission. Controlled Primary hypertension. 2D ECHO with moderate to large pericardial effusion. Low threshold for Cardiac Tamponade. S/P Pericardiocentesis 3/30/19. F/U 2D ECHO 3/31/19 showed minimal residual effusion. Cardiology following. 4) Hematology: Probable anemia of chronicity. 5) Electrolytes: Resolved Hypokalemia. 6) Endocrine: Morbid obesity with a BMI greater than 40.  H/O Gastric Bypass surgery. Therapeutic lifestyle changes counselling done. 7) JOHANNA: Generalized weakness. PT as tolerated. 8) Psych: Anxiety Disorder. Depression. As needed anxiolytics. 9) Prophylaxis: DVT. 10) Directives: Full Code with a guarded prognosis. D/W patient and family. 11) Plan: Possible transfer to Telemetry floor after drain removal. Discussed with patient and patient's  Yolanda Dawson who can be reached at [phone]. D/W RN. 
[facility] Problems  Date Reviewed: 3/29/2019 Codes Class Noted POA Pericardial effusion ICD-10-CM: I31.3 ICD-9-CM: 423.9  3/27/2019 Unknown Pleural effusion, left ICD-10-CM: J90 ICD-9-CM: 511.9  3/27/2019 Unknown Near syncope ICD-10-CM: R55 
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  
 
 Last 24hrs VS reviewed since prior progress note. Most recent are: 
Visit Vitals /86 (BP 1 Location: Right arm, BP Patient Position: At rest) Pulse 92 Temp 98.3 °F (36.8 °C) Resp 18 Ht 5' 7\" (1.702 m) Wt 113.3 kg (249 lb 12.8 oz) SpO2 96% BMI 39.12 kg/m² Constitutional:  No acute distress, cooperative, pleasant   
   
Resp:  Decreased air entry B/L. Drain in situ. CV:  S1,S2 Tachycardic GI:  Soft, obese, non distended, non tender. normoactive bowel sounds,  
:  No CVA or suprapubic tenderness Musculoskeletal:  Bipedal edema. Neurologic:  Awake, alert and oriented. No intake or output data in the 24 hours ending 04/02/19 1238 Labs:  
 
Recent Labs 04/01/19 
8313 03/31/19 
6637 WBC 12.7* 12.4* HGB 9.5* 9.4* HCT 31.2* 31.2*  
* 379 Recent Labs 04/01/19 
4405 03/31/19 
1808  138  
K 3.3* 3.8  107 CO2 29 27 BUN 7 6 CREA 0.58 0.51* GLU 95 88 CA 8.5 8.6 No results for input(s): SGOT, GPT, ALT, AP, TBIL, TBILI, TP, ALB, GLOB, GGT, AML, LPSE in the last 72 hours. No lab exists for component: AMYP, HLPSE No results for input(s): INR, PTP, APTT in the last 72 hours. No lab exists for component: INREXT, INREXT No results for input(s): FE, TIBC, PSAT, FERR in the last 72 hours. Lab Results Component Value Date/Time Folate 8.0 04/10/2009 06:00 PM  
  
No results for input(s): PH, PCO2, PO2 in the last 72 hours. No results for input(s): CPK, CKNDX, TROIQ in the last 72 hours. No lab exists for component: CPKMB Lab Results Component Value Date/Time Cholesterol, total 211 (H) 10/13/2017 04:23 PM  
 HDL Cholesterol 44 10/13/2017 04:23 PM  
 LDL, calculated 113 (H) 10/13/2017 04:23 PM  
 Triglyceride 268 (H) 10/13/2017 04:23 PM  
 
Lab Results Component Value Date/Time Glucose (POC) 97 01/31/2010 01:10 PM  
 Glucose  05/14/2012 10:55 AM  
 
Lab Results Component Value Date/Time Color 0-3 03/27/2019 12:17 PM  
 Appearance CLOUDY (A) 03/27/2019 12:17 PM  
 Specific gravity 1.026 03/27/2019 12:17 PM  
 pH (UA) 5.5 03/27/2019 12:17 PM  
 Protein 100 (A) 03/27/2019 12:17 PM  
 Glucose NEGATIVE  03/27/2019 12:17 PM  
 Ketone 15 (A) 03/27/2019 12:17 PM  
 Bilirubin NEGATIVE  03/10/2015 02:45 PM  
 Urobilinogen 1.0 03/27/2019 12:17 PM  
 Nitrites NEGATIVE  03/27/2019 12:17 PM  
 Leukocyte Esterase SMALL (A) 03/27/2019 12:17 PM  
 Epithelial cells MANY (A) 03/27/2019 12:17 PM  
 Bacteria 2+ (A) 03/27/2019 12:17 PM  
 WBC 0-4 03/27/2019 12:17 PM  
 RBC 0-5 03/27/2019 12:17 PM  
 
 
 
Medications Reviewed:  
 
Current Facility-Administered Medications Medication Dose Route Frequency  traMADol (ULTRAM) tablet 50 mg  50 mg Oral Q4H PRN  
 colchicine tablet 0.6 mg  0.6 mg Oral DAILY  multivitamin, tx-iron-ca-min (THERA-M w/ IRON) tablet 1 Tab  1 Tab Oral DAILY  calcium carbonate tablet 520 mg [elemental]  520 mg Oral DAILY  cholecalciferol (VITAMIN D3) tablet 3,000 Units  3,000 Units Oral DAILY  cyanocobalamin (VITAMIN B12) tablet 500 mcg  500 mcg Oral DAILY  LORazepam (ATIVAN) tablet 1 mg  1 mg Oral TID PRN  
 losartan (COZAAR) tablet 25 mg  25 mg Oral DAILY  PARoxetine (PAXIL) tablet 40 mg  40 mg Oral DAILY  sodium chloride (NS) flush 5-40 mL  5-40 mL IntraVENous Q8H  
 sodium chloride (NS) flush 5-40 mL  5-40 mL IntraVENous PRN  
 ondansetron (ZOFRAN) injection 4 mg  4 mg IntraVENous Q6H PRN  
 acetaminophen (TYLENOL) tablet 650 mg  650 mg Oral Q6H PRN  pantoprazole (PROTONIX) tablet 40 mg  40 mg Oral DAILY  
 
______________________________________________________________________ EXPECTED LENGTH OF STAY: 4d 21h ACTUAL LENGTH OF STAY:          6 Marcelo Molina MD

## 2019-04-02 NOTE — PROGRESS NOTES
Bedside and Verbal shift change report given to Deann Amezcua (oncoming nurse) by Jeannette Serna (offgoing nurse). Report included the following information SBAR, Kardex, Intake/Output, MAR, Recent Results, Med Rec Status, Cardiac Rhythm NSR and Quality Measures.

## 2019-04-03 ENCOUNTER — ANESTHESIA (OUTPATIENT)
Dept: SURGERY | Age: 49
DRG: 371 | End: 2019-04-03
Payer: COMMERCIAL

## 2019-04-03 PROBLEM — K75.0 ABSCESS, LIVER: Status: ACTIVE | Noted: 2019-04-03

## 2019-04-03 PROCEDURE — 74011000250 HC RX REV CODE- 250

## 2019-04-03 PROCEDURE — 77030012407 HC DRN WND BARD -B: Performed by: SURGERY

## 2019-04-03 PROCEDURE — 0DJ08ZZ INSPECTION OF UPPER INTESTINAL TRACT, VIA NATURAL OR ARTIFICIAL OPENING ENDOSCOPIC: ICD-10-PCS | Performed by: SURGERY

## 2019-04-03 PROCEDURE — 77030031139 HC SUT VCRL2 J&J -A: Performed by: SURGERY

## 2019-04-03 PROCEDURE — 77030013567 HC DRN WND RESERV BARD -A: Performed by: SURGERY

## 2019-04-03 PROCEDURE — 76010000131 HC OR TIME 2 TO 2.5 HR: Performed by: SURGERY

## 2019-04-03 PROCEDURE — 87186 SC STD MICRODIL/AGAR DIL: CPT

## 2019-04-03 PROCEDURE — 74011250637 HC RX REV CODE- 250/637: Performed by: INTERNAL MEDICINE

## 2019-04-03 PROCEDURE — 74011250636 HC RX REV CODE- 250/636: Performed by: INTERNAL MEDICINE

## 2019-04-03 PROCEDURE — 77030026438 HC STYL ET INTUB CARD -A: Performed by: ANESTHESIOLOGY

## 2019-04-03 PROCEDURE — 65660000000 HC RM CCU STEPDOWN

## 2019-04-03 PROCEDURE — 77030035051: Performed by: SURGERY

## 2019-04-03 PROCEDURE — 74011250637 HC RX REV CODE- 250/637: Performed by: FAMILY MEDICINE

## 2019-04-03 PROCEDURE — 88305 TISSUE EXAM BY PATHOLOGIST: CPT

## 2019-04-03 PROCEDURE — 77030008756 HC TU IRR SUC STRY -B: Performed by: SURGERY

## 2019-04-03 PROCEDURE — 77030020263 HC SOL INJ SOD CL0.9% LFCR 1000ML: Performed by: SURGERY

## 2019-04-03 PROCEDURE — 74011250637 HC RX REV CODE- 250/637: Performed by: SURGERY

## 2019-04-03 PROCEDURE — 0W9G40Z DRAINAGE OF PERITONEAL CAVITY WITH DRAINAGE DEVICE, PERCUTANEOUS ENDOSCOPIC APPROACH: ICD-10-PCS | Performed by: SURGERY

## 2019-04-03 PROCEDURE — 74011250636 HC RX REV CODE- 250/636

## 2019-04-03 PROCEDURE — 87077 CULTURE AEROBIC IDENTIFY: CPT

## 2019-04-03 PROCEDURE — 74011250637 HC RX REV CODE- 250/637: Performed by: ANESTHESIOLOGY

## 2019-04-03 PROCEDURE — 0DBW4ZX EXCISION OF PERITONEUM, PERCUTANEOUS ENDOSCOPIC APPROACH, DIAGNOSTIC: ICD-10-PCS | Performed by: SURGERY

## 2019-04-03 PROCEDURE — 77030037032 HC INSRT SCIS CLICKLLINE DISP STOR -B: Performed by: SURGERY

## 2019-04-03 PROCEDURE — 74011000258 HC RX REV CODE- 258: Performed by: INTERNAL MEDICINE

## 2019-04-03 PROCEDURE — 87205 SMEAR GRAM STAIN: CPT

## 2019-04-03 PROCEDURE — 74011250636 HC RX REV CODE- 250/636: Performed by: ANESTHESIOLOGY

## 2019-04-03 PROCEDURE — 77030020053 HC ELECTRD LAPSCP COVD -B: Performed by: SURGERY

## 2019-04-03 PROCEDURE — 77030002933 HC SUT MCRYL J&J -A: Performed by: SURGERY

## 2019-04-03 PROCEDURE — 87185 SC STD ENZYME DETCJ PER NZM: CPT

## 2019-04-03 PROCEDURE — 74011000250 HC RX REV CODE- 250: Performed by: SURGERY

## 2019-04-03 PROCEDURE — 76060000035 HC ANESTHESIA 2 TO 2.5 HR: Performed by: SURGERY

## 2019-04-03 PROCEDURE — 77030020782 HC GWN BAIR PAWS FLX 3M -B

## 2019-04-03 PROCEDURE — 77030002916 HC SUT ETHLN J&J -A: Performed by: SURGERY

## 2019-04-03 PROCEDURE — 77030018836 HC SOL IRR NACL ICUM -A: Performed by: SURGERY

## 2019-04-03 PROCEDURE — 77030040361 HC SLV COMPR DVT MDII -B: Performed by: SURGERY

## 2019-04-03 PROCEDURE — 77030020747 HC TU INSUF ENDOSC TELE -A: Performed by: SURGERY

## 2019-04-03 PROCEDURE — 77030035045 HC TRCR ENDOSC VRSPRT BLDLSS COVD -B: Performed by: SURGERY

## 2019-04-03 PROCEDURE — 77030035048 HC TRCR ENDOSC OPTCL COVD -B: Performed by: SURGERY

## 2019-04-03 PROCEDURE — 74011250636 HC RX REV CODE- 250/636: Performed by: SURGERY

## 2019-04-03 PROCEDURE — 77030039266 HC ADH SKN EXOFIN S2SG -A: Performed by: SURGERY

## 2019-04-03 PROCEDURE — 88304 TISSUE EXAM BY PATHOLOGIST: CPT

## 2019-04-03 PROCEDURE — 76210000016 HC OR PH I REC 1 TO 1.5 HR: Performed by: SURGERY

## 2019-04-03 PROCEDURE — P9045 ALBUMIN (HUMAN), 5%, 250 ML: HCPCS

## 2019-04-03 PROCEDURE — 77030002895 HC DEV VASC CLOSR COVD -B: Performed by: SURGERY

## 2019-04-03 PROCEDURE — 77030011640 HC PAD GRND REM COVD -A: Performed by: SURGERY

## 2019-04-03 PROCEDURE — 74011250637 HC RX REV CODE- 250/637: Performed by: NURSE PRACTITIONER

## 2019-04-03 PROCEDURE — 77030008684 HC TU ET CUF COVD -B: Performed by: ANESTHESIOLOGY

## 2019-04-03 RX ORDER — SODIUM CHLORIDE 9 MG/ML
25 INJECTION, SOLUTION INTRAVENOUS CONTINUOUS
Status: DISCONTINUED | OUTPATIENT
Start: 2019-04-03 | End: 2019-04-03 | Stop reason: HOSPADM

## 2019-04-03 RX ORDER — NEOSTIGMINE METHYLSULFATE 1 MG/ML
INJECTION INTRAVENOUS AS NEEDED
Status: DISCONTINUED | OUTPATIENT
Start: 2019-04-03 | End: 2019-04-03 | Stop reason: HOSPADM

## 2019-04-03 RX ORDER — SODIUM CHLORIDE 0.9 % (FLUSH) 0.9 %
5-40 SYRINGE (ML) INJECTION EVERY 8 HOURS
Status: DISCONTINUED | OUTPATIENT
Start: 2019-04-03 | End: 2019-04-03 | Stop reason: HOSPADM

## 2019-04-03 RX ORDER — FENTANYL CITRATE 50 UG/ML
50 INJECTION, SOLUTION INTRAMUSCULAR; INTRAVENOUS AS NEEDED
Status: DISCONTINUED | OUTPATIENT
Start: 2019-04-03 | End: 2019-04-03 | Stop reason: HOSPADM

## 2019-04-03 RX ORDER — VANCOMYCIN/0.9 % SOD CHLORIDE 1.5G/250ML
1500 PLASTIC BAG, INJECTION (ML) INTRAVENOUS ONCE
Status: COMPLETED | OUTPATIENT
Start: 2019-04-03 | End: 2019-04-03

## 2019-04-03 RX ORDER — SODIUM CHLORIDE, SODIUM LACTATE, POTASSIUM CHLORIDE, CALCIUM CHLORIDE 600; 310; 30; 20 MG/100ML; MG/100ML; MG/100ML; MG/100ML
125 INJECTION, SOLUTION INTRAVENOUS CONTINUOUS
Status: DISCONTINUED | OUTPATIENT
Start: 2019-04-03 | End: 2019-04-03 | Stop reason: HOSPADM

## 2019-04-03 RX ORDER — ONDANSETRON 2 MG/ML
4 INJECTION INTRAMUSCULAR; INTRAVENOUS AS NEEDED
Status: DISCONTINUED | OUTPATIENT
Start: 2019-04-03 | End: 2019-04-03 | Stop reason: HOSPADM

## 2019-04-03 RX ORDER — GLYCOPYRROLATE 0.2 MG/ML
INJECTION INTRAMUSCULAR; INTRAVENOUS AS NEEDED
Status: DISCONTINUED | OUTPATIENT
Start: 2019-04-03 | End: 2019-04-03 | Stop reason: HOSPADM

## 2019-04-03 RX ORDER — FENTANYL CITRATE 50 UG/ML
25 INJECTION, SOLUTION INTRAMUSCULAR; INTRAVENOUS
Status: DISCONTINUED | OUTPATIENT
Start: 2019-04-03 | End: 2019-04-03 | Stop reason: HOSPADM

## 2019-04-03 RX ORDER — VANCOMYCIN 1.75 GRAM/500 ML IN 0.9 % SODIUM CHLORIDE INTRAVENOUS
1750 EVERY 12 HOURS
Status: DISCONTINUED | OUTPATIENT
Start: 2019-04-03 | End: 2019-04-05 | Stop reason: DRUGHIGH

## 2019-04-03 RX ORDER — SODIUM CHLORIDE 0.9 % (FLUSH) 0.9 %
5-40 SYRINGE (ML) INJECTION AS NEEDED
Status: DISCONTINUED | OUTPATIENT
Start: 2019-04-03 | End: 2019-04-03 | Stop reason: HOSPADM

## 2019-04-03 RX ORDER — HYDROMORPHONE HYDROCHLORIDE 2 MG/ML
INJECTION, SOLUTION INTRAMUSCULAR; INTRAVENOUS; SUBCUTANEOUS AS NEEDED
Status: DISCONTINUED | OUTPATIENT
Start: 2019-04-03 | End: 2019-04-03 | Stop reason: HOSPADM

## 2019-04-03 RX ORDER — BUPIVACAINE HYDROCHLORIDE AND EPINEPHRINE 5; 5 MG/ML; UG/ML
INJECTION, SOLUTION EPIDURAL; INTRACAUDAL; PERINEURAL AS NEEDED
Status: DISCONTINUED | OUTPATIENT
Start: 2019-04-03 | End: 2019-04-03 | Stop reason: HOSPADM

## 2019-04-03 RX ORDER — HYDROMORPHONE HYDROCHLORIDE 1 MG/ML
0.2 INJECTION, SOLUTION INTRAMUSCULAR; INTRAVENOUS; SUBCUTANEOUS
Status: DISCONTINUED | OUTPATIENT
Start: 2019-04-03 | End: 2019-04-03 | Stop reason: HOSPADM

## 2019-04-03 RX ORDER — MIDAZOLAM HYDROCHLORIDE 1 MG/ML
INJECTION, SOLUTION INTRAMUSCULAR; INTRAVENOUS AS NEEDED
Status: DISCONTINUED | OUTPATIENT
Start: 2019-04-03 | End: 2019-04-03 | Stop reason: HOSPADM

## 2019-04-03 RX ORDER — MIDAZOLAM HYDROCHLORIDE 1 MG/ML
0.5 INJECTION, SOLUTION INTRAMUSCULAR; INTRAVENOUS
Status: DISCONTINUED | OUTPATIENT
Start: 2019-04-03 | End: 2019-04-03 | Stop reason: HOSPADM

## 2019-04-03 RX ORDER — PROPOFOL 10 MG/ML
INJECTION, EMULSION INTRAVENOUS AS NEEDED
Status: DISCONTINUED | OUTPATIENT
Start: 2019-04-03 | End: 2019-04-03 | Stop reason: HOSPADM

## 2019-04-03 RX ORDER — FENTANYL CITRATE 50 UG/ML
INJECTION, SOLUTION INTRAMUSCULAR; INTRAVENOUS AS NEEDED
Status: DISCONTINUED | OUTPATIENT
Start: 2019-04-03 | End: 2019-04-03 | Stop reason: HOSPADM

## 2019-04-03 RX ORDER — LIDOCAINE HYDROCHLORIDE 10 MG/ML
0.1 INJECTION, SOLUTION EPIDURAL; INFILTRATION; INTRACAUDAL; PERINEURAL AS NEEDED
Status: DISCONTINUED | OUTPATIENT
Start: 2019-04-03 | End: 2019-04-03 | Stop reason: HOSPADM

## 2019-04-03 RX ORDER — ROCURONIUM BROMIDE 10 MG/ML
INJECTION, SOLUTION INTRAVENOUS AS NEEDED
Status: DISCONTINUED | OUTPATIENT
Start: 2019-04-03 | End: 2019-04-03 | Stop reason: HOSPADM

## 2019-04-03 RX ORDER — OXYCODONE AND ACETAMINOPHEN 5; 325 MG/1; MG/1
1 TABLET ORAL AS NEEDED
Status: DISCONTINUED | OUTPATIENT
Start: 2019-04-03 | End: 2019-04-03 | Stop reason: HOSPADM

## 2019-04-03 RX ORDER — ONDANSETRON 2 MG/ML
INJECTION INTRAMUSCULAR; INTRAVENOUS AS NEEDED
Status: DISCONTINUED | OUTPATIENT
Start: 2019-04-03 | End: 2019-04-03 | Stop reason: HOSPADM

## 2019-04-03 RX ORDER — DEXAMETHASONE SODIUM PHOSPHATE 4 MG/ML
INJECTION, SOLUTION INTRA-ARTICULAR; INTRALESIONAL; INTRAMUSCULAR; INTRAVENOUS; SOFT TISSUE AS NEEDED
Status: DISCONTINUED | OUTPATIENT
Start: 2019-04-03 | End: 2019-04-03 | Stop reason: HOSPADM

## 2019-04-03 RX ORDER — MIDAZOLAM HYDROCHLORIDE 1 MG/ML
1 INJECTION, SOLUTION INTRAMUSCULAR; INTRAVENOUS AS NEEDED
Status: DISCONTINUED | OUTPATIENT
Start: 2019-04-03 | End: 2019-04-03 | Stop reason: HOSPADM

## 2019-04-03 RX ORDER — ALBUMIN HUMAN 50 G/1000ML
SOLUTION INTRAVENOUS AS NEEDED
Status: DISCONTINUED | OUTPATIENT
Start: 2019-04-03 | End: 2019-04-03 | Stop reason: HOSPADM

## 2019-04-03 RX ORDER — ACETAMINOPHEN 325 MG/1
650 TABLET ORAL ONCE
Status: COMPLETED | OUTPATIENT
Start: 2019-04-03 | End: 2019-04-03

## 2019-04-03 RX ORDER — LEVOFLOXACIN 5 MG/ML
500 INJECTION, SOLUTION INTRAVENOUS EVERY 24 HOURS
Status: DISCONTINUED | OUTPATIENT
Start: 2019-04-03 | End: 2019-04-03

## 2019-04-03 RX ORDER — DIPHENHYDRAMINE HYDROCHLORIDE 50 MG/ML
12.5 INJECTION, SOLUTION INTRAMUSCULAR; INTRAVENOUS AS NEEDED
Status: DISCONTINUED | OUTPATIENT
Start: 2019-04-03 | End: 2019-04-03 | Stop reason: HOSPADM

## 2019-04-03 RX ORDER — SUCCINYLCHOLINE CHLORIDE 20 MG/ML
INJECTION INTRAMUSCULAR; INTRAVENOUS AS NEEDED
Status: DISCONTINUED | OUTPATIENT
Start: 2019-04-03 | End: 2019-04-03 | Stop reason: HOSPADM

## 2019-04-03 RX ORDER — PHENYLEPHRINE HCL IN 0.9% NACL 0.4MG/10ML
SYRINGE (ML) INTRAVENOUS AS NEEDED
Status: DISCONTINUED | OUTPATIENT
Start: 2019-04-03 | End: 2019-04-03 | Stop reason: HOSPADM

## 2019-04-03 RX ORDER — LIDOCAINE HYDROCHLORIDE 20 MG/ML
INJECTION, SOLUTION EPIDURAL; INFILTRATION; INTRACAUDAL; PERINEURAL AS NEEDED
Status: DISCONTINUED | OUTPATIENT
Start: 2019-04-03 | End: 2019-04-03 | Stop reason: HOSPADM

## 2019-04-03 RX ORDER — MORPHINE SULFATE 10 MG/ML
2 INJECTION, SOLUTION INTRAMUSCULAR; INTRAVENOUS
Status: DISCONTINUED | OUTPATIENT
Start: 2019-04-03 | End: 2019-04-03 | Stop reason: HOSPADM

## 2019-04-03 RX ORDER — SODIUM CHLORIDE, SODIUM LACTATE, POTASSIUM CHLORIDE, CALCIUM CHLORIDE 600; 310; 30; 20 MG/100ML; MG/100ML; MG/100ML; MG/100ML
INJECTION, SOLUTION INTRAVENOUS
Status: DISCONTINUED | OUTPATIENT
Start: 2019-04-03 | End: 2019-04-03 | Stop reason: HOSPADM

## 2019-04-03 RX ADMIN — ALBUMIN HUMAN 250 ML: 50 SOLUTION INTRAVENOUS at 08:24

## 2019-04-03 RX ADMIN — DEXAMETHASONE SODIUM PHOSPHATE 6 MG: 4 INJECTION, SOLUTION INTRA-ARTICULAR; INTRALESIONAL; INTRAMUSCULAR; INTRAVENOUS; SOFT TISSUE at 07:44

## 2019-04-03 RX ADMIN — Medication 80 MCG: at 08:21

## 2019-04-03 RX ADMIN — FENTANYL CITRATE 50 MCG: 50 INJECTION, SOLUTION INTRAMUSCULAR; INTRAVENOUS at 07:33

## 2019-04-03 RX ADMIN — SODIUM CHLORIDE, SODIUM LACTATE, POTASSIUM CHLORIDE, AND CALCIUM CHLORIDE 125 ML/HR: 600; 310; 30; 20 INJECTION, SOLUTION INTRAVENOUS at 07:25

## 2019-04-03 RX ADMIN — HYDROMORPHONE HYDROCHLORIDE 0.4 MG: 2 INJECTION, SOLUTION INTRAMUSCULAR; INTRAVENOUS; SUBCUTANEOUS at 09:18

## 2019-04-03 RX ADMIN — TRAMADOL HYDROCHLORIDE 50 MG: 50 TABLET, FILM COATED ORAL at 20:31

## 2019-04-03 RX ADMIN — Medication 80 MCG: at 08:39

## 2019-04-03 RX ADMIN — VANCOMYCIN HYDROCHLORIDE 1750 MG: 10 INJECTION, POWDER, LYOPHILIZED, FOR SOLUTION INTRAVENOUS at 17:42

## 2019-04-03 RX ADMIN — MULTIPLE VITAMINS W/ MINERALS TAB 1 TABLET: TAB at 11:17

## 2019-04-03 RX ADMIN — SUCCINYLCHOLINE CHLORIDE 140 MG: 20 INJECTION INTRAMUSCULAR; INTRAVENOUS at 07:34

## 2019-04-03 RX ADMIN — SODIUM CHLORIDE, SODIUM LACTATE, POTASSIUM CHLORIDE, CALCIUM CHLORIDE: 600; 310; 30; 20 INJECTION, SOLUTION INTRAVENOUS at 09:29

## 2019-04-03 RX ADMIN — ONDANSETRON 4 MG: 2 INJECTION INTRAMUSCULAR; INTRAVENOUS at 09:10

## 2019-04-03 RX ADMIN — SODIUM CHLORIDE, SODIUM LACTATE, POTASSIUM CHLORIDE, CALCIUM CHLORIDE: 600; 310; 30; 20 INJECTION, SOLUTION INTRAVENOUS at 07:23

## 2019-04-03 RX ADMIN — Medication 500 MCG: at 11:19

## 2019-04-03 RX ADMIN — Medication 80 MCG: at 09:00

## 2019-04-03 RX ADMIN — ROCURONIUM BROMIDE 5 MG: 10 INJECTION, SOLUTION INTRAVENOUS at 07:33

## 2019-04-03 RX ADMIN — Medication 80 MCG: at 09:09

## 2019-04-03 RX ADMIN — LORAZEPAM 1 MG: 1 TABLET ORAL at 20:31

## 2019-04-03 RX ADMIN — NEOSTIGMINE METHYLSULFATE 3.5 MG: 1 INJECTION INTRAVENOUS at 09:22

## 2019-04-03 RX ADMIN — TRAMADOL HYDROCHLORIDE 50 MG: 50 TABLET, FILM COATED ORAL at 15:48

## 2019-04-03 RX ADMIN — Medication 80 MCG: at 08:57

## 2019-04-03 RX ADMIN — ACETAMINOPHEN 650 MG: 325 TABLET ORAL at 07:25

## 2019-04-03 RX ADMIN — Medication 80 MCG: at 07:57

## 2019-04-03 RX ADMIN — Medication 80 MCG: at 08:12

## 2019-04-03 RX ADMIN — DOXYCYCLINE 100 MG: 100 INJECTION, POWDER, LYOPHILIZED, FOR SOLUTION INTRAVENOUS at 20:34

## 2019-04-03 RX ADMIN — ROCURONIUM BROMIDE 25 MG: 10 INJECTION, SOLUTION INTRAVENOUS at 07:50

## 2019-04-03 RX ADMIN — Medication 80 MCG: at 08:24

## 2019-04-03 RX ADMIN — MIDAZOLAM HYDROCHLORIDE 2 MG: 1 INJECTION, SOLUTION INTRAMUSCULAR; INTRAVENOUS at 07:25

## 2019-04-03 RX ADMIN — GLYCOPYRROLATE 0.5 MG: 0.2 INJECTION INTRAMUSCULAR; INTRAVENOUS at 09:20

## 2019-04-03 RX ADMIN — Medication 10 ML: at 14:00

## 2019-04-03 RX ADMIN — Medication 80 MCG: at 08:54

## 2019-04-03 RX ADMIN — VITAMIN D 1000 UNITS: 25 TAB ORAL at 11:17

## 2019-04-03 RX ADMIN — Medication 80 MCG: at 08:27

## 2019-04-03 RX ADMIN — HYDROMORPHONE HYDROCHLORIDE 0.4 MG: 2 INJECTION, SOLUTION INTRAMUSCULAR; INTRAVENOUS; SUBCUTANEOUS at 08:05

## 2019-04-03 RX ADMIN — LOSARTAN POTASSIUM 25 MG: 25 TABLET, FILM COATED ORAL at 11:17

## 2019-04-03 RX ADMIN — PAROXETINE HYDROCHLORIDE 40 MG: 20 TABLET, FILM COATED ORAL at 11:17

## 2019-04-03 RX ADMIN — ANTACID TABLETS 520 MG: 648 TABLET, CHEWABLE ORAL at 11:17

## 2019-04-03 RX ADMIN — LIDOCAINE HYDROCHLORIDE 60 MG: 20 INJECTION, SOLUTION EPIDURAL; INFILTRATION; INTRACAUDAL; PERINEURAL at 07:33

## 2019-04-03 RX ADMIN — FENTANYL CITRATE 50 MCG: 50 INJECTION, SOLUTION INTRAMUSCULAR; INTRAVENOUS at 07:55

## 2019-04-03 RX ADMIN — VANCOMYCIN HYDROCHLORIDE 1.5 G: 10 INJECTION, POWDER, LYOPHILIZED, FOR SOLUTION INTRAVENOUS at 07:37

## 2019-04-03 RX ADMIN — PANTOPRAZOLE SODIUM 40 MG: 40 TABLET, DELAYED RELEASE ORAL at 11:19

## 2019-04-03 RX ADMIN — PROPOFOL 200 MG: 10 INJECTION, EMULSION INTRAVENOUS at 07:33

## 2019-04-03 NOTE — PROGRESS NOTES
Hospitalist Progress Note Lincoln Strauss MD 
     
                             Answering service: [phone] OR [phone] from in house phone Date of Service:  4/3/2019 NAME:  Serene Lennox :  1970 MRN:  [id number] Admission Summary:  
46 y.o. white female with past medical history of anemia, anxiety Disorder, depression, arthritis, hypertension, menorrhagia, morbid obesity, s/p gastric bypass surgery was brought to the ED from work via EMS with chief complaint of generalized weakness, fatigue, feeling like she was going to pass out and diarrhea. Reason for follow up:  
Pt seen and examined, just back from Abscess drainage, feels well, hungry asking to eat. Assessment & Plan:  
 
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 S/P CT-guided biopsy done on 3/29/19. Thoracic surgical F/Us noted and appreciated. - Pathology results show organizing abscess, no Malignancy seen. - Surgical team following, s/p drainage of abscess, Drain placed. Will discuss abx need with surgery 2) Resp: Small left pleural effusion without respiratory failure. Oxygen PRN, diuresis and incentive spirometry. 3) CVS: Near Syncope on admission. Controlled Primary hypertension. 2D ECHO with moderate to large pericardial effusion. Low threshold for Cardiac Tamponade. S/P Pericardiocentesis 3/30/19. F/U 2D ECHO 3/31/19 showed minimal residual effusion. Cardiology evaluated. 4) Hematology: Probable anemia of chronicity. 5) Electrolytes: Resolved Hypokalemia. 6) Endocrine: Morbid obesity with a BMI greater than 40. H/O Gastric Bypass surgery. Therapeutic lifestyle changes counselling done. 7) JOHANNA: Generalized weakness. PT as tolerated. 8) Psych: Anxiety Disorder. Depression. As needed anxiolytics. 9) Prophylaxis: DVT. 10) Directives: Full Code with a guarded prognosis. D/W patient and family. 11) Plan: TBD 
  
Hospital Problems  Date Reviewed: 4/3/2019 Codes Class Noted POA Pericardial effusion ICD-10-CM: I31.3 ICD-9-CM: 423.9  3/27/2019 Unknown Pleural effusion, left ICD-10-CM: J90 ICD-9-CM: 511.9  3/27/2019 Unknown Near syncope ICD-10-CM: R55 
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  
 
 Last 24hrs VS reviewed since prior progress note. Most recent are: 
Visit Vitals /79 Pulse (!) 106 Temp 98 °F (36.7 °C) Resp 12 Ht 5' 7\" (1.702 m) Wt 109 kg (240 lb 6.4 oz) SpO2 96% BMI 37.65 kg/m² Constitutional:  No acute distress, cooperative, pleasant   
   
Resp:  Decreased air entry B/L. Drain in situ. CV:  S1,S2 Tachycardic GI:  Soft, obese, non distended, non tender. normoactive bowel sounds,  
:  No CVA or suprapubic tenderness Musculoskeletal:  Bipedal edema. Neurologic:  Awake, alert and oriented. Intake/Output Summary (Last 24 hours) at 4/3/2019 1348 Last data filed at 4/3/2019 1316 Gross per 24 hour Intake 1680 ml Output 100 ml Net 1580 ml Labs:  
 
Recent Labs 04/01/19 
0447 WBC 12.7* HGB 9.5* HCT 31.2*  
* Recent Labs 04/01/19 
0447   
K 3.3*  
 CO2 29 BUN 7  
CREA 0.58 GLU 95  
CA 8.5 No results for input(s): SGOT, GPT, ALT, AP, TBIL, TBILI, TP, ALB, GLOB, GGT, AML, LPSE in the last 72 hours. No lab exists for component: AMYP, HLPSE No results for input(s): INR, PTP, APTT in the last 72 hours. No lab exists for component: INREXT, INREXT No results for input(s): FE, TIBC, PSAT, FERR in the last 72 hours. Lab Results Component Value Date/Time  Folate 8.0 04/10/2009 06:00 PM  
  
 No results for input(s): PH, PCO2, PO2 in the last 72 hours. No results for input(s): CPK, CKNDX, TROIQ in the last 72 hours. No lab exists for component: CPKMB Lab Results Component Value Date/Time Cholesterol, total 211 (H) 10/13/2017 04:23 PM  
 HDL Cholesterol 44 10/13/2017 04:23 PM  
 LDL, calculated 113 (H) 10/13/2017 04:23 PM  
 Triglyceride 268 (H) 10/13/2017 04:23 PM  
 
Lab Results Component Value Date/Time Glucose (POC) 97 01/31/2010 01:10 PM  
 Glucose  05/14/2012 10:55 AM  
 
Lab Results Component Value Date/Time Color 0-3 03/27/2019 12:17 PM  
 Appearance CLOUDY (A) 03/27/2019 12:17 PM  
 Specific gravity 1.026 03/27/2019 12:17 PM  
 pH (UA) 5.5 03/27/2019 12:17 PM  
 Protein 100 (A) 03/27/2019 12:17 PM  
 Glucose NEGATIVE  03/27/2019 12:17 PM  
 Ketone 15 (A) 03/27/2019 12:17 PM  
 Bilirubin NEGATIVE  03/10/2015 02:45 PM  
 Urobilinogen 1.0 03/27/2019 12:17 PM  
 Nitrites NEGATIVE  03/27/2019 12:17 PM  
 Leukocyte Esterase SMALL (A) 03/27/2019 12:17 PM  
 Epithelial cells MANY (A) 03/27/2019 12:17 PM  
 Bacteria 2+ (A) 03/27/2019 12:17 PM  
 WBC 0-4 03/27/2019 12:17 PM  
 RBC 0-5 03/27/2019 12:17 PM  
 
 
 
Medications Reviewed:  
 
Current Facility-Administered Medications Medication Dose Route Frequency  vancomycin (VANCOCIN) 1750 mg in  ml infusion  1,750 mg IntraVENous Q12H  Vancomycin - Pharmacy to Dose   Other Rx Dosing/Monitoring  traMADol (ULTRAM) tablet 50 mg  50 mg Oral Q4H PRN  
 multivitamin, tx-iron-ca-min (THERA-M w/ IRON) tablet 1 Tab  1 Tab Oral DAILY  calcium carbonate tablet 520 mg [elemental]  520 mg Oral DAILY  cholecalciferol (VITAMIN D3) tablet 3,000 Units  3,000 Units Oral DAILY  cyanocobalamin (VITAMIN B12) tablet 500 mcg  500 mcg Oral DAILY  LORazepam (ATIVAN) tablet 1 mg  1 mg Oral TID PRN  
 losartan (COZAAR) tablet 25 mg  25 mg Oral DAILY  PARoxetine (PAXIL) tablet 40 mg  40 mg Oral DAILY  sodium chloride (NS) flush 5-40 mL  5-40 mL IntraVENous Q8H  
 sodium chloride (NS) flush 5-40 mL  5-40 mL IntraVENous PRN  
 ondansetron (ZOFRAN) injection 4 mg  4 mg IntraVENous Q6H PRN  
 acetaminophen (TYLENOL) tablet 650 mg  650 mg Oral Q6H PRN  pantoprazole (PROTONIX) tablet 40 mg  40 mg Oral DAILY  
 
______________________________________________________________________ EXPECTED LENGTH OF STAY: 4d 21h ACTUAL LENGTH OF STAY:          7 Zoya Diaz MD

## 2019-04-03 NOTE — PROGRESS NOTES
notes that patient had a diagnostic lap today along with bx of abscess. Care management will follow for transitions of care needs.

## 2019-04-03 NOTE — ROUTINE PROCESS
Patient: Avtar Trujillo MRN: [id number]  SSN: [id number] YOB: 1970  Age: 46 y.o. Sex: female Patient is status post Procedure(s): DIAGNOSTIC LAPAROSCOPY WITH EGD. Surgeon(s) and Role: 
   Danny Primrose, MD - Primary Local/Dose/Irrigation:  30mL 0.5% Bupivacaine with Epinephrine 1:200,000 Peripheral IV 03/30/19 Right Forearm (Active) Site Assessment Clean, dry, & intact 4/3/2019  7:28 AM  
Phlebitis Assessment 0 4/3/2019  7:28 AM  
Infiltration Assessment 0 4/3/2019  7:28 AM  
Dressing Status Clean, dry, & intact 4/3/2019  7:28 AM  
Dressing Type Transparent 4/3/2019  7:28 AM  
Hub Color/Line Status Flushed; Infusing 4/3/2019  7:28 AM  
Action Taken Open ports on tubing capped 4/2/2019  9:08 PM  
Alcohol Cap Used Yes 4/2/2019  9:08 PM  
   
Peripheral IV 04/03/19 Left Hand (Active) Beka Painting #1 04/03/19 Left Abdomen (Active) Site Assessment Clean, dry, & intact 4/3/2019  9:27 AM  
Dressing Status Clean, dry, & intact 4/3/2019  9:27 AM  
Drainage Description Serosanguinous 4/3/2019  9:27 AM  
Walter Drain Airleak No 4/3/2019  9:27 AM  
Status Patent; Charged;Draining 4/3/2019  9:27 AM  
  
Airway - Endotracheal Tube 04/03/19 Oral (Active) Dressing/Packing:    
Splint/Cast:  ] Other:

## 2019-04-03 NOTE — PROGRESS NOTES
Pharmacist Note - Vancomycin Dosing Consult provided for this 46 y.o. female for indication of intra-abdominal infection. Antibiotic regimen(s): vancomycin Recent Labs 19 
0447 WBC 12.7*  
CREA 0.58  
BUN 7 Frequency of BMP: daily through  Height: 170 cm Weight: 109 kg Est CrCl: >100 ml/min; UO: unmeasured Temp (24hrs), Av.1 °F (37.3 °C), Min:98 °F (36.7 °C), Max:100.4 °F (38 °C) Cultures: 
3/29 tissue, NGTD, final 
3/30 pericardial fluid, NG, final 
4/3 body fluid, pending Goal trough = 10 - 15 mcg/mL Therapy will be initiated with a loading dose of 1500 mg IV x 1 to be followed by a maintenance dose of 1750 mg IV every 12 hours. Based on population kinetics, anticipate a trough of ~12 mcg/ml. Pharmacy to follow patient daily and order levels / make dose adjustments as appropriate.

## 2019-04-03 NOTE — ANESTHESIA POSTPROCEDURE EVALUATION
Post-Anesthesia Evaluation and Assessment Patient: Kenny Oh MRN: [id number]  SSN: [id number] YOB: 1970  Age: 46 y.o. Sex: female I have evaluated the patient and they are stable and ready for discharge from the PACU. Cardiovascular Function/Vital Signs Visit Vitals /77 Pulse (!) 108 Temp 36.8 °C (98.3 °F) Resp 9 Ht 5' 7\" (1.702 m) Wt 109 kg (240 lb 6.4 oz) SpO2 91% BMI 37.65 kg/m² Patient is status post General anesthesia for Procedure(s): DIAGNOSTIC LAPAROSCOPY WITH EGD. Nausea/Vomiting: None Postoperative hydration reviewed and adequate. Pain: 
Pain Scale 1: Numeric (0 - 10) (04/03/19 1029) Pain Intensity 1: 0 (04/03/19 1029) Managed Neurological Status:  
Neuro (WDL): Within Defined Limits (04/03/19 1029) Neuro Neurologic State: Drowsy (04/03/19 0950) LUE Motor Response: Purposeful (04/03/19 0950) LLE Motor Response: Purposeful (04/03/19 0950) RUE Motor Response: Purposeful (04/03/19 0950) RLE Motor Response: Purposeful (04/03/19 0950) At baseline Mental Status, Level of Consciousness: Alert and  oriented to person, place, and time Pulmonary Status:  
O2 Device: Nasal cannula (04/03/19 1029) Adequate oxygenation and airway patent Complications related to anesthesia: None Post-anesthesia assessment completed. No concerns Signed By: Vaughn Soriano MD   
 April 3, 2019 Procedure(s): DIAGNOSTIC LAPAROSCOPY WITH EGD. general 
 
<BSHSIANPOST> Vitals Value Taken Time /77 4/3/2019 10:45 AM  
Temp 36.8 °C (98.3 °F) 4/3/2019 10:29 AM  
Pulse 106 4/3/2019 10:53 AM  
Resp 11 4/3/2019 10:53 AM  
SpO2 97 % 4/3/2019 10:53 AM  
Vitals shown include unvalidated device data.

## 2019-04-03 NOTE — PERIOP NOTES
TRANSFER - OUT REPORT: 
 
Verbal report given to Lyric Morgan (name) on [employer]  being transferred to [facility] (unit) for routine post - op Report consisted of patients Situation, Background, Assessment and  
Recommendations(SBAR). Time Pre op antibiotic [id number] Anesthesia Stop time:  
Noble Present on Transfer to floor:No 
Order for Noble on Chart:No 
Discharge Prescriptions with Chart:No 
 
Information from the following report(s) SBAR, OR Summary, Procedure Summary, Intake/Output, MAR and Accordion was reviewed with the receiving nurse. Opportunity for questions and clarification was provided. Is the patient on 02? YES 
     L/Min 2 Other Is the patient on a monitor? NO Is the nurse transporting with the patient? NO Surgical Waiting Area notified of patient's transfer from PACU? YES The following personal items collected during your admission accompanied patient upon transfer:  
Dental Appliance: Dental Appliances: None Vision: Visual Aid: None Hearing Aid:   
Jewelry: Jewelry: Ring, With patient Clothing: Clothing: None(no valuables to pacu) Other Valuables: Other Valuables: None Valuables sent to safe:

## 2019-04-03 NOTE — ADT AUTH CERT NOTES
Cardiovascular Surgery or Procedure [facility] - Care Day 7 (4/2/2019) by Mike Jane RN  
 
   
Review Entered Review Status 4/2/2019 16:13 Completed  
   
Criteria Review Care Day: 7 Care Date: 4/2/2019 Level of Care:   
Guideline Day 3 Level Of Care  
(X) * Activity level acceptable 4/2/2019 16:13:55 EDT by Mike Jane Ambulate with assist in de dios. Clinical Status  
(X) * Hemodynamic stability 4/2/2019 16:13:55 EDT by Mike Jane VS: 98.9- P- 84- R- 18- 133/90. O2 sat= 95% on r/a. (X) * Cardiovascular status acceptable 4/2/2019 16:13:55 EDT by Mike Jane CV: S1,S2 Tachycardic   
  
( ) * Cardiac inflammation absent or controlled (eg, pericarditis)   
( ) * Operative site and other wounds acceptable ( ) * Vascular, soft tissue, and wound status acceptable ( ) * No blood loss, or problem resolved   
(X) * No infection, or status acceptable 4/2/2019 16:13:55 EDT by Mike Jane None noted. (X) * Pain and nausea absent or adequately managed ( ) * General Discharge Criteria met Interventions ( ) * Vascular access established or not needed ( ) * Arteriovenous fistula absent or functioning adequately ( ) * Intake acceptable ( ) * No inpatient interventions needed * Milestone Additional Notes 4/2/19-  
  
VS:  98.9- P- 84- R- 18- 133/90. O2 sat= 95% on r/a. Attending Note:  
Resting comfortably on am rounds. Denied any SOB or chest pains. C/o of flank pain.  at bedside, they were very happy with pathology results ruling out malignancy. 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 S/P CT-guided biopsy done on 3/29/19. Thoracic surgical F/Us noted and appreciated. - Pathology results show organizing abscess, no Malignancy seen. - Surgical team following, will probably need resection and long course of abx. Consider ID consult. - Clinically well, no need for abx now. 2) Resp: Small left pleural effusion without any respiratory failure. As needed oxygen, diuresis and incentive spirometry. 3) CVS: Near Syncope on admission. Controlled Primary hypertension. 2D ECHO with moderate to large pericardial effusion. Low threshold for Cardiac Tamponade. S/P Pericardiocentesis 3/30/19. F/U 2D ECHO 3/31/19 showed minimal residual effusion. Cardiology following. 4) Hematology: Probable anemia of chronicity. 5) Electrolytes: Resolved Hypokalemia. 6) Endocrine: Morbid obesity with a BMI greater than 40. H/O Gastric Bypass surgery. Therapeutic lifestyle changes counselling done. 7) JOHANNA: Generalized weakness. PT as tolerated. 8) Psych: Anxiety Disorder. Depression. As needed anxiolytics. Heme-Onc Note:  
Case discussed at cancer conference today Path from liver biopsy shows ? Abscess no malignancy Pericardial fluid still pending/ culture negative so far Radiology review suggests possible MRI chest/ abd vs tagged wbc scan to r/o infection. Team will d/w pt General Surgery Note:  
Larry Councilman is a 46 y. o.yr old female with diaphragmatic mass vs abscess Plan:  
We discussed in the cancer conference potential plans.  We discussed MRI and WBC scan to see if there is involvement with the liver or if there is an abscess.  The quickest way to see if if there is liver/diaphram involvement is to do a diagnostic laparoscopy and EGD.  I am planning on this for tomorrow am at 34 to see if there is anything in the liver or any infection related to the bypass, which it does not.  I will make her NPO at MN and consent for surgery in the am.  I can also biopsy tomorrow as well if I see anything.    
  
  
Orders:  
Telemetry/ IP.  VS Q 4 hours.  OOB to chair.  Ambulate in de dios.  Spot check oximetry.  O2 at 2l pm.  IS. I and O.  SCD's.  Bladder checks.  Full code.  2 G Na Cardiac diet.   NPO after MN. PO: Losartan, Vitamin B 12, Ativan 1 mg po tid prn. PO Tylenol prn, paxil, Protonix, MVI, colchicine, Vitamin B 12, calcium carbonate.    
  
   
Cardiovascular Surgery or Procedure [facility] - Care Day 6 (4/1/2019) by Giovani Man RN  
 
   
Review Entered Review Status 4/2/2019 11:57 Completed  
   
Criteria Review Care Day: 6 Care Date: 4/1/2019 Level of Care:   
Guideline Day 3 Level Of Care  
(X) * Activity level acceptable 4/2/2019 11:57:58 EDT by Giovani Man OOB to chair. Ambulate in de dios. Clinical Status  
(X) * Hemodynamic stability 4/2/2019 11:57:58 EDT by Giovani Man VS: 99.1- P- 80- R- 18- 130/89. O2 sat= 93% on r/a. ( ) * Cardiovascular status acceptable ( ) * Cardiac inflammation absent or controlled (eg, pericarditis)   
( ) * Operative site and other wounds acceptable ( ) * Vascular, soft tissue, and wound status acceptable ( ) * No blood loss, or problem resolved   
(X) * No infection, or status acceptable 4/2/2019 11:57:58 EDT by Giovani Man None noted. (X) * Pain and nausea absent or adequately managed 4/2/2019 11:57:58 EDT by Giovani Man Pain controlled. ( ) * General Discharge Criteria met Interventions ( ) * Vascular access established or not needed ( ) * Arteriovenous fistula absent or functioning adequately ( ) * Intake acceptable ( ) * No inpatient interventions needed * Milestone Additional Notes 4/1/19- Abnormal Labs:  
K+= 3.3. WBC= 12.7. Hgb= 9.5. Hct= 31.2. MCH= 24.9.  Plt= 409.    
  
CXR= Increased left pleural effusion. Attending Note:  
Alert/oriented.  Lungs > Decreased air entry B/L. Drain in situ.  Abdomen soft/NT.  Bipedal edema.    
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 S/P CT-guided biopsy done on 3/29/19. Thoracic surgical F/Us noted and appreciated. AWAITING FOR PATH FOR TREATMENT PALN  
2) Resp: Small left pleural effusion without any respiratory failure. As needed oxygen, diuresis and incentive spirometry. 3) CVS: Near Syncope on admission. Controlled Primary hypertension. 2D ECHO with moderate to large pericardial effusion. Low threshold for Cardiac Tamponade. S/P Pericardiocentesis 3/30/19. For F/U 2D ECHO 3/31/19 with possible drain removal. Cardiology F/U noted and appreciated. 4) Hematology: Probable anemia of chronicity. 5) Electrolytes: Resolved Hypokalemia. 6) Endocrine: Morbid obesity with a BMI greater than 40. H/O Gastric Bypass surgery. Therapeutic lifestyle changes counselling done. 7) JOHANNA: Generalized weakness. PT as tolerated. 8) Psych: Anxiety Disorder. Depression. As needed anxiolytics. General Surgery Note:  
Assessment Jacki Fitzpatrick is a 46 y. o.yr old female with mediastinal mass Plan Biopsies are still pending S/p pariecardiocentesis with minimal residual fluid Some increase in L pleural effusion No surgical plans at this point Thoracic Surgery Note:  
Exam:  
GENERAL: VSS, afrible, alert and cooperative HEART:  regular rate and rhythm. Pericardial effusion was drained on 03/30/19 and drain removed on 3/31/19. LUNG: clear to auscultation bilaterally, diminished breath sounds L base NEURO:  normal without focal findings  
mental status, speech normal, alert and oriented x iii EXTREMITIES:No evidence of DVT seen on physical exam.  
GI/: Abd soft, nonterder with + bowel sounds. Voiding No DVT. Principal Problem:  
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  
Pericardial effusion (3/27/2019) Pleural effusion, left (3/27/2019) Near syncope (3/27/2019) PATH:   Pending from liver Bx       
Plan: Will obtain CXR, if effusion has increased, agree with IR thoracentesis. She may also benefit from an abdominal MRI to better determine the origin of her mass. Cardiology Note:  
Admit Diagnosis: Neoplasm of diaphragm [D49.2] Pericardial effusion [I31.3] Pleural effusion, left [J90] Near syncope [R55] Principal Problem:  
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  
Pericardial effusion (3/27/2019) Pleural effusion, left (3/27/2019) Near syncope (3/27/2019) Assessment/Plan: 1. Status post pericardiocentesis with minimal residual effusion. Echo was otherwise normal.  
2. Has a moderate to large L pleural effusion 3. Has pain from the liver bx and pericardiocentesis site Plan: May benefit from thoracentesis. Will ask the hospitalist to pursue Analgesics for pain control Increase activity Should be able to go home soon Orders:  
Telemetry/ IP.  VS Q 4 hours.  OOB to chair.  Ambulate in de dios.  Spot check oximetry.  O2 at 2l pm.  IS. I and O.  SCD's.  Bladder checks.  Full code.  2 G Na Cardiac diet.     
  
PO: Losartan, Vitamin B 12, Ativan 1 mg po tid prn> rec'd x 1, PO Tylenol prn, paxil, Protonix, MVI, colchicine, Vitamin B 12, calcium carbonate.    
  
   
Cardiovascular Surgery or Procedure GRG - Care Day 5 (3/31/2019) by Giovani Man RN  
 
   
Review Entered Review Status 4/2/2019 11:37 Completed  
   
Criteria Review Care Day: 5 Care Date: 3/31/2019 Level of Care:   
Guideline Day 2 Level Of Care (X) Floor 4/2/2019 11:37:04 EDT by Giovani Man Telemetry. Clinical Status  
(X) * No ICU or intermediate care needs 4/2/2019 11:37:04 EDT by Giovani Man Transferred to Telemetry. Interventions (X) Inpatient interventions continue 4/2/2019 11:37:04 EDT by Giovani Man NS at 125 ml/hr. Toradol 30 mg iv x 1. (X) Transition to oral routes 4/2/2019 11:37:04 EDT by Giovani Man PO: Losartan, Vitamin B 12, Ativan 1 mg po tid prn> recâd x 2, PO Tylenol prn, paxil, Protonix, MVI, colchicine, Vitamin B 12, calcium carbonate. * Milestone Additional Notes 3/31/19-  
  
VS:  98.4- P- 81- R- 18- 124/80. O2 sat= 95% on r/a. Abnormal Labs: Anion gap= 4. Cr= 0.51. WBC= 12.4. RBC= 3.75. Hgb= 9.4. Hct= 31.2. MCH= 25.1.  Pericardial fluid cx pending. Attending Note:  
Alert/oriented.  Decreased air entry B/L. Drain in situ.  Abdomen soft/NT.  Bipedal edema. 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 S/P CT-guided biopsy done on 3/29/19. Thoracic surgical F/Us noted and appreciated. 2) Resp: Small left pleural effusion without any respiratory failure. As needed oxygen, diuresis and incentive spirometry. 3) CVS: Near Syncope on admission. Controlled Primary hypertension. 2D ECHO with moderate to large pericardial effusion. Low threshold for Cardiac Tamponade. S/P Pericardiocentesis 3/30/19. For F/U 2D ECHO 3/31/19 with possible drain removal. Cardiology F/U noted and appreciated. 4) Hematology: Probable anemia of chronicity. 5) Electrolytes: Resolved Hypokalemia. 6) Endocrine: Morbid obesity with a BMI greater than 40. H/O Gastric Bypass surgery. Therapeutic lifestyle changes counselling done. 7) JOHANNA: Generalized weakness. PT as tolerated. 8) Psych: Anxiety Disorder. Depression. As needed anxiolytics. Cardiology Note:  
Status post pericardiocentesis. There has been no significant additional drainage since coming back to from the cath lab. She has pleuritic chest pain post procedure consistent with pericarditis (expected) Transiently had AF post pericardiocentesis Plan:   
Echo to confirm no reaccumulation of fluid Discontinue pericardial drain Antiinflammatory rx for her pain Transfer to tele after her drain is removed. Thoracic Surgery Note:  
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  
  Active Problems:  
Pericardial effusion (3/27/2019) Pleural effusion, left (3/27/2019) Near syncope (3/27/2019) Plan:  
Appreciate Dr Dottie Das help with pericardial drainage Breathing improved after draining fluid Awaiting cytology. Heme-Onc Note:  
1) Mediastinal/Diaphragmatic/Pericardial Mass 10.5 x 9.4 x 5.1 cm enhancing mass centered at the left diaphragm abuts the inferior pericardium and extends into liver noted on CT A/P 3/27/19. Had CT-Guided Biopsy and path pending. Had pericardial effusion and in CCU post pericardial drain placed.  Pt clinically stable. Will await pathology to determine further management.    
2) Moderate Pericardial Effusion/Left Pleural Effusion Post drain. Per cardio.    
3) Morbid Obesity S/p Gastric Bypass surgery 10/18 with Dr. Kelly Evans. Mass was not seen at time of surgery per Dr. Kelly Evans. EGD at time of surgery was also normal per surgery. Pre-op CXR was also normal.  
   
4) Epigastric Pain On PPI but pain likely from mass    
5) Psychosocial  
Anxious about possible cancer dx. Works at [employer] and likes job. Good family support.  at bedside. Orders:  
Telemetry/ IP.  VS Q 4 hours.  OOB to chair. Spot check oximetry.  O2 at 2l pm.  IS. I and O.  SCD's.  Bladder checks.  Full code.  2 G Na Cardiac diet.     
  
   
Cardiovascular Surgery or Procedure GRG - Care Day 4 (3/30/2019) by Delaney Freitas RN  
 
   
Review Entered Review Status 4/2/2019 11:25 Completed  
   
Criteria Review Care Day: 4 Care Date: 3/30/2019 Level of Care:   
Guideline Day 1 Level Of Care  
(X) OR to ICU or intermediate care[K] 4/2/2019 11:25:36 EDT by Delaney Freitas OR to ICU. Clinical Status  
(X) * Clinical Indications met[L] 4/2/2019 11:25:36 EDT by Delaney Freitas Pericardiocentesis due to large pericardial effusion.   
  
(X) * Procedure completed 4/2/2019 11:25:36 EDT by Joe Morales Completed on 3/30. Interventions (X) Inpatient interventions as needed 4/2/2019 11:25:36 EDT by Joe Morales NS at 125 ml/hr. Cardizem 10 mg iv x 1, gtt x 3 hours. Fentanyl 50 mcg iv x 4.   
  
  
  
  
  
* Milestone Additional Notes 3/30/19-  
  
VS:  98.8- P- 126- R- 18- 148/98.  O2 sat= 92% on r/a. Abnormal Labs: Anion gap= 4.  Pericardia fluid sent for cx.    
  
CXR=  Pericardial drain in place. Left lower lobe atelectasis and probable left pleural effusion. Attending Note:  
Very anxious on rounds.  Alert/oriented x 3.  Decreased breath sounds bilaterally. Abdomen soft/NT. Bipedal edema. 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 S/P CT-guided biopsy done on 3/29/19. Thoracic surgical F/Us noted and appreciated. 2) Resp: Small left pleural effusion without any respiratory failure. As needed oxygen and diuresis. 3) CVS: Near Syncope on admission. Controlled Primary hypertension. 2D ECHO with moderate to large pericardial effusion. Low threshold for Cardiac Tamponade. Pericardiocentesis 3/30/19. Cardiology eval noted and appreciated. 4) Hematology: Probable anemia of chronicity. 5) Electrolytes: Resolved Hypokalemia. 6) Endocrine: Morbid obesity with a BMI greater than 40. H/O Gastric Bypass surgery. Therapeutic lifestyle changes counselling done. 7) JOHANNA: Generalized weakness. PT as tolerated. 8) Psych: Anxiety Disorder. Depression. As needed anxiolytics. Cardiology Consult:  
IMPRESSION: 1.  This patient has a large pericardial effusion, early signs of tamponade [place], pre-tamponade  including the heart rate, which accelerates to 170s with any activity.  She does not have hypotension, pulses  tachycardia.  No other signs of pericardial tamponade. 2.  The patient has a mass involving the left diaphragm with the extension into the left lobe of the liver and the inferior pericardium. 3.  Status post gastric bypass surgery in 10/2018 with a total of 70 pound weight loss so far. RECOMMENDATIONS:  
1.  We will transfer the patient to telemetry for closer observation. 2.  We would not discharge the patient since she has signs of early tamponade. 3.  Pericardiocentesis is not indicated at this time since she does not have tamponade or will never compromise in any event and I am concerned that the tumor involving the inferior pericardium in the diaphragm would interfere with the needle insertion for pericardial drainage.  This situation might probably would be better served with open thoracotomy.  I will speak to the thoracic surgeon on-call tonight.  Further recommendations will follow. **Update**  
 Large pericardial effusion. No tamponade but patient is very dyspneic with any activity Diaphragmatic mass: bx results pending Plan: I spoke with Dr Liam Sosa yesterday evening. He would prefer that the patient have pericardiocentesis and avoid a thoracotomy at this time. I spoke with the patient and her  and recommended that we proceed with pericardiocentesis today with flouro and US guidance. The risks were reviewed with the patient and her  and they agree to proceed. Procedure Note:  
Cardiac Catheterization Procedure Note Patient: Avtar Trujillo MRN: [id number] SSN: [id number] YOB: 1970 Age: 46 y.o. Sex: female Date of Procedure: 3/30/2019 Pre-procedure Diagnosis: large pericardial effusion Post-procedure Diagnosis: virtually resolved pericardial effusion Procedure: pericardiocenteis :  Dr. Juve Sparks MD  
   
Assistant(s): Tanmay Terry Anesthesia: Moderate Sedation Estimated Blood Loss: Less than 10 mL Specimens Removed: None Findings: serosanguinous fluid was drained from the pericardium; well tolerated; the drain was left in place Complications: None Implants:  None General Surgery Note:  
Feeling better after pericardiocentesis . Breathing better About 700 was drained. Awaiting path. Erum Crimes for diet Following. Orders: ICU/IP. VS Q 4 hours.  OOB to chair. Spot check oximetry.  O2 at 2l pm.  IS. I and O.  SCD's.  Bladder checks.  Full code.  2 G Na Cardiac diet.   Pericardial Drain. NS at 125 ml/hr.  Cardizem 10 mg iv x 1,  gtt x 3 hours.  Fentanyl 50 mcg iv x 4.  PO: Losartan, Vitamin B 12, Ativan 1 mg po tid prn> rec'd x 2, PO Tylenol prn.   
  
   
Cardiovascular Surgery or Procedure GRG - Clinical Indications for Procedure by Tamera Quesada RN  
 
   
Review Entered Review Status 4/2/2019 11:24 Completed  
   
Criteria Review Clinical Indications for Procedure Most Recent : Tamera Quesada Most Recent Date: 4/2/2019 11:24:29 EDT  
(X) Surgery or other procedure covered by this guideline is indicated for1 or more of the following(1)(2)(3)(4):  
   (X) Pericardial operation needed; indications include(85)(86):  
   4/2/2019 11:24:29 EDT by Tamera Quesada Pericardiocentesis done due to large pericardial effusion.   
  
  
  
  
  
  
  
   
Medical Oncology [facility] - Care Day 3 (3/29/2019) by Cristian Gamez Review Entered Review Status 3/29/2019 15:53 Completed  
   
Criteria Review Care Day: 3 Care Date: 3/29/2019 Level of Care: Telemetry Guideline Day 3 Level Of Care  
(X) * Activity level acceptable 3/29/2019 15:53:28 EDT by Lyndsey Roman   
ambulate with assitance ( ) * Complete discharge planning Clinical Status  
(X) * Pain and nausea absent or adequately managed 3/29/2019 15:53:28 EDT by Lyndsey Roman Pt denies pain at present   
  
(X) * Temperature status acceptable 3/29/2019 15:53:28 EDT by Lyndsey Roman   
98.9 (X) * No infection, or status acceptable 3/29/2019 15:53:28 EDT by Michael Kern   
none noted   
  
(X) * No neutropenia, or status acceptable 3/29/2019 15:53:28 EDT by Michael Kern   
none ntoed   
  
(X) * Abdominal status acceptable 3/29/2019 15:53:28 EDT by Michael Kern Abd:soft, nondistended, nontender, without guarding, without rebound   
  
(X) * Mucositis absent or adequately resolved 3/29/2019 15:53:28 EDT by Michael Kern   
none noted ( ) * Diarrhea absent or adequately controlled   
(X) * Blood cell count acceptable 3/29/2019 15:53:28 EDT by Michael Kern   
no abnormalities noted   
  
(X) * Hematologic complications absent or stabilized 3/29/2019 15:53:28 EDT by Michael Kern   
no abnormalities noted   
  
(X) * Neurologic status acceptable 3/29/2019 15:53:28 EDT by Michael Kern Neurologic: Grossly normal   
  
( ) * Electrolyte status acceptable   
(X) * Tumor lysis absent or resolved 3/29/2019 15:53:28 EDT by Michael Kern   
none noted ( ) * Malignant effusions absent or adequately controlled   
(X) * Pathologic fracture absent or stabilized 3/29/2019 15:53:28 EDT by Michael Kern   
none noted ( ) * General Discharge Criteria met Interventions  
(X) * Intake acceptable 3/29/2019 15:53:28 EDT by Michael Kern Patient 's current diet is Regular, limited appetite ( ) * No inpatient interventions needed * Milestone Additional Notes 3/29/19 /96 Pulse 87 Temp 99 Resp 14 SpO2 96% Medications Losartan 25mg PO x1, Protonix 40mg PO x1, Paxil 40mg Pox1, sodium bicarb 210mg inj SCx1, NS 500ml bolusx1, fentanyl 200mcg IVx3 Hospitalist Note Reason for follow up:  
[facility]: Generalized weakness, fatigue, and near passing out.  
   
Patient denied any worsening of symptoms on afternoon rounds.  C/O feeling anxious about results.  
   
Assessment & Plan:  
   
 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 S/P CT-guided biopsy 3/29/19. Thoracic surgical eval noted and appreciated.    
2) Resp: Small left pleural effusion without any respiratory failure. As needed oxygen and diuresis.    
3) CVS: Near Syncope. Orthostatic readings. Controlled Primary hypertension. Pericardial effusion. 2D ECHO check. As needed Cardiology consult.  
   
4) Hematology: Probable anemia of chronicity. R/O Iron deficiency. Iron profile and Ferritin level.  
   
5) Electrolytes: Hypokalemia. Repletion with F/U.  
   
6) Endocrine: Morbid obesity with a BMI greater than 40. H/O Gastric Bypass surgery. Therapeutic lifestyle changes counselling done.  
   
7) JOHANNA: Generalized weakness. PT as tolerated.  
   
8) Psych: Anxiety Disorder. Depression. As needed anxiolytics.    
9) Prophylaxis: DVT.    
10) Directives: Full Code with a guarded prognosis. D/W patient and family.  
   
11) Plan: Anticipate D/C to home in am with outpatient F/U. Discussed with patient and patient's  Reynaldo Horowitz who can be reached at [phone]. Hematology and Oncology Note Heme/ONC Stopped by to see pt and she was in biopsy.  If d/c'd today, we will follow up in office. Otherwise will see pt tmw. General surgical Note  
  Pt has complaint of poor sleep, feeling a bit anxious, no acute issues overnight, .  Pt denies pain at present   No SOB but says her breathing \"feels heavy \" at times . No CP.  Pt is ambulating.  Patient 's current diet is Regular, limited appetite . Lex Black Pt reports  no nausea and no vomiting. Pt reports no fever or chills  
   
Assessment:  
   
Principal Problem:  
  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  
  Active Problems:  
  Pericardial effusion (3/27/2019)    
  Pleural effusion, left (3/27/2019)    
  Near syncope (3/27/2019)  
   
s/p lap gastric bypass 10/2018   
   
 Plan:  
Diet-- resume bariatric  diet after todays procedure GI Prophylaxis OOB/ ambulate Thoracic and oncology following For perc bx today in IR to guide treatment plan Further plan per Dr. Lauren Medeiros

## 2019-04-03 NOTE — BRIEF OP NOTE
BRIEF OPERATIVE NOTE Date of Procedure: 4/3/2019 Preoperative Diagnosis: DIAPHRAGMATIC MASS Postoperative Diagnosis: DIAPHRAGMATIC ABCESS Procedure(s): DIAGNOSTIC LAPAROSCOPY, drainage of diaphragmatic abscess with biopsy of abscess WITH EGD 
ESOPHAGOGASTRODUODENOSCOPY (EGD) Surgeon(s) and Role: 
   Stu Villanueva MD - Primary Surgical Staff: 
Circ-1: Sabra Martines Scrub Tech-1: Mireille Sauceda Surg Asst-1: Alana Connor Event Time In Time Out Incision Start 04/03/2019 0800 Incision Close Anesthesia: General  
Estimated Blood Loss: 20ml Specimens:  
ID Type Source Tests Collected by Time Destination 1 : Culture Abscess Abscess Abscess ANAEROBIC/AEROBIC/GRAM STAIN Cosmo Owen MD 4/3/2019 0825 Microbiology Findings: diaphragmatic abscess with old yellow purulent material, separate from the liver, abscess drained and 19Fr drain into the cavity, cultures taken, normal esophagus, gastric pouch and Ren limb, no signs of fistula seen Complications: none Implants: * No implants in log *

## 2019-04-03 NOTE — PROGRESS NOTES
[facility] Subjective Doing well, NPO Objective Patient Vitals for the past 24 hrs: 
 Temp Pulse Resp BP SpO2  
04/03/19 0705 98.6 °F (37 °C) 97 20 (!) 140/94 96 % 04/03/19 0521 100.4 °F (38 °C) 89 18 143/90 94 % 04/02/19 2340 99.1 °F (37.3 °C) 90 16 (!) 143/97 93 % 04/02/19 1909 98.9 °F (37.2 °C) 84 18 (!) 130/91 95 % 04/02/19 1548 98.9 °F (37.2 °C) 84 18 133/90 95 % 04/02/19 1202 98.3 °F (36.8 °C) 92 18 139/86 96 % 04/02/19 0752 98.9 °F (37.2 °C) 85 18 134/88 94 % Date 04/02/19 0700 - 04/03/19 4404 04/03/19 0700 - 04/04/19 5869 Shift 1845-5842 4494-4642 24 Hour Total 9804-7325 7190-8155 24 Hour Total  
INTAKE  
P.O. 240 240 480     
  P. O. 240 240 480 Shift Total(mL/kg) 240(2.1) 240(2.2) 480(4.4) OUTPUT Shift Total(mL/kg)  240 480 Weight (kg) 113.3 109 109 109 109 109 PE 
Pulm - CTAB 
CV - RRR Abd - soft, ND, BS present, NTTP Labs No results found for this or any previous visit (from the past 12 hour(s)). Assessment Emir Osborne is a 46 y. o.yr old female with diaphragmatic mass vs abscess Plan  
 
OR today to look from the abdomen at the liver and diaphragm with diagnostic laparoscopy I have discussed the above procedure with the patient in detail. We reviewed the benefits and possible complications of the surgery which include bleeding, infection, damage to adjacent organs, venous thromboembolism, need for repeat surgery, death and other unforseen complications. The patient agreed to proceed with the surgery.    
 
 
Supriya Buckner MD

## 2019-04-03 NOTE — PROGRESS NOTES
Patient seen and examined. Consult dictated # [id number] 
 
- She has a good story for an actinomyces infection. She had abdominal surgery last October. Gram stain show Gram positive rods that are beaded and branching. Lastly, at least on her CTs, the infection crossed tissue planes (liver and pericardial involvement). Start doxy

## 2019-04-03 NOTE — PROGRESS NOTES
Vss. are stable. Afebrile. O2 sats 94-96% BBS audible, clear and diminished in the bases. Chest expansion is symmetrical. Tele shows NSR without ectopy. HR . Peripheral pulses positive in all extremities. No pedal edema. 3 LP sights with Dermabond -c/d/i.  CARLINE X 1 to self suction draining moderate amounts of serosangenous drainage, 40 cc thus far. +BS, abdomen is soft and non-distended. Voided X1-unwitnessed.

## 2019-04-03 NOTE — PERIOP NOTES
[address] [facility] to Dr Kyle Flynn regarding transfer orders for pt. MD noted she could go back to the room she from. Also, spoke to him about maintenance fluids, MD ordered to finish LR bag from OR.

## 2019-04-03 NOTE — ANESTHESIA PREPROCEDURE EVALUATION
Relevant Problems No relevant active problems Anesthetic History No history of anesthetic complications Review of Systems / Medical History Patient summary reviewed, nursing notes reviewed and pertinent labs reviewed Pulmonary Within defined limits Neuro/Psych Psychiatric history Cardiovascular Within defined limits Hypertension Exercise tolerance: >4 METS 
  
GI/Hepatic/Renal 
Within defined limits Liver disease Endo/Other Within defined limits Obesity and anemia Other Findings Comments: diaphragmatic mass Anemia 
, anxiety Disorder Depression, 
 arthritis, 
 hypertension,  
menorrhagia,  
morbid obesity 
, s/p gastric bypass Physical Exam 
 
Airway Mallampati: III 
TM Distance: > 6 cm Neck ROM: normal range of motion Mouth opening: Normal 
 
 Cardiovascular Regular rate and rhythm,  S1 and S2 normal,  no murmur, click, rub, or gallop Dental 
No notable dental hx Pulmonary Breath sounds clear to auscultation Abdominal 
GI exam deferred Other Findings Anesthetic Plan ASA: 3 Anesthesia type: general 
 
 
 
 
Induction: Intravenous Anesthetic plan and risks discussed with: Patient Stage 1: Number Of Blocks?: 2

## 2019-04-03 NOTE — OP NOTES
[address]   OPERATIVE REPORT    Name:  Pau Sheth  MR#:  [id number]  :  1970  ACCOUNT #:  [de-identified]  DATE OF SERVICE:  2019    PREOPERATIVE DIAGNOSIS:  Diaphragmatic mass. POSTOPERATIVE DIAGNOSIS:  Diaphragmatic abscess. PROCEDURE PERFORMED:  Diagnostic laparoscopy with drainage of diaphragmatic abscess with esophagogastroduodenoscopy with biopsy of the abscess cavity. SURGEON:  Namrata Phillip MD    ASSISTANT:  Jason Logan. ANESTHESIA:  General.    COMPLICATIONS:  None. SPECIMENS REMOVED:  Culture and abscess and then also culture tissue. IMPLANTS:  None. ESTIMATED BLOOD LOSS:  20 mL. INDICATION FOR THE OPERATION:  The patient is a 61-year-old female who is status post gastric bypass 6 months ago, who came in with a new possible abscess versus a mass in the area of the diaphragm. She is needing exploration of that area in the operating room. FINDINGS:  Diaphragmatic abscess with old yellow purulent material separate from the liver. Abscess drained with a 19-Icelandic round drain into the abdominal cavity. Cultures were taken. Normal esophagus. Normal gastric pouch and Ren limb. No signs of fistula were seen. DESCRIPTION OF THE OPERATION:  The patient was met in the preop holding area. The H and P was updated. Consent was signed. All risks and benefits were explained to the patient prior to start of the operation. She was taken back to the operating room. She was lying in the supine position. The abdomen was prepped and draped in standard sterile fashion. Time-out was called. The antibiotics were given. SCDs were on lower extremities. We started the operation by making a 5-mm incision into the left upper quadrant, inserting a Visiport trocar into the intraabdominal cavity, insufflating to 15 mmHg, and then placed a 5-mm trocar superior to the left of the umbilicus and placed a 24-MZ port in the left lateral abdominal wall. We looked up into the upper abdomen. We could see that there was some of the liver stuck to the diaphragm in the upper abdomen in the area where the mass or abscess seemed to be. We dissected that area down with blunt dissection. We got a little closer to what appeared to be kind of a whitish structure separate from the liver into the diaphragm. We dissected the area, and then we bluntly dissected into an abscess cavity finding yellow purulent material with old thickened yellow tissue there around the edges and on the inside as well. We suctioned all of that area out. We also sent some of that for cultures and biopsied some of the inside portion of the tissue for pathology. We irrigated out that area, we pushed on that area, and we were able to express a little bit more purulence from the area. Once we felt that we had it pretty well drained, we did place a subxiphoid liver retractor lifting the liver up and out of the way. We took down a few adhesions to the upper abdomen. There was a bit of scar tissue in the upper abdomen from the gastric bypass. We were able to follow the Ren limb to the [address] anastomosis. The [address] anastomosis appeared to be completely intact, did not see any sign of any fistula or abscess cavity, just adhesions in the upper abdomen which we took down. We also performed an endoscopy. We placed the endoscope down the mouth into the esophagus and down into the gastric pouch. We insufflated. We did not see any air coming from the abscess cavity or from the [address] anastomosis. We did have the Ren limb occluded with a grasper. We were able to get into the Ren limb. The [address] anastomosis appeared to be normal.  There were no signs of any fistula. There was no sign of any bleeding, ulceration, or abnormalities in the esophagus or stomach or Ren limb. We then decompressed the stomach and Ren limb, removing the endoscope, and then completed the EGD.   We then went back in laparoscopically and placed a 19-Italian round Walter drain into the abscess cavity and sutured that into place in the left upper quadrant. We suction irrigated out the upper abdomen again and then closed our 12-mm port fascial defect with an Endoclose suture passing device in an interrupted fashion. We then desufflated the abdominal cavity, removed the trocars, and closed the skin with 4-0 Monocryl and Dermabond to complete the operation. Dr. Shmuel Angeles was present and scrubbed during the entire operation. Counts were correct.       Jacinta Barrientos MD      [id number]  D:  04/03/2019 9:27  T:  04/03/2019 9:33  JOB #:  [id number]

## 2019-04-03 NOTE — PROGRESS NOTES
Patient resting in bed, with family at bedside. A&Ox4, VSS, afebrile, SR on tele. NPO for EGD today, consent signed, CHG done. Patient up at dhruv, skin intact. Call light within reach, will ctm

## 2019-04-04 LAB
ANION GAP SERPL CALC-SCNC: 6 MMOL/L (ref 5–15)
BUN SERPL-MCNC: 9 MG/DL (ref 6–20)
BUN/CREAT SERPL: 13 (ref 12–20)
CALCIUM SERPL-MCNC: 8.8 MG/DL (ref 8.5–10.1)
CHLORIDE SERPL-SCNC: 105 MMOL/L (ref 97–108)
CO2 SERPL-SCNC: 27 MMOL/L (ref 21–32)
CREAT SERPL-MCNC: 0.72 MG/DL (ref 0.55–1.02)
ERYTHROCYTE [DISTWIDTH] IN BLOOD BY AUTOMATED COUNT: 14.6 % (ref 11.5–14.5)
GLUCOSE SERPL-MCNC: 108 MG/DL (ref 65–100)
HCT VFR BLD AUTO: 30.2 % (ref 35–47)
HGB BLD-MCNC: 8.9 G/DL (ref 11.5–16)
MCH RBC QN AUTO: 24.2 PG (ref 26–34)
MCHC RBC AUTO-ENTMCNC: 29.5 G/DL (ref 30–36.5)
MCV RBC AUTO: 82.1 FL (ref 80–99)
NRBC # BLD: 0 K/UL (ref 0–0.01)
NRBC BLD-RTO: 0 PER 100 WBC
PLATELET # BLD AUTO: 457 K/UL (ref 150–400)
PMV BLD AUTO: 9.7 FL (ref 8.9–12.9)
POTASSIUM SERPL-SCNC: 3.9 MMOL/L (ref 3.5–5.1)
RBC # BLD AUTO: 3.68 M/UL (ref 3.8–5.2)
SODIUM SERPL-SCNC: 138 MMOL/L (ref 136–145)
WBC # BLD AUTO: 13.5 K/UL (ref 3.6–11)

## 2019-04-04 PROCEDURE — 74011250637 HC RX REV CODE- 250/637: Performed by: SURGERY

## 2019-04-04 PROCEDURE — 36415 COLL VENOUS BLD VENIPUNCTURE: CPT

## 2019-04-04 PROCEDURE — 65660000000 HC RM CCU STEPDOWN

## 2019-04-04 PROCEDURE — 74011000258 HC RX REV CODE- 258: Performed by: INTERNAL MEDICINE

## 2019-04-04 PROCEDURE — 80048 BASIC METABOLIC PNL TOTAL CA: CPT

## 2019-04-04 PROCEDURE — 85027 COMPLETE CBC AUTOMATED: CPT

## 2019-04-04 PROCEDURE — 74011250637 HC RX REV CODE- 250/637: Performed by: INTERNAL MEDICINE

## 2019-04-04 PROCEDURE — 74011250636 HC RX REV CODE- 250/636: Performed by: INTERNAL MEDICINE

## 2019-04-04 PROCEDURE — 74011250636 HC RX REV CODE- 250/636: Performed by: SURGERY

## 2019-04-04 RX ORDER — TRAMADOL HYDROCHLORIDE 50 MG/1
100 TABLET ORAL
Status: DISCONTINUED | OUTPATIENT
Start: 2019-04-04 | End: 2019-04-12 | Stop reason: HOSPADM

## 2019-04-04 RX ORDER — HYDROMORPHONE HYDROCHLORIDE 1 MG/ML
1 INJECTION, SOLUTION INTRAMUSCULAR; INTRAVENOUS; SUBCUTANEOUS
Status: DISCONTINUED | OUTPATIENT
Start: 2019-04-04 | End: 2019-04-12 | Stop reason: HOSPADM

## 2019-04-04 RX ADMIN — TRAMADOL HYDROCHLORIDE 100 MG: 50 TABLET, FILM COATED ORAL at 09:28

## 2019-04-04 RX ADMIN — VITAMIN D 3000 UNITS: 25 TAB ORAL at 08:34

## 2019-04-04 RX ADMIN — LORAZEPAM 1 MG: 1 TABLET ORAL at 20:49

## 2019-04-04 RX ADMIN — Medication 500 MCG: at 08:34

## 2019-04-04 RX ADMIN — Medication 10 ML: at 23:07

## 2019-04-04 RX ADMIN — PAROXETINE HYDROCHLORIDE 40 MG: 20 TABLET, FILM COATED ORAL at 08:34

## 2019-04-04 RX ADMIN — TRAMADOL HYDROCHLORIDE 100 MG: 50 TABLET, FILM COATED ORAL at 17:02

## 2019-04-04 RX ADMIN — MULTIPLE VITAMINS W/ MINERALS TAB 1 TABLET: TAB at 08:34

## 2019-04-04 RX ADMIN — VANCOMYCIN HYDROCHLORIDE 1750 MG: 10 INJECTION, POWDER, LYOPHILIZED, FOR SOLUTION INTRAVENOUS at 18:18

## 2019-04-04 RX ADMIN — PANTOPRAZOLE SODIUM 40 MG: 40 TABLET, DELAYED RELEASE ORAL at 08:34

## 2019-04-04 RX ADMIN — DOXYCYCLINE 100 MG: 100 INJECTION, POWDER, LYOPHILIZED, FOR SOLUTION INTRAVENOUS at 09:29

## 2019-04-04 RX ADMIN — DOXYCYCLINE 100 MG: 100 INJECTION, POWDER, LYOPHILIZED, FOR SOLUTION INTRAVENOUS at 23:06

## 2019-04-04 RX ADMIN — ANTACID TABLETS 520 MG: 648 TABLET, CHEWABLE ORAL at 08:34

## 2019-04-04 RX ADMIN — LOSARTAN POTASSIUM 25 MG: 25 TABLET, FILM COATED ORAL at 08:34

## 2019-04-04 RX ADMIN — TRAMADOL HYDROCHLORIDE 100 MG: 50 TABLET, FILM COATED ORAL at 23:04

## 2019-04-04 RX ADMIN — VANCOMYCIN HYDROCHLORIDE 1750 MG: 10 INJECTION, POWDER, LYOPHILIZED, FOR SOLUTION INTRAVENOUS at 06:06

## 2019-04-04 NOTE — CONSULTS
[address]    Name:  Coni Del Toro  MR#:  [id number]  :  1970  ACCOUNT #:  [de-identified]  DATE OF SERVICE:  2019    REQUESTING PHYSICIAN:  Lincoln Strauss MD.    REASON FOR CONSULTATION:  Diaphragmatic abscess. HISTORY OF PRESENT ILLNESS:  The patient is a 25-year-old woman whose medical history is significant for gastric bypass surgery in 10/2018. She also has a history of anxiety, depression, hypertension as well as menorrhagia. She tells me that for the last several weeks, she has had exertional dyspnea which was made better with rest and was worse with effort. She tells me that just going to the bathroom made her short of breath. She did not have any cough. She said that her right ankle became swollen. She was uncomfortable lying flat, but she did not have any paroxysmal nocturnal dyspnea. She did not have any fever, but she did have some chills. In association with this, she also had upper abdominal pain, which she rated 8/10. There was no alleviating or aggravating factors. There was no vomiting, but she did have some diarrhea. Because of her symptoms, she came to the emergency room where a CT scan of the chest and abdomen revealed a 9.7 x 8.5 x 4.6 mass lesion that is centered along the left hemidiaphragm with invasion of the inferior pericardium. There was some concern that if it was malignancy, so on , she had a biopsy of the mass done. The result showed fibrous tissue with granulation tissue containing lymphoplasmacytic and acute inflammation with mixed histocytes. There was also some concern that she had a significant pericardial effusion. There was some concern that she would go into tamponade. On , she had a pericardiocentesis with drainage of serosanguineous material.  Cultures did not reveal any significant growth. It was eventually decided that to take her to surgery on .   She was operated on by Dr. Linh Cantu, who performed a diagnostic laparoscopy. He discovered a diaphragmatic abscess as well as an esophagogastroduodenoscopy with drainage of the abscess. The Gram stain of the culture revealed gram-positive rods, which are beaded and branching. She is currently on vancomycin and we are being asked to see her in consult. ALLERGIES:  AMLODIPINE, PENICILLIN. CURRENT MEDICATIONS:  1. Calcium carbonate. 2.  Vitamin D3.  3.  Vitamin B12.  4.  Losartan. 5.  Multivitamin. 6.  Pantoprazole. 7.  Paxil. 8.  Vancomycin. REVIEW OF SYSTEMS:  Vision and hearing are fine. No hemoptysis. No hematochezia. No hematuria. No headache. No sore throat. Aside from the things mentioned previously, the rest of the review of systems is negative. PAST MEDICAL HISTORY:  1. Anemia. 2.  Arthritis. 3.  Anxiety. 4.  Depression. 5.  Hypertension. 6.  Menorrhagia. PAST SURGICAL HISTORY:  1. Uterine polyp biopsy. 2.  Cholecystectomy. 3.  Partial hysterectomy. 4.  Laparoscopic gastric bypass. 5.  Cystoscopy. FAMILY HISTORY:  Mother had heart attack and heart disease. Brother has diabetes, her grandmother had stroke. SOCIAL HISTORY:  She is a former smoker. She does not engage in recreational drug use. She does not take alcohol. PHYSICAL EXAMINATION:  GENERAL:  She is not in respiratory distress. VITAL SIGNS:  Temperature is 97.8, pulse 89, blood pressure is 136/83, satting at 93%  HEENT:  She has pink conjunctivae. Anicteric sclerae. NECK:  No JVD. No cervical lymphadenopathy. External ears are normal.  There are no carotid bruits. LUNGS:  Clear to auscultation. No rales, wheezes or rhonchi. There is no accessory muscle use. HEART:  Regular rate and rhythm. No murmur, rubs or clicks. No heaves. ABDOMEN:  Slightly tender on the left upper quadrant. It is soft. There is no guarding or rebound. :  No CVA tenderness.   MUSCULOSKELETAL:  Knees, ankles, wrists and elbows are not warm and are not tender. INTEGUMENTARY:  I did not see any rash. PSYCHIATRIC:  No disturbance in thought. Mood and affect are appropriate. NEUROLOGIC:  She has full use of her extraocular muscles. Tongue midline. No facial symmetry. Muscle strength is 5/5. LABORATORY DATA:  White blood cell count is 12.7, hemoglobin 9.5, platelet count is 923. Urinalysis reveals 0-4 white blood cells, creatinine 0.72, alk phos 243, AST 17, ALT 16, total bilirubin is 0.8. Pericardial cultures are negative. Liver culture is negative as well. Abscess culture is currently pending, but there are gram-positive rods, which are beaded and branching. IMAGING STUDIES:  Described in the history of present illness. IMPRESSION:  1. Diaphragmatic abscess with possible involvement of the pericardium, status post laparoscopic drainage. 2.  History of gastric bypass surgery. 3.  Pleural effusions. 4.  Pericardial effusion status post pericardiocentesis. 5.  Hypertension. 6.  Depression. 7.  Anxiety. PLAN:  The patient has a good story for actinomyces infection. She had abdominal surgery last October. Gram stain shows gram-positive rods that are beaded and branching. Lastly, at least on her CT, the infection crossed tissue planes with diaphragm and with liver and pericardial involvement. I will start doxycycline on her. I will continue vancomycin. Thank you for the consult.       MD JUAN F [id number]  D:  04/04/2019 10:05  T:  04/04/2019 10:11  JOB #:  [id number]

## 2019-04-04 NOTE — PROGRESS NOTES
Bedside and Verbal shift change report given to Adin Guevara (oncoming nurse) by Phoebe Horn (offgoing nurse). Report included the following information SBAR, Kardex, Intake/Output, MAR, Cardiac Rhythm NSR and Quality Measures.

## 2019-04-04 NOTE — ADT AUTH CERT NOTES
General Surgery or Procedure GRG - Clinical Indications for Procedure by China Mercedes Review Status Review Entered Completed 4/3/2019 15:20  
   
Criteria Review Clinical Indications for Procedure Most Recent : Jefferson Steiner Most Recent Date: 4/3/2019 15:20:55 EDT  
(X) Surgery or other procedure covered by this guideline is indicated for1 or more of the following(1)(2):  
   (X) Laparoscopic procedure needed(52)(53)  
   4/3/2019 15:10:00 EDT by Jefferson Steiner  
diagnostic   
  
  
  
  
  
  
  
   
General Surgery or Procedure GRG - Care Day 8 (4/3/2019) by China Mercedes Review Status Review Entered Completed 4/3/2019 15:10  
   
Criteria Review Care Day: 8 Care Date: 4/3/2019 Level of Care: Inpatient Floor Guideline Day 1 Clinical Status  
(X) * Clinical Indications met[M] 4/3/2019 15:10:48 EDT by Jefferson Steiner DIAPHRAGMATIC MASS   
  
(X) * Procedure completed 4/3/2019 15:10:48 EDT by Jefferson Steiner DIAGNOSTIC LAPAROSCOPY * Milestone Additional Notes 97.8-136/83-89-14-93% 2L NC  
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 S/P CT-guided biopsy done on 3/29/19. Thoracic surgical F/Us noted and appreciated. - Pathology results show organizing abscess, no Malignancy seen. - Surgical team following, s/p drainage of abscess, Drain placed. Will discuss abx need with surgery    
2) Resp: Small left pleural effusion without respiratory failure. Oxygen PRN, diuresis and incentive spirometry.    
3) CVS: Near Syncope on admission. Controlled Primary hypertension. 2D ECHO with moderate to large pericardial effusion. Low threshold for Cardiac Tamponade. S/P Pericardiocentesis 3/30/19. F/U 2D ECHO 3/31/19 showed minimal residual effusion. Cardiology evaluated.    
4) Hematology: Probable anemia of chronicity.   
   
5) Electrolytes: Resolved Hypokalemia.  
   
 6) Endocrine: Morbid obesity with a BMI greater than 40. H/O Gastric Bypass surgery. Therapeutic lifestyle changes counselling done.  
   
7) JOHANNA: Generalized weakness. PT as tolerated.  
   
8) Psych: Anxiety Disorder. Depression. As needed anxiolytics.    
9) Prophylaxis: DVT.    
10) Directives: Full Code with a guarded prognosis. D/W patient and family.  
   
11) Plan: TBD Date of Procedure: 4/3/2019 Preoperative Diagnosis: DIAPHRAGMATIC MASS Postoperative Diagnosis: DIAPHRAGMATIC ABCESS Procedure(s): DIAGNOSTIC LAPAROSCOPY, drainage of diaphragmatic abscess with biopsy of abscess WITH EGD  
ESOPHAGOGASTRODUODENOSCOPY (EGD) Findings: diaphragmatic abscess with old yellow purulent material, separate from the liver, abscess drained and 19Fr drain into the cavity, cultures taken, normal esophagus, gastric pouch and Ren limb, no signs of fistula seen  
  
body fluid culture pending  
spot check pulse ox  
regular diet  
consult infectious disease  
cozaar 25mg po x1  
protonix 40mg po x1  
vanco 1.5g iv x1  
lactated ringers cont iv 125cc/hr

## 2019-04-04 NOTE — PROGRESS NOTES
Hospitalist Progress Note Maureen Lomeli MD 
     
                             Answering service: [phone] OR [phone] from in house phone Date of Service:  2019 NAME:  Tremaine Hutson :  1970 MRN:  [id number] Admission Summary:  
46 y.o. white female with past medical history of anemia, anxiety Disorder, depression, arthritis, hypertension, menorrhagia, morbid obesity, s/p gastric bypass surgery was brought to the ED from work via EMS with chief complaint of generalized weakness, fatigue, feeling like she was going to pass out and diarrhea. Reason for follow up:  
Pt seen and examined,   
 
Assessment & Plan:  
 
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 S/P CT-guided biopsy done on 3/29/19. Thoracic surgical F/Us noted and appreciated. - Pathology results show organizing abscess, no Malignancy seen. - Surgical team following, s/p drainage of abscess, Drain placed. - ID following, concerned about Actinomyces started doxy 2) Resp: Small left pleural effusion without respiratory failure. Oxygen PRN, diuresis and incentive spirometry. 3) CVS: Near Syncope on admission. Controlled Primary hypertension. 2D ECHO with moderate to large pericardial effusion. Low threshold for Cardiac Tamponade. S/P Pericardiocentesis 3/30/19. F/U 2D ECHO 3/31/19 showed minimal residual effusion. Cardiology evaluated. 4) Hematology: Probable anemia of chronicity. 5) Electrolytes: Resolved Hypokalemia. 6) Endocrine: Morbid obesity with a BMI greater than 40. H/O Gastric Bypass surgery. Therapeutic lifestyle changes counselling done. 7) JOHANNA: Generalized weakness. PT as tolerated. 8) Psych: Anxiety Disorder. Depression. As needed anxiolytics. 9) Prophylaxis: DVT. 10) Directives: Full Code with a guarded prognosis. D/W patient and family. 11) Plan: TBD 
  
Hospital Problems  Date Reviewed: 4/3/2019 Codes Class Noted POA * (Principal) Abscess, liver ICD-10-CM: K75.0 ICD-9-CM: 572.0  4/3/2019 Yes Pericardial effusion ICD-10-CM: I31.3 ICD-9-CM: 423.9  3/27/2019 Unknown Pleural effusion, left ICD-10-CM: J90 ICD-9-CM: 511.9  3/27/2019 Unknown Near syncope ICD-10-CM: R55 
ICD-9-CM: 780.2  3/27/2019 Unknown Morbid obesity ([facility] [address] ICD-10-CM: E66.01 
ICD-9-CM: 278.01  2/28/2011 Yes Physical Examination:  
 
 Last 24hrs VS reviewed since prior progress note. Most recent are: 
Visit Vitals /90 (BP 1 Location: Left arm, BP Patient Position: At rest) Pulse 77 Temp 97.9 °F (36.6 °C) Resp 16 Ht 5' 7\" (1.702 m) Wt 111.9 kg (246 lb 12.8 oz) SpO2 92% BMI 38.65 kg/m² Constitutional:  No acute distress, cooperative, pleasant   
   
Resp:  Decreased air entry B/L. Drain in situ. CV:  S1,S2 Tachycardic GI:  Soft, obese, non distended, tender LUQ. BS+, drain in situ, clear bloody discharge. Musculoskeletal:  Bipedal edema. Neurologic:  Awake, alert and oriented. Intake/Output Summary (Last 24 hours) at 4/4/2019 2610 Last data filed at 4/3/2019 2014 Gross per 24 hour Intake 1200 ml Output 130 ml Net 1070 ml Labs:  
 
Recent Labs 04/04/19 
0345 WBC 13.5* HGB 8.9* HCT 30.2* * Recent Labs 04/04/19 
0345   
K 3.9  CO2 27 BUN 9  
CREA 0.72 * CA 8.8 No results for input(s): SGOT, GPT, ALT, AP, TBIL, TBILI, TP, ALB, GLOB, GGT, AML, LPSE in the last 72 hours. No lab exists for component: AMYP, HLPSE No results for input(s): INR, PTP, APTT in the last 72 hours. No lab exists for component: INREXT, INREXT No results for input(s): FE, TIBC, PSAT, FERR in the last 72 hours. Lab Results Component Value Date/Time Folate 8.0 04/10/2009 06:00 PM  
  
No results for input(s): PH, PCO2, PO2 in the last 72 hours. No results for input(s): CPK, CKNDX, TROIQ in the last 72 hours. No lab exists for component: CPKMB Lab Results Component Value Date/Time Cholesterol, total 211 (H) 10/13/2017 04:23 PM  
 HDL Cholesterol 44 10/13/2017 04:23 PM  
 LDL, calculated 113 (H) 10/13/2017 04:23 PM  
 Triglyceride 268 (H) 10/13/2017 04:23 PM  
 
Lab Results Component Value Date/Time Glucose (POC) 97 01/31/2010 01:10 PM  
 Glucose  05/14/2012 10:55 AM  
 
Lab Results Component Value Date/Time Color 0-3 03/27/2019 12:17 PM  
 Appearance CLOUDY (A) 03/27/2019 12:17 PM  
 Specific gravity 1.026 03/27/2019 12:17 PM  
 pH (UA) 5.5 03/27/2019 12:17 PM  
 Protein 100 (A) 03/27/2019 12:17 PM  
 Glucose NEGATIVE  03/27/2019 12:17 PM  
 Ketone 15 (A) 03/27/2019 12:17 PM  
 Bilirubin NEGATIVE  03/10/2015 02:45 PM  
 Urobilinogen 1.0 03/27/2019 12:17 PM  
 Nitrites NEGATIVE  03/27/2019 12:17 PM  
 Leukocyte Esterase SMALL (A) 03/27/2019 12:17 PM  
 Epithelial cells MANY (A) 03/27/2019 12:17 PM  
 Bacteria 2+ (A) 03/27/2019 12:17 PM  
 WBC 0-4 03/27/2019 12:17 PM  
 RBC 0-5 03/27/2019 12:17 PM  
 
 
 
Medications Reviewed:  
 
Current Facility-Administered Medications Medication Dose Route Frequency  [START ON 4/5/2019] Vancomycin trough FRIDAY 4/5/19 @ 0600   Other ONCE  
 vancomycin (VANCOCIN) 1750 mg in  ml infusion  1,750 mg IntraVENous Q12H  Vancomycin - Pharmacy to Dose   Other Rx Dosing/Monitoring  doxycycline (VIBRAMYCIN) 100 mg in 0.9% sodium chloride (MBP/ADV) 100 mL  100 mg IntraVENous Q12H  
 traMADol (ULTRAM) tablet 50 mg  50 mg Oral Q4H PRN  
 multivitamin, tx-iron-ca-min (THERA-M w/ IRON) tablet 1 Tab  1 Tab Oral DAILY  calcium carbonate tablet 520 mg [elemental]  520 mg Oral DAILY  cholecalciferol (VITAMIN D3) tablet 3,000 Units  3,000 Units Oral DAILY  cyanocobalamin (VITAMIN B12) tablet 500 mcg  500 mcg Oral DAILY  LORazepam (ATIVAN) tablet 1 mg  1 mg Oral TID PRN  
 losartan (COZAAR) tablet 25 mg  25 mg Oral DAILY  PARoxetine (PAXIL) tablet 40 mg  40 mg Oral DAILY  sodium chloride (NS) flush 5-40 mL  5-40 mL IntraVENous Q8H  
 sodium chloride (NS) flush 5-40 mL  5-40 mL IntraVENous PRN  
 ondansetron (ZOFRAN) injection 4 mg  4 mg IntraVENous Q6H PRN  
 acetaminophen (TYLENOL) tablet 650 mg  650 mg Oral Q6H PRN  pantoprazole (PROTONIX) tablet 40 mg  40 mg Oral DAILY  
 
______________________________________________________________________ EXPECTED LENGTH OF STAY: 4d 21h ACTUAL LENGTH OF STAY:          8 Morena Rey MD

## 2019-04-04 NOTE — PROGRESS NOTES
Surgery Progress Note Admit Date: 3/27/2019 Subjective:  
 
 Pt complains of alot of pain at her drain site and is asking that her drain be removed  Pts pain present - not adequately treated. No SOB. No CP. Pt is not yet ambulating. Patient 's current diet is bariatric diet, no issue taking po . Fior Po Pt reports  no nausea and no vomiting. Pt reports no fever or chills Objective:  
 
Patient Vitals for the past 8 hrs: 
 BP Temp Pulse Resp SpO2 Weight 04/04/19 0831 130/88 97.5 °F (36.4 °C) 80 17 94 %   
04/04/19 0321 135/90 97.9 °F (36.6 °C) 77 16 92 % 246 lb 12.8 oz (111.9 kg) No intake/output data recorded. 04/02 1901 - 04/04 0700 In: 1440 [P.O.:240; I.V.:1200] Out: 130 [Drains:120]ip Physical Exam: 
 
General: alert, cooperative, no distress Cardiac: tachy to 110 Lungs: clear anterior Abdomen: soft, protuberant, nondistended, tenderness mild and moderate - at drain site  and in the LUQ, without guarding, without rebound, CARLINE in place draining SS fluid, no pus, pt asked that her drain be emptied and I measured 40 cc output for overnight Wounds:clean, dry, no drainage Neuro: alert, oriented x 3, no defects noted in general exam. 
Extremities: no edema CBC:  
Lab Results Component Value Date/Time WBC 13.5 (H) 04/04/2019 03:45 AM  
 RBC 3.68 (L) 04/04/2019 03:45 AM  
 HGB 8.9 (L) 04/04/2019 03:45 AM  
 HCT 30.2 (L) 04/04/2019 03:45 AM  
 PLATELET 908 (H) 17/39/1483 03:45 AM  
 
BMP:  
Lab Results Component Value Date/Time Glucose 108 (H) 04/04/2019 03:45 AM  
 Sodium 138 04/04/2019 03:45 AM  
 Potassium 3.9 04/04/2019 03:45 AM  
 Chloride 105 04/04/2019 03:45 AM  
 CO2 27 04/04/2019 03:45 AM  
 BUN 9 04/04/2019 03:45 AM  
 Creatinine 0.72 04/04/2019 03:45 AM  
 Calcium 8.8 04/04/2019 03:45 AM  
 
CMP: 
Lab Results Component Value Date/Time  Glucose 108 (H) 04/04/2019 03:45 AM  
 Sodium 138 04/04/2019 03:45 AM  
 Potassium 3.9 04/04/2019 03:45 AM  
 Chloride 105 04/04/2019 03:45 AM  
 CO2 27 04/04/2019 03:45 AM  
 BUN 9 04/04/2019 03:45 AM  
 Creatinine 0.72 04/04/2019 03:45 AM  
 Calcium 8.8 04/04/2019 03:45 AM  
 Anion gap 6 04/04/2019 03:45 AM  
 BUN/Creatinine ratio 13 04/04/2019 03:45 AM  
 Alk. phosphatase 243 (H) 03/27/2019 11:52 AM  
 Protein, total 8.3 (H) 03/27/2019 11:52 AM  
 Albumin 2.3 (L) 03/27/2019 11:52 AM  
 Globulin 6.0 (H) 03/27/2019 11:52 AM  
 A-G Ratio 0.4 (L) 03/27/2019 11:52 AM  
 
 
Radiology review: na 
 
Prelim Component Value Ref Range & Units Status Special Requests: CULTURE ABS LOOKING FOR ACTINOMYCES   Preliminary GRAM STAIN 3+ WBCS SEEN    Preliminary GRAM STAIN    Preliminary 1+ GRAM POSITIVE COCCI (SOME IN CHAINS) GRAM STAIN    Preliminary FEW APPARENT GRAM POSITIVE RODS (OCCASIONALLY BEADED AND BRANCHING) Assessment:  
Pt is POD #1 s/p Procedure(s): DIAGNOSTIC LAPAROSCOPY WITH EGD Diaphragmatic mass/ abscess S/p lap gastric bypass 10/2018 Plan:  
Diet: advance as tolerated Activity: walk in de dios Pain management--will adjust to get her more comfortable--she only has oral tramadol ordered, will add prn dilaudid GI and DVT prophylaxis Drains pt request CARLINE be removed, explained that she will need interval imaging to assess abscess and drain will stay in for now. she understood Meds: continue baseline meds Labs: WBC up a bit post op Antibiotics: vancomycin and Vibramycin F/u cultures, interval CT in 1-2 days Further plan per Dr. Grant Mccarty PA-C

## 2019-04-05 ENCOUNTER — TELEPHONE (OUTPATIENT)
Dept: SURGERY | Age: 49
End: 2019-04-05

## 2019-04-05 ENCOUNTER — APPOINTMENT (OUTPATIENT)
Dept: CT IMAGING | Age: 49
DRG: 371 | End: 2019-04-05
Attending: SURGERY
Payer: COMMERCIAL

## 2019-04-05 ENCOUNTER — APPOINTMENT (OUTPATIENT)
Dept: ULTRASOUND IMAGING | Age: 49
DRG: 371 | End: 2019-04-05
Attending: SURGERY
Payer: COMMERCIAL

## 2019-04-05 ENCOUNTER — APPOINTMENT (OUTPATIENT)
Dept: GENERAL RADIOLOGY | Age: 49
DRG: 371 | End: 2019-04-05
Attending: RADIOLOGY
Payer: COMMERCIAL

## 2019-04-05 LAB
ALBUMIN FLD-MCNC: 1.4 G/DL
ANION GAP SERPL CALC-SCNC: 3 MMOL/L (ref 5–15)
APPEARANCE FLD: ABNORMAL
BASOPHILS # BLD: 0 K/UL (ref 0–0.1)
BASOPHILS NFR BLD: 0 % (ref 0–1)
BUN SERPL-MCNC: 7 MG/DL (ref 6–20)
BUN/CREAT SERPL: 8 (ref 12–20)
CALCIUM SERPL-MCNC: 8.7 MG/DL (ref 8.5–10.1)
CHLORIDE SERPL-SCNC: 105 MMOL/L (ref 97–108)
CO2 SERPL-SCNC: 30 MMOL/L (ref 21–32)
COLOR FLD: ABNORMAL
COMMENT, HOLDF: NORMAL
CREAT SERPL-MCNC: 0.83 MG/DL (ref 0.55–1.02)
DATE LAST DOSE: ABNORMAL
DIFFERENTIAL METHOD BLD: ABNORMAL
EOSINOPHIL # BLD: 0.3 K/UL (ref 0–0.4)
EOSINOPHIL NFR BLD: 2 % (ref 0–7)
ERYTHROCYTE [DISTWIDTH] IN BLOOD BY AUTOMATED COUNT: 14.8 % (ref 11.5–14.5)
GLUCOSE FLD-MCNC: 86 MG/DL
GLUCOSE SERPL-MCNC: 91 MG/DL (ref 65–100)
HCT VFR BLD AUTO: 32.1 % (ref 35–47)
HGB BLD-MCNC: 9.4 G/DL (ref 11.5–16)
IMM GRANULOCYTES # BLD AUTO: 0.1 K/UL (ref 0–0.04)
IMM GRANULOCYTES NFR BLD AUTO: 1 % (ref 0–0.5)
LDH FLD L TO P-CCNC: 139 U/L
LYMPHOCYTES # BLD: 1.8 K/UL (ref 0.8–3.5)
LYMPHOCYTES NFR BLD: 16 % (ref 12–49)
LYMPHOCYTES NFR FLD: 50 %
MCH RBC QN AUTO: 24.1 PG (ref 26–34)
MCHC RBC AUTO-ENTMCNC: 29.3 G/DL (ref 30–36.5)
MCV RBC AUTO: 82.3 FL (ref 80–99)
MESOTHL CELL NFR FLD: 1 %
MONOCYTES # BLD: 0.7 K/UL (ref 0–1)
MONOCYTES NFR BLD: 7 % (ref 5–13)
MONOS+MACROS NFR FLD: 45 %
NEUTROPHILS NFR FLD: 4 %
NEUTS SEG # BLD: 8.4 K/UL (ref 1.8–8)
NEUTS SEG NFR BLD: 74 % (ref 32–75)
NRBC # BLD: 0 K/UL (ref 0–0.01)
NRBC BLD-RTO: 0 PER 100 WBC
NUC CELL # FLD: 2355 /CU MM
PLATELET # BLD AUTO: 492 K/UL (ref 150–400)
PMV BLD AUTO: 9.7 FL (ref 8.9–12.9)
POTASSIUM SERPL-SCNC: 3.4 MMOL/L (ref 3.5–5.1)
PROT FLD-MCNC: 3.9 G/DL
RBC # BLD AUTO: 3.9 M/UL (ref 3.8–5.2)
RBC # FLD: >100 /CU MM
REPORTED DOSE,DOSE: ABNORMAL UNITS
REPORTED DOSE/TIME,TMG: ABNORMAL
SAMPLES BEING HELD,HOLD: NORMAL
SODIUM SERPL-SCNC: 138 MMOL/L (ref 136–145)
SPECIMEN SOURCE FLD: ABNORMAL
SPECIMEN SOURCE FLD: NORMAL
VANCOMYCIN TROUGH SERPL-MCNC: 26.5 UG/ML (ref 5–10)
WBC # BLD AUTO: 11.2 K/UL (ref 3.6–11)

## 2019-04-05 PROCEDURE — C1729 CATH, DRAINAGE: HCPCS

## 2019-04-05 PROCEDURE — 74011250637 HC RX REV CODE- 250/637: Performed by: INTERNAL MEDICINE

## 2019-04-05 PROCEDURE — 87015 SPECIMEN INFECT AGNT CONCNTJ: CPT

## 2019-04-05 PROCEDURE — 74011250637 HC RX REV CODE- 250/637: Performed by: SURGERY

## 2019-04-05 PROCEDURE — 71045 X-RAY EXAM CHEST 1 VIEW: CPT

## 2019-04-05 PROCEDURE — 80202 ASSAY OF VANCOMYCIN: CPT

## 2019-04-05 PROCEDURE — 80048 BASIC METABOLIC PNL TOTAL CA: CPT

## 2019-04-05 PROCEDURE — 74011250636 HC RX REV CODE- 250/636: Performed by: SURGERY

## 2019-04-05 PROCEDURE — 87205 SMEAR GRAM STAIN: CPT

## 2019-04-05 PROCEDURE — 74011000258 HC RX REV CODE- 258: Performed by: RADIOLOGY

## 2019-04-05 PROCEDURE — 74160 CT ABDOMEN W/CONTRAST: CPT

## 2019-04-05 PROCEDURE — 74011636320 HC RX REV CODE- 636/320: Performed by: RADIOLOGY

## 2019-04-05 PROCEDURE — 85025 COMPLETE CBC W/AUTO DIFF WBC: CPT

## 2019-04-05 PROCEDURE — 84157 ASSAY OF PROTEIN OTHER: CPT

## 2019-04-05 PROCEDURE — 82945 GLUCOSE OTHER FLUID: CPT

## 2019-04-05 PROCEDURE — 74011250636 HC RX REV CODE- 250/636: Performed by: PHYSICIAN ASSISTANT

## 2019-04-05 PROCEDURE — 0W9B3ZZ DRAINAGE OF LEFT PLEURAL CAVITY, PERCUTANEOUS APPROACH: ICD-10-PCS | Performed by: RADIOLOGY

## 2019-04-05 PROCEDURE — 32555 ASPIRATE PLEURA W/ IMAGING: CPT

## 2019-04-05 PROCEDURE — 74011250636 HC RX REV CODE- 250/636: Performed by: INTERNAL MEDICINE

## 2019-04-05 PROCEDURE — 65660000000 HC RM CCU STEPDOWN

## 2019-04-05 PROCEDURE — 88305 TISSUE EXAM BY PATHOLOGIST: CPT

## 2019-04-05 PROCEDURE — 83615 LACTATE (LD) (LDH) ENZYME: CPT

## 2019-04-05 PROCEDURE — 89050 BODY FLUID CELL COUNT: CPT

## 2019-04-05 PROCEDURE — 82042 OTHER SOURCE ALBUMIN QUAN EA: CPT

## 2019-04-05 PROCEDURE — 36415 COLL VENOUS BLD VENIPUNCTURE: CPT

## 2019-04-05 PROCEDURE — 74011000258 HC RX REV CODE- 258: Performed by: INTERNAL MEDICINE

## 2019-04-05 PROCEDURE — 88112 CYTOPATH CELL ENHANCE TECH: CPT

## 2019-04-05 PROCEDURE — 94760 N-INVAS EAR/PLS OXIMETRY 1: CPT

## 2019-04-05 PROCEDURE — 71260 CT THORAX DX C+: CPT

## 2019-04-05 RX ORDER — POTASSIUM CHLORIDE 750 MG/1
40 TABLET, FILM COATED, EXTENDED RELEASE ORAL
Status: COMPLETED | OUTPATIENT
Start: 2019-04-05 | End: 2019-04-05

## 2019-04-05 RX ORDER — SODIUM CHLORIDE 0.9 % (FLUSH) 0.9 %
10 SYRINGE (ML) INJECTION
Status: COMPLETED | OUTPATIENT
Start: 2019-04-05 | End: 2019-04-05

## 2019-04-05 RX ADMIN — TRAMADOL HYDROCHLORIDE 100 MG: 50 TABLET, FILM COATED ORAL at 23:00

## 2019-04-05 RX ADMIN — HYDROMORPHONE HYDROCHLORIDE 1 MG: 1 INJECTION, SOLUTION INTRAMUSCULAR; INTRAVENOUS; SUBCUTANEOUS at 15:24

## 2019-04-05 RX ADMIN — Medication 10 ML: at 21:26

## 2019-04-05 RX ADMIN — LORAZEPAM 1 MG: 1 TABLET ORAL at 21:22

## 2019-04-05 RX ADMIN — VANCOMYCIN HYDROCHLORIDE 1000 MG: 1 INJECTION, POWDER, LYOPHILIZED, FOR SOLUTION INTRAVENOUS at 09:46

## 2019-04-05 RX ADMIN — Medication 500 MCG: at 09:45

## 2019-04-05 RX ADMIN — TRAMADOL HYDROCHLORIDE 100 MG: 50 TABLET, FILM COATED ORAL at 07:21

## 2019-04-05 RX ADMIN — VANCOMYCIN HYDROCHLORIDE 1000 MG: 1 INJECTION, POWDER, LYOPHILIZED, FOR SOLUTION INTRAVENOUS at 21:23

## 2019-04-05 RX ADMIN — VITAMIN D 3000 UNITS: 25 TAB ORAL at 09:44

## 2019-04-05 RX ADMIN — SODIUM CHLORIDE 100 ML: 900 INJECTION, SOLUTION INTRAVENOUS at 08:46

## 2019-04-05 RX ADMIN — MULTIPLE VITAMINS W/ MINERALS TAB 1 TABLET: TAB at 09:45

## 2019-04-05 RX ADMIN — Medication 10 ML: at 07:22

## 2019-04-05 RX ADMIN — DOXYCYCLINE 100 MG: 100 INJECTION, POWDER, LYOPHILIZED, FOR SOLUTION INTRAVENOUS at 09:37

## 2019-04-05 RX ADMIN — TRAMADOL HYDROCHLORIDE 100 MG: 50 TABLET, FILM COATED ORAL at 16:28

## 2019-04-05 RX ADMIN — ANTACID TABLETS 520 MG: 648 TABLET, CHEWABLE ORAL at 09:45

## 2019-04-05 RX ADMIN — LOSARTAN POTASSIUM 25 MG: 25 TABLET, FILM COATED ORAL at 09:44

## 2019-04-05 RX ADMIN — Medication 10 ML: at 08:46

## 2019-04-05 RX ADMIN — IOPAMIDOL 100 ML: 755 INJECTION, SOLUTION INTRAVENOUS at 08:46

## 2019-04-05 RX ADMIN — POTASSIUM CHLORIDE 40 MEQ: 750 TABLET, EXTENDED RELEASE ORAL at 09:45

## 2019-04-05 RX ADMIN — PANTOPRAZOLE SODIUM 40 MG: 40 TABLET, DELAYED RELEASE ORAL at 09:45

## 2019-04-05 RX ADMIN — DOXYCYCLINE 100 MG: 100 INJECTION, POWDER, LYOPHILIZED, FOR SOLUTION INTRAVENOUS at 21:24

## 2019-04-05 RX ADMIN — PAROXETINE HYDROCHLORIDE 40 MG: 20 TABLET, FILM COATED ORAL at 09:45

## 2019-04-05 NOTE — PROGRESS NOTES
Surgery Progress Note Admit Date: 3/27/2019 Subjective:  
 
 Pt a bit sore, no acute c/o but asking when her drain may come out, NPO this AM for CT, waiting her her lunch, no issue taking po but not very hungry. Pts pain present - improved today and  adequately treated. No SOB. No CP. Pt is  ambulating. Patient 's current diet is bariatric diet, no issue taking po . Bernabe Junk Pt reports she has  no nausea and no vomiting. Pt reports no fever or chills, passing a lot of flatus, asking how long she may be in the hospital  
 
 
 
 
 
Objective:  
 
Patient Vitals for the past 8 hrs: 
 BP Temp Pulse Resp SpO2 Weight 04/05/19 0800     94 %   
04/05/19 0732 129/85 98 °F (36.7 °C) 82 18 93 %   
04/05/19 0436 143/86 98.3 °F (36.8 °C) 86 18 91 % 247 lb 3.2 oz (112.1 kg) 04/05 0701 - 04/05 1900 In: 845 [P.O.:240; I.V.:605] Out: 200 [Urine:200] 04/03 1901 - 04/05 0700 In: 3427 [P.O.:640; I.V.:1200] Out: 1080 [Urine:900; Drains:180]ip Physical Exam: 
 
General: alert, cooperative, no distress, in good spirits, appears comfortable Cardiac:reg s1,s2 Lungs: clear anterior Abdomen: soft, protuberant, she is  nondistended, tenderness mild - at her  drain site  and in the LUQ, without guarding, without rebound, CARLINE in place draining SS fluid, no purulent output Wounds:clean, dry, no drainage Neuro: alert, oriented x 3, no defects noted in general exam. 
Extremities: no edema CBC:  
Lab Results Component Value Date/Time WBC 13.5 (H) 04/04/2019 03:45 AM  
 RBC 3.68 (L) 04/04/2019 03:45 AM  
 HGB 8.9 (L) 04/04/2019 03:45 AM  
 HCT 30.2 (L) 04/04/2019 03:45 AM  
 PLATELET 892 (H) 22/48/7570 03:45 AM  
 
BMP:  
Lab Results Component Value Date/Time  Glucose 91 04/05/2019 04:42 AM  
 Sodium 138 04/05/2019 04:42 AM  
 Potassium 3.4 (L) 04/05/2019 04:42 AM  
 Chloride 105 04/05/2019 04:42 AM  
 CO2 30 04/05/2019 04:42 AM  
 BUN 7 04/05/2019 04:42 AM  
 Creatinine 0.83 04/05/2019 04:42 AM  
 Calcium 8.7 04/05/2019 04:42 AM  
 
CMP: 
Lab Results Component Value Date/Time Glucose 91 04/05/2019 04:42 AM  
 Sodium 138 04/05/2019 04:42 AM  
 Potassium 3.4 (L) 04/05/2019 04:42 AM  
 Chloride 105 04/05/2019 04:42 AM  
 CO2 30 04/05/2019 04:42 AM  
 BUN 7 04/05/2019 04:42 AM  
 Creatinine 0.83 04/05/2019 04:42 AM  
 Calcium 8.7 04/05/2019 04:42 AM  
 Anion gap 3 (L) 04/05/2019 04:42 AM  
 BUN/Creatinine ratio 8 (L) 04/05/2019 04:42 AM  
 Alk. phosphatase 243 (H) 03/27/2019 11:52 AM  
 Protein, total 8.3 (H) 03/27/2019 11:52 AM  
 Albumin 2.3 (L) 03/27/2019 11:52 AM  
 Globulin 6.0 (H) 03/27/2019 11:52 AM  
 A-G Ratio 0.4 (L) 03/27/2019 11:52 AM  
 
 
Radiology review: CT scan completed, results are pending Prelim Component Value Ref Range & Units Status Special Requests: CULTURE ABS LOOKING FOR ACTINOMYCES   Preliminary GRAM STAIN 3+ WBCS SEEN    Preliminary GRAM STAIN    Preliminary 1+ GRAM POSITIVE COCCI (SOME IN CHAINS) GRAM STAIN    Preliminary FEW APPARENT GRAM POSITIVE RODS (OCCASIONALLY BEADED AND BRANCHING) Assessment:  
Pt is POD #2 s/p Procedure(s): DIAGNOSTIC LAPAROSCOPY WITH EGD Diaphragmatic mass/ abscess--drained in OR 4/3 S/p lap gastric bypass 10/2018 Plan:  
Diet: resume bariatric diet Activity: walk in de dios Pain management--current pain regimen is working well for her GI and DVT prophylaxis Continue CARLINE drain Today's CT result is pending Meds: continue baseline meds Antibiotics: vancomycin and Vibramycin Appreciate Dr. Skip Talley input, cultures from OR are pending--he suspects Actinomyces --ABX treatment course per ID Doing well from surgical standpoint Further plan per Dr. Lily Grady PA-C

## 2019-04-05 NOTE — PROGRESS NOTES
Bedside and Verbal shift change report given to Adin Guevara (oncoming nurse) by Georges Laughlin (offgoing nurse).  Report included the following information SBAR, Kardex, Procedure Summary, MAR, Accordion and Med Rec Status NSR

## 2019-04-05 NOTE — PROGRESS NOTES
Bedside and Verbal shift change report given to [address] [place] [address] (oncoming nurse) by Zoey Villalobos (offgoing nurse). Report included the following information SBAR, MAR and Cardiac Rhythm Sinus Rhythm.

## 2019-04-05 NOTE — PROGRESS NOTES
Pharmacist Note - Vancomycin Dosing Therapy day 3 Indication: intra-abdominal infection Current regimen: 1750 mg q12h A Trough Level resulted at 26.5 mcg/mL which was obtained 10.5 hrs post-dose. The extrapolated \"true\" trough is approximately 24.2 mcg/mL based on the patient's known kinetics. Goal trough: 10 - 15 mcg/mL Plan: Change to 1 gram q12h . Pharmacy will continue to monitor this patient daily for changes in clinical status and renal function.

## 2019-04-05 NOTE — PROGRESS NOTES
Hospitalist Progress Note Akanksha Lau MD 
     
                             Answering service: [phone] OR [phone] from in house phone Date of Service:  2019 NAME:  Sima Chaves :  1970 MRN:  [id number] Admission Summary:  
46 y.o. white female with past medical history of anemia, anxiety Disorder, depression, arthritis, hypertension, menorrhagia, morbid obesity, s/p gastric bypass surgery was brought to the ED from work via EMS with chief complaint of generalized weakness, fatigue, feeling like she was going to pass out and diarrhea. Reason for follow up:  
Pt seen and examined, feels well,  sleeps in room. Down for rpt CT Assessment & Plan:  
 
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 S/P CT-guided biopsy done on 3/29/19. Thoracic surgical F/Us noted and appreciated. - Pathology results show organizing abscess, no Malignancy seen. - Surgical team following, s/p drainage of abscess, CARLINE Drain placed. - ID following, concerned about Actinomyces started doxy 2) Resp: Small left pleural effusion without respiratory failure. Oxygen PRN, diuresis and incentive spirometry. 3) CVS: Near Syncope on admission. Controlled Primary hypertension. 2D ECHO with moderate to large pericardial effusion. Low threshold for Cardiac Tamponade. S/P Pericardiocentesis 3/30/19. F/U 2D ECHO 3/31/19 showed minimal residual effusion. Cardiology evaluated. 4) Hematology: Probable anemia of chronicity. 5) Electrolytes: Resolved Hypokalemia. 6) Endocrine: Morbid obesity with a BMI greater than 40. H/O Gastric Bypass surgery. Therapeutic lifestyle changes counselling done. 7) JOHANNA: Generalized weakness. PT as tolerated. 8) Psych: Anxiety Disorder. Depression. As needed anxiolytics. 9) Prophylaxis: DVT. 10) Directives: Full Code with a guarded prognosis. D/W patient and family. 11) Plan: TBD 
  
Hospital Problems  Date Reviewed: 4/3/2019 Codes Class Noted POA * (Principal) Abscess, liver ICD-10-CM: K75.0 ICD-9-CM: 572.0  4/3/2019 Yes Pericardial effusion ICD-10-CM: I31.3 ICD-9-CM: 423.9  3/27/2019 Unknown Pleural effusion, left ICD-10-CM: J90 ICD-9-CM: 511.9  3/27/2019 Unknown Near syncope ICD-10-CM: R55 
ICD-9-CM: 780.2  3/27/2019 Unknown Morbid obesity ([facility] [address] ICD-10-CM: E66.01 
ICD-9-CM: 278.01  2/28/2011 Yes Physical Examination:  
 
 Last 24hrs VS reviewed since prior progress note. Most recent are: 
Visit Vitals /85 (BP 1 Location: Left arm, BP Patient Position: At rest) Pulse 82 Temp 98 °F (36.7 °C) Resp 18 Ht 5' 7\" (1.702 m) Wt 112.1 kg (247 lb 3.2 oz) SpO2 93% BMI 38.72 kg/m² Constitutional:  No acute distress, cooperative, pleasant   
   
Resp:  Decreased air entry B/L. Drain in situ. CV:  S1,S2 Tachycardic GI:  Soft, obese, non distended, tender LUQ. BS+, drain in situ, clear bloody discharge. Musculoskeletal:  Bipedal edema. Neurologic:  Awake, alert and oriented. Intake/Output Summary (Last 24 hours) at 4/5/2019 9243 Last data filed at 4/5/2019 4726 Gross per 24 hour Intake 1840 ml Output 1050 ml Net 790 ml Labs:  
 
Recent Labs 04/04/19 
0345 WBC 13.5* HGB 8.9* HCT 30.2* * Recent Labs 04/05/19 
9732 04/04/19 
0345  138  
K 3.4* 3.9  105 CO2 30 27 BUN 7 9 CREA 0.83 0.72 GLU 91 108* CA 8.7 8.8 No results for input(s): SGOT, GPT, ALT, AP, TBIL, TBILI, TP, ALB, GLOB, GGT, AML, LPSE in the last 72 hours. No lab exists for component: AMYP, HLPSE No results for input(s): INR, PTP, APTT in the last 72 hours.  
 
No lab exists for component: INREXT, INREXT  
 No results for input(s): FE, TIBC, PSAT, FERR in the last 72 hours. Lab Results Component Value Date/Time Folate 8.0 04/10/2009 06:00 PM  
  
No results for input(s): PH, PCO2, PO2 in the last 72 hours. No results for input(s): CPK, CKNDX, TROIQ in the last 72 hours. No lab exists for component: CPKMB Lab Results Component Value Date/Time Cholesterol, total 211 (H) 10/13/2017 04:23 PM  
 HDL Cholesterol 44 10/13/2017 04:23 PM  
 LDL, calculated 113 (H) 10/13/2017 04:23 PM  
 Triglyceride 268 (H) 10/13/2017 04:23 PM  
 
Lab Results Component Value Date/Time Glucose (POC) 97 01/31/2010 01:10 PM  
 Glucose  05/14/2012 10:55 AM  
 
Lab Results Component Value Date/Time Color 0-3 03/27/2019 12:17 PM  
 Appearance CLOUDY (A) 03/27/2019 12:17 PM  
 Specific gravity 1.026 03/27/2019 12:17 PM  
 pH (UA) 5.5 03/27/2019 12:17 PM  
 Protein 100 (A) 03/27/2019 12:17 PM  
 Glucose NEGATIVE  03/27/2019 12:17 PM  
 Ketone 15 (A) 03/27/2019 12:17 PM  
 Bilirubin NEGATIVE  03/10/2015 02:45 PM  
 Urobilinogen 1.0 03/27/2019 12:17 PM  
 Nitrites NEGATIVE  03/27/2019 12:17 PM  
 Leukocyte Esterase SMALL (A) 03/27/2019 12:17 PM  
 Epithelial cells MANY (A) 03/27/2019 12:17 PM  
 Bacteria 2+ (A) 03/27/2019 12:17 PM  
 WBC 0-4 03/27/2019 12:17 PM  
 RBC 0-5 03/27/2019 12:17 PM  
 
 
 
Medications Reviewed:  
 
Current Facility-Administered Medications Medication Dose Route Frequency  sodium chloride 0.9 % bolus infusion 100 mL  100 mL IntraVENous RAD ONCE  
 iopamidol (ISOVUE-370) 76 % injection 100 mL  100 mL IntraVENous RAD ONCE  
 sodium chloride (NS) flush 10 mL  10 mL IntraVENous RAD ONCE  
 vancomycin (VANCOCIN) 1,000 mg in 0.9% sodium chloride (MBP/ADV) 250 mL  1,000 mg IntraVENous Q12H  potassium chloride SR (KLOR-CON 10) tablet 40 mEq  40 mEq Oral NOW  traMADol (ULTRAM) tablet 100 mg  100 mg Oral Q6H PRN  
 HYDROmorphone (PF) (DILAUDID) injection 1 mg  1 mg IntraVENous Q4H PRN  
  doxycycline (VIBRAMYCIN) 100 mg in 0.9% sodium chloride (MBP/ADV) 100 mL  100 mg IntraVENous Q12H  Vancomycin - Pharmacy to Dose   Other Rx Dosing/Monitoring  multivitamin, tx-iron-ca-min (THERA-M w/ IRON) tablet 1 Tab  1 Tab Oral DAILY  calcium carbonate tablet 520 mg [elemental]  520 mg Oral DAILY  cholecalciferol (VITAMIN D3) tablet 3,000 Units  3,000 Units Oral DAILY  cyanocobalamin (VITAMIN B12) tablet 500 mcg  500 mcg Oral DAILY  LORazepam (ATIVAN) tablet 1 mg  1 mg Oral TID PRN  
 losartan (COZAAR) tablet 25 mg  25 mg Oral DAILY  PARoxetine (PAXIL) tablet 40 mg  40 mg Oral DAILY  sodium chloride (NS) flush 5-40 mL  5-40 mL IntraVENous Q8H  
 sodium chloride (NS) flush 5-40 mL  5-40 mL IntraVENous PRN  
 ondansetron (ZOFRAN) injection 4 mg  4 mg IntraVENous Q6H PRN  
 acetaminophen (TYLENOL) tablet 650 mg  650 mg Oral Q6H PRN  pantoprazole (PROTONIX) tablet 40 mg  40 mg Oral DAILY  
 
______________________________________________________________________ EXPECTED LENGTH OF STAY: 4d 21h ACTUAL LENGTH OF STAY:          9 Marti Gonzales MD

## 2019-04-05 NOTE — PROGRESS NOTES
Bedside and Verbal shift change report given to [address] [place] [address] (oncoming nurse) by Adonay Lopez (offgoing nurse). Report included the following information SBAR, MAR and Cardiac Rhythm Sinus Rhythm.

## 2019-04-05 NOTE — PROGRESS NOTES
ID Progress Note 2019 Subjective: Afebrile. No dyspnea, headache, sore throat or dysuria. Some abdominal soreness. Objective:  
 
Vitals:  
Visit Vitals /85 (BP 1 Location: Left arm, BP Patient Position: At rest) Pulse 82 Temp 98 °F (36.7 °C) Resp 18 Ht 5' 7\" (1.702 m) Wt 112.1 kg (247 lb 3.2 oz) LMP 06/10/2013 SpO2 93% BMI 38.72 kg/m² Tmax:  Temp (24hrs), Av °F (36.7 °C), Min:97.4 °F (36.3 °C), Max:98.7 °F (37.1 °C) Exam: Not in distress Eyes: pink conjunctivae, anicteric sclerae No cervical lymphadenopathy Lungs: clear to auscultation, no rales, wheezes or rhonchi Heart: s1, s2, no murmurs, rubs of clicks Abdomen: soft, a little tender at the CARLINE drain site. The CARLINE drain has bloody fluid in it Extremities: knees not warm or tender Speech fluent Labs:  
Lab Results Component Value Date/Time WBC 13.5 (H) 2019 03:45 AM  
 Hemoglobin (POC) 9.6 2012 10:55 AM  
 Hemoglobin (POC) 11.2 (L) 2010 01:10 PM  
 HGB 8.9 (L) 2019 03:45 AM  
 Hematocrit (POC) 33 (L) 2010 01:10 PM  
 HCT 30.2 (L) 2019 03:45 AM  
 PLATELET 103 (H)  03:45 AM  
 MCV 82.1 2019 03:45 AM  
 
Lab Results Component Value Date/Time Sodium 138 2019 04:42 AM  
 Potassium 3.4 (L) 2019 04:42 AM  
 Chloride 105 2019 04:42 AM  
 CO2 30 2019 04:42 AM  
 Anion gap 3 (L) 2019 04:42 AM  
 Glucose 91 2019 04:42 AM  
 BUN 7 2019 04:42 AM  
 Creatinine 0.83 2019 04:42 AM  
 BUN/Creatinine ratio 8 (L) 2019 04:42 AM  
 GFR est AA >60 2019 04:42 AM  
 GFR est non-AA >60 2019 04:42 AM  
 Calcium 8.7 2019 04:42 AM  
 Bilirubin, total 0.8 2019 11:52 AM  
 AST (SGOT) 17 2019 11:52 AM  
 Alk.  phosphatase 243 (H) 2019 11:52 AM  
 Protein, total 8.3 (H) 2019 11:52 AM  
 Albumin 2.3 (L) 2019 11:52 AM  
 Globulin 6.0 (H) 2019 11:52 AM  
 A-G Ratio 0.4 (L) 03/27/2019 11:52 AM  
 ALT (SGPT) 16 03/27/2019 11:52 AM  
 
 
 
Assessment: 1. Diaphragmatic abscess with possible involvement of the pericardium, status post laparoscopic drainage. 2.  History of gastric bypass surgery. 3.  Pleural effusions. 4.  Pericardial effusion status post pericardiocentesis. 5.  Hypertension. 6.  Depression. 7.  Anxiety. Recommendations:  
 
Continue vanco and doxy The lab is aware that we are concerned about actinomyces Ff up cultures Official ct report is not back yet. I reviewed films. The abscess seems smaller but it has not resolved Dr. Mehnaz Roberson will check cx over the weekend.   
 
 
Cruz Infante MD

## 2019-04-06 LAB
ANION GAP SERPL CALC-SCNC: 5 MMOL/L (ref 5–15)
BUN SERPL-MCNC: 7 MG/DL (ref 6–20)
BUN/CREAT SERPL: 9 (ref 12–20)
CALCIUM SERPL-MCNC: 8.6 MG/DL (ref 8.5–10.1)
CHLORIDE SERPL-SCNC: 106 MMOL/L (ref 97–108)
CO2 SERPL-SCNC: 30 MMOL/L (ref 21–32)
CREAT SERPL-MCNC: 0.76 MG/DL (ref 0.55–1.02)
ERYTHROCYTE [DISTWIDTH] IN BLOOD BY AUTOMATED COUNT: 14.8 % (ref 11.5–14.5)
GLUCOSE SERPL-MCNC: 89 MG/DL (ref 65–100)
HCT VFR BLD AUTO: 29.1 % (ref 35–47)
HGB BLD-MCNC: 8.6 G/DL (ref 11.5–16)
MCH RBC QN AUTO: 24.1 PG (ref 26–34)
MCHC RBC AUTO-ENTMCNC: 29.6 G/DL (ref 30–36.5)
MCV RBC AUTO: 81.5 FL (ref 80–99)
NRBC # BLD: 0 K/UL (ref 0–0.01)
NRBC BLD-RTO: 0 PER 100 WBC
PLATELET # BLD AUTO: 436 K/UL (ref 150–400)
PMV BLD AUTO: 9.4 FL (ref 8.9–12.9)
POTASSIUM SERPL-SCNC: 3.1 MMOL/L (ref 3.5–5.1)
RBC # BLD AUTO: 3.57 M/UL (ref 3.8–5.2)
SODIUM SERPL-SCNC: 141 MMOL/L (ref 136–145)
WBC # BLD AUTO: 10.4 K/UL (ref 3.6–11)

## 2019-04-06 PROCEDURE — 65660000000 HC RM CCU STEPDOWN

## 2019-04-06 PROCEDURE — 74011000258 HC RX REV CODE- 258: Performed by: INTERNAL MEDICINE

## 2019-04-06 PROCEDURE — 74011250637 HC RX REV CODE- 250/637: Performed by: SURGERY

## 2019-04-06 PROCEDURE — 36415 COLL VENOUS BLD VENIPUNCTURE: CPT

## 2019-04-06 PROCEDURE — 85027 COMPLETE CBC AUTOMATED: CPT

## 2019-04-06 PROCEDURE — 74011250636 HC RX REV CODE- 250/636: Performed by: INTERNAL MEDICINE

## 2019-04-06 PROCEDURE — 80048 BASIC METABOLIC PNL TOTAL CA: CPT

## 2019-04-06 PROCEDURE — 74011250636 HC RX REV CODE- 250/636: Performed by: SURGERY

## 2019-04-06 PROCEDURE — 74011250637 HC RX REV CODE- 250/637: Performed by: INTERNAL MEDICINE

## 2019-04-06 RX ORDER — POTASSIUM CHLORIDE 750 MG/1
40 TABLET, FILM COATED, EXTENDED RELEASE ORAL 2 TIMES DAILY
Status: COMPLETED | OUTPATIENT
Start: 2019-04-06 | End: 2019-04-07

## 2019-04-06 RX ORDER — DOCUSATE SODIUM 100 MG/1
100 CAPSULE, LIQUID FILLED ORAL DAILY
Status: DISCONTINUED | OUTPATIENT
Start: 2019-04-06 | End: 2019-04-12 | Stop reason: HOSPADM

## 2019-04-06 RX ADMIN — Medication 500 MCG: at 08:56

## 2019-04-06 RX ADMIN — VANCOMYCIN HYDROCHLORIDE 1000 MG: 1 INJECTION, POWDER, LYOPHILIZED, FOR SOLUTION INTRAVENOUS at 10:51

## 2019-04-06 RX ADMIN — VITAMIN D 3000 UNITS: 25 TAB ORAL at 08:56

## 2019-04-06 RX ADMIN — LORAZEPAM 1 MG: 1 TABLET ORAL at 22:22

## 2019-04-06 RX ADMIN — POTASSIUM CHLORIDE 40 MEQ: 750 TABLET, EXTENDED RELEASE ORAL at 17:10

## 2019-04-06 RX ADMIN — TRAMADOL HYDROCHLORIDE 100 MG: 50 TABLET, FILM COATED ORAL at 20:55

## 2019-04-06 RX ADMIN — ANTACID TABLETS 520 MG: 648 TABLET, CHEWABLE ORAL at 08:56

## 2019-04-06 RX ADMIN — TRAMADOL HYDROCHLORIDE 100 MG: 50 TABLET, FILM COATED ORAL at 10:53

## 2019-04-06 RX ADMIN — DOXYCYCLINE 100 MG: 100 INJECTION, POWDER, LYOPHILIZED, FOR SOLUTION INTRAVENOUS at 10:47

## 2019-04-06 RX ADMIN — MULTIPLE VITAMINS W/ MINERALS TAB 1 TABLET: TAB at 08:57

## 2019-04-06 RX ADMIN — DOXYCYCLINE 100 MG: 100 INJECTION, POWDER, LYOPHILIZED, FOR SOLUTION INTRAVENOUS at 21:00

## 2019-04-06 RX ADMIN — ACETAMINOPHEN 650 MG: 325 TABLET ORAL at 17:15

## 2019-04-06 RX ADMIN — Medication 10 ML: at 21:01

## 2019-04-06 RX ADMIN — LOSARTAN POTASSIUM 25 MG: 25 TABLET, FILM COATED ORAL at 08:57

## 2019-04-06 RX ADMIN — VANCOMYCIN HYDROCHLORIDE 1000 MG: 1 INJECTION, POWDER, LYOPHILIZED, FOR SOLUTION INTRAVENOUS at 22:22

## 2019-04-06 RX ADMIN — PANTOPRAZOLE SODIUM 40 MG: 40 TABLET, DELAYED RELEASE ORAL at 08:57

## 2019-04-06 RX ADMIN — PAROXETINE HYDROCHLORIDE 40 MG: 20 TABLET, FILM COATED ORAL at 08:56

## 2019-04-06 RX ADMIN — POTASSIUM CHLORIDE 40 MEQ: 750 TABLET, EXTENDED RELEASE ORAL at 10:47

## 2019-04-06 RX ADMIN — Medication 10 ML: at 15:50

## 2019-04-06 NOTE — PROGRESS NOTES
1627 - Bedside and Verbal shift change report given to Angel Rao RN (oncoming nurse) by Estefanía Gamez (offgoing nurse). Report included the following information SBAR, Kardex, Intake/Output, MAR, Accordion, Recent Results and Cardiac Rhythm NSR.  
 
2015 - Bedside and Verbal shift change report given to TASHIA/Arturo Bryant 1106 (oncoming nurse) by Angel Rao RN (offgoing nurse). Report included the following information SBAR, Kardex, Intake/Output, MAR, Accordion, Recent Results and Cardiac Rhythm NSR.

## 2019-04-06 NOTE — PROGRESS NOTES
Hospitalist Progress Note Aldair Jimenez MD 
     
                             Answering service: [phone] OR [phone] from in house phone Date of Service:  2019 NAME:  Jacki Fitzpatrick :  1970 MRN:  [id number] Admission Summary:  
46 y.o. white female with past medical history of anemia, anxiety Disorder, depression, arthritis, hypertension, menorrhagia, morbid obesity, s/p gastric bypass surgery was brought to the ED from work via EMS with chief complaint of generalized weakness, fatigue, feeling like she was going to pass out and diarrhea. Reason for follow up:  
Pt seen and examined, feels well,  present, no sob/cp/cough. Drain still getting some reddish purulent semi thick fluid. Assessment & Plan: #. Large L subphrenic Abscess: - S/P CT-guided biopsy- Pathology results show organizing abscess, no Malignancy seen. - Surgical team following, s/p drainage of abscess, CARLINE Drain placed. - ID following, concerned about Actinomyces started doxy #. Large left pleural effusion without respiratory failure. - Oxygen PRN, diuresis and incentive spirometry. - CT surgery following - S/p US guided thoracentesis. fluid studies/cultures pending #. Near Syncope on admission. Controlled Primary hypertension. 2D ECHO with moderate to large pericardial effusion. Low threshold for Cardiac Tamponade. S/P Pericardiocentesis 3/30/19. F/U 2D ECHO 3/31/19 showed minimal residual effusion. Cardiology evaluated. #. Anemia: blood loss, post Operative #. Hypokalemia. Replace, monitor #. Morbid obesity with a BMI greater than 40. H/O Gastric Bypass surgery. Counseled. #. Generalized weakness. PT as tolerated. #. Anxiety Disorder. Depression. As needed anxiolytics. 9) Prophylaxis: DVT. 10) Directives: Full Code with a guarded prognosis. D/W patient and family. 11) Plan: TBD 
  
Hospital Problems  Date Reviewed: 4/3/2019 Codes Class Noted POA * (Principal) Abscess, liver ICD-10-CM: K75.0 ICD-9-CM: 572.0  4/3/2019 Yes Pericardial effusion ICD-10-CM: I31.3 ICD-9-CM: 423.9  3/27/2019 Unknown Pleural effusion, left ICD-10-CM: J90 ICD-9-CM: 511.9  3/27/2019 Unknown Near syncope ICD-10-CM: R55 
ICD-9-CM: 780.2  3/27/2019 Unknown Morbid obesity ([facility] [address] ICD-10-CM: E66.01 
ICD-9-CM: 278.01  2/28/2011 Yes Physical Examination:  
 
 Last 24hrs VS reviewed since prior progress note. Most recent are: 
Visit Vitals /84 (BP 1 Location: Left arm, BP Patient Position: At rest) Pulse 74 Temp 98.3 °F (36.8 °C) Resp 18 Ht 5' 7\" (1.702 m) Wt 110.2 kg (243 lb) SpO2 95% BMI 38.06 kg/m² Constitutional:  No acute distress, cooperative, pleasant   
   
Resp:  Decreased air entry B/L. GI:  Soft, obese, non distended, tender LUQ. BS+, drain in situ, clear bloody discharge. Musculoskeletal:  Bipedal edema. Neurologic:  Awake, alert and oriented. Intake/Output Summary (Last 24 hours) at 4/6/2019 8706 Last data filed at 4/6/2019 0400 Gross per 24 hour Intake 790 ml Output 1080 ml Net -290 ml Labs:  
 
Recent Labs 04/06/19 
0541 04/05/19 
1640 WBC 10.4 11.2* HGB 8.6* 9.4* HCT 29.1* 32.1*  
* 492* Recent Labs 04/06/19 
8822 04/05/19 
7118 04/04/19 
0345  138 138  
K 3.1* 3.4* 3.9  105 105 CO2 30 30 27 BUN 7 7 9 CREA 0.76 0.83 0.72 GLU 89 91 108* CA 8.6 8.7 8.8 No results for input(s): SGOT, GPT, ALT, AP, TBIL, TBILI, TP, ALB, GLOB, GGT, AML, LPSE in the last 72 hours. No lab exists for component: AMYP, HLPSE No results for input(s): INR, PTP, APTT in the last 72 hours.  
 
No lab exists for component: INREXT, INREXT  
 No results for input(s): FE, TIBC, PSAT, FERR in the last 72 hours. Lab Results Component Value Date/Time Folate 8.0 04/10/2009 06:00 PM  
  
No results for input(s): PH, PCO2, PO2 in the last 72 hours. No results for input(s): CPK, CKNDX, TROIQ in the last 72 hours. No lab exists for component: CPKMB Lab Results Component Value Date/Time Cholesterol, total 211 (H) 10/13/2017 04:23 PM  
 HDL Cholesterol 44 10/13/2017 04:23 PM  
 LDL, calculated 113 (H) 10/13/2017 04:23 PM  
 Triglyceride 268 (H) 10/13/2017 04:23 PM  
 
Lab Results Component Value Date/Time Glucose (POC) 97 01/31/2010 01:10 PM  
 Glucose  05/14/2012 10:55 AM  
 
Lab Results Component Value Date/Time Color 0-3 03/27/2019 12:17 PM  
 Appearance CLOUDY (A) 03/27/2019 12:17 PM  
 Specific gravity 1.026 03/27/2019 12:17 PM  
 pH (UA) 5.5 03/27/2019 12:17 PM  
 Protein 100 (A) 03/27/2019 12:17 PM  
 Glucose NEGATIVE  03/27/2019 12:17 PM  
 Ketone 15 (A) 03/27/2019 12:17 PM  
 Bilirubin NEGATIVE  03/10/2015 02:45 PM  
 Urobilinogen 1.0 03/27/2019 12:17 PM  
 Nitrites NEGATIVE  03/27/2019 12:17 PM  
 Leukocyte Esterase SMALL (A) 03/27/2019 12:17 PM  
 Epithelial cells MANY (A) 03/27/2019 12:17 PM  
 Bacteria 2+ (A) 03/27/2019 12:17 PM  
 WBC 0-4 03/27/2019 12:17 PM  
 RBC 0-5 03/27/2019 12:17 PM  
 
 
 
Medications Reviewed:  
 
Current Facility-Administered Medications Medication Dose Route Frequency  [START ON 4/7/2019] Vancomycin trough before 10am dose 4/7   Other ONCE  
 vancomycin (VANCOCIN) 1,000 mg in 0.9% sodium chloride (MBP/ADV) 250 mL  1,000 mg IntraVENous Q12H  
 traMADol (ULTRAM) tablet 100 mg  100 mg Oral Q6H PRN  
 HYDROmorphone (PF) (DILAUDID) injection 1 mg  1 mg IntraVENous Q4H PRN  
 doxycycline (VIBRAMYCIN) 100 mg in 0.9% sodium chloride (MBP/ADV) 100 mL  100 mg IntraVENous Q12H  Vancomycin - Pharmacy to Dose   Other Rx Dosing/Monitoring  multivitamin, tx-iron-ca-min (THERA-M w/ IRON) tablet 1 Tab  1 Tab Oral DAILY  calcium carbonate tablet 520 mg [elemental]  520 mg Oral DAILY  cholecalciferol (VITAMIN D3) tablet 3,000 Units  3,000 Units Oral DAILY  cyanocobalamin (VITAMIN B12) tablet 500 mcg  500 mcg Oral DAILY  LORazepam (ATIVAN) tablet 1 mg  1 mg Oral TID PRN  
 losartan (COZAAR) tablet 25 mg  25 mg Oral DAILY  PARoxetine (PAXIL) tablet 40 mg  40 mg Oral DAILY  sodium chloride (NS) flush 5-40 mL  5-40 mL IntraVENous Q8H  
 sodium chloride (NS) flush 5-40 mL  5-40 mL IntraVENous PRN  
 ondansetron (ZOFRAN) injection 4 mg  4 mg IntraVENous Q6H PRN  
 acetaminophen (TYLENOL) tablet 650 mg  650 mg Oral Q6H PRN  pantoprazole (PROTONIX) tablet 40 mg  40 mg Oral DAILY  
 
______________________________________________________________________ EXPECTED LENGTH OF STAY: 6d 14h ACTUAL LENGTH OF STAY:          Molina More MD

## 2019-04-06 NOTE — PROGRESS NOTES
Bedside and Verbal shift change report given to Nisa Cornell RN  (oncoming nurse) by Michael Sanchez RN  (offgoing nurse). Report included the following information SBAR.

## 2019-04-06 NOTE — PROGRESS NOTES
2000 
Assessment. Patient is stable 
2200 Vancomycin and doxy antibiotics (see MAR). Bedside and Verbal shift change report given to [employer] (oncoming nurse) by Ulysses Euceda (offgoing nurse). Report included the following information SBAR, Kardex, MAR, Recent Results and Med Rec Status.

## 2019-04-06 NOTE — PROGRESS NOTES
Mr.s Arrieta is POD#3 after a Laparoscopic drainage of a subphrenic abscess. Recovering well. Asked yesterday by Dr. Tessie Mccoy to comment on her pleural effusions. Recommended she have a thoracentesis. She had >1L drained by IR yesterday afternoon. No acute events overnight. Afebrile HD stable On exam she is laying in bed Resting comfortably I did not awaken her Micro from pleural fluid shows no organisms, gram stain neg Chemistry from Pleural fluid shows low LDH and low protein, transudative Diagnoses  1- Transudative pleural effusion  2- Subphrneic abscess Mrs. Doreen Rivera has a transudative pleural effusion which is most likely sympathetic in nature in response to her subphrenic process. At this time she does not need a drain or any intervention on her pleural space. Will be available to assist should her effusions recur or get worse.

## 2019-04-06 NOTE — PROGRESS NOTES
Progress Note Patient: Santiago Yoo MRN: [id number]  SSN: [id number] YOB: 1970  Age: 46 y.o. Sex: female Admit Date: 3/27/2019 
 
3 Days Post-Op Procedure:  Procedure(s): DIAGNOSTIC LAPAROSCOPY WITH EGD Subjective: No acute surgical issues. Pt tolerating bariatric diet. [place] still with purulent drainage. WBC normalizing Objective:  
 
Visit Vitals /85 (BP 1 Location: Left arm, BP Patient Position: At rest) Pulse 75 Temp 98 °F (36.7 °C) Resp 16 Ht 5' 7\" (1.702 m) Wt 243 lb (110.2 kg) SpO2 96% BMI 38.06 kg/m² Temp (24hrs), Av.2 °F (36.8 °C), Min:97.6 °F (36.4 °C), Max:98.6 °F (37 °C) Physical Exam:   
Gen:  NAD Pulm:  Unlabored Abd:  S/ND/appropriate TTP Wound:  C/D/I 
CARLINE with seropurulent drainage Recent Results (from the past 24 hour(s)) CBC W/O DIFF Collection Time: 19  5:41 AM  
Result Value Ref Range WBC 10.4 3.6 - 11.0 K/uL  
 RBC 3.57 (L) 3.80 - 5.20 M/uL HGB 8.6 (L) 11.5 - 16.0 g/dL HCT 29.1 (L) 35.0 - 47.0 % MCV 81.5 80.0 - 99.0 FL  
 MCH 24.1 (L) 26.0 - 34.0 PG  
 MCHC 29.6 (L) 30.0 - 36.5 g/dL  
 RDW 14.8 (H) 11.5 - 14.5 % PLATELET 155 (H) 796 - 400 K/uL MPV 9.4 8.9 - 12.9 FL  
 NRBC 0.0 0  WBC ABSOLUTE NRBC 0.00 0.00 - 0.01 K/uL METABOLIC PANEL, BASIC Collection Time: 19  5:41 AM  
Result Value Ref Range Sodium 141 136 - 145 mmol/L Potassium 3.1 (L) 3.5 - 5.1 mmol/L Chloride 106 97 - 108 mmol/L  
 CO2 30 21 - 32 mmol/L Anion gap 5 5 - 15 mmol/L Glucose 89 65 - 100 mg/dL BUN 7 6 - 20 MG/DL Creatinine 0.76 0.55 - 1.02 MG/DL  
 BUN/Creatinine ratio 9 (L) 12 - 20 GFR est AA >60 >60 ml/min/1.73m2 GFR est non-AA >60 >60 ml/min/1.73m2 Calcium 8.6 8.5 - 10.1 MG/DL Assessment:  
 
Hospital Problems  Date Reviewed: 4/3/2019 Codes Class Noted POA * (Principal) Abscess, liver ICD-10-CM: K75.0 ICD-9-CM: 572.0  4/3/2019 Yes Pericardial effusion ICD-10-CM: I31.3 ICD-9-CM: 423.9  3/27/2019 Unknown Pleural effusion, left ICD-10-CM: J90 ICD-9-CM: 511.9  3/27/2019 Unknown Near syncope ICD-10-CM: R55 
ICD-9-CM: 780.2  3/27/2019 Unknown Morbid obesity ([facility] [address] ICD-10-CM: E66.01 
ICD-9-CM: 278.01  2/28/2011 Yes Plan/Recommendations/Medical Decision Making: - Bariatric diet 
- Pain control 
- Continue antibiotic therapy - Monitor CARLINE drain output Signed By: Daphney Anglin MD   
 April 6, 2019

## 2019-04-07 LAB
DATE LAST DOSE: ABNORMAL
REPORTED DOSE,DOSE: ABNORMAL UNITS
REPORTED DOSE/TIME,TMG: ABNORMAL
VANCOMYCIN TROUGH SERPL-MCNC: 19.9 UG/ML (ref 5–10)

## 2019-04-07 PROCEDURE — 36415 COLL VENOUS BLD VENIPUNCTURE: CPT

## 2019-04-07 PROCEDURE — 80202 ASSAY OF VANCOMYCIN: CPT

## 2019-04-07 PROCEDURE — 74011000258 HC RX REV CODE- 258: Performed by: INTERNAL MEDICINE

## 2019-04-07 PROCEDURE — 74011250637 HC RX REV CODE- 250/637: Performed by: SURGERY

## 2019-04-07 PROCEDURE — 74011250636 HC RX REV CODE- 250/636: Performed by: INTERNAL MEDICINE

## 2019-04-07 PROCEDURE — 65660000000 HC RM CCU STEPDOWN

## 2019-04-07 PROCEDURE — 74011250637 HC RX REV CODE- 250/637: Performed by: INTERNAL MEDICINE

## 2019-04-07 PROCEDURE — 74011250636 HC RX REV CODE- 250/636: Performed by: SURGERY

## 2019-04-07 RX ORDER — VANCOMYCIN HYDROCHLORIDE
1250
Status: DISCONTINUED | OUTPATIENT
Start: 2019-04-08 | End: 2019-04-12 | Stop reason: HOSPADM

## 2019-04-07 RX ADMIN — MEROPENEM 500 MG: 500 INJECTION, POWDER, FOR SOLUTION INTRAVENOUS at 21:37

## 2019-04-07 RX ADMIN — Medication 10 ML: at 05:00

## 2019-04-07 RX ADMIN — VANCOMYCIN HYDROCHLORIDE 1000 MG: 1 INJECTION, POWDER, LYOPHILIZED, FOR SOLUTION INTRAVENOUS at 09:32

## 2019-04-07 RX ADMIN — ACETAMINOPHEN 650 MG: 325 TABLET ORAL at 00:14

## 2019-04-07 RX ADMIN — VITAMIN D 3000 UNITS: 25 TAB ORAL at 09:27

## 2019-04-07 RX ADMIN — MULTIPLE VITAMINS W/ MINERALS TAB 1 TABLET: TAB at 09:27

## 2019-04-07 RX ADMIN — LOSARTAN POTASSIUM 25 MG: 25 TABLET, FILM COATED ORAL at 09:27

## 2019-04-07 RX ADMIN — DOXYCYCLINE 100 MG: 100 INJECTION, POWDER, LYOPHILIZED, FOR SOLUTION INTRAVENOUS at 09:22

## 2019-04-07 RX ADMIN — Medication 500 MCG: at 09:27

## 2019-04-07 RX ADMIN — POTASSIUM CHLORIDE 40 MEQ: 750 TABLET, EXTENDED RELEASE ORAL at 09:27

## 2019-04-07 RX ADMIN — PANTOPRAZOLE SODIUM 40 MG: 40 TABLET, DELAYED RELEASE ORAL at 09:27

## 2019-04-07 RX ADMIN — TRAMADOL HYDROCHLORIDE 100 MG: 50 TABLET, FILM COATED ORAL at 10:26

## 2019-04-07 RX ADMIN — DOXYCYCLINE 100 MG: 100 INJECTION, POWDER, LYOPHILIZED, FOR SOLUTION INTRAVENOUS at 22:50

## 2019-04-07 RX ADMIN — LORAZEPAM 1 MG: 1 TABLET ORAL at 21:36

## 2019-04-07 RX ADMIN — Medication 10 ML: at 21:37

## 2019-04-07 RX ADMIN — MEROPENEM 500 MG: 500 INJECTION, POWDER, FOR SOLUTION INTRAVENOUS at 16:23

## 2019-04-07 RX ADMIN — TRAMADOL HYDROCHLORIDE 100 MG: 50 TABLET, FILM COATED ORAL at 20:44

## 2019-04-07 RX ADMIN — ANTACID TABLETS 520 MG: 648 TABLET, CHEWABLE ORAL at 09:27

## 2019-04-07 RX ADMIN — DOCUSATE SODIUM 100 MG: 100 CAPSULE, LIQUID FILLED ORAL at 09:27

## 2019-04-07 RX ADMIN — PAROXETINE HYDROCHLORIDE 40 MG: 20 TABLET, FILM COATED ORAL at 09:27

## 2019-04-07 NOTE — PROGRESS NOTES
[id number] Pt alert and oriented. OOB ad dhruv, steady on feet. C/o discomfort to left abdominal CARLINE site. PRN tramadol and tylenol given, see MAR for details. Ativan given x 1 at HS. No other complaints this shift, pt appears to be resting comfortably. CARLINE x 1 to ss, small serosanguinous output. IV abx infusing per order. Good PO intake, voiding spontaneously. Active bowel sounds, pt reports passing flatus. Venodynes in place bilaterally. Call bell within reach, safety maintained, will ctm. Bedside and Verbal shift change report given to Gloria Padilla (oncoming nurse) by Kimberly Harris (offgoing nurse). Report included the following information SBAR, Kardex and MAR.

## 2019-04-07 NOTE — PROGRESS NOTES
Hospitalist Progress Note Radha Wright MD 
     
                             Answering service: [phone] OR [phone] from in house phone Date of Service:  2019 NAME:  Ana Paula Taylor :  1970 MRN:  [id number] Admission Summary:  
46 y.o. white female with past medical history of anemia, anxiety Disorder, depression, arthritis, hypertension, menorrhagia, morbid obesity, s/p gastric bypass surgery was brought to the ED from work via EMS with chief complaint of generalized weakness, fatigue, feeling like she was going to pass out and diarrhea. Reason for follow up:  
Pt seen and examined, feels well,  present, no sob/cp/cough. Walking about the floor now. Cultures still pending Assessment & Plan: #. Large L subphrenic Abscess: - S/P CT-guided biopsy- Pathology results show organizing abscess, no Malignancy seen. - Surgical team following, s/p drainage of abscess, CARLINE Drain placed. - ID following, concerned about Actinomyces started doxy. Cultures pending #. Large left pleural effusion without respiratory failure. - Oxygen PRN, diuresis and incentive spirometry. - CT surgery following - S/p US guided thoracentesis. fluid studies/cultures pending #. Near Syncope on admission. Controlled Primary hypertension. 2D ECHO with moderate to large pericardial effusion. Low threshold for Cardiac Tamponade. S/P Pericardiocentesis 3/30/19. F/U 2D ECHO 3/31/19 showed minimal residual effusion. Cardiology evaluated. #. Anemia: blood loss, post Operative #. Hypokalemia. Replace, monitor #. Morbid obesity with a BMI greater than 40. H/O Gastric Bypass surgery. Counseled. #. Generalized weakness. PT as tolerated. #. Anxiety Disorder. Depression. As needed anxiolytics. 9) Prophylaxis: DVT. 10) Directives: Full Code with a guarded prognosis. D/W patient and family. 11) Plan: TBD 
  
Hospital Problems  Date Reviewed: 4/3/2019 Codes Class Noted POA * (Principal) Abscess, liver ICD-10-CM: K75.0 ICD-9-CM: 572.0  4/3/2019 Yes Pericardial effusion ICD-10-CM: I31.3 ICD-9-CM: 423.9  3/27/2019 Unknown Pleural effusion, left ICD-10-CM: J90 ICD-9-CM: 511.9  3/27/2019 Unknown Near syncope ICD-10-CM: R55 
ICD-9-CM: 780.2  3/27/2019 Unknown Morbid obesity ([facility] [address] ICD-10-CM: E66.01 
ICD-9-CM: 278.01  2/28/2011 Yes Physical Examination:  
 
 Last 24hrs VS reviewed since prior progress note. Most recent are: 
Visit Vitals /79 (BP 1 Location: Left arm, BP Patient Position: At rest) Pulse (!) 58 Temp 97.3 °F (36.3 °C) Resp 16 Ht 5' 7\" (1.702 m) Wt 110.2 kg (243 lb) SpO2 98% BMI 38.06 kg/m² Constitutional:  No acute distress, cooperative, pleasant   
   
Resp:  Decreased air entry B/L. GI:  Soft, obese, non distended, tender LUQ. BS+, drain in situ, clear bloody discharge. Musculoskeletal:  Bipedal edema. Neurologic:  Awake, alert and oriented. Intake/Output Summary (Last 24 hours) at 4/7/2019 4250 Last data filed at 4/6/2019 2052 Gross per 24 hour Intake 240 ml Output 78 ml Net 162 ml Labs:  
 
Recent Labs 04/06/19 
0541 04/05/19 
1640 WBC 10.4 11.2* HGB 8.6* 9.4* HCT 29.1* 32.1*  
* 492* Recent Labs 04/06/19 
6866 04/05/19 
7783  138  
K 3.1* 3.4*  
 105 CO2 30 30 BUN 7 7 CREA 0.76 0.83 GLU 89 91  
CA 8.6 8.7 No results for input(s): SGOT, GPT, ALT, AP, TBIL, TBILI, TP, ALB, GLOB, GGT, AML, LPSE in the last 72 hours. No lab exists for component: AMYP, HLPSE No results for input(s): INR, PTP, APTT in the last 72 hours. No lab exists for component: INREXT, INREXT No results for input(s): FE, TIBC, PSAT, FERR in the last 72 hours. Lab Results Component Value Date/Time Folate 8.0 04/10/2009 06:00 PM  
  
No results for input(s): PH, PCO2, PO2 in the last 72 hours. No results for input(s): CPK, CKNDX, TROIQ in the last 72 hours. No lab exists for component: CPKMB Lab Results Component Value Date/Time Cholesterol, total 211 (H) 10/13/2017 04:23 PM  
 HDL Cholesterol 44 10/13/2017 04:23 PM  
 LDL, calculated 113 (H) 10/13/2017 04:23 PM  
 Triglyceride 268 (H) 10/13/2017 04:23 PM  
 
Lab Results Component Value Date/Time Glucose (POC) 97 01/31/2010 01:10 PM  
 Glucose  05/14/2012 10:55 AM  
 
Lab Results Component Value Date/Time Color 0-3 03/27/2019 12:17 PM  
 Appearance CLOUDY (A) 03/27/2019 12:17 PM  
 Specific gravity 1.026 03/27/2019 12:17 PM  
 pH (UA) 5.5 03/27/2019 12:17 PM  
 Protein 100 (A) 03/27/2019 12:17 PM  
 Glucose NEGATIVE  03/27/2019 12:17 PM  
 Ketone 15 (A) 03/27/2019 12:17 PM  
 Bilirubin NEGATIVE  03/10/2015 02:45 PM  
 Urobilinogen 1.0 03/27/2019 12:17 PM  
 Nitrites NEGATIVE  03/27/2019 12:17 PM  
 Leukocyte Esterase SMALL (A) 03/27/2019 12:17 PM  
 Epithelial cells MANY (A) 03/27/2019 12:17 PM  
 Bacteria 2+ (A) 03/27/2019 12:17 PM  
 WBC 0-4 03/27/2019 12:17 PM  
 RBC 0-5 03/27/2019 12:17 PM  
 
 
 
Medications Reviewed:  
 
Current Facility-Administered Medications Medication Dose Route Frequency  Vancomycin trough before 10am dose 4/7   Other ONCE  potassium chloride SR (KLOR-CON 10) tablet 40 mEq  40 mEq Oral BID  docusate sodium (COLACE) capsule 100 mg  100 mg Oral DAILY  vancomycin (VANCOCIN) 1,000 mg in 0.9% sodium chloride (MBP/ADV) 250 mL  1,000 mg IntraVENous Q12H  
 traMADol (ULTRAM) tablet 100 mg  100 mg Oral Q6H PRN  
 HYDROmorphone (PF) (DILAUDID) injection 1 mg  1 mg IntraVENous Q4H PRN  
 doxycycline (VIBRAMYCIN) 100 mg in 0.9% sodium chloride (MBP/ADV) 100 mL  100 mg IntraVENous Q12H  Vancomycin - Pharmacy to Dose   Other Rx Dosing/Monitoring  multivitamin, tx-iron-ca-min (THERA-M w/ IRON) tablet 1 Tab  1 Tab Oral DAILY  calcium carbonate tablet 520 mg [elemental]  520 mg Oral DAILY  cholecalciferol (VITAMIN D3) tablet 3,000 Units  3,000 Units Oral DAILY  cyanocobalamin (VITAMIN B12) tablet 500 mcg  500 mcg Oral DAILY  LORazepam (ATIVAN) tablet 1 mg  1 mg Oral TID PRN  
 losartan (COZAAR) tablet 25 mg  25 mg Oral DAILY  PARoxetine (PAXIL) tablet 40 mg  40 mg Oral DAILY  sodium chloride (NS) flush 5-40 mL  5-40 mL IntraVENous Q8H  
 sodium chloride (NS) flush 5-40 mL  5-40 mL IntraVENous PRN  
 ondansetron (ZOFRAN) injection 4 mg  4 mg IntraVENous Q6H PRN  
 acetaminophen (TYLENOL) tablet 650 mg  650 mg Oral Q6H PRN  pantoprazole (PROTONIX) tablet 40 mg  40 mg Oral DAILY  
 
______________________________________________________________________ EXPECTED LENGTH OF STAY: 6d 14h ACTUAL LENGTH OF STAY:          Sera Ontiveros MD

## 2019-04-07 NOTE — PROGRESS NOTES
Bedside and Verbal shift change report given to [address] [place] [address] Avenue (oncoming nurse) by Xochilt Hayes (offgoing nurse). Report included the following information SBAR, MAR and Cardiac Rhythm Sinus Rhythm.

## 2019-04-07 NOTE — PROGRESS NOTES
Spiritual Care Assessment/Progress Note [address] 
 
 
NAME: Peter Moss      MRN: [id number] AGE: 46 y.o. SEX: female Yazdanism Affiliation: Cara Wong Language: [place] 4/7/2019     Total Time (in minutes): 10 Spiritual Assessment begun in [facility] 3N TELEMETRY through conversation with: 
  
    [x]Patient        [x] Family    [] Friend(s) Reason for Consult: Initial/Spiritual assessment, patient floor Spiritual beliefs: (Please include comment if needed) [x] Identifies with a jessica tradition:     
   [] Supported by a jessica community:        
   [] Claims no spiritual orientation:       
   [] Seeking spiritual identity:            
   [] Adheres to an individual form of spirituality:       
   [] Not able to assess:                   
 
    
Identified resources for coping:  
   [x] Prayer                           
   [] Music                  [] Guided Imagery [x] Family/friends                 [] Pet visits [] Devotional reading                         [] Unknown 
   [] Other:                                          
 
 
Interventions offered during this visit: (See comments for more details) Patient Interventions: Affirmation of emotions/emotional suffering, Affirmation of jessica, Normalization of emotional/spiritual concerns, Prayer (assurance of) Family/Friend(s): Affirmation of emotions/emotional suffering, Normalization of emotional/spiritual concerns, Prayer (assurance of) Plan of Care: 
 
 [] Support spiritual and/or cultural needs  
 [] Support AMD and/or advance care planning process    
 [] Support grieving process 
 [] Coordinate Rites and/or Rituals  
 [] Coordination with community clergy [] No spiritual needs identified at this time 
 [] Detailed Plan of Care below (See Comments)  [] Make referral to Music Therapy 
[] Make referral to Pet Therapy    
[] Make referral to Addiction services 
[] Make referral to [facility] [] Make referral to Spiritual Care Partner 
[] No future visits requested       
[x] Follow up visits as needed Comments:  for initial visit. Pt was in bed and commented that she is having soup after not being able to eat for a few days. Pt's family was in room visit.  let pt and family know of  availability. Please contact [phone] [address] [facility] for further support.  follow up as needed. Favio Dominguez, [facility] 
 [phone])

## 2019-04-07 NOTE — PROGRESS NOTES
Progress Note Patient: Sarah Beth Alex MRN: [id number]  SSN: [id number] YOB: 1970  Age: 46 y.o. Sex: female Admit Date: 3/27/2019 
 
4 Days Post-Op Procedure:  Procedure(s): DIAGNOSTIC LAPAROSCOPY WITH EGD Subjective: No acute surgical issues. Pt tolerating bariatric diet. [place] still with more serous drainage today. Pt reporting some pain at drain site. Afebrile Objective:  
 
Visit Vitals BP (!) 157/92 (BP 1 Location: Left arm, BP Patient Position: At rest) Pulse 62 Temp 97.3 °F (36.3 °C) Resp 18 Ht 5' 7\" (1.702 m) Wt 243 lb (110.2 kg) SpO2 97% BMI 38.06 kg/m² Temp (24hrs), Av.6 °F (36.4 °C), Min:97.3 °F (36.3 °C), Max:98.2 °F (36.8 °C) Physical Exam:   
Gen:  NAD Pulm:  Unlabored Abd:  S/ND/appropriate TTP Wound:  C/D/I 
CARLINE with seropurulent drainage Recent Results (from the past 24 hour(s)) Saul Dovea Collection Time: 19  9:16 AM  
Result Value Ref Range Vancomycin,trough 19.9 (H) 5.0 - 10.0 ug/mL Reported dose date: NOT PROVIDED Reported dose time: NOT PROVIDED Reported dose: NOT PROVIDED UNITS Assessment:  
 
Hospital Problems  Date Reviewed: 4/3/2019 Codes Class Noted POA * (Principal) Abscess, liver ICD-10-CM: K75.0 ICD-9-CM: 572.0  4/3/2019 Yes Pericardial effusion ICD-10-CM: I31.3 ICD-9-CM: 423.9  3/27/2019 Unknown Pleural effusion, left ICD-10-CM: J90 ICD-9-CM: 511.9  3/27/2019 Unknown Near syncope ICD-10-CM: R55 
ICD-9-CM: 780.2  3/27/2019 Unknown Morbid obesity ([facility] [address] ICD-10-CM: E66.01 
ICD-9-CM: 278.01  2011 Yes Plan/Recommendations/Medical Decision Making: - Bariatric diet 
- Pain control 
- Continue antibiotic therapy - Monitor CARLINE drain output Signed By: Dasha Pires MD   
 2019

## 2019-04-07 NOTE — PROGRESS NOTES
Pharmacist Note - Vancomycin Dosing Therapy day 4 Indication:   intra-abdominal infection Current regimen:  1000 mg Q12H A Trough Level resulted at 19.9 mcg/mL which was obtained 11hrs post-dose. The extrapolated \"true\" trough is approximately 18.5 mcg/mL based on the patient's known kinetics. Goal trough: 10 - 15 mcg/mL Plan: Change to 1250 mg q 18 hr .  
Pharmacy will continue to monitor this patient daily for changes in clinical status and renal function.

## 2019-04-08 LAB
ANION GAP SERPL CALC-SCNC: 5 MMOL/L (ref 5–15)
BUN SERPL-MCNC: 7 MG/DL (ref 6–20)
BUN/CREAT SERPL: 10 (ref 12–20)
CALCIUM SERPL-MCNC: 8.5 MG/DL (ref 8.5–10.1)
CHLORIDE SERPL-SCNC: 104 MMOL/L (ref 97–108)
CO2 SERPL-SCNC: 28 MMOL/L (ref 21–32)
CREAT SERPL-MCNC: 0.71 MG/DL (ref 0.55–1.02)
ERYTHROCYTE [DISTWIDTH] IN BLOOD BY AUTOMATED COUNT: 14.6 % (ref 11.5–14.5)
GLUCOSE SERPL-MCNC: 86 MG/DL (ref 65–100)
HCT VFR BLD AUTO: 29.7 % (ref 35–47)
HGB BLD-MCNC: 8.7 G/DL (ref 11.5–16)
MCH RBC QN AUTO: 24.2 PG (ref 26–34)
MCHC RBC AUTO-ENTMCNC: 29.3 G/DL (ref 30–36.5)
MCV RBC AUTO: 82.5 FL (ref 80–99)
NRBC # BLD: 0 K/UL (ref 0–0.01)
NRBC BLD-RTO: 0 PER 100 WBC
PLATELET # BLD AUTO: 328 K/UL (ref 150–400)
PMV BLD AUTO: 9.7 FL (ref 8.9–12.9)
POTASSIUM SERPL-SCNC: 3.3 MMOL/L (ref 3.5–5.1)
RBC # BLD AUTO: 3.6 M/UL (ref 3.8–5.2)
SODIUM SERPL-SCNC: 137 MMOL/L (ref 136–145)
WBC # BLD AUTO: 9.8 K/UL (ref 3.6–11)

## 2019-04-08 PROCEDURE — 85027 COMPLETE CBC AUTOMATED: CPT

## 2019-04-08 PROCEDURE — 80048 BASIC METABOLIC PNL TOTAL CA: CPT

## 2019-04-08 PROCEDURE — 74011250637 HC RX REV CODE- 250/637: Performed by: INTERNAL MEDICINE

## 2019-04-08 PROCEDURE — 74011000258 HC RX REV CODE- 258: Performed by: INTERNAL MEDICINE

## 2019-04-08 PROCEDURE — 36415 COLL VENOUS BLD VENIPUNCTURE: CPT

## 2019-04-08 PROCEDURE — 74011250636 HC RX REV CODE- 250/636: Performed by: INTERNAL MEDICINE

## 2019-04-08 PROCEDURE — 74011250637 HC RX REV CODE- 250/637: Performed by: SURGERY

## 2019-04-08 PROCEDURE — 94760 N-INVAS EAR/PLS OXIMETRY 1: CPT

## 2019-04-08 PROCEDURE — 65660000000 HC RM CCU STEPDOWN

## 2019-04-08 RX ORDER — POTASSIUM CHLORIDE 750 MG/1
40 TABLET, FILM COATED, EXTENDED RELEASE ORAL DAILY
Status: COMPLETED | OUTPATIENT
Start: 2019-04-08 | End: 2019-04-10

## 2019-04-08 RX ADMIN — LORAZEPAM 1 MG: 1 TABLET ORAL at 23:05

## 2019-04-08 RX ADMIN — MEROPENEM 500 MG: 500 INJECTION, POWDER, FOR SOLUTION INTRAVENOUS at 15:32

## 2019-04-08 RX ADMIN — VANCOMYCIN HYDROCHLORIDE 1250 MG: 10 INJECTION, POWDER, LYOPHILIZED, FOR SOLUTION INTRAVENOUS at 20:29

## 2019-04-08 RX ADMIN — MULTIPLE VITAMINS W/ MINERALS TAB 1 TABLET: TAB at 08:31

## 2019-04-08 RX ADMIN — VANCOMYCIN HYDROCHLORIDE 1250 MG: 10 INJECTION, POWDER, LYOPHILIZED, FOR SOLUTION INTRAVENOUS at 02:20

## 2019-04-08 RX ADMIN — MEROPENEM 500 MG: 500 INJECTION, POWDER, FOR SOLUTION INTRAVENOUS at 09:56

## 2019-04-08 RX ADMIN — PANTOPRAZOLE SODIUM 40 MG: 40 TABLET, DELAYED RELEASE ORAL at 08:32

## 2019-04-08 RX ADMIN — VITAMIN D 3000 UNITS: 25 TAB ORAL at 08:31

## 2019-04-08 RX ADMIN — TRAMADOL HYDROCHLORIDE 100 MG: 50 TABLET, FILM COATED ORAL at 06:21

## 2019-04-08 RX ADMIN — LOSARTAN POTASSIUM 25 MG: 25 TABLET, FILM COATED ORAL at 08:31

## 2019-04-08 RX ADMIN — MEROPENEM 500 MG: 500 INJECTION, POWDER, FOR SOLUTION INTRAVENOUS at 04:34

## 2019-04-08 RX ADMIN — Medication 10 ML: at 06:22

## 2019-04-08 RX ADMIN — PAROXETINE HYDROCHLORIDE 40 MG: 20 TABLET, FILM COATED ORAL at 08:31

## 2019-04-08 RX ADMIN — POTASSIUM CHLORIDE 40 MEQ: 750 TABLET, EXTENDED RELEASE ORAL at 09:53

## 2019-04-08 RX ADMIN — Medication 10 ML: at 22:00

## 2019-04-08 RX ADMIN — DOCUSATE SODIUM 100 MG: 100 CAPSULE, LIQUID FILLED ORAL at 08:32

## 2019-04-08 RX ADMIN — DOXYCYCLINE 100 MG: 100 INJECTION, POWDER, LYOPHILIZED, FOR SOLUTION INTRAVENOUS at 23:54

## 2019-04-08 RX ADMIN — ANTACID TABLETS 520 MG: 648 TABLET, CHEWABLE ORAL at 08:31

## 2019-04-08 RX ADMIN — DOXYCYCLINE 100 MG: 100 INJECTION, POWDER, LYOPHILIZED, FOR SOLUTION INTRAVENOUS at 10:38

## 2019-04-08 RX ADMIN — MEROPENEM 500 MG: 500 INJECTION, POWDER, FOR SOLUTION INTRAVENOUS at 23:00

## 2019-04-08 RX ADMIN — Medication 10 ML: at 15:31

## 2019-04-08 RX ADMIN — Medication 500 MCG: at 08:31

## 2019-04-08 NOTE — PROGRESS NOTES
Surgery Progress Note Admit Date: 3/27/2019 Subjective:  
 
 Pt complains of feeling bored and wanting to go home, clinically feels quite well, no acute c/o.   she denies pain  No SOB. No CP. Pt is ambulating. Patient 's current diet is bariatric regular with no issue taking po . Concepcion Bartholomew Pt reports  no nausea and no vomiting. Pt reports no fever or chills Bowel Movements: Flatus Objective:  
 
Patient Vitals for the past 8 hrs: 
 BP Temp Pulse Resp SpO2  
04/08/19 1218 130/85 97.6 °F (36.4 °C) 70 18 96 % 04/08/19 0800 128/80 97.5 °F (36.4 °C) 68 16 98 % 04/08 0701 - 04/08 1900 In: 560 [P.O.:360; I.V.:200] Out: -  
04/06 1901 - 04/08 0700 In: 1770 [P.O.:640; I.V.:400] Out: 133 [Drains:133]ip Physical Exam: 
 
General: alert, cooperative, no distress Cardiac: normal S1 and S2 Lungs: clear posterior with good inspiratory effort Abdomen: soft, nondistended, normal bowel sounds, nontender, without guarding, without rebound, CARLINE is serous with some particulate Wounds:clean, dry, no drainage Neuro: alert, oriented x 3, no defects noted in general exam. 
Extremities: no edema CBC:  
Lab Results Component Value Date/Time WBC 9.8 04/08/2019 02:21 AM  
 RBC 3.60 (L) 04/08/2019 02:21 AM  
 HGB 8.7 (L) 04/08/2019 02:21 AM  
 HCT 29.7 (L) 04/08/2019 02:21 AM  
 PLATELET 237 85/04/0132 02:21 AM  
 
BMP:  
Lab Results Component Value Date/Time Glucose 86 04/08/2019 02:21 AM  
 Sodium 137 04/08/2019 02:21 AM  
 Potassium 3.3 (L) 04/08/2019 02:21 AM  
 Chloride 104 04/08/2019 02:21 AM  
 CO2 28 04/08/2019 02:21 AM  
 BUN 7 04/08/2019 02:21 AM  
 Creatinine 0.71 04/08/2019 02:21 AM  
 Calcium 8.5 04/08/2019 02:21 AM  
 
CMP: 
Lab Results Component Value Date/Time  Glucose 86 04/08/2019 02:21 AM  
 Sodium 137 04/08/2019 02:21 AM  
 Potassium 3.3 (L) 04/08/2019 02:21 AM  
 Chloride 104 04/08/2019 02:21 AM  
 CO2 28 04/08/2019 02:21 AM  
 BUN 7 04/08/2019 02:21 AM  
 Creatinine 0.71 04/08/2019 02:21 AM  
 Calcium 8.5 04/08/2019 02:21 AM  
 Anion gap 5 04/08/2019 02:21 AM  
 BUN/Creatinine ratio 10 (L) 04/08/2019 02:21 AM  
 Alk. phosphatase 243 (H) 03/27/2019 11:52 AM  
 Protein, total 8.3 (H) 03/27/2019 11:52 AM  
 Albumin 2.3 (L) 03/27/2019 11:52 AM  
 Globulin 6.0 (H) 03/27/2019 11:52 AM  
 A-G Ratio 0.4 (L) 03/27/2019 11:52 AM  
 
 
Radiology review: 
cxr 4/5 IMPRESSION Impression: 1. Enlarged cardiac silhouette, decreased size of left pleural effusion with 
persistent partial collapse of the left base. No pneumothorax All Micro Results Procedure Component Value Units Date/Time CULTURE, BODY FLUID Kindra Enamorado [[id number]] Collected:  04/05/19 1545 Order Status:  Completed Specimen:  Pleural Fluid Updated:  04/08/19 1046 Special Requests: NO SPECIAL REQUESTS     
  GRAM STAIN FEW WBCS SEEN     
   NO ORGANISMS SEEN Culture result: NO GROWTH 3 DAYS     
 CULTURE, BODY FLUID Darrius Freud STAIN [[id number]]  (Abnormal) Collected:  04/03/19 0825 Order Status:  Completed Specimen:  Abscess Updated:  04/07/19 1116 Special Requests: --  
  CULTURE ABS LOOKING FOR ACTINOMYCES 
  
  GRAM STAIN 3+ WBCS SEEN     
      
  1+ GRAM POSITIVE COCCI (SOME IN CHAINS) FEW APPARENT GRAM POSITIVE RODS (OCCASIONALLY BEADED AND BRANCHING) Culture result:    
  HEAVY MICROAEROPHILIC STREPTOCOCCUS  
     
      
  MODERATE ANAEROBIC GRAM POSITIVE COCCI MODERATE ANAEROBIC GRAM NEGATIVE RODS , BETA LACTAMASE NEGATIVE HOLDING ANAEROBIC PLATES FOR 7 DAYS 
(LOOKING FOR ACTINOMYCES PER DR. Aakash Sharpe) AFB CULTURE + SMEAR W/RFLX ID FROM CULTURE [[id number]] Collected:  03/30/19 1421 Order Status:  Completed Specimen:  Miscellaneous sample Updated:  04/04/19 0235 Source PERICARDIAL FLUID     
  AFB Specimen processing Concentration Acid Fast Smear NEGATIVE Comment: (NOTE) Performed At: [facility] [address], [place] [id number] Glendy Medley MD EJ:[id number] Acid Fast Culture PENDING  
 CULTURE, BODY FLUID W Ana Fishman [[id number]] Collected:  03/30/19 1421 Order Status:  Completed Specimen:  Pericardial Fluid Updated:  04/03/19 1030 Special Requests:    
  Culture performed on Unspun Fluid GRAM STAIN 2+ WBCS SEEN     
   NO ORGANISMS SEEN Culture result:    
  Culture performed on Unspun Fluid NO GROWTH 4 DAYS     
 CULTURE, ANAEROBIC [[id number]] Collected:  04/03/19 0825 Order Status:  Canceled Updated:  04/03/19 6374 CULTURE, TISSUE Macy Hollisp [[id number]] Collected:  03/29/19 0920 Order Status:  Completed Specimen:  Tissue Updated:  04/02/19 1023 Special Requests: LIVER LOOK FOR ANAEROBES  
  GRAM STAIN NO WBC'S SEEN     
   NO ORGANISMS SEEN Culture result:    
  Specimen not collected anaerobically, recovery of anaerobes may be compromised. Anaerobic screening performed based on source. NO GROWTH 4 DAYS URINE CULTURE HOLD SAMPLE [[id number]] Collected:  03/27/19 1217 Order Status:  Completed Specimen:  Serum Updated:  03/27/19 1224 Urine culture hold URINE ON HOLD IN MICROBIOLOGY DEPT FOR 3 DAYS. IF UNPRESERVED URINE IS SUBMITTED, IT CANNOT BE USED FOR ADDITIONAL TESTING AFTER 24 HRS, RECOLLECTION WILL BE REQUIRED. Assessment:  
Pt is POD #5 s/p Procedure(s): DIAGNOSTIC LAPAROSCOPY WITH EGD Diaphragmatic abscess s/p surgical drainage Left pleural effusion day 3 after pleuracentesis S/p lap gastric bypass 10/2018 Plan:  
Diet: tolerating bariatric diet without issue, Activity: walk in de dios Pain management GI and DVT prophylaxis Drains CARLINE remains in place with serous output--some particulate Labs: labs are reviewed Antibiotics: vancomycin, Meropenem and doxy Clinically improved after surgical drainage and pleuracentesis--main issue is ABX coverage and length of tx after all cultures are finalized--plan will be per SUDHEER CROWLEY drain management per Dr. Lauren Hsu PA-C

## 2019-04-08 NOTE — PROGRESS NOTES
9955-2232 Pt alert and oriented. CARLINE x 1 to left abdomen, insertion site dry and intact. To self suction, serosanguinous output. Pain to CARLINE site, tramadol given with positive effect. No other complaints this shift, pt appears to be resting comfortably. IV abx infusing per order. Call bell within reach, safety maintained, will ctm. Bedside and Verbal shift change report given to Paige Coates (oncoming nurse) by Vikki Ibrahim (offgoing nurse). Report included the following information SBAR, Kardex and MAR.

## 2019-04-08 NOTE — PROGRESS NOTES
ID Progress Note 2019 Subjective: Afebrile. Feels well. Objective:  
 
Vitals:  
Visit Vitals /85 (BP 1 Location: Right arm, BP Patient Position: At rest) Pulse 70 Temp 97.6 °F (36.4 °C) Resp 18 Ht 5' 7\" (1.702 m) Wt 113.9 kg (251 lb) LMP 06/10/2013 SpO2 96% BMI 39.31 kg/m² Tmax:  Temp (24hrs), Av.8 °F (36.6 °C), Min:97.5 °F (36.4 °C), Max:98.4 °F (36.9 °C) Exam: Not in distress Lungs: clear to auscultation, no rales, wheezes or rhonchi Heart: s1, s2, no murmurs, rubs of clicks Abdomen: soft, a little tender at the CARLINE drain site. The CARLINE drain has serous fluid in it Speech fluent Labs:  
Lab Results Component Value Date/Time WBC 9.8 2019 02:21 AM  
 Hemoglobin (POC) 9.6 2012 10:55 AM  
 Hemoglobin (POC) 11.2 (L) 2010 01:10 PM  
 HGB 8.7 (L) 2019 02:21 AM  
 Hematocrit (POC) 33 (L) 2010 01:10 PM  
 HCT 29.7 (L) 2019 02:21 AM  
 PLATELET 971  02:21 AM  
 MCV 82.5 2019 02:21 AM  
 
Lab Results Component Value Date/Time Sodium 137 2019 02:21 AM  
 Potassium 3.3 (L) 2019 02:21 AM  
 Chloride 104 2019 02:21 AM  
 CO2 28 2019 02:21 AM  
 Anion gap 5 2019 02:21 AM  
 Glucose 86 2019 02:21 AM  
 BUN 7 2019 02:21 AM  
 Creatinine 0.71 2019 02:21 AM  
 BUN/Creatinine ratio 10 (L) 2019 02:21 AM  
 GFR est AA >60 2019 02:21 AM  
 GFR est non-AA >60 2019 02:21 AM  
 Calcium 8.5 2019 02:21 AM  
 Bilirubin, total 0.8 2019 11:52 AM  
 AST (SGOT) 17 2019 11:52 AM  
 Alk. phosphatase 243 (H) 2019 11:52 AM  
 Protein, total 8.3 (H) 2019 11:52 AM  
 Albumin 2.3 (L) 2019 11:52 AM  
 Globulin 6.0 (H) 2019 11:52 AM  
 A-G Ratio 0.4 (L) 2019 11:52 AM  
 ALT (SGPT) 16 2019 11:52 AM  
 
 
 
Assessment: 1.   Diaphragmatic abscess with possible involvement of the pericardium, status post laparoscopic drainage. - smaller based on 4/5 ct  
2. History of gastric bypass surgery. 3.  Pleural effusions. 4.  Pericardial effusion status post pericardiocentesis. 5.  Hypertension. 6.  Depression. 7.  Anxiety. Recommendations:  
 
Continue vanco,  merrem,and doxy. She has anaphylaxis if she takes penicillin. The lab is aware that we are concerned about actinomyces. I will ask micro to do sensitivities on the micoraerophilic strep Vladimir Hodgson MD

## 2019-04-08 NOTE — PROGRESS NOTES
Hospitalist Progress Note Kenna Mcghee MD 
     
                             Answering service: [phone] OR [phone] from in house phone Date of Service:  2019 NAME:  Katharyn Mortimer :  1970 MRN:  [id number] Admission Summary:  
46 y.o. white female with past medical history of anemia, anxiety Disorder, depression, arthritis, hypertension, menorrhagia, morbid obesity, s/p gastric bypass surgery was brought to the ED from work via EMS with chief complaint of generalized weakness, fatigue, feeling like she was going to pass out and diarrhea. Reason for follow up:  
Pt seen and examined, feels well, pain controlled, getting about, no sob/chest pain. Final cutlures not back yet Assessment & Plan: #. Large L subphrenic Abscess: - S/P CT-guided biopsy- Pathology results show organizing abscess, no Malignancy seen. - Surgical team following, s/p drainage of abscess, CARLINE Drain placed. - ID following, concerned about Actinomyces started doxy. Cultures growing multiple bacteria, final results pending #. Large left pleural effusion without respiratory failure. - Oxygen PRN, diuresis and incentive spirometry. - CT surgery following - S/p US guided thoracentesis. fluid studies/cultures pending #. Near Syncope on admission. Controlled Primary hypertension. 2D ECHO with moderate to large pericardial effusion. Low threshold for Cardiac Tamponade. S/P Pericardiocentesis 3/30/19. F/U 2D ECHO 3/31/19 showed minimal residual effusion. Cardiology evaluated. #. Anemia: blood loss, post Operative #. Hypokalemia. Replace, monitor #. Morbid obesity with a BMI greater than 40. H/O Gastric Bypass surgery. Counseled. #. Generalized weakness. PT as tolerated. #. Anxiety Disorder. Depression. As needed anxiolytics. 9) Prophylaxis: DVT. 10) Directives: Full Code with a guarded prognosis. D/W patient and family. 11) Plan: TBD 
  
Hospital Problems  Date Reviewed: 4/3/2019 Codes Class Noted POA * (Principal) Abscess, liver ICD-10-CM: K75.0 ICD-9-CM: 572.0  4/3/2019 Yes Pericardial effusion ICD-10-CM: I31.3 ICD-9-CM: 423.9  3/27/2019 Unknown Pleural effusion, left ICD-10-CM: J90 ICD-9-CM: 511.9  3/27/2019 Unknown Near syncope ICD-10-CM: R55 
ICD-9-CM: 780.2  3/27/2019 Unknown Morbid obesity ([facility] [address] ICD-10-CM: E66.01 
ICD-9-CM: 278.01  2/28/2011 Yes Physical Examination:  
 
 Last 24hrs VS reviewed since prior progress note. Most recent are: 
Visit Vitals /80 (BP 1 Location: Right arm, BP Patient Position: At rest) Pulse 68 Temp 97.5 °F (36.4 °C) Resp 16 Ht 5' 7\" (1.702 m) Wt 113.9 kg (251 lb) SpO2 98% BMI 39.31 kg/m² Constitutional:  No acute distress, cooperative, pleasant   
   
Resp:  Decreased air entry B/L. GI:  Soft, obese, non distended, tender LUQ. BS+, drain in situ, clear bloody discharge. Musculoskeletal:  Bipedal edema. Neurologic:  Awake, alert and oriented. Intake/Output Summary (Last 24 hours) at 4/8/2019 1614 Last data filed at 4/8/2019 6602 Gross per 24 hour Intake 600 ml Output 55 ml Net 545 ml Labs:  
 
Recent Labs 04/08/19 0221 04/06/19 
0541 WBC 9.8 10.4 HGB 8.7* 8.6* HCT 29.7* 29.1*  
 436* Recent Labs 04/08/19 0221 04/06/19 
0541  141  
K 3.3* 3.1*  
 106 CO2 28 30 BUN 7 7 CREA 0.71 0.76 GLU 86 89  
CA 8.5 8.6 No results for input(s): SGOT, GPT, ALT, AP, TBIL, TBILI, TP, ALB, GLOB, GGT, AML, LPSE in the last 72 hours. No lab exists for component: AMYP, HLPSE No results for input(s): INR, PTP, APTT in the last 72 hours. No lab exists for component: INREXT, INREXT No results for input(s): FE, TIBC, PSAT, FERR in the last 72 hours. Lab Results Component Value Date/Time Folate 8.0 04/10/2009 06:00 PM  
  
No results for input(s): PH, PCO2, PO2 in the last 72 hours. No results for input(s): CPK, CKNDX, TROIQ in the last 72 hours. No lab exists for component: CPKMB Lab Results Component Value Date/Time Cholesterol, total 211 (H) 10/13/2017 04:23 PM  
 HDL Cholesterol 44 10/13/2017 04:23 PM  
 LDL, calculated 113 (H) 10/13/2017 04:23 PM  
 Triglyceride 268 (H) 10/13/2017 04:23 PM  
 
Lab Results Component Value Date/Time Glucose (POC) 97 01/31/2010 01:10 PM  
 Glucose  05/14/2012 10:55 AM  
 
Lab Results Component Value Date/Time Color 0-3 03/27/2019 12:17 PM  
 Appearance CLOUDY (A) 03/27/2019 12:17 PM  
 Specific gravity 1.026 03/27/2019 12:17 PM  
 pH (UA) 5.5 03/27/2019 12:17 PM  
 Protein 100 (A) 03/27/2019 12:17 PM  
 Glucose NEGATIVE  03/27/2019 12:17 PM  
 Ketone 15 (A) 03/27/2019 12:17 PM  
 Bilirubin NEGATIVE  03/10/2015 02:45 PM  
 Urobilinogen 1.0 03/27/2019 12:17 PM  
 Nitrites NEGATIVE  03/27/2019 12:17 PM  
 Leukocyte Esterase SMALL (A) 03/27/2019 12:17 PM  
 Epithelial cells MANY (A) 03/27/2019 12:17 PM  
 Bacteria 2+ (A) 03/27/2019 12:17 PM  
 WBC 0-4 03/27/2019 12:17 PM  
 RBC 0-5 03/27/2019 12:17 PM  
 
 
 
Medications Reviewed:  
 
Current Facility-Administered Medications Medication Dose Route Frequency  potassium chloride SR (KLOR-CON 10) tablet 40 mEq  40 mEq Oral DAILY  vancomycin (VANCOCIN) 1250 mg in  ml infusion  1,250 mg IntraVENous Q18H  
 meropenem (MERREM) 500 mg in 0.9% sodium chloride (MBP/ADV) 50 mL  0.5 g IntraVENous Q6H  
 docusate sodium (COLACE) capsule 100 mg  100 mg Oral DAILY  traMADol (ULTRAM) tablet 100 mg  100 mg Oral Q6H PRN  
 HYDROmorphone (PF) (DILAUDID) injection 1 mg  1 mg IntraVENous Q4H PRN  
 doxycycline (VIBRAMYCIN) 100 mg in 0.9% sodium chloride (MBP/ADV) 100 mL  100 mg IntraVENous Q12H  Vancomycin - Pharmacy to Dose   Other Rx Dosing/Monitoring  multivitamin, tx-iron-ca-min (THERA-M w/ IRON) tablet 1 Tab  1 Tab Oral DAILY  calcium carbonate tablet 520 mg [elemental]  520 mg Oral DAILY  cholecalciferol (VITAMIN D3) tablet 3,000 Units  3,000 Units Oral DAILY  cyanocobalamin (VITAMIN B12) tablet 500 mcg  500 mcg Oral DAILY  LORazepam (ATIVAN) tablet 1 mg  1 mg Oral TID PRN  
 losartan (COZAAR) tablet 25 mg  25 mg Oral DAILY  PARoxetine (PAXIL) tablet 40 mg  40 mg Oral DAILY  sodium chloride (NS) flush 5-40 mL  5-40 mL IntraVENous Q8H  
 sodium chloride (NS) flush 5-40 mL  5-40 mL IntraVENous PRN  
 ondansetron (ZOFRAN) injection 4 mg  4 mg IntraVENous Q6H PRN  
 acetaminophen (TYLENOL) tablet 650 mg  650 mg Oral Q6H PRN  pantoprazole (PROTONIX) tablet 40 mg  40 mg Oral DAILY  
 
______________________________________________________________________ EXPECTED LENGTH OF STAY: 6d 14h ACTUAL LENGTH OF STAY:          Disha Cochran MD

## 2019-04-09 ENCOUNTER — APPOINTMENT (OUTPATIENT)
Dept: CT IMAGING | Age: 49
DRG: 371 | End: 2019-04-09
Attending: SURGERY
Payer: COMMERCIAL

## 2019-04-09 LAB
ANION GAP SERPL CALC-SCNC: 8 MMOL/L (ref 5–15)
BACTERIA SPEC CULT: NORMAL
BUN SERPL-MCNC: 5 MG/DL (ref 6–20)
BUN/CREAT SERPL: 7 (ref 12–20)
CALCIUM SERPL-MCNC: 8.9 MG/DL (ref 8.5–10.1)
CHLORIDE SERPL-SCNC: 104 MMOL/L (ref 97–108)
CO2 SERPL-SCNC: 27 MMOL/L (ref 21–32)
CREAT SERPL-MCNC: 0.73 MG/DL (ref 0.55–1.02)
ERYTHROCYTE [DISTWIDTH] IN BLOOD BY AUTOMATED COUNT: 15.3 % (ref 11.5–14.5)
GLUCOSE SERPL-MCNC: 102 MG/DL (ref 65–100)
GRAM STN SPEC: NORMAL
GRAM STN SPEC: NORMAL
HCT VFR BLD AUTO: 31.2 % (ref 35–47)
HGB BLD-MCNC: 9.6 G/DL (ref 11.5–16)
MAGNESIUM SERPL-MCNC: 1.9 MG/DL (ref 1.6–2.4)
MCH RBC QN AUTO: 24.5 PG (ref 26–34)
MCHC RBC AUTO-ENTMCNC: 30.8 G/DL (ref 30–36.5)
MCV RBC AUTO: 79.6 FL (ref 80–99)
NRBC # BLD: 0 K/UL (ref 0–0.01)
NRBC BLD-RTO: 0 PER 100 WBC
PLATELET # BLD AUTO: 435 K/UL (ref 150–400)
PMV BLD AUTO: 9.2 FL (ref 8.9–12.9)
POTASSIUM SERPL-SCNC: 3.7 MMOL/L (ref 3.5–5.1)
RBC # BLD AUTO: 3.92 M/UL (ref 3.8–5.2)
SERVICE CMNT-IMP: NORMAL
SODIUM SERPL-SCNC: 139 MMOL/L (ref 136–145)
WBC # BLD AUTO: 10.2 K/UL (ref 3.6–11)

## 2019-04-09 PROCEDURE — 80048 BASIC METABOLIC PNL TOTAL CA: CPT

## 2019-04-09 PROCEDURE — 74011250637 HC RX REV CODE- 250/637: Performed by: SURGERY

## 2019-04-09 PROCEDURE — 71260 CT THORAX DX C+: CPT

## 2019-04-09 PROCEDURE — 74011250637 HC RX REV CODE- 250/637: Performed by: INTERNAL MEDICINE

## 2019-04-09 PROCEDURE — 74011000258 HC RX REV CODE- 258: Performed by: HOSPITALIST

## 2019-04-09 PROCEDURE — 74011250636 HC RX REV CODE- 250/636: Performed by: INTERNAL MEDICINE

## 2019-04-09 PROCEDURE — 36415 COLL VENOUS BLD VENIPUNCTURE: CPT

## 2019-04-09 PROCEDURE — 74011000258 HC RX REV CODE- 258: Performed by: INTERNAL MEDICINE

## 2019-04-09 PROCEDURE — 83735 ASSAY OF MAGNESIUM: CPT

## 2019-04-09 PROCEDURE — 65660000000 HC RM CCU STEPDOWN

## 2019-04-09 PROCEDURE — 85027 COMPLETE CBC AUTOMATED: CPT

## 2019-04-09 PROCEDURE — 74011636320 HC RX REV CODE- 636/320: Performed by: HOSPITALIST

## 2019-04-09 RX ORDER — SODIUM CHLORIDE 0.9 % (FLUSH) 0.9 %
10 SYRINGE (ML) INJECTION
Status: COMPLETED | OUTPATIENT
Start: 2019-04-09 | End: 2019-04-09

## 2019-04-09 RX ADMIN — PANTOPRAZOLE SODIUM 40 MG: 40 TABLET, DELAYED RELEASE ORAL at 08:43

## 2019-04-09 RX ADMIN — SODIUM CHLORIDE 100 ML: 900 INJECTION, SOLUTION INTRAVENOUS at 11:39

## 2019-04-09 RX ADMIN — MEROPENEM 500 MG: 500 INJECTION, POWDER, FOR SOLUTION INTRAVENOUS at 09:20

## 2019-04-09 RX ADMIN — Medication 10 ML: at 13:37

## 2019-04-09 RX ADMIN — VANCOMYCIN HYDROCHLORIDE 1250 MG: 10 INJECTION, POWDER, LYOPHILIZED, FOR SOLUTION INTRAVENOUS at 13:36

## 2019-04-09 RX ADMIN — IOPAMIDOL 100 ML: 755 INJECTION, SOLUTION INTRAVENOUS at 11:38

## 2019-04-09 RX ADMIN — MEROPENEM 500 MG: 500 INJECTION, POWDER, FOR SOLUTION INTRAVENOUS at 04:48

## 2019-04-09 RX ADMIN — PAROXETINE HYDROCHLORIDE 40 MG: 20 TABLET, FILM COATED ORAL at 08:43

## 2019-04-09 RX ADMIN — POTASSIUM CHLORIDE 40 MEQ: 750 TABLET, EXTENDED RELEASE ORAL at 08:43

## 2019-04-09 RX ADMIN — VITAMIN D 3000 UNITS: 25 TAB ORAL at 08:43

## 2019-04-09 RX ADMIN — ANTACID TABLETS 520 MG: 648 TABLET, CHEWABLE ORAL at 08:43

## 2019-04-09 RX ADMIN — LOSARTAN POTASSIUM 25 MG: 25 TABLET, FILM COATED ORAL at 08:44

## 2019-04-09 RX ADMIN — DOXYCYCLINE 100 MG: 100 INJECTION, POWDER, LYOPHILIZED, FOR SOLUTION INTRAVENOUS at 22:13

## 2019-04-09 RX ADMIN — Medication 500 MCG: at 08:43

## 2019-04-09 RX ADMIN — IOHEXOL 50 ML: 240 INJECTION, SOLUTION INTRATHECAL; INTRAVASCULAR; INTRAVENOUS; ORAL at 10:00

## 2019-04-09 RX ADMIN — DOXYCYCLINE 100 MG: 100 INJECTION, POWDER, LYOPHILIZED, FOR SOLUTION INTRAVENOUS at 09:44

## 2019-04-09 RX ADMIN — Medication 10 ML: at 22:16

## 2019-04-09 RX ADMIN — DOCUSATE SODIUM 100 MG: 100 CAPSULE, LIQUID FILLED ORAL at 08:43

## 2019-04-09 RX ADMIN — LORAZEPAM 1 MG: 1 TABLET ORAL at 22:12

## 2019-04-09 RX ADMIN — MEROPENEM 500 MG: 500 INJECTION, POWDER, FOR SOLUTION INTRAVENOUS at 16:38

## 2019-04-09 RX ADMIN — MEROPENEM 500 MG: 500 INJECTION, POWDER, FOR SOLUTION INTRAVENOUS at 22:13

## 2019-04-09 RX ADMIN — Medication 10 ML: at 11:39

## 2019-04-09 RX ADMIN — MULTIPLE VITAMINS W/ MINERALS TAB 1 TABLET: TAB at 08:44

## 2019-04-09 NOTE — PROGRESS NOTES
Problem: Falls - Risk of 
Goal: *Absence of Falls Description Document Kev Lopez Fall Risk and appropriate interventions in the flowsheet. Outcome: Progressing Towards Goal 
Note:  
Fall Risk Interventions: 
  
 
  
 
Medication Interventions: Teach patient to arise slowly Elimination Interventions: Toileting schedule/hourly rounds, Call light in reach

## 2019-04-09 NOTE — PROGRESS NOTES
ID Progress Note 2019 Subjective: Afebrile. Feels well. Objective:  
 
Vitals:  
Visit Vitals /83 (BP 1 Location: Left arm, BP Patient Position: At rest) Pulse 74 Temp 98.5 °F (36.9 °C) Resp 16 Ht 5' 7\" (1.702 m) Wt 108.6 kg (239 lb 6.4 oz) LMP 06/10/2013 SpO2 98% BMI 37.50 kg/m² Tmax:  Temp (24hrs), Av °F (36.7 °C), Min:97.7 °F (36.5 °C), Max:98.5 °F (36.9 °C) Exam: Not in distress Lungs: clear to auscultation, no rales, wheezes or rhonchi Heart: s1, s2, no murmurs, rubs of clicks Abdomen: soft, nontender. The CARLINE drain has serous fluid in it Speech fluent Labs:  
Lab Results Component Value Date/Time WBC 9.8 2019 02:21 AM  
 Hemoglobin (POC) 9.6 2012 10:55 AM  
 Hemoglobin (POC) 11.2 (L) 2010 01:10 PM  
 HGB 8.7 (L) 2019 02:21 AM  
 Hematocrit (POC) 33 (L) 2010 01:10 PM  
 HCT 29.7 (L) 2019 02:21 AM  
 PLATELET 624  02:21 AM  
 MCV 82.5 2019 02:21 AM  
 
Lab Results Component Value Date/Time Sodium 137 2019 02:21 AM  
 Potassium 3.3 (L) 2019 02:21 AM  
 Chloride 104 2019 02:21 AM  
 CO2 28 2019 02:21 AM  
 Anion gap 5 2019 02:21 AM  
 Glucose 86 2019 02:21 AM  
 BUN 7 2019 02:21 AM  
 Creatinine 0.71 2019 02:21 AM  
 BUN/Creatinine ratio 10 (L) 2019 02:21 AM  
 GFR est AA >60 2019 02:21 AM  
 GFR est non-AA >60 2019 02:21 AM  
 Calcium 8.5 2019 02:21 AM  
 Bilirubin, total 0.8 2019 11:52 AM  
 AST (SGOT) 17 2019 11:52 AM  
 Alk. phosphatase 243 (H) 2019 11:52 AM  
 Protein, total 8.3 (H) 2019 11:52 AM  
 Albumin 2.3 (L) 2019 11:52 AM  
 Globulin 6.0 (H) 2019 11:52 AM  
 A-G Ratio 0.4 (L) 2019 11:52 AM  
 ALT (SGPT) 16 2019 11:52 AM  
 
 
 
Assessment: 1. Diaphragmatic abscess '2. History of gastric bypass surgery. 3.  Pleural effusions. 4.  Pericardial effusion status post pericardiocentesis. 5.  Hypertension. 6.  Depression. 7.  Anxiety. Recommendations:  
 
Continue vanco,  merrem,and doxy. She has anaphylaxis if she takes penicillin. The lab is aware that we are concerned about actinomyces. I have asked micro to do sensitivities on the micoraerophilic strep Vladimir Hodgson MD

## 2019-04-09 NOTE — PROGRESS NOTES
Hospitalist Progress Note Josefa Jack MD 
Answering service: [phone] OR [phone] from in house phone Cell: [phone] Date of Service:  2019 NAME:  Serene Lennox :  1970 MRN:  [id number] Admission Summary: A 48 y. o. white female with past medical history of anemia, anxiety disorder, depression, arthritis, hypertension, menorrhagia, morbid obesity, s/p gastric bypass surgery was brought to the ED from work via EMS with chief complaint of generalized weakness, fatigue, feeling like she was going to pass out and diarrhea. Interval history / Subjective: She said she feels better Assessment & Plan:  
 
Large left subphrenic abscess - S/P CT-guided biopsy- Pathology results show organizing abscess, no Malignancy seen. - Surgical team following, s/p drainage of abscess, CARLINE Drain placed. - ID following, concerned about Actinomyces started doxy. Cultures growing multiple bacteria, on IV meropenem, final results pending 
  
Large left pleural effusion without respiratory failure. - Oxygen PRN, diuresis and incentive spirometry.  
- CT surgery following - S/p US guided thoracentesis. fluid studies/cultures no growth 
  
Near Syncope on admission. -2D ECHO with moderate to large pericardial effusion. Low threshold for Cardiac Tamponade. -S/P Pericardiocentesis 3/30/19.  
-F/U 2D ECHO 3/31/19 showed minimal residual effusion.  
-Cardiology evaluated. 
  
Anemia of blood loss post Operative 
-H/H low but stable 
-repeat H/H Hypokalemia. Replace, monitor Morbid obesity with a BMI greater than 40.  
-s/p Gastric Bypass surgery Generalized weakness. -PT as tolerated. Anxiety Disorder/ Depression. -stable 
  
 
 
Code status: Full Code DVT prophylaxis: SCD Care Plan discussed with: Patient/Family and Nurse Disposition: TBD Hospital Problems  Date Reviewed: 4/3/2019 Codes Class Noted POA * (Principal) Abscess, liver ICD-10-CM: K75.0 ICD-9-CM: 572.0  4/3/2019 Yes Pericardial effusion ICD-10-CM: I31.3 ICD-9-CM: 423.9  3/27/2019 Unknown Pleural effusion, left ICD-10-CM: J90 ICD-9-CM: 511.9  3/27/2019 Unknown Near syncope ICD-10-CM: R55 
ICD-9-CM: 780.2  3/27/2019 Unknown Morbid obesity ([facility] [address] ICD-10-CM: E66.01 
ICD-9-CM: 278.01  2/28/2011 Yes Vital Signs:  
 Last 24hrs VS reviewed since prior progress note. Most recent are: 
Visit Vitals BP (!) 146/92 (BP 1 Location: Left arm, BP Patient Position: At rest) Pulse 68 Temp 97.9 °F (36.6 °C) Resp 15 Ht 5' 7\" (1.702 m) Wt 108.6 kg (239 lb 6.4 oz) SpO2 97% BMI 37.50 kg/m² Intake/Output Summary (Last 24 hours) at 4/9/2019 1141 Last data filed at 4/9/2019 3728 Gross per 24 hour Intake 1070 ml Output 90 ml Net 980 ml Physical Examination:  
 
 
     
Constitutional:  No acute distress, cooperative, pleasant   
ENT:  Oral mucous moist, oropharynx benign. Neck supple, Resp:  Chest tube on the left side, decrease bronchial breath sound bilaterally. No wheezing/rhonchi/rales. No accessory muscle use CV:  Regular rhythm, normal rate, no murmurs, gallops, rubs GI:  Soft, non distended, non tender. normoactive bowel sounds, no hepatosplenomegaly Musculoskeletal:  No edema Neurologic:  Moves all extremities. AAOx3, CN II-XII reviewed Skin:  Good turgor, no rashes or ulcers Data Review:  
 Review and/or order of clinical lab test 
 
 
Labs:  
 
Recent Labs 04/08/19 0221 WBC 9.8 HGB 8.7* HCT 29.7*  
 Recent Labs 04/08/19 0221   
K 3.3*  
 CO2 28 BUN 7  
CREA 0.71 GLU 86  
CA 8.5 No results for input(s): SGOT, GPT, ALT, AP, TBIL, TBILI, TP, ALB, GLOB, GGT, AML, LPSE in the last 72 hours. No lab exists for component: AMYP, HLPSE No results for input(s): INR, PTP, APTT in the last 72 hours. No lab exists for component: INREXT No results for input(s): FE, TIBC, PSAT, FERR in the last 72 hours. Lab Results Component Value Date/Time Folate 8.0 04/10/2009 06:00 PM  
  
No results for input(s): PH, PCO2, PO2 in the last 72 hours. No results for input(s): CPK, CKNDX, TROIQ in the last 72 hours. No lab exists for component: CPKMB Lab Results Component Value Date/Time Cholesterol, total 211 (H) 10/13/2017 04:23 PM  
 HDL Cholesterol 44 10/13/2017 04:23 PM  
 LDL, calculated 113 (H) 10/13/2017 04:23 PM  
 Triglyceride 268 (H) 10/13/2017 04:23 PM  
 
Lab Results Component Value Date/Time Glucose (POC) 97 01/31/2010 01:10 PM  
 Glucose  05/14/2012 10:55 AM  
 
Lab Results Component Value Date/Time Color 0-3 03/27/2019 12:17 PM  
 Appearance CLOUDY (A) 03/27/2019 12:17 PM  
 Specific gravity 1.026 03/27/2019 12:17 PM  
 pH (UA) 5.5 03/27/2019 12:17 PM  
 Protein 100 (A) 03/27/2019 12:17 PM  
 Glucose NEGATIVE  03/27/2019 12:17 PM  
 Ketone 15 (A) 03/27/2019 12:17 PM  
 Bilirubin NEGATIVE  03/10/2015 02:45 PM  
 Urobilinogen 1.0 03/27/2019 12:17 PM  
 Nitrites NEGATIVE  03/27/2019 12:17 PM  
 Leukocyte Esterase SMALL (A) 03/27/2019 12:17 PM  
 Epithelial cells MANY (A) 03/27/2019 12:17 PM  
 Bacteria 2+ (A) 03/27/2019 12:17 PM  
 WBC 0-4 03/27/2019 12:17 PM  
 RBC 0-5 03/27/2019 12:17 PM  
 
 
 
Medications Reviewed:  
 
Current Facility-Administered Medications Medication Dose Route Frequency  [START ON 4/10/2019] Vancomycin trough WED 4/10 @ 0800   Other ONCE  potassium chloride SR (KLOR-CON 10) tablet 40 mEq  40 mEq Oral DAILY  vancomycin (VANCOCIN) 1250 mg in  ml infusion  1,250 mg IntraVENous Q18H  
 meropenem (MERREM) 500 mg in 0.9% sodium chloride (MBP/ADV) 50 mL  0.5 g IntraVENous Q6H  
 docusate sodium (COLACE) capsule 100 mg  100 mg Oral DAILY  traMADol (ULTRAM) tablet 100 mg  100 mg Oral Q6H PRN  
 HYDROmorphone (PF) (DILAUDID) injection 1 mg  1 mg IntraVENous Q4H PRN  
 doxycycline (VIBRAMYCIN) 100 mg in 0.9% sodium chloride (MBP/ADV) 100 mL  100 mg IntraVENous Q12H  Vancomycin - Pharmacy to Dose   Other Rx Dosing/Monitoring  multivitamin, tx-iron-ca-min (THERA-M w/ IRON) tablet 1 Tab  1 Tab Oral DAILY  calcium carbonate tablet 520 mg [elemental]  520 mg Oral DAILY  cholecalciferol (VITAMIN D3) tablet 3,000 Units  3,000 Units Oral DAILY  cyanocobalamin (VITAMIN B12) tablet 500 mcg  500 mcg Oral DAILY  LORazepam (ATIVAN) tablet 1 mg  1 mg Oral TID PRN  
 losartan (COZAAR) tablet 25 mg  25 mg Oral DAILY  PARoxetine (PAXIL) tablet 40 mg  40 mg Oral DAILY  sodium chloride (NS) flush 5-40 mL  5-40 mL IntraVENous Q8H  
 sodium chloride (NS) flush 5-40 mL  5-40 mL IntraVENous PRN  
 ondansetron (ZOFRAN) injection 4 mg  4 mg IntraVENous Q6H PRN  
 acetaminophen (TYLENOL) tablet 650 mg  650 mg Oral Q6H PRN  pantoprazole (PROTONIX) tablet 40 mg  40 mg Oral DAILY  
 
______________________________________________________________________ EXPECTED LENGTH OF STAY: 6d 14h ACTUAL LENGTH OF STAY:          No Morgan MD

## 2019-04-09 NOTE — PROGRESS NOTES
Bedside shift change report given to Dodie Crigler, RN (oncoming nurse) by Ritu Aviles RN (offgoing nurse). Report included the following information SBAR, Kardex, MAR, Recent Results and Cardiac Rhythm NSR.

## 2019-04-09 NOTE — PROGRESS NOTES
Reviewed chart for transitions of care, and discussed in rounds. There are no cm orders at this time. Patient to have an abdominal CT. Cm to follow for any discharge needs.  
 
Ubaldo Driver [facility]

## 2019-04-09 NOTE — PROGRESS NOTES
[facility] Surgery Subjective No acute issues Objective Patient Vitals for the past 24 hrs: 
 Temp Pulse Resp BP SpO2  
04/09/19 1529 98.5 °F (36.9 °C) 74 16 151/83 98 % 04/09/19 1207 98 °F (36.7 °C) 74 16 (!) 142/91 96 % 04/09/19 0755 97.9 °F (36.6 °C) 68 15 (!) 146/92 97 % 04/09/19 0520 97.7 °F (36.5 °C) 70 18 (!) 157/92 97 % 04/08/19 2310 98.2 °F (36.8 °C) 73 18 (!) 160/93 97 % 04/08/19 2007 97.9 °F (36.6 °C) 71 18 141/86 96 % Date 04/08/19 0700 - 04/09/19 0138 04/09/19 0700 - 04/10/19 9214 Shift 1679-1879 3852-5211 24 Hour Total 1964-7383 3977-5449 24 Hour Total  
INTAKE  
P.O. 360  360 240  240  
  P. O. 360  360 240  240  
I. V.(mL/kg/hr) 200(0.1)  200(0.1) 900  900 Volume (sodium chloride 0.9 % bolus infusion 100 mL)    0  0 Volume (vancomycin (VANCOCIN) 1250 mg in  ml infusion)    500  500 Volume (meropenem (MERREM) 500 mg in 0.9% sodium chloride (MBP/ADV) 50 mL) 100  100 200  200 Volume (doxycycline (VIBRAMYCIN) 100 mg in 0.9% sodium chloride (MBP/ADV) 100 mL) 100  100 200  200 Shift Total(mL/kg) 560(4.9)  560(5.2) 1140(10.5)  1140(10.5) OUTPUT Urine(mL/kg/hr) Urine Occurrence(s) 2 x  2 x 1 x  1 x Drains 50  50 40  40 Output (ml) Ana Bimler Drain #1 04/03/19 Left Abdomen) 50  50 40  40 Shift Total(mL/kg) 50(0.4)  50(0.5) 40(0.4)  40(0.4)   510 1100  1100 Weight (kg) 113.9 108.6 108.6 108.6 108.6 108.6 PE 
Pulm - CTAB 
CV - RRR Abd - soft, ND, BS present, CARLINE ss Labs Lab Results Component Value Date/Time  WBC 9.8 04/08/2019 02:21 AM  
 WBC 7.2 05/14/2012 10:42 AM  
 Hemoglobin (POC) 9.6 05/14/2012 10:55 AM  
 Hemoglobin (POC) 11.2 (L) 01/31/2010 01:10 PM  
 HGB 8.7 (L) 04/08/2019 02:21 AM  
 Hematocrit (POC) 33 (L) 01/31/2010 01:10 PM  
 HCT 29.7 (L) 04/08/2019 02:21 AM  
 PLATELET 795 77/55/0767 02:21 AM  
 MCV 82.5 04/08/2019 02:21 AM  
 
 
 
 Assessment Peter Moss is a 46 y. o.yr old female s/p laparoscopic drainage of diaphragmatic abscess Plan Cultures are still pending Abscess appears to be improving and shrinking I will plan on removing the drain on DC home Pastor Bon MD

## 2019-04-09 NOTE — PROGRESS NOTES
Patient resting in bed, no c/o pain, some slight discomfort, prn pain meds available if needed. A&Ox4, SR on tele. Up ad dhruv oob, self care. CARLINE x1 cdi to L abdomen, serous fluids, low output. IV given per MAR. Patient awaiting a CT ABD/PEL to assess abscess.  at bedside, call light within reach, will ctm 
 
 
0730: Bedside and Verbal shift change report given to Yuliana Conde (oncoming nurse) by Noy Christina (offgoing nurse).  Report included the following information SBAR, Kardex, MAR, Med Rec Status and Cardiac Rhythm SR.

## 2019-04-10 LAB
ANION GAP SERPL CALC-SCNC: 5 MMOL/L (ref 5–15)
BACTERIA SPEC CULT: ABNORMAL
BUN SERPL-MCNC: 5 MG/DL (ref 6–20)
BUN/CREAT SERPL: 7 (ref 12–20)
CALCIUM SERPL-MCNC: 8.6 MG/DL (ref 8.5–10.1)
CHLORIDE SERPL-SCNC: 104 MMOL/L (ref 97–108)
CO2 SERPL-SCNC: 30 MMOL/L (ref 21–32)
CREAT SERPL-MCNC: 0.67 MG/DL (ref 0.55–1.02)
DATE LAST DOSE: ABNORMAL
GLUCOSE SERPL-MCNC: 87 MG/DL (ref 65–100)
GRAM STN SPEC: ABNORMAL
PHOSPHATE SERPL-MCNC: 4.1 MG/DL (ref 2.6–4.7)
POTASSIUM SERPL-SCNC: 3.7 MMOL/L (ref 3.5–5.1)
REPORTED DOSE,DOSE: ABNORMAL UNITS
REPORTED DOSE/TIME,TMG: ABNORMAL
SERVICE CMNT-IMP: ABNORMAL
SODIUM SERPL-SCNC: 139 MMOL/L (ref 136–145)
VANCOMYCIN TROUGH SERPL-MCNC: 13.8 UG/ML (ref 5–10)

## 2019-04-10 PROCEDURE — 80202 ASSAY OF VANCOMYCIN: CPT

## 2019-04-10 PROCEDURE — 74011250636 HC RX REV CODE- 250/636: Performed by: INTERNAL MEDICINE

## 2019-04-10 PROCEDURE — 77010033678 HC OXYGEN DAILY

## 2019-04-10 PROCEDURE — 74011000258 HC RX REV CODE- 258: Performed by: INTERNAL MEDICINE

## 2019-04-10 PROCEDURE — 74011250637 HC RX REV CODE- 250/637: Performed by: SURGERY

## 2019-04-10 PROCEDURE — 80048 BASIC METABOLIC PNL TOTAL CA: CPT

## 2019-04-10 PROCEDURE — 74011250637 HC RX REV CODE- 250/637: Performed by: INTERNAL MEDICINE

## 2019-04-10 PROCEDURE — 94760 N-INVAS EAR/PLS OXIMETRY 1: CPT

## 2019-04-10 PROCEDURE — 65660000000 HC RM CCU STEPDOWN

## 2019-04-10 PROCEDURE — 84100 ASSAY OF PHOSPHORUS: CPT

## 2019-04-10 PROCEDURE — 36415 COLL VENOUS BLD VENIPUNCTURE: CPT

## 2019-04-10 RX ADMIN — TRAMADOL HYDROCHLORIDE 100 MG: 50 TABLET, FILM COATED ORAL at 17:29

## 2019-04-10 RX ADMIN — VITAMIN D 3000 UNITS: 25 TAB ORAL at 08:44

## 2019-04-10 RX ADMIN — MEROPENEM 500 MG: 500 INJECTION, POWDER, FOR SOLUTION INTRAVENOUS at 04:14

## 2019-04-10 RX ADMIN — LOSARTAN POTASSIUM 25 MG: 25 TABLET, FILM COATED ORAL at 08:44

## 2019-04-10 RX ADMIN — ANTACID TABLETS 520 MG: 648 TABLET, CHEWABLE ORAL at 08:44

## 2019-04-10 RX ADMIN — MEROPENEM 500 MG: 500 INJECTION, POWDER, FOR SOLUTION INTRAVENOUS at 17:20

## 2019-04-10 RX ADMIN — Medication 500 MCG: at 08:44

## 2019-04-10 RX ADMIN — PAROXETINE HYDROCHLORIDE 40 MG: 20 TABLET, FILM COATED ORAL at 08:44

## 2019-04-10 RX ADMIN — MEROPENEM 500 MG: 500 INJECTION, POWDER, FOR SOLUTION INTRAVENOUS at 10:41

## 2019-04-10 RX ADMIN — Medication 10 ML: at 23:43

## 2019-04-10 RX ADMIN — MEROPENEM 500 MG: 500 INJECTION, POWDER, FOR SOLUTION INTRAVENOUS at 23:41

## 2019-04-10 RX ADMIN — MULTIPLE VITAMINS W/ MINERALS TAB 1 TABLET: TAB at 08:44

## 2019-04-10 RX ADMIN — DOCUSATE SODIUM 100 MG: 100 CAPSULE, LIQUID FILLED ORAL at 08:44

## 2019-04-10 RX ADMIN — PANTOPRAZOLE SODIUM 40 MG: 40 TABLET, DELAYED RELEASE ORAL at 08:44

## 2019-04-10 RX ADMIN — POTASSIUM CHLORIDE 40 MEQ: 750 TABLET, EXTENDED RELEASE ORAL at 08:44

## 2019-04-10 RX ADMIN — VANCOMYCIN HYDROCHLORIDE 1250 MG: 10 INJECTION, POWDER, LYOPHILIZED, FOR SOLUTION INTRAVENOUS at 08:45

## 2019-04-10 RX ADMIN — DOXYCYCLINE 100 MG: 100 INJECTION, POWDER, LYOPHILIZED, FOR SOLUTION INTRAVENOUS at 10:38

## 2019-04-10 NOTE — PROGRESS NOTES
Cm notified by MD that patient will need PICC line and Home IV ABX. CM discuss with patient and her , referral sent to [facility] to verify insurance. 1419: HCP can accept patient insurance.  
 
Digna Mckeon [facility]

## 2019-04-10 NOTE — PROGRESS NOTES
Bedside and Verbal shift change report given to [address] [place] [address] (oncoming nurse) by Patricia Shah (offgoing nurse). Report included the following information SBAR, MAR and Cardiac Rhythm Sinus Rhythm.

## 2019-04-10 NOTE — PROGRESS NOTES
Day #7 of Vancomycin Indication:  intra-abdominal infection Current regimen:  1250 mg Q18H Abx regimen:  vancomycin + doxycycline + meropenem ID Following ?: YES Concomitant nephrotoxic drugs (requires more frequent monitoring): None Frequency of BMP?: every other day Recent Labs 04/10/19 
9338 19 
2123 19 
0221 WBC  --  10.2 9.8 CREA 0.67 0.73 0.71  
BUN 5* 5* 7 Est CrCl: >100 ml/min; UO: currently being recorded as unmeasured occurrences. Temp (24hrs), Av.8 °F (36.6 °C), Min:97.4 °F (36.3 °C), Max:98.5 °F (36.9 °C) Cultures:  
3/29 tissue, NGTD, final 
3/30 pericardial fluid, NG, final 
4/3 body fluid,HEAVY MICROAEROPHILIC STREPTOCOCCUS, moderate anaerobic GPC, moderate GNR beta lactamase negative   body fluid NGTD, prelim Goal trough = 10 - 15 mcg/mL Recent trough history (date/time/level/dose/action taken): 
 = 26.5 mcg/ml on 1750 mg Q12H; change to 1000 mg Q12H 
 = 19.9 mcg/ml; (extrapolated to 18.5 mcg/ml) on 1000 mg Q12H; change to 1250 mg Q18H 
4/10 = 13.8mcg/mL (~19hour level) Plan: Continue current regimen as the expected true trough is therapeutic. Will need to consider a repeat trough early next week if the patient remains on Vancomycin.

## 2019-04-10 NOTE — PROGRESS NOTES
NUTRITION Pt seen for:    
[x]   Po intake and rescreeing RECOMMENDATIONS:  
Continue to monitor wt for trends. Adjust supplements a per tolerance. SUBJECTIVE/OBJECTIVE:  
46year old admitted for abscess s/p laparoscopic drainage of  Diaphragmatic abscess with possible involvement of the pericardium. PMH: s/p gastric bypass 10/2018, presurgical wt: 315#. Pt with ~ 62# wt loss from surgery. Pt noted to have an additional 8 kg during current admission. Pt with low po intake but improved. Suggested high protein foods to consume and pt is agreeable to receive nutritional supplement of Boost Glucose Control. Diet:  GBP > 6 weeks Supplements: none Intake: [x]           Good     []           Fair      []           Poor Patient Vitals for the past 100 hrs: 
 % Diet Eaten 04/10/19 1158 90 % 04/10/19 0802 90 % 04/09/19 1529 90 % 04/09/19 1207 80 % 04/08/19 0800 25 % 04/07/19 1200 90 % 04/07/19 0751 60 % Estimated Nutrition Needs:  
Kcals/day: 1500 Kcals/day(6081-1395 kcal/day) Protein: 128 g Fluid: (1 ml/kcal) Weight Changes:  
[x]            Loss ( 8 kg wt loss since admission) []            Gain  
[]            Stable Wt Readings from Last 10 Encounters:  
04/10/19 107 kg (236 lb)  
01/29/19 113.4 kg (250 lb)  
11/26/18 123.6 kg (272 lb 6.4 oz)  
11/12/18 127.2 kg (280 lb 6.4 oz) 10/29/18 132.7 kg (292 lb 9.6 oz) 10/17/18 141.2 kg (311 lb 4.6 oz)  
10/01/18 142 kg (313 lb)  
09/26/18 142 kg (313 lb)  
09/26/18 142 kg (313 lb)  
09/26/18 142 kg (313 lb) Nutrition Problems Identified: 
[x]      None PLAN:  
[x]           Obtained/adjusted food preferences/tolerances [x]    Continue current diet [x]           Add Supplements Cheng Hester RD

## 2019-04-10 NOTE — PROGRESS NOTES
Bedside and Verbal shift change report given to [facility] (oncoming nurse) by Russel Keenan (offgoing nurse). Report included the following information SBAR, MAR and Cardiac Rhythm Sinus Rhythm.

## 2019-04-10 NOTE — PROGRESS NOTES
ID Progress Note 4/10/2019 Subjective: Afebrile. Feels well. No dyspnea, abdominal pain, headache, nausea. Objective:  
 
Vitals:  
Visit Vitals /84 (BP 1 Location: Right arm, BP Patient Position: At rest) Pulse 65 Temp 97.7 °F (36.5 °C) Resp 16 Ht 5' 7\" (1.702 m) Wt 107 kg (236 lb) LMP 06/10/2013 SpO2 96% BMI 36.96 kg/m² Tmax:  Temp (24hrs), Av.8 °F (36.6 °C), Min:97.4 °F (36.3 °C), Max:98.5 °F (36.9 °C) Exam: Not in distress Pink conjunctivae, anicteric sclerae Lungs: clear to auscultation, no rales, wheezes or rhonchi Heart: s1, s2, no murmurs, rubs of clicks Abdomen: soft, nontender, no guarding or rebound Knees not warm or tender Speech fluent No cervical lymphadenopathy Labs:  
Lab Results Component Value Date/Time WBC 10.2 2019 09:23 PM  
 Hemoglobin (POC) 9.6 2012 10:55 AM  
 Hemoglobin (POC) 11.2 (L) 2010 01:10 PM  
 HGB 9.6 (L) 2019 09:23 PM  
 Hematocrit (POC) 33 (L) 2010 01:10 PM  
 HCT 31.2 (L) 2019 09:23 PM  
 PLATELET 827 (H)  09:23 PM  
 MCV 79.6 (L) 2019 09:23 PM  
 
Lab Results Component Value Date/Time Sodium 139 04/10/2019 08:39 AM  
 Potassium 3.7 04/10/2019 08:39 AM  
 Chloride 104 04/10/2019 08:39 AM  
 CO2 30 04/10/2019 08:39 AM  
 Anion gap 5 04/10/2019 08:39 AM  
 Glucose 87 04/10/2019 08:39 AM  
 BUN 5 (L) 04/10/2019 08:39 AM  
 Creatinine 0.67 04/10/2019 08:39 AM  
 BUN/Creatinine ratio 7 (L) 04/10/2019 08:39 AM  
 GFR est AA >60 04/10/2019 08:39 AM  
 GFR est non-AA >60 04/10/2019 08:39 AM  
 Calcium 8.6 04/10/2019 08:39 AM  
 Bilirubin, total 0.8 2019 11:52 AM  
 AST (SGOT) 17 2019 11:52 AM  
 Alk.  phosphatase 243 (H) 2019 11:52 AM  
 Protein, total 8.3 (H) 2019 11:52 AM  
 Albumin 2.3 (L) 2019 11:52 AM  
 Globulin 6.0 (H) 2019 11:52 AM  
 A-G Ratio 0.4 (L) 2019 11:52 AM  
 ALT (SGPT) 16 2019 11:52 AM  
 
 
 
 Assessment: 1. Diaphragmatic abscess '2. History of gastric bypass surgery. 3.  Pleural effusions. 4.  Pericardial effusion status post pericardiocentesis. 5.  Hypertension. 6.  Depression. 7.  Anxiety. Recommendations:  
 
Continue vanco,  Merrem. Discontinue doxy as there is no actinomyces. She could potentially get ertapenem as an outpatient if the microaerophilic strep is sensitive to it Tavo Cota MD

## 2019-04-10 NOTE — PROGRESS NOTES
Hospitalist Progress Note Mireille Gaming MD 
Answering service: [phone] OR [phone] from in house phone Cell: [phone] Date of Service:  4/10/2019 NAME:  Georgie Calderon :  1970 MRN:  [id number] Admission Summary: A 48 y. o. white female with past medical history of anemia, anxiety disorder, depression, arthritis, hypertension, menorrhagia, morbid obesity, s/p gastric bypass surgery was brought to the ED from work via EMS with chief complaint of generalized weakness, fatigue, feeling like she was going to pass out and diarrhea. Interval history / Subjective: She said she feels better Assessment & Plan:  
 
Large left subphrenic abscess 
-s/p CT-guided biopsy- Pathology results show organizing abscess, no Malignancy seen. 
-Surgical team following, s/p drainage of abscess, CARLINE Drain placed. -Felix Actinomyces, doxycycline discontinued  
-Culture growing heavy microaerophilic streptococcus, moderate anaerobic gram positive coci, moderate anaerobic gram negative rods 
- on IV meropenem and IV Vancomycin 
-ID and surgical service following 
  
Large left pleural effusion without respiratory failure.  
-continue incentive spirometry. -s/p US guided thoracentesis. fluid studies/cultures no growth 
-thoracic surgeon following 
  
Near Syncope on admission. -2D ECHO with moderate to large pericardial effusion. Low threshold for Cardiac Tamponade. -S/P Pericardiocentesis 3/30/19.  
-2D ECHO 3/31/19 showed minimal residual effusion. -seen by Cardiology 
  
Anemia of blood loss post Operative 
-H/H stable 
-monitor H/H Hypokalemia 
-replaced and resolved Morbid obesity with a BMI greater than 40.  
-s/p Gastric Bypass surgery Generalized weakness. -PT as tolerated. Anxiety Disorder/ Depression. -stable 
  
 
 
Code status: Full Code DVT prophylaxis: SCD 
 
 Care Plan discussed with: Patient/Family and Nurse Disposition: D Hospital Problems  Date Reviewed: 4/3/2019 Codes Class Noted POA * (Principal) Abscess, liver ICD-10-CM: K75.0 ICD-9-CM: 572.0  4/3/2019 Yes Pericardial effusion ICD-10-CM: I31.3 ICD-9-CM: 423.9  3/27/2019 Unknown Pleural effusion, left ICD-10-CM: J90 ICD-9-CM: 511.9  3/27/2019 Unknown Near syncope ICD-10-CM: R55 
ICD-9-CM: 780.2  3/27/2019 Unknown Morbid obesity ([facility] [address] ICD-10-CM: E66.01 
ICD-9-CM: 278.01  2/28/2011 Yes Vital Signs:  
 Last 24hrs VS reviewed since prior progress note. Most recent are: 
Visit Vitals BP (!) 152/91 (BP 1 Location: Right arm, BP Patient Position: At rest) Pulse 67 Temp 97.8 °F (36.6 °C) Resp 16 Ht 5' 7\" (1.702 m) Wt 107 kg (236 lb) SpO2 98% BMI 36.96 kg/m² Intake/Output Summary (Last 24 hours) at 4/10/2019 1150 Last data filed at 4/10/2019 1267 Gross per 24 hour Intake 1370 ml Output 365 ml Net 1005 ml Physical Examination:  
 
 
     
Constitutional:  No acute distress, cooperative, pleasant   
ENT:  Oral mucous moist, oropharynx benign. Neck supple, Resp:  Chest tube on the left side, decrease bronchial breath sound bilaterally. No wheezing/rhonchi/rales. No accessory muscle use CV:  Regular rhythm, normal rate, no murmurs, gallops, rubs GI:  Soft, non distended, non tender. normoactive bowel sounds, no hepatosplenomegaly Musculoskeletal:  No edema Neurologic:  Moves all extremities. AAOx3, CN II-XII reviewed Skin:  Good turgor, no rashes or ulcers Data Review:  
 Review and/or order of clinical lab test 
 
 
Labs:  
 
Recent Labs 04/09/19 
2123 04/08/19 
0221 WBC 10.2 9.8 HGB 9.6* 8.7* HCT 31.2* 29.7* * 328 Recent Labs 04/10/19 
8425 04/09/19 2123 04/08/19 
0221  139 137  
K 3.7 3.7 3.3*  
 104 104 CO2 30 27 28 BUN 5* 5* 7 CREA 0.67 0.73 0.71  
 GLU 87 102* 86  
CA 8.6 8.9 8.5 MG  --  1.9  -- No results for input(s): SGOT, GPT, ALT, AP, TBIL, TBILI, TP, ALB, GLOB, GGT, AML, LPSE in the last 72 hours. No lab exists for component: AMYP, HLPSE No results for input(s): INR, PTP, APTT in the last 72 hours. No lab exists for component: INREXT, INREXT No results for input(s): FE, TIBC, PSAT, FERR in the last 72 hours. Lab Results Component Value Date/Time Folate 8.0 04/10/2009 06:00 PM  
  
No results for input(s): PH, PCO2, PO2 in the last 72 hours. No results for input(s): CPK, CKNDX, TROIQ in the last 72 hours. No lab exists for component: CPKMB Lab Results Component Value Date/Time Cholesterol, total 211 (H) 10/13/2017 04:23 PM  
 HDL Cholesterol 44 10/13/2017 04:23 PM  
 LDL, calculated 113 (H) 10/13/2017 04:23 PM  
 Triglyceride 268 (H) 10/13/2017 04:23 PM  
 
Lab Results Component Value Date/Time Glucose (POC) 97 01/31/2010 01:10 PM  
 Glucose  05/14/2012 10:55 AM  
 
Lab Results Component Value Date/Time Color 0-3 03/27/2019 12:17 PM  
 Appearance CLOUDY (A) 03/27/2019 12:17 PM  
 Specific gravity 1.026 03/27/2019 12:17 PM  
 pH (UA) 5.5 03/27/2019 12:17 PM  
 Protein 100 (A) 03/27/2019 12:17 PM  
 Glucose NEGATIVE  03/27/2019 12:17 PM  
 Ketone 15 (A) 03/27/2019 12:17 PM  
 Bilirubin NEGATIVE  03/10/2015 02:45 PM  
 Urobilinogen 1.0 03/27/2019 12:17 PM  
 Nitrites NEGATIVE  03/27/2019 12:17 PM  
 Leukocyte Esterase SMALL (A) 03/27/2019 12:17 PM  
 Epithelial cells MANY (A) 03/27/2019 12:17 PM  
 Bacteria 2+ (A) 03/27/2019 12:17 PM  
 WBC 0-4 03/27/2019 12:17 PM  
 RBC 0-5 03/27/2019 12:17 PM  
 
 
 
Medications Reviewed:  
 
Current Facility-Administered Medications Medication Dose Route Frequency  vancomycin (VANCOCIN) 1250 mg in  ml infusion  1,250 mg IntraVENous Q18H  
 meropenem (MERREM) 500 mg in 0.9% sodium chloride (MBP/ADV) 50 mL  0.5 g IntraVENous Q6H  
  docusate sodium (COLACE) capsule 100 mg  100 mg Oral DAILY  traMADol (ULTRAM) tablet 100 mg  100 mg Oral Q6H PRN  
 HYDROmorphone (PF) (DILAUDID) injection 1 mg  1 mg IntraVENous Q4H PRN  Vancomycin - Pharmacy to Dose   Other Rx Dosing/Monitoring  multivitamin, tx-iron-ca-min (THERA-M w/ IRON) tablet 1 Tab  1 Tab Oral DAILY  calcium carbonate tablet 520 mg [elemental]  520 mg Oral DAILY  cholecalciferol (VITAMIN D3) tablet 3,000 Units  3,000 Units Oral DAILY  cyanocobalamin (VITAMIN B12) tablet 500 mcg  500 mcg Oral DAILY  LORazepam (ATIVAN) tablet 1 mg  1 mg Oral TID PRN  
 losartan (COZAAR) tablet 25 mg  25 mg Oral DAILY  PARoxetine (PAXIL) tablet 40 mg  40 mg Oral DAILY  sodium chloride (NS) flush 5-40 mL  5-40 mL IntraVENous Q8H  
 sodium chloride (NS) flush 5-40 mL  5-40 mL IntraVENous PRN  
 ondansetron (ZOFRAN) injection 4 mg  4 mg IntraVENous Q6H PRN  
 acetaminophen (TYLENOL) tablet 650 mg  650 mg Oral Q6H PRN  pantoprazole (PROTONIX) tablet 40 mg  40 mg Oral DAILY  
 
______________________________________________________________________ EXPECTED LENGTH OF STAY: 6d 14h ACTUAL LENGTH OF STAY:          14 
 
            
Swathi Birmingham MD

## 2019-04-10 NOTE — PROGRESS NOTES
Doing well, getting PICC tomorrow and I will DC the CARLINE prior to her leaving likely tomorrow. Marly Sutherland

## 2019-04-10 NOTE — PROGRESS NOTES
2000 
Assessment of patient. NO IV access. Called IV team. Unsuccessful attempt. Paged Dr. Galdino Botello to maybe order IJ. Dr. Galdino Botello advised to ask ER nurse for help. ER nurse available to help (Thank you). Bedside and Verbal shift change report given to Newton Cotter (oncoming nurse) by Destin Carrera (offgoing nurse). Report included the following information SBAR, Med Rec Status and Cardiac Rhythm Sinus Rhythm.

## 2019-04-11 VITALS
RESPIRATION RATE: 16 BRPM | SYSTOLIC BLOOD PRESSURE: 133 MMHG | OXYGEN SATURATION: 97 % | TEMPERATURE: 98.3 F | BODY MASS INDEX: 36.41 KG/M2 | DIASTOLIC BLOOD PRESSURE: 79 MMHG | WEIGHT: 232 LBS | HEART RATE: 73 BPM | HEIGHT: 67 IN

## 2019-04-11 PROCEDURE — 02HV33Z INSERTION OF INFUSION DEVICE INTO SUPERIOR VENA CAVA, PERCUTANEOUS APPROACH: ICD-10-PCS | Performed by: HOSPITALIST

## 2019-04-11 PROCEDURE — 74011250637 HC RX REV CODE- 250/637: Performed by: SURGERY

## 2019-04-11 PROCEDURE — 74011000258 HC RX REV CODE- 258: Performed by: INTERNAL MEDICINE

## 2019-04-11 PROCEDURE — C1751 CATH, INF, PER/CENT/MIDLINE: HCPCS

## 2019-04-11 PROCEDURE — 77030020365 HC SOL INJ SOD CL 0.9% 50ML

## 2019-04-11 PROCEDURE — 76937 US GUIDE VASCULAR ACCESS: CPT

## 2019-04-11 PROCEDURE — 36569 INSJ PICC 5 YR+ W/O IMAGING: CPT | Performed by: HOSPITALIST

## 2019-04-11 PROCEDURE — 77030020847 HC STATLOK BARD -A

## 2019-04-11 PROCEDURE — 74011250636 HC RX REV CODE- 250/636: Performed by: INTERNAL MEDICINE

## 2019-04-11 PROCEDURE — 77030018719 HC DRSG PTCH ANTIMIC J&J -A

## 2019-04-11 PROCEDURE — 74011250637 HC RX REV CODE- 250/637: Performed by: INTERNAL MEDICINE

## 2019-04-11 RX ORDER — BACITRACIN 500 UNIT/G
1 PACKET (EA) TOPICAL AS NEEDED
Status: DISCONTINUED | OUTPATIENT
Start: 2019-04-11 | End: 2019-04-12 | Stop reason: HOSPADM

## 2019-04-11 RX ORDER — TRAMADOL HYDROCHLORIDE 50 MG/1
50-100 TABLET ORAL
Qty: 40 TAB | Refills: 0 | Status: SHIPPED | OUTPATIENT
Start: 2019-04-11 | End: 2019-04-14

## 2019-04-11 RX ORDER — TRAMADOL HYDROCHLORIDE 50 MG/1
50-100 TABLET ORAL
Qty: 40 TAB | Refills: 0 | Status: SHIPPED | OUTPATIENT
Start: 2019-04-11 | End: 2019-04-11 | Stop reason: SDUPTHER

## 2019-04-11 RX ADMIN — VANCOMYCIN HYDROCHLORIDE 1250 MG: 10 INJECTION, POWDER, LYOPHILIZED, FOR SOLUTION INTRAVENOUS at 19:11

## 2019-04-11 RX ADMIN — LOSARTAN POTASSIUM 25 MG: 25 TABLET, FILM COATED ORAL at 09:54

## 2019-04-11 RX ADMIN — DOCUSATE SODIUM 100 MG: 100 CAPSULE, LIQUID FILLED ORAL at 09:54

## 2019-04-11 RX ADMIN — ANTACID TABLETS 520 MG: 648 TABLET, CHEWABLE ORAL at 09:54

## 2019-04-11 RX ADMIN — TRAMADOL HYDROCHLORIDE 100 MG: 50 TABLET, FILM COATED ORAL at 18:40

## 2019-04-11 RX ADMIN — VANCOMYCIN HYDROCHLORIDE 1250 MG: 10 INJECTION, POWDER, LYOPHILIZED, FOR SOLUTION INTRAVENOUS at 02:10

## 2019-04-11 RX ADMIN — Medication 10 ML: at 06:51

## 2019-04-11 RX ADMIN — ERTAPENEM SODIUM 1 G: 1 INJECTION, POWDER, LYOPHILIZED, FOR SOLUTION INTRAMUSCULAR; INTRAVENOUS at 18:39

## 2019-04-11 RX ADMIN — Medication 500 MCG: at 09:54

## 2019-04-11 RX ADMIN — PAROXETINE HYDROCHLORIDE 40 MG: 20 TABLET, FILM COATED ORAL at 09:54

## 2019-04-11 RX ADMIN — MEROPENEM 500 MG: 500 INJECTION, POWDER, FOR SOLUTION INTRAVENOUS at 06:50

## 2019-04-11 RX ADMIN — VITAMIN D 3000 UNITS: 25 TAB ORAL at 09:54

## 2019-04-11 RX ADMIN — MULTIPLE VITAMINS W/ MINERALS TAB 1 TABLET: TAB at 09:54

## 2019-04-11 RX ADMIN — MEROPENEM 500 MG: 500 INJECTION, POWDER, FOR SOLUTION INTRAVENOUS at 12:23

## 2019-04-11 RX ADMIN — PANTOPRAZOLE SODIUM 40 MG: 40 TABLET, DELAYED RELEASE ORAL at 09:54

## 2019-04-11 NOTE — PROGRESS NOTES
2000 
Assessment. Patient ambulates in hallway 
 
0000 assessment Antibiotics given Bedside and Verbal shift change report given to Shruthi Walters (oncoming nurse) by Gisela Dent (offgoing nurse).  Report included the following information SBAR, Kardex, Intake/Output, MAR, Accordion, Med Rec Status and Cardiac Rhythm NSR.

## 2019-04-11 NOTE — PROGRESS NOTES
ID Progress Note 2019 Subjective: Afebrile. Feels well. Objective:  
 
Vitals:  
Visit Vitals /79 (BP 1 Location: Left arm, BP Patient Position: At rest) Pulse 73 Temp 98.3 °F (36.8 °C) Resp 16 Ht 5' 7\" (1.702 m) Wt 105.2 kg (232 lb) LMP 06/10/2013 SpO2 97% BMI 36.34 kg/m² Tmax:  Temp (24hrs), Av.9 °F (36.6 °C), Min:97.4 °F (36.3 °C), Max:98.3 °F (36.8 °C) Exam: Not in distress Lungs: clear to auscultation, no rales, wheezes or rhonchi Heart: s1, s2, no murmurs, rubs of clicks Abdomen: soft, nontender, no guarding or rebound Speech fluent Labs:  
Lab Results Component Value Date/Time WBC 10.2 2019 09:23 PM  
 Hemoglobin (POC) 9.6 2012 10:55 AM  
 Hemoglobin (POC) 11.2 (L) 2010 01:10 PM  
 HGB 9.6 (L) 2019 09:23 PM  
 Hematocrit (POC) 33 (L) 2010 01:10 PM  
 HCT 31.2 (L) 2019 09:23 PM  
 PLATELET 187 (H)  09:23 PM  
 MCV 79.6 (L) 2019 09:23 PM  
 
Lab Results Component Value Date/Time Sodium 139 04/10/2019 08:39 AM  
 Potassium 3.7 04/10/2019 08:39 AM  
 Chloride 104 04/10/2019 08:39 AM  
 CO2 30 04/10/2019 08:39 AM  
 Anion gap 5 04/10/2019 08:39 AM  
 Glucose 87 04/10/2019 08:39 AM  
 BUN 5 (L) 04/10/2019 08:39 AM  
 Creatinine 0.67 04/10/2019 08:39 AM  
 BUN/Creatinine ratio 7 (L) 04/10/2019 08:39 AM  
 GFR est AA >60 04/10/2019 08:39 AM  
 GFR est non-AA >60 04/10/2019 08:39 AM  
 Calcium 8.6 04/10/2019 08:39 AM  
 Bilirubin, total 0.8 2019 11:52 AM  
 AST (SGOT) 17 2019 11:52 AM  
 Alk. phosphatase 243 (H) 2019 11:52 AM  
 Protein, total 8.3 (H) 2019 11:52 AM  
 Albumin 2.3 (L) 2019 11:52 AM  
 Globulin 6.0 (H) 2019 11:52 AM  
 A-G Ratio 0.4 (L) 2019 11:52 AM  
 ALT (SGPT) 16 2019 11:52 AM  
 
 
 
Assessment: 1. Diaphragmatic abscess '2. History of gastric bypass surgery. 3.  Pleural effusions. 4.  Pericardial effusion status post pericardiocentesis. 5.  Hypertension. 6.  Depression. 7.  Anxiety. Recommendations:  
 
Discharge on vanco + ertapenem. Orders written.   
 
 
 
 
 
 
Cruz Infante MD

## 2019-04-11 NOTE — PROGRESS NOTES
CM has provided HCP with IV order and PICC note. HCP can also provide nursing for PICC line. Patient will discharge tonight.  
 
Sujatha Velasco [facility]

## 2019-04-11 NOTE — PROGRESS NOTES
[facility] Subjective Doing well, afebrile, no N/V, minimal pain Objective Patient Vitals for the past 24 hrs: 
 Temp Pulse Resp BP SpO2  
04/11/19 0746 98 °F (36.7 °C) 69 16 119/80 93 % 04/11/19 0435 97.4 °F (36.3 °C) 66 18 120/82 95 % 04/11/19 0148 97.6 °F (36.4 °C) 73 18 127/69 96 % 04/10/19 2014 98.1 °F (36.7 °C) 75 16 134/82   
04/10/19 1600     97 % 04/10/19 1534 98 °F (36.7 °C) 72 16 132/86 97 % 04/10/19 1158     96 % 04/10/19 1113 97.8 °F (36.6 °C) 67 16 (!) 152/91 98 % Date 04/10/19 0700 - 04/11/19 0765 04/11/19 0700 - 04/12/19 4561 Shift 1468-9995 0319-3299 24 Hour Total 4488-3504 1985-1269 24 Hour Total  
INTAKE  
P.O. 680  680     
  P. O. 680  680     
I. V.(mL/kg/hr) 650(0.5)  650(0.3) 400  400 Volume (vancomycin (VANCOCIN) 1250 mg in  ml infusion) 250  250 250  250 Volume (meropenem (MERREM) 500 mg in 0.9% sodium chloride (MBP/ADV) 50 mL) 200  200 150  150 Volume (doxycycline (VIBRAMYCIN) 100 mg in 0.9% sodium chloride (MBP/ADV) 100 mL) 200  200 Shift Total(mL/kg) 1330(12.4)  1330(12.6) 400(3.8)  400(3.8) OUTPUT Urine(mL/kg/hr) Urine Occurrence(s) 3 x 3 x 6 x Drains 15  15 Output (ml) (Denise Meir #1 04/03/19 Left Abdomen) 15  15 Shift Total(mL/kg) 15(0.1)  15(0.1) NET 1315  1315 400  400 Weight (kg) 107 105.2 105.2 105.2 105.2 105.2 PE 
Pulm - CTAB 
CV - RRR Abd - soft, ND, BS present, NTTP, drain with purulent output Labs No results found for this or any previous visit (from the past 12 hour(s)). Assessment Zulma Reddingchrj is a 46 y. o.yr old female with diaphragmatic abscess Plan The drain today looks more purulent, the abscess seems to be emptying out more today I will keep the drain in place until the output is more serous I will have her get another CT scan in the next 1-2 wks. She will follow up with me in 1-2 wks. IV abx per ID OK for DC home today with CARLINE in place Mina Padilla MD

## 2019-04-11 NOTE — PROCEDURES
PICC Placement Note    PRE-PROCEDURE VERIFICATION    Order for PICC verified. Consent obtained from patient after risks, benefits, and procedure was explained. Time given to answer questions and/or concerns. Correct Procedure: yes  Correct Site:  yes  Temperature: Temp: 98 °F (36.7 °C), Temperature Source: Temp Source: Oral  Recent Labs     04/10/19  0839 04/09/19  2123   BUN 5* 5*   CREA 0.67 0.73   PLT  --  435*   WBC  --  10.2     Allergies: Amlodipine and Pcn [penicillins]  Education materials, including PICC Booklet, for PICC Care given to patient: yes. See Patient Education activity for further details. PROCEDURE DETAIL  A single lumen PICC line was started for antibiotic therapy and Home IV Therapy. The following documentation is in addition to the PICC properties in the lines/airways flowsheet :  Lot #: [id number]  Was xylocaine 1% used intradermally:  yes  Catheter Length: 41 cm  External Catheter Length: 0cm  Vein Selection for PICC:right cephalic  Central Line Bundle followed yes  Complication Related to Insertion: none    The placement was verified by ECG/[employer]: The tip location is in the superior vena cava. See ECG results for PICC tip placement. Report given to Prasanna Corrales RN. Line is okay to use.     JOSÉ MIGUEL DickersonN, RN, [facility]-BC  Vascular Access Team

## 2019-04-11 NOTE — PROGRESS NOTES
Home IV Orders1. Diagnosis:  Diaphragmatic abscess 2. Routine PICC [address] 3. Antibiotic: vancomycin 1.75 gram q24 hours IV, invanz 1 gram daily IV 4. Lab each Monday: CBC/diff/platelets CMP 
 CPK Trough Vancomycin level 5. Lab each Thursday: CBC/diff/platelets BUN Creatinine Trough Vancomycin level 6. Fax lab to Dr. Martha Estrada @ [phone]. 
7.  Call Dr. Martha Estraad @ [phone] for fever >100.5, infection at PICC site, diarrhea, WBC > 15 or < 3, cr > 1.8 8. Duration of therapy: last day of therapy should be April 22, 2019 Please call Dr. Martha Estrada @ [phone] before stopping therapy. 9.  Allergies: Allergies Allergen Reactions  Amlodipine Other (comments) Fatigue/drowsy  Pcn [Penicillins] Rash Per Patient Childhood Allergy - Unsure of reaction. Mother told her not to take it. 10. Pharmacy Consult for Vancomycin dosing. Maintain trough between 10-15. 11. Make appointment in 2 weeks Summer Raman MD

## 2019-04-11 NOTE — DISCHARGE SUMMARY
Discharge Summary       PATIENT ID: Serene Lennox  MRN: [id number]   YOB: 1970    DATE OF ADMISSION: 3/27/2019 11:48 AM    DATE OF DISCHARGE:  4/11/2019  PRIMARY CARE PROVIDER: Florence Pimentel MD     ATTENDING PHYSICIAN: Rosita Mckeon   DISCHARGING PROVIDER: Bianca Muhammad MD    To contact this individual call [phone] and ask the  to page. If unavailable ask to be transferred the Adult Hospitalist Department. CONSULTATIONS: IP CONSULT TO GENERAL SURGERY  IP CONSULT TO GENERAL SURGERY  IP CONSULT TO HOSPITALIST  IP CONSULT TO THORACIC SURGERY  IP CONSULT TO ONCOLOGY  IP CONSULT TO INFECTIOUS DISEASES  IP CONSULT TO INFECTIOUS DISEASES  IP CONSULT TO CARDIOLOGY    PROCEDURES/SURGERIES: Procedure(s):  DIAGNOSTIC LAPAROSCOPY WITH EGD    ADMITTING DIAGNOSES & HOSPITAL COURSE:   Large left subphrenic abscess  -s/p CT-guided biopsy- Pathology results show organizing abscess, no Malignancy seen.  -Surgical CONSULTED s/p drainage of abscess, CARLINE Drain placed. -Felix Actinomyces, doxycycline discontinued   -Culture growing heavy microaerophilic streptococcus, moderate anaerobic gram positive coci, moderate anaerobic gram negative rods  - on IV meropenem and IV Vancomycin  -ID and surgical service following  -picc line 4/11, arrange home IV abx 1.75 gram q24 hours IV, invanz 1 gram daily IV until April 22. 2019   Patient will go home with CARLINE drainage, will see leila Viveros in 1 week and he will arrange outpatient follow up CT in 1-2 weeks          Large left pleural effusion without respiratory failure.   -continue incentive spirometry. -s/p US guided thoracentesis. fluid studies/cultures no growth  -thoracic surgeon consulted      Near Syncope on admission. -2D ECHO with moderate to large pericardial effusion. Low threshold for Cardiac Tamponade. -S/P Pericardiocentesis 3/30/19.   -2D ECHO 3/31/19 showed minimal residual effusion.    -seen by Cardiology  -stable      Anemia of blood loss post Operative  -H/H stable  -monitor H/H     Hypokalemia  -replaced and resolved     Morbid obesity with a BMI greater than 40.   -s/p Gastric Bypass surgery     Generalized weakness. -PT as tolerated.     Anxiety Disorder/ Depression. -stable           DISCHARGE PLAN:      Home IV abx 1.75 gram q24 hours IV, invanz 1 gram daily IV until 4/22/2019 per ID   Follow general surgeon for the CARLINE drainage, and Rpeat CT in 1-2 weeks     1. PENDING TEST RESULTS:   At the time of discharge the following test results are still pending: none     FOLLOW UP APPOINTMENTS:    Follow-up Information     Follow up With Specialties Details Why Contact Info    Walter Aguilar MD General Surgery In 1 week For wound re-check [address]  [phone]      Alcides Patel MD Family Practice On 4/18/2019 Hospital f/u PCP appointment Thursday, 4/18/19 @ 2:15 p.m. Please arrive 15 minutes early with discharge papers. [address]  [phone]               DIET: Regular Diet      ACTIVITY: Activity as tolerated          DISCHARGE MEDICATIONS:  Current Discharge Medication List      START taking these medications    Details   traMADol (ULTRAM) 50 mg tablet Take 1-2 Tabs by mouth every six (6) hours as needed for Pain for up to 3 days. Max Daily Amount: 400 mg. Qty: 40 Tab, Refills: 0    Associated Diagnoses: Abdominal mass, unspecified abdominal location         CONTINUE these medications which have NOT CHANGED    Details   multivitamin with iron (FLINTSTONES) chewable tablet Take 1 Tab by mouth two (2) times a day. losartan (COZAAR) 25 mg tablet TAKE 1 TABLET BY MOUTH DAILY  Qty: 90 Tab, Refills: 0    Associated Diagnoses: Essential hypertension      LORazepam (ATIVAN) 1 mg tablet Take 1 Tab by mouth daily as needed for Anxiety (Panic Attack).   Qty: 30 Tab, Refills: 0    Associated Diagnoses: Anxiety      PARoxetine (PAXIL) 40 mg tablet TAKE 1 TABLET BY MOUTH EVERY DAY  Qty: 90 Tab, Refills: 1    Associated Diagnoses: Anxiety      omeprazole (PRILOSEC) 20 mg capsule Take 1 Cap by mouth daily. Qty: 90 Cap, Refills: 1               NOTIFY YOUR PHYSICIAN FOR ANY OF THE FOLLOWING:   Fever over 101 degrees for 24 hours. Chest pain, shortness of breath, fever, chills, nausea, vomiting, diarrhea, change in mentation, falling, weakness, bleeding. Severe pain or pain not relieved by medications. Or, any other signs or symptoms that you may have questions about. DISPOSITION:    Home With:   OT  PT  HH x RN       Long term SNF/Inpatient Rehab   x Independent/assisted living    Hospice    Other:       PATIENT CONDITION AT DISCHARGE:     Functional status    Poor     Deconditioned    x Independent      Cognition   x  Lucid     Forgetful     Dementia      Catheters/lines (plus indication)    Noble    x PICC  4/11     PEG     None      Code status   x  Full code     DNR      PHYSICAL EXAMINATION AT DISCHARGE:  04/11/19 1200 98.3 °F (36.8 °C) 73 133/79 97  At rest 16 97 %       04/11/19 0800         Room air      04/11/19 0746 98 °F (36.7 °C) 69 119/80 93  At rest 16 93 %         Constitutional:  No acute distress, cooperative, pleasant    ENT:  Oral mucous moist, oropharynx benign. Neck supple,    Resp:  Chest tube on the left side, decrease bronchial breath sound bilaterally. No wheezing/rhonchi/rales. No accessory muscle use   CV:  Regular rhythm, normal rate, no murmurs, gallops, rubs    GI:  soft, ND, BS present, NTTP, drain with purulent output        Musculoskeletal:  No edema    Neurologic:  Moves all extremities.   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  ICD-9-CM: 239.2  3/27/2019 - Present        Morbid obesity with BMI of 45.0-49.9, adult (HCC) ICD-10-CM: E66.01, Z68.42  ICD-9-CM: 278.01, V85.42  10/15/2018 - Present        Elevated glucose ICD-10-CM: R73.09  ICD-9-CM: 790.29  5/2/2018 - Present    Overview Signed 5/2/2018 11:32 AM by Imelda Wooten     Needs a1c and GTT             Moderate major depression ([facility] ICD-10-CM: F32.1  ICD-9-CM: 296.22  2/16/2018 - Present        MAK (nonalcoholic steatohepatitis) ICD-10-CM: K75.81  ICD-9-CM: 571.8  10/20/2015 - Present    Overview Addendum 3/12/2018  6:30 PM by Noble Bowden, [address] [place] [address] 2015               Smoker ICD-10-CM: F17.200  ICD-9-CM: 305.1  10/13/2015 - Present    Overview Addendum 3/12/2018  6:13 PM by Imelda Wooten     advised to quit, see letter 3/12/2018             H/O: hysterectomy ICD-10-CM: Z90.710  ICD-9-CM: V88.01  7/6/2015 - Present    Overview Signed 7/6/2015  9:30 AM by Imelda Fisher done for benign reasons 2013             Morbid obesity ([facility] ICD-10-CM: E66.01  ICD-9-CM: 278.01  2/28/2011 - Present        HTN (hypertension), benign ICD-10-CM: I10  ICD-9-CM: 401.1  2/28/2011 - Present        Panic attacks ICD-10-CM: F41.0  ICD-9-CM: 300.01  7/21/2010 - Present    Overview Signed 3/12/2018  6:31 PM by Imelda Wooten     3/12/2018:  PHQ9 = 2             RESOLVED: Chest pain ICD-10-CM: R07.9  ICD-9-CM: 786.50  3/12/2015 - 7/6/2015        RESOLVED: Dysmenorrhea ICD-10-CM: N94.6  ICD-9-CM: 625.3  7/10/2013 - 3/1/2018        RESOLVED: Menorrhagia ICD-10-CM: N92.0  ICD-9-CM: 626.2  6/4/2013 - 7/6/2015        RESOLVED: Irregular menstrual cycle ICD-10-CM: N92.6  ICD-9-CM: 626.4  6/4/2013 - 7/6/2015        RESOLVED: Anxiety ICD-10-CM: F41.9  ICD-9-CM: 300.00  7/21/2010 - 3/12/2018        RESOLVED: Cellulitis and abscess of face ICD-10-CM: J15.103, L02.01  ICD-9-CM: 682.0  2/2/2010 - 2/28/2011              Greater than 30  minutes were spent with the patient on counseling and coordination of care    Signed:   Zoey Ponce MD  4/11/2019  4:31 PM

## 2019-04-11 NOTE — PROGRESS NOTES
Hospital follow-up PCP transitional care appointment has been scheduled with Dr. Sae Zimmer for Thursday, 4/18/19 at 2:15 p.m. Pending patient discharge.   Stephanie Olvera, Care Management Specialist.

## 2019-04-12 ENCOUNTER — TELEPHONE (OUTPATIENT)
Dept: SURGERY | Age: 49
End: 2019-04-12

## 2019-04-12 NOTE — TELEPHONE ENCOUNTER
Patient called stating she was calling to schedule appointment for next week for post op. Patient stated she was told by Dr. Skinny Grande that he needs to see her specifically since she is having drainage. I told her I could schedule her with NP and she wanted me to make sure. Please advice who to schedule patient with.

## 2019-04-16 ENCOUNTER — OFFICE VISIT (OUTPATIENT)
Dept: SURGERY | Age: 49
End: 2019-04-16

## 2019-04-16 VITALS
HEIGHT: 67 IN | HEART RATE: 96 BPM | OXYGEN SATURATION: 95 % | RESPIRATION RATE: 18 BRPM | SYSTOLIC BLOOD PRESSURE: 136 MMHG | WEIGHT: 225 LBS | DIASTOLIC BLOOD PRESSURE: 86 MMHG | BODY MASS INDEX: 35.31 KG/M2 | TEMPERATURE: 98.4 F

## 2019-04-16 DIAGNOSIS — L02.219 CELLULITIS AND ABSCESS OF TRUNK: Primary | ICD-10-CM

## 2019-04-16 DIAGNOSIS — L03.319 CELLULITIS AND ABSCESS OF TRUNK: Primary | ICD-10-CM

## 2019-04-16 NOTE — LETTER
2019 1:37 PM 
 
Ms. Peter Moss [address] [address] To Whom It May Concern: This letter is concerning jury duty for Peter Moss, : 1970, followed at  [facility]. Due to medical problems, this patient must be excused from jury duty at this time due to recovering from major surgery. Please contact the office if you need further information or have any questions. Sincerely, Nupur Philip MD

## 2019-04-16 NOTE — PROGRESS NOTES
1. Have you been to the ER, urgent care clinic since your last visit? Hospitalized since your last visit? No    2. Have you seen or consulted any other health care providers outside of the [address] [place] [address] since your last visit? Include any pap smears or colon screening.  No

## 2019-04-16 NOTE — LETTER
4/16/19 Patient: Ashleigh Tejeda YOB: 1970 Date of Visit: 4/16/2019 Loida Sanabria MD 
[address] [place] [address] VIA Facsimile: [phone] Dear Loida Sanabria MD, Thank you for referring Ms. Scott Kendrick to [address] AT [facility] for evaluation. My notes for this consultation are attached. If you have questions, please do not hesitate to call me. I look forward to following your patient along with you. Sincerely, Joshua Rosenbaum MD

## 2019-04-16 NOTE — PROGRESS NOTES
Subjective:      Reji Powell is a 46 y.o. female presents for postop care 3 wk(s) following  Laparoscopic drainage of diaphragmatic abscess. Appetite is good. Eating a regular diet. without difficulty. Bowel movements are regular. The patient is not having any pain. Her drain is putting about 10-20 cc a day at home. The fluid is a little more clear now    Pathology: abscess cavity    Objective:     Visit Vitals  /86 (BP 1 Location: Left arm, BP Patient Position: Sitting)   Pulse 96   Temp 98.4 °F (36.9 °C) (Oral)   Resp 18   Ht 5' 7\" (1.702 m)   Wt 225 lb (102.1 kg)   LMP 06/10/2013   SpO2 95%   BMI 35.24 kg/m²       General:  alert, cooperative, no distress, appears stated age   Abdomen: soft, non-tender   Incision:   healing well, no drainage, no erythema, no hernia, no seroma, no swelling, drain with seropurulent material, no dehiscence, incision well approximated     Assessment:     1. Reji Powell is a 46 y.o. female who is s/p laparoscopic drainage of diaphragmatic abscess      Plan:     1. She is doing well post op and on IV abx.  2. The drain is putting out minimal fluid and it is seropurulent now instead of purulent. She will need a CT scan to look at the abscess again  3. Follow-up after the CT is done and I may remove the drain. Ms. Celeste Paris has a reminder for a \"due or due soon\" health maintenance. I have asked that she contact her primary care provider for follow-up on this health maintenance.

## 2019-04-18 LAB
ORGANISM ID BY MALDI, ORJ1T: NORMAL
OTHER ANTIBIOTIC SUSC ISLT: NORMAL
SOURCE, RSRC67: NORMAL
SPECIMEN SOURCE: NORMAL

## 2019-04-22 ENCOUNTER — HOSPITAL ENCOUNTER (OUTPATIENT)
Dept: CT IMAGING | Age: 49
Discharge: HOME | End: 2019-04-22
Attending: SURGERY
Payer: COMMERCIAL

## 2019-04-22 DIAGNOSIS — L03.319 CELLULITIS AND ABSCESS OF TRUNK: ICD-10-CM

## 2019-04-22 DIAGNOSIS — L02.219 CELLULITIS AND ABSCESS OF TRUNK: ICD-10-CM

## 2019-04-22 PROCEDURE — 74011000258 HC RX REV CODE- 258: Performed by: INTERNAL MEDICINE

## 2019-04-22 PROCEDURE — 74160 CT ABDOMEN W/CONTRAST: CPT

## 2019-04-22 PROCEDURE — 74011636320 HC RX REV CODE- 636/320: Performed by: INTERNAL MEDICINE

## 2019-04-22 PROCEDURE — 71260 CT THORAX DX C+: CPT

## 2019-04-22 RX ORDER — SODIUM CHLORIDE 9 MG/ML
INJECTION, SOLUTION INTRAVENOUS
Status: DISCONTINUED
Start: 2019-04-22 | End: 2019-04-23 | Stop reason: HOSPADM

## 2019-04-22 RX ORDER — SODIUM CHLORIDE 0.9 % (FLUSH) 0.9 %
10 SYRINGE (ML) INJECTION
Status: COMPLETED | OUTPATIENT
Start: 2019-04-22 | End: 2019-04-22

## 2019-04-22 RX ADMIN — SODIUM CHLORIDE 100 ML: 900 INJECTION, SOLUTION INTRAVENOUS at 15:35

## 2019-04-22 RX ADMIN — IOHEXOL 50 ML: 240 INJECTION, SOLUTION INTRATHECAL; INTRAVASCULAR; INTRAVENOUS; ORAL at 15:28

## 2019-04-22 RX ADMIN — IOPAMIDOL 100 ML: 755 INJECTION, SOLUTION INTRAVENOUS at 15:28

## 2019-04-22 RX ADMIN — Medication 10 ML: at 15:28

## 2019-04-23 ENCOUNTER — OFFICE VISIT (OUTPATIENT)
Dept: SURGERY | Age: 49
End: 2019-04-23

## 2019-04-23 VITALS
RESPIRATION RATE: 18 BRPM | HEART RATE: 117 BPM | WEIGHT: 220 LBS | DIASTOLIC BLOOD PRESSURE: 86 MMHG | HEIGHT: 67 IN | OXYGEN SATURATION: 95 % | SYSTOLIC BLOOD PRESSURE: 116 MMHG | BODY MASS INDEX: 34.53 KG/M2 | TEMPERATURE: 98.3 F

## 2019-04-23 DIAGNOSIS — J98.6: Primary | ICD-10-CM

## 2019-04-23 NOTE — LETTER
4/23/19 Patient: Katharyn Mortimer YOB: 1970 Date of Visit: 4/23/2019 Lev Morton MD 
[address] [facility] [address] [place] [address] VIA Facsimile: [phone] Dear Lev Morton MD, Thank you for referring Ms. Karyle Hailey to [address] [facility] AT [place] [address] for evaluation. My notes for this consultation are attached. If you have questions, please do not hesitate to call me. I look forward to following your patient along with you. Sincerely, Krista Chavez MD

## 2019-04-23 NOTE — LETTER
NOTIFICATION OF RETURN TO WORK  
 
4/23/2019 3:09 PM 
 
Ms. Angelia Diaz [address] [address] [facility] To Whom It May Concern: 
 
Angelia Diaz was under the care of Angela Keane . She will be able to return to work on 4/29/19 with no restrictions. If there are questions or concerns please have the patient contact our office. Sincerely, Matthias Bravo MD

## 2019-04-23 NOTE — PROGRESS NOTES
1. Have you been to the ER, urgent care clinic since your last visit? Hospitalized since your last visit? No    2. Have you seen or consulted any other health care providers outside of the [address] [place] [address] since your last visit? Include any pap smears or colon screening.  No

## 2019-04-24 LAB
AEROBIC ID BY MALDI, ORJ11T: NORMAL
BACTERIA ISLT: ABNORMAL
BACTERIA ISLT: ABNORMAL
OTHER ANTIBIOTIC SUSC ISLT: ABNORMAL
SOURCE, RSRC62: NORMAL
SOURCE, RSRC70: ABNORMAL

## 2019-04-24 NOTE — PROGRESS NOTES
Subjective:      Georgie Calderon is a 46 y.o. female presents for postop care 3 wk(s) following  Laparoscopic drainage of diaphragmatic abscess. She is doing very well currenlty. She is on IV abx with DR Zachariah Schumacher currently. She still has her drain in place and it is putting out about 5-10cc a day of clear serous fluid. She does not have any pain or SOB    Objective:     Visit Vitals  /86 (BP 1 Location: Left arm, BP Patient Position: Sitting)   Pulse (!) 117   Temp 98.3 °F (36.8 °C) (Oral)   Resp 18   Ht 5' 7\" (1.702 m)   Wt 220 lb (99.8 kg)   LMP 06/10/2013   SpO2 95%   BMI 34.46 kg/m²       General:  alert, cooperative, no distress, appears stated age   Abdomen: soft, non-tender   Incision:   healing well, no drainage, no erythema, no hernia, no seroma, no swelling, CARLINE drain with scan serous output, no dehiscence, incision well approximated     CT chest/abd - The poorly defined solid-appearing collection interposed between the left  hemidiaphragm and the superior surface of the left lobe of liver has not  definitely changed. The drainage catheter is lateral to the abnormality. There  is no significant undrained fluid.     There are changes from bariatric surgery.     Liver, spleen, pancreas, and kidneys are unremarkable.     IMPRESSION  IMPRESSION:  1. The solid-appearing collection interposed between the left hemidiaphragm and  the superior surface of the left lobe of the liver is stable. 2. Decreasing left pleural effusion. 3. Status post bariatric surgery. Assessment:     1. Georgie Calderon is a 46 y.o. female who is s/p laparoscopic drainage of diaphragmatic abscess      Plan:     1. Her drain was removed in the office today since the output is low and serous. 2. The CT shows the abscess is resolving. She will follow up with ID this week and may stop IV abx.  3. Follow-up in 3 week(s)    Ms. Jerry Cramer has a reminder for a \"due or due soon\" health maintenance.  I have asked that she contact her primary care provider for follow-up on this health maintenance.

## 2019-05-13 LAB
ACID FAST STN SPEC: NEGATIVE
MYCOBACTERIUM SPEC QL CULT: NEGATIVE
SPECIMEN PREPARATION: NORMAL
SPECIMEN SOURCE: NORMAL

## 2019-08-16 ENCOUNTER — TELEPHONE (OUTPATIENT)
Dept: SURGERY | Age: 49
End: 2019-08-16

## 2019-09-03 ENCOUNTER — TELEPHONE (OUTPATIENT)
Dept: SURGERY | Age: 49
End: 2019-09-03

## 2019-09-03 ENCOUNTER — HOSPITAL ENCOUNTER (EMERGENCY)
Age: 49
Discharge: HOME | End: 2019-09-03
Attending: EMERGENCY MEDICINE | Admitting: INTERNAL MEDICINE
Payer: COMMERCIAL

## 2019-09-03 ENCOUNTER — APPOINTMENT (OUTPATIENT)
Dept: CT IMAGING | Age: 49
End: 2019-09-03
Attending: EMERGENCY MEDICINE
Payer: COMMERCIAL

## 2019-09-03 ENCOUNTER — APPOINTMENT (OUTPATIENT)
Dept: GENERAL RADIOLOGY | Age: 49
End: 2019-09-03
Attending: EMERGENCY MEDICINE
Payer: COMMERCIAL

## 2019-09-03 VITALS
RESPIRATION RATE: 18 BRPM | HEART RATE: 56 BPM | HEIGHT: 67 IN | SYSTOLIC BLOOD PRESSURE: 166 MMHG | WEIGHT: 202 LBS | OXYGEN SATURATION: 100 % | TEMPERATURE: 97.8 F | BODY MASS INDEX: 31.71 KG/M2 | DIASTOLIC BLOOD PRESSURE: 97 MMHG

## 2019-09-03 DIAGNOSIS — R07.89 CHEST PRESSURE: Primary | ICD-10-CM

## 2019-09-03 PROBLEM — S72.009A FRACTURE OF HIP (HCC): Status: ACTIVE | Noted: 2019-09-03

## 2019-09-03 LAB
ANION GAP SERPL CALC-SCNC: 6 MMOL/L (ref 5–15)
ATRIAL RATE: 61 BPM
BASOPHILS # BLD: 0 K/UL (ref 0–0.1)
BASOPHILS NFR BLD: 0 % (ref 0–1)
BUN SERPL-MCNC: 11 MG/DL (ref 6–20)
BUN/CREAT SERPL: 12 (ref 12–20)
CALCIUM SERPL-MCNC: 8.9 MG/DL (ref 8.5–10.1)
CALCULATED P AXIS, ECG09: 15 DEGREES
CALCULATED R AXIS, ECG10: 21 DEGREES
CALCULATED T AXIS, ECG11: 28 DEGREES
CHLORIDE SERPL-SCNC: 108 MMOL/L (ref 97–108)
CO2 SERPL-SCNC: 28 MMOL/L (ref 21–32)
COMMENT, HOLDF: NORMAL
CREAT SERPL-MCNC: 0.95 MG/DL (ref 0.55–1.02)
DIAGNOSIS, 93000: NORMAL
DIFFERENTIAL METHOD BLD: NORMAL
EOSINOPHIL # BLD: 0.1 K/UL (ref 0–0.4)
EOSINOPHIL NFR BLD: 2 % (ref 0–7)
ERYTHROCYTE [DISTWIDTH] IN BLOOD BY AUTOMATED COUNT: 14.4 % (ref 11.5–14.5)
GLUCOSE SERPL-MCNC: 99 MG/DL (ref 65–100)
HCT VFR BLD AUTO: 36.9 % (ref 35–47)
HGB BLD-MCNC: 11.8 G/DL (ref 11.5–16)
IMM GRANULOCYTES # BLD AUTO: 0 K/UL (ref 0–0.04)
IMM GRANULOCYTES NFR BLD AUTO: 0 % (ref 0–0.5)
LYMPHOCYTES # BLD: 3.4 K/UL (ref 0.8–3.5)
LYMPHOCYTES NFR BLD: 40 % (ref 12–49)
MCH RBC QN AUTO: 28.2 PG (ref 26–34)
MCHC RBC AUTO-ENTMCNC: 32 G/DL (ref 30–36.5)
MCV RBC AUTO: 88.1 FL (ref 80–99)
MONOCYTES # BLD: 0.4 K/UL (ref 0–1)
MONOCYTES NFR BLD: 5 % (ref 5–13)
NEUTS SEG # BLD: 4.5 K/UL (ref 1.8–8)
NEUTS SEG NFR BLD: 53 % (ref 32–75)
NRBC # BLD: 0 K/UL (ref 0–0.01)
NRBC BLD-RTO: 0 PER 100 WBC
P-R INTERVAL, ECG05: 152 MS
PLATELET # BLD AUTO: 220 K/UL (ref 150–400)
PMV BLD AUTO: 11.1 FL (ref 8.9–12.9)
POTASSIUM SERPL-SCNC: 3.5 MMOL/L (ref 3.5–5.1)
Q-T INTERVAL, ECG07: 428 MS
QRS DURATION, ECG06: 84 MS
QTC CALCULATION (BEZET), ECG08: 430 MS
RBC # BLD AUTO: 4.19 M/UL (ref 3.8–5.2)
SAMPLES BEING HELD,HOLD: NORMAL
SODIUM SERPL-SCNC: 142 MMOL/L (ref 136–145)
TROPONIN I BLD-MCNC: <0.04 NG/ML (ref 0–0.08)
TROPONIN I SERPL-MCNC: <0.05 NG/ML
VENTRICULAR RATE, ECG03: 61 BPM
WBC # BLD AUTO: 8.4 K/UL (ref 3.6–11)

## 2019-09-03 PROCEDURE — 80048 BASIC METABOLIC PNL TOTAL CA: CPT

## 2019-09-03 PROCEDURE — 99284 EMERGENCY DEPT VISIT MOD MDM: CPT

## 2019-09-03 PROCEDURE — 84484 ASSAY OF TROPONIN QUANT: CPT

## 2019-09-03 PROCEDURE — 85025 COMPLETE CBC W/AUTO DIFF WBC: CPT

## 2019-09-03 PROCEDURE — 74011636320 HC RX REV CODE- 636/320: Performed by: RADIOLOGY

## 2019-09-03 PROCEDURE — 71260 CT THORAX DX C+: CPT

## 2019-09-03 PROCEDURE — 93005 ELECTROCARDIOGRAM TRACING: CPT

## 2019-09-03 PROCEDURE — 74011000258 HC RX REV CODE- 258: Performed by: RADIOLOGY

## 2019-09-03 PROCEDURE — 65270000029 HC RM PRIVATE

## 2019-09-03 RX ORDER — SODIUM CHLORIDE 0.9 % (FLUSH) 0.9 %
5-40 SYRINGE (ML) INJECTION EVERY 8 HOURS
Status: DISCONTINUED | OUTPATIENT
Start: 2019-09-03 | End: 2019-09-03

## 2019-09-03 RX ORDER — SODIUM CHLORIDE 0.9 % (FLUSH) 0.9 %
5-40 SYRINGE (ML) INJECTION AS NEEDED
Status: DISCONTINUED | OUTPATIENT
Start: 2019-09-03 | End: 2019-09-03

## 2019-09-03 RX ORDER — ONDANSETRON 2 MG/ML
4 INJECTION INTRAMUSCULAR; INTRAVENOUS
Status: DISCONTINUED | OUTPATIENT
Start: 2019-09-03 | End: 2019-09-03

## 2019-09-03 RX ORDER — SODIUM CHLORIDE 0.9 % (FLUSH) 0.9 %
10 SYRINGE (ML) INJECTION
Status: COMPLETED | OUTPATIENT
Start: 2019-09-03 | End: 2019-09-03

## 2019-09-03 RX ORDER — HYDROCODONE BITARTRATE AND ACETAMINOPHEN 5; 325 MG/1; MG/1
1 TABLET ORAL
Status: DISCONTINUED | OUTPATIENT
Start: 2019-09-03 | End: 2019-09-03

## 2019-09-03 RX ADMIN — IOPAMIDOL 100 ML: 612 INJECTION, SOLUTION INTRAVENOUS at 18:28

## 2019-09-03 RX ADMIN — Medication 10 ML: at 18:29

## 2019-09-03 RX ADMIN — SODIUM CHLORIDE 100 ML: 900 INJECTION, SOLUTION INTRAVENOUS at 18:29
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
## 2019-09-03 NOTE — ED TRIAGE NOTES
Triage Note: Patient is coming in for chest pain since yesterday. Patient was seen here 3 months ago for same and was admitted and had antibiotics.

## 2019-09-03 NOTE — PROGRESS NOTES
Admission Medication Reconciliation:    Information obtained from:  Patient  RxQuery data available¹:  YES    Comments/Recommendations: Updated PTA meds/reviewed patient's allergies. Medication changes (since last review): Added  - None    Adjusted  - None    Removed  - Losartan  - Omeprazole    Takes medications at night. Yesterday evening was the last administration        ¹RxQuery pharmacy benefit data reflects medications filled and processed through the patient's insurance, however   this data does NOT capture whether the medication was picked up or is currently being taken by the patient. Allergies:  Amlodipine and Pcn [penicillins]    Significant PMH/Disease States:   Past Medical History:   Diagnosis Date    Anemia     Anxiety     Arthritis     LEFT KNEE    Current smoker     Depression     Hypertension     Menorrhagia     Morbid obesity ([facility]     Nicotine vapor product user     SMALL AMOUNT OF NICOTINE 0.3    Snoring      Chief Complaint for this Admission:    Chief Complaint   Patient presents with    Chest Pain     Prior to Admission Medications:   Prior to Admission Medications   Prescriptions Last Dose Informant Patient Reported? Taking? LORazepam (ATIVAN) 1 mg tablet 9/2/2019 at 2200  No Yes   Sig: Take 1 Tab by mouth daily as needed for Anxiety (Panic Attack). PARoxetine (PAXIL) 40 mg tablet 9/2/2019 at 2200  No Yes   Sig: TAKE 1 TABLET BY MOUTH EVERY DAY   multivitamin with iron (FLINTSTONES) chewable tablet 9/3/2019 at Unknown time  Yes Yes   Sig: Take 1 Tab by mouth two (2) times a day. Facility-Administered Medications: None       Please contact the main inpatient pharmacy with any questions or concerns at [phone] and we will direct you to the clinical pharmacist covering this patient's care while in-house.    Nguyen Pires, ANGELAD

## 2019-09-03 NOTE — DISCHARGE INSTRUCTIONS

## 2020-02-20 ENCOUNTER — HOSPITAL ENCOUNTER (EMERGENCY)
Age: 50
Discharge: HOME | End: 2020-02-20
Attending: EMERGENCY MEDICINE
Payer: COMMERCIAL

## 2020-02-20 ENCOUNTER — APPOINTMENT (OUTPATIENT)
Dept: CT IMAGING | Age: 50
End: 2020-02-20
Attending: PHYSICIAN ASSISTANT
Payer: COMMERCIAL

## 2020-02-20 ENCOUNTER — APPOINTMENT (OUTPATIENT)
Dept: GENERAL RADIOLOGY | Age: 50
End: 2020-02-20
Attending: EMERGENCY MEDICINE
Payer: COMMERCIAL

## 2020-02-20 VITALS
HEART RATE: 60 BPM | DIASTOLIC BLOOD PRESSURE: 89 MMHG | TEMPERATURE: 98 F | SYSTOLIC BLOOD PRESSURE: 148 MMHG | OXYGEN SATURATION: 100 % | RESPIRATION RATE: 16 BRPM

## 2020-02-20 DIAGNOSIS — R10.12 ABDOMINAL PAIN, LUQ (LEFT UPPER QUADRANT): Primary | ICD-10-CM

## 2020-02-20 LAB
ALBUMIN SERPL-MCNC: 3.5 G/DL (ref 3.5–5)
ALBUMIN/GLOB SERPL: 1 {RATIO} (ref 1.1–2.2)
ALP SERPL-CCNC: 120 U/L (ref 45–117)
ALT SERPL-CCNC: 31 U/L (ref 12–78)
ANION GAP SERPL CALC-SCNC: 5 MMOL/L (ref 5–15)
AST SERPL-CCNC: 16 U/L (ref 15–37)
ATRIAL RATE: 54 BPM
BASOPHILS # BLD: 0 K/UL (ref 0–0.1)
BASOPHILS NFR BLD: 0 % (ref 0–1)
BILIRUB SERPL-MCNC: 0.4 MG/DL (ref 0.2–1)
BUN SERPL-MCNC: 13 MG/DL (ref 6–20)
BUN/CREAT SERPL: 14 (ref 12–20)
CALCIUM SERPL-MCNC: 9.1 MG/DL (ref 8.5–10.1)
CALCULATED P AXIS, ECG09: 22 DEGREES
CALCULATED R AXIS, ECG10: 24 DEGREES
CALCULATED T AXIS, ECG11: 26 DEGREES
CHLORIDE SERPL-SCNC: 106 MMOL/L (ref 97–108)
CO2 SERPL-SCNC: 30 MMOL/L (ref 21–32)
COMMENT, HOLDF: NORMAL
CREAT SERPL-MCNC: 0.94 MG/DL (ref 0.55–1.02)
DIAGNOSIS, 93000: NORMAL
DIFFERENTIAL METHOD BLD: ABNORMAL
EOSINOPHIL # BLD: 0.1 K/UL (ref 0–0.4)
EOSINOPHIL NFR BLD: 1 % (ref 0–7)
ERYTHROCYTE [DISTWIDTH] IN BLOOD BY AUTOMATED COUNT: 13.1 % (ref 11.5–14.5)
GLOBULIN SER CALC-MCNC: 3.6 G/DL (ref 2–4)
GLUCOSE SERPL-MCNC: 89 MG/DL (ref 65–100)
HCT VFR BLD AUTO: 40 % (ref 35–47)
HGB BLD-MCNC: 13 G/DL (ref 11.5–16)
IMM GRANULOCYTES # BLD AUTO: 0 K/UL (ref 0–0.04)
IMM GRANULOCYTES NFR BLD AUTO: 0 % (ref 0–0.5)
LIPASE SERPL-CCNC: 117 U/L (ref 73–393)
LYMPHOCYTES # BLD: 3.4 K/UL (ref 0.8–3.5)
LYMPHOCYTES NFR BLD: 43 % (ref 12–49)
MCH RBC QN AUTO: 29.4 PG (ref 26–34)
MCHC RBC AUTO-ENTMCNC: 32.5 G/DL (ref 30–36.5)
MCV RBC AUTO: 90.5 FL (ref 80–99)
MONOCYTES # BLD: 0.3 K/UL (ref 0–1)
MONOCYTES NFR BLD: 4 % (ref 5–13)
NEUTS SEG # BLD: 4 K/UL (ref 1.8–8)
NEUTS SEG NFR BLD: 52 % (ref 32–75)
NRBC # BLD: 0 K/UL (ref 0–0.01)
NRBC BLD-RTO: 0 PER 100 WBC
P-R INTERVAL, ECG05: 148 MS
PLATELET # BLD AUTO: 205 K/UL (ref 150–400)
PMV BLD AUTO: 10.9 FL (ref 8.9–12.9)
POTASSIUM SERPL-SCNC: 4.2 MMOL/L (ref 3.5–5.1)
PROT SERPL-MCNC: 7.1 G/DL (ref 6.4–8.2)
Q-T INTERVAL, ECG07: 442 MS
QRS DURATION, ECG06: 84 MS
QTC CALCULATION (BEZET), ECG08: 419 MS
RBC # BLD AUTO: 4.42 M/UL (ref 3.8–5.2)
SAMPLES BEING HELD,HOLD: NORMAL
SODIUM SERPL-SCNC: 141 MMOL/L (ref 136–145)
TROPONIN I SERPL-MCNC: <0.05 NG/ML
VENTRICULAR RATE, ECG03: 54 BPM
WBC # BLD AUTO: 8 K/UL (ref 3.6–11)

## 2020-02-20 PROCEDURE — 93005 ELECTROCARDIOGRAM TRACING: CPT

## 2020-02-20 PROCEDURE — 74011000258 HC RX REV CODE- 258: Performed by: RADIOLOGY

## 2020-02-20 PROCEDURE — 74011636320 HC RX REV CODE- 636/320: Performed by: RADIOLOGY

## 2020-02-20 PROCEDURE — 84484 ASSAY OF TROPONIN QUANT: CPT

## 2020-02-20 PROCEDURE — 99284 EMERGENCY DEPT VISIT MOD MDM: CPT

## 2020-02-20 PROCEDURE — 85025 COMPLETE CBC W/AUTO DIFF WBC: CPT

## 2020-02-20 PROCEDURE — 71046 X-RAY EXAM CHEST 2 VIEWS: CPT

## 2020-02-20 PROCEDURE — 83690 ASSAY OF LIPASE: CPT

## 2020-02-20 PROCEDURE — 36415 COLL VENOUS BLD VENIPUNCTURE: CPT

## 2020-02-20 PROCEDURE — 96360 HYDRATION IV INFUSION INIT: CPT

## 2020-02-20 PROCEDURE — 80053 COMPREHEN METABOLIC PANEL: CPT

## 2020-02-20 PROCEDURE — 74177 CT ABD & PELVIS W/CONTRAST: CPT

## 2020-02-20 RX ORDER — PANTOPRAZOLE SODIUM 40 MG/1
40 TABLET, DELAYED RELEASE ORAL DAILY
Qty: 14 TAB | Refills: 0 | Status: SHIPPED | OUTPATIENT
Start: 2020-02-20 | End: 2020-03-05

## 2020-02-20 RX ORDER — SUCRALFATE 1 G/1
1 TABLET ORAL 4 TIMES DAILY
Qty: 56 TAB | Refills: 0 | Status: SHIPPED | OUTPATIENT
Start: 2020-02-20 | End: 2020-03-05

## 2020-02-20 RX ORDER — SODIUM CHLORIDE 0.9 % (FLUSH) 0.9 %
10 SYRINGE (ML) INJECTION
Status: COMPLETED | OUTPATIENT
Start: 2020-02-20 | End: 2020-02-20

## 2020-02-20 RX ADMIN — IOPAMIDOL 100 ML: 755 INJECTION, SOLUTION INTRAVENOUS at 15:59

## 2020-02-20 RX ADMIN — Medication 10 ML: at 15:59

## 2020-02-20 RX ADMIN — SODIUM CHLORIDE 100 ML: 900 INJECTION, SOLUTION INTRAVENOUS at 15:59

## 2020-02-20 NOTE — DISCHARGE INSTRUCTIONS

## 2020-02-20 NOTE — ED TRIAGE NOTES
Triage: Pt to ED due to continued fatigue, diarrhea, and left lower chest pain/heaviness. Pt ambulatory into triage with no visible cues of distress, Pt guarding left lower chest region with left arm.

## 2020-02-20 NOTE — LETTER
Taryn Merritt 53 
[address] [facility] [address] [id number] [phone] Work/School Note Date: 2/20/2020 To Whom It May concern: 
 
Tommy Lyons was seen and treated today in the emergency room by the following provider(s): 
Attending Provider: Chacorta Martel MD 
Physician Assistant: JACKELIN Marte. Tommy Lyons may return to work on 2/21/20. Sincerely, JACKELIN Abreu

## 2020-02-20 NOTE — CONSULTS
[facility] at [address] [place] [address]  Emergency Department Consultation    Admit Date: 2/20/2020    Reason for Consultation: abdominal pain    CC:  Reji George is a 44 y.o. female who presents to the ED with LUQ pain and \"heaviness. \"     HPI:   Symptoms began 3 days ago. She vomited yesterday while trying to drink a protein shake, and has also had watery diarrhea x 2 days. Denies fever. She is well known to our service from 2018 when she underwent laparoscopic RnY gastric bypass under the care of Dr. Lily Chowdary. More recently she had drainage of diaphragmatic abscess in April 1517 that was complicated by large left pleural effusion and pericardial effusion. States her symptoms feel like when she previously developed the subphrenic abscess, so she came in to get it \"checked out. \"     WBC 8, Hgb 13. CXR with no acute process. CT is pending.        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    Morbid obesity with BMI of 45.0-49.9, adult ([address] 10/15/2018    Elevated glucose 05/02/2018    Moderate major depression ([address] 02/16/2018    MAK (nonalcoholic steatohepatitis) 10/20/2015    Smoker 10/13/2015    H/O: hysterectomy 07/06/2015    Morbid obesity ([address] 02/28/2011    HTN (hypertension), benign 02/28/2011    Panic attacks 07/21/2010     Past Medical History:   Diagnosis Date    Anemia     Anxiety     Arthritis     LEFT KNEE    Current smoker     Depression     Hypertension     Menorrhagia     Morbid obesity ([address]     Nicotine vapor product user     SMALL AMOUNT OF NICOTINE 0.3    Snoring       Past Surgical History:   Procedure Laterality Date    BIOPSY  2010    polyp, 1/2 cm - negative, per patient   [address] [address] [facility] [address]  2010    [facility]    HX CHOLECYSTECTOMY  2006    HX GYN      PARTIAL HYSTERECTOMY    HX LAP GASTRIC BYPASS  10/15/2018    Lap Gastric Bypass with Endo by Dr. Mina Padilla.  OR CYSTOURETHROSCOPY  7/10/2013    CYSTOSCOPY performed by Vandana Isaac MD at [address] [facility] [address] TUBE/OVARY  7/10/2013    HYSTERECTOMY ROBOTIC ASSISTED performed by Vandana Isaac MD at [facility] MAIN OR      Social History     Tobacco Use    Smoking status: Former Smoker     Packs/day: 0.50     Years: 28.00     Pack years: 14.00     Types: Cigarettes     Last attempt to quit: 2018     Years since quittin.7    Smokeless tobacco: Never Used   Substance Use Topics    Alcohol use: No     Comment: rarely      Family History   Problem Relation Age of Onset    Heart Disease Mother         heart bypass    Heart Attack Mother     Diabetes Brother     Stroke Maternal Grandmother     No Known Problems Father     Malignant Hyperthermia Neg Hx     Pseudocholinesterase Deficiency Neg Hx     Delayed Awakening Neg Hx     Post-op Nausea/Vomiting Neg Hx     Emergence Delirium Neg Hx     Post-op Cognitive Dysfunction Neg Hx     Other Neg Hx     Anesth Problems Neg Hx       Prior to Admission medications    Medication Sig Start Date End Date Taking? Authorizing Provider   multivitamin with iron (FLINTSTONES) chewable tablet Take 1 Tab by mouth two (2) times a day. Provider, Historical   LORazepam (ATIVAN) 1 mg tablet Take 1 Tab by mouth daily as needed for Anxiety (Panic Attack). 19   Isacc Carnes MD   PARoxetine (PAXIL) 40 mg tablet TAKE 1 TABLET BY MOUTH EVERY DAY 18   Isacc Carnes MD     Allergies   Allergen Reactions    Amlodipine Other (comments)     Fatigue/drowsy    Pcn [Penicillins] Rash     Per Patient Childhood Allergy - Unsure of reaction. Mother told her not to take it.           Subjective:     Review of Systems:    A comprehensive review of systems was negative except for that written in the History of Present Illness. Objective:     Blood pressure (!) 157/93, pulse (!) 59, temperature 97.9 °F (36.6 °C), resp. rate 20, last menstrual period 06/10/2013, SpO2 100 %, not currently breastfeeding. Recent Results (from the past 24 hour(s))   EKG, 12 LEAD, INITIAL    Collection Time: 02/20/20 12:54 PM   Result Value Ref Range    Ventricular Rate 54 BPM    Atrial Rate 54 BPM    P-R Interval 148 ms    QRS Duration 84 ms    Q-T Interval 442 ms    QTC Calculation (Bezet) 419 ms    Calculated P Axis 22 degrees    Calculated R Axis 24 degrees    Calculated T Axis 26 degrees    Diagnosis       Sinus bradycardia  When compared with ECG of 03-SEP-2019 14:45,  No significant change was found     METABOLIC PANEL, COMPREHENSIVE    Collection Time: 02/20/20 12:58 PM   Result Value Ref Range    Sodium 141 136 - 145 mmol/L    Potassium 4.2 3.5 - 5.1 mmol/L    Chloride 106 97 - 108 mmol/L    CO2 30 21 - 32 mmol/L    Anion gap 5 5 - 15 mmol/L    Glucose 89 65 - 100 mg/dL    BUN 13 6 - 20 MG/DL    Creatinine 0.94 0.55 - 1.02 MG/DL    BUN/Creatinine ratio 14 12 - 20      GFR est AA >60 >60 ml/min/1.73m2    GFR est non-AA >60 >60 ml/min/1.73m2    Calcium 9.1 8.5 - 10.1 MG/DL    Bilirubin, total 0.4 0.2 - 1.0 MG/DL    ALT (SGPT) 31 12 - 78 U/L    AST (SGOT) 16 15 - 37 U/L    Alk.  phosphatase 120 (H) 45 - 117 U/L    Protein, total 7.1 6.4 - 8.2 g/dL    Albumin 3.5 3.5 - 5.0 g/dL    Globulin 3.6 2.0 - 4.0 g/dL    A-G Ratio 1.0 (L) 1.1 - 2.2     CBC WITH AUTOMATED DIFF    Collection Time: 02/20/20 12:58 PM   Result Value Ref Range    WBC 8.0 3.6 - 11.0 K/uL    RBC 4.42 3.80 - 5.20 M/uL    HGB 13.0 11.5 - 16.0 g/dL    HCT 40.0 35.0 - 47.0 %    MCV 90.5 80.0 - 99.0 FL    MCH 29.4 26.0 - 34.0 PG    MCHC 32.5 30.0 - 36.5 g/dL    RDW 13.1 11.5 - 14.5 %    PLATELET 460 229 - 261 K/uL    MPV 10.9 8.9 - 12.9 FL    NRBC 0.0 0  WBC    ABSOLUTE NRBC 0.00 0.00 - 0.01 K/uL    NEUTROPHILS 52 32 - 75 % LYMPHOCYTES 43 12 - 49 %    MONOCYTES 4 (L) 5 - 13 %    EOSINOPHILS 1 0 - 7 %    BASOPHILS 0 0 - 1 %    IMMATURE GRANULOCYTES 0 0.0 - 0.5 %    ABS. NEUTROPHILS 4.0 1.8 - 8.0 K/UL    ABS. LYMPHOCYTES 3.4 0.8 - 3.5 K/UL    ABS. MONOCYTES 0.3 0.0 - 1.0 K/UL    ABS. EOSINOPHILS 0.1 0.0 - 0.4 K/UL    ABS. BASOPHILS 0.0 0.0 - 0.1 K/UL    ABS. IMM. GRANS. 0.0 0.00 - 0.04 K/UL    DF AUTOMATED     SAMPLES BEING HELD    Collection Time: 02/20/20 12:58 PM   Result Value Ref Range    SAMPLES BEING HELD 1RED,1BLU     COMMENT        Add-on orders for these samples will be processed based on acceptable specimen integrity and analyte stability, which may vary by analyte. TROPONIN I    Collection Time: 02/20/20 12:58 PM   Result Value Ref Range    Troponin-I, Qt. <0.05 <0.05 ng/mL     _____________________  Physical Exam:     General:  Alert, cooperative, no distress, appears stated age. Eyes:   PERRL, EOMs intact. Sclera clear. Throat: Lips, mucosa, and tongue normal.   Neck: Supple, symmetrical, trachea midline. Lungs:   Clear to auscultation bilaterally. Heart:  Regular rate and rhythm. Abdomen:   Soft, tender over LUQ. No masses,  No organomegaly. Extremities: Extremities normal, atraumatic, no cyanosis or edema. Skin: Skin color, texture, turgor normal. No rashes or lesions. Assessment:   LUQ pain        Plan:     F/U CT results  Further plan per Dr. Mariola Woodward    Total time spent with patient: 15 minutes. Signed By: aNncy Montano NP     February 20, 2020        I have independently examined the patient and have reviewed the chart. I agree with the above plan. Her CT was reviewed and it was completely normal.  Her labs were normal as well. Her abdominal exam showed some mild tenderness to palpation in the left upper quadrant. She may have an ulcer and will send her home with Protonix and Carafate I will set her up for a GI appointment and have her follow-up with us next week in the office.     Molly Umana Arpan Dickinson MD
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
## 2020-02-28 ENCOUNTER — TELEPHONE (OUTPATIENT)
Dept: SURGERY | Age: 50
End: 2020-02-28

## 2020-02-28 NOTE — PROGRESS NOTES
Patient's 2D ECHO results reviewed with findings of moderate to large Pericardial effusion. Will keep on monitor. Cardiology consulted. Updated patient and patient's family. May have to cancel 3/30/19 anticipated discharge. D/W nursing. 33

## 2020-02-28 NOTE — TELEPHONE ENCOUNTER
Call placed to patient to determine why [place] paperwork was sent to office. Patient has only been to ER and has not had any surgery. Left voicemail for patient to call office back.

## 2020-03-09 ENCOUNTER — OFFICE VISIT (OUTPATIENT)
Dept: SURGERY | Age: 50
End: 2020-03-09

## 2020-03-09 VITALS
TEMPERATURE: 98.1 F | BODY MASS INDEX: 31.23 KG/M2 | RESPIRATION RATE: 20 BRPM | HEIGHT: 67 IN | WEIGHT: 199 LBS | DIASTOLIC BLOOD PRESSURE: 80 MMHG | SYSTOLIC BLOOD PRESSURE: 146 MMHG | HEART RATE: 80 BPM

## 2020-03-09 DIAGNOSIS — R19.7 DIARRHEA, UNSPECIFIED TYPE: ICD-10-CM

## 2020-03-09 DIAGNOSIS — R10.13 EPIGASTRIC PAIN: Primary | ICD-10-CM

## 2020-03-09 RX ORDER — OMEPRAZOLE 20 MG/1
20 TABLET, DELAYED RELEASE ORAL DAILY
COMMUNITY
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
## 2020-03-09 NOTE — LETTER
3/9/20 Patient: Georgie Calderon YOB: 1970 Date of Visit: 3/9/2020 Arden Lemon MD 
[address] [facility] [address] [place] [id number] VIA Facsimile: [phone] Dear Arden Lemon MD, Thank you for referring Ms. Franki Salazar to [address] AT [place] [address] for evaluation. My notes for this consultation are attached. If you have questions, please do not hesitate to call me. I look forward to following your patient along with you. Sincerely, Denita Kinney MD

## 2020-03-11 ENCOUNTER — TELEPHONE (OUTPATIENT)
Dept: SURGERY | Age: 50
End: 2020-03-11

## 2020-03-11 NOTE — TELEPHONE ENCOUNTER
Spoke with patient advsed I will try to complete paperwork today and send off but our policy states 5-7 business days. Office Policies     Phone calls/patient messages:            Please allow up to 24 hours for someone in the office to contact you about your call or message. Be mindful your provider may be out of the office or your message may require further review. We encourage you to use [employer] for your messages as this is a faster, more efficient way to communicate with our office                         Medication Refills:            Prescription medications require 48-72 business hours to process. We encourage you to use [employer] for your refills. For controlled medications: Please allow 72 business hours to process. Certain medications may require you to  a written prescription at our office. NO narcotic/controlled medications will be prescribed after 4pm Monday through Friday or on weekends              Form/Paperwork Completion:            Please note a $10 fee may incur for all paperwork for completed by our providers. We ask that you allow 7-10 business days. Pre-payment is due prior to picking up/faxing the completed form. You may also download your forms to [employer] to have your doctor print off.

## 2020-03-19 ENCOUNTER — TELEPHONE (OUTPATIENT)
Dept: SURGERY | Age: 50
End: 2020-03-19

## 2020-03-19 NOTE — TELEPHONE ENCOUNTER
Pt would like a call back in reference to some disability forms from [address] needs more information

## 2020-03-23 ENCOUNTER — TELEPHONE (OUTPATIENT)
Dept: SURGERY | Age: 50
End: 2020-03-23

## 2020-03-23 NOTE — TELEPHONE ENCOUNTER
Spoke with patient advised that Kiera Chen from [place] called this morning and information was given in regards to bathroom breaks.

## 2020-03-26 ENCOUNTER — TELEPHONE (OUTPATIENT)
Dept: SURGERY | Age: 50
End: 2020-03-26

## 2020-03-26 NOTE — TELEPHONE ENCOUNTER
Patient states she's sorry for constantly calling but she needs a letter or office note stating she was seen on 2/20/2020 by Dr Lily Chowdary and he did give her prescriptions.     States Dr Lily Chowdary actually came and saw her in while she was in the hospital.    Patient would like a call back      [phone]

## 2020-05-04 ENCOUNTER — APPOINTMENT (OUTPATIENT)
Dept: GENERAL RADIOLOGY | Age: 50
End: 2020-05-04
Attending: STUDENT IN AN ORGANIZED HEALTH CARE EDUCATION/TRAINING PROGRAM
Payer: COMMERCIAL

## 2020-05-04 ENCOUNTER — HOSPITAL ENCOUNTER (EMERGENCY)
Age: 50
Discharge: HOME | End: 2020-05-04
Attending: STUDENT IN AN ORGANIZED HEALTH CARE EDUCATION/TRAINING PROGRAM | Admitting: STUDENT IN AN ORGANIZED HEALTH CARE EDUCATION/TRAINING PROGRAM
Payer: COMMERCIAL

## 2020-05-04 VITALS
HEART RATE: 50 BPM | HEIGHT: 67 IN | BODY MASS INDEX: 29.98 KG/M2 | TEMPERATURE: 98 F | RESPIRATION RATE: 10 BRPM | SYSTOLIC BLOOD PRESSURE: 149 MMHG | OXYGEN SATURATION: 100 % | DIASTOLIC BLOOD PRESSURE: 73 MMHG | WEIGHT: 191 LBS

## 2020-05-04 DIAGNOSIS — R55 NEAR SYNCOPE: Primary | ICD-10-CM

## 2020-05-04 LAB
ALBUMIN SERPL-MCNC: 3.4 G/DL (ref 3.5–5)
ALBUMIN/GLOB SERPL: 1 {RATIO} (ref 1.1–2.2)
ALP SERPL-CCNC: 106 U/L (ref 45–117)
ALT SERPL-CCNC: 45 U/L (ref 12–78)
ANION GAP SERPL CALC-SCNC: 5 MMOL/L (ref 5–15)
APPEARANCE UR: CLEAR
AST SERPL-CCNC: 59 U/L (ref 15–37)
BACTERIA URNS QL MICRO: ABNORMAL /HPF
BASOPHILS # BLD: 0 K/UL (ref 0–0.1)
BASOPHILS NFR BLD: 0 % (ref 0–1)
BILIRUB SERPL-MCNC: 0.5 MG/DL (ref 0.2–1)
BILIRUB UR QL: NEGATIVE
BUN SERPL-MCNC: 13 MG/DL (ref 6–20)
BUN/CREAT SERPL: 18 (ref 12–20)
CALCIUM SERPL-MCNC: 9 MG/DL (ref 8.5–10.1)
CAOX CRY URNS QL MICRO: ABNORMAL
CHLORIDE SERPL-SCNC: 107 MMOL/L (ref 97–108)
CO2 SERPL-SCNC: 29 MMOL/L (ref 21–32)
COLOR UR: ABNORMAL
COMMENT, HOLDF: NORMAL
CREAT SERPL-MCNC: 0.74 MG/DL (ref 0.55–1.02)
DIFFERENTIAL METHOD BLD: ABNORMAL
EOSINOPHIL # BLD: 0.2 K/UL (ref 0–0.4)
EOSINOPHIL NFR BLD: 2 % (ref 0–7)
EPITH CASTS URNS QL MICRO: ABNORMAL /LPF
ERYTHROCYTE [DISTWIDTH] IN BLOOD BY AUTOMATED COUNT: 12.6 % (ref 11.5–14.5)
GLOBULIN SER CALC-MCNC: 3.4 G/DL (ref 2–4)
GLUCOSE BLD STRIP.AUTO-MCNC: 92 MG/DL (ref 65–100)
GLUCOSE SERPL-MCNC: 93 MG/DL (ref 65–100)
GLUCOSE UR STRIP.AUTO-MCNC: NEGATIVE MG/DL
HCT VFR BLD AUTO: 38.5 % (ref 35–47)
HGB BLD-MCNC: 13.1 G/DL (ref 11.5–16)
HGB UR QL STRIP: NEGATIVE
IMM GRANULOCYTES # BLD AUTO: 0 K/UL (ref 0–0.04)
IMM GRANULOCYTES NFR BLD AUTO: 0 % (ref 0–0.5)
KETONES UR QL STRIP.AUTO: NEGATIVE MG/DL
LEUKOCYTE ESTERASE UR QL STRIP.AUTO: NEGATIVE
LYMPHOCYTES # BLD: 3.6 K/UL (ref 0.8–3.5)
LYMPHOCYTES NFR BLD: 47 % (ref 12–49)
MAGNESIUM SERPL-MCNC: 1.7 MG/DL (ref 1.6–2.4)
MCH RBC QN AUTO: 30.1 PG (ref 26–34)
MCHC RBC AUTO-ENTMCNC: 34 G/DL (ref 30–36.5)
MCV RBC AUTO: 88.5 FL (ref 80–99)
MONOCYTES # BLD: 0.4 K/UL (ref 0–1)
MONOCYTES NFR BLD: 5 % (ref 5–13)
NEUTS SEG # BLD: 3.6 K/UL (ref 1.8–8)
NEUTS SEG NFR BLD: 46 % (ref 32–75)
NITRITE UR QL STRIP.AUTO: NEGATIVE
NRBC # BLD: 0 K/UL (ref 0–0.01)
NRBC BLD-RTO: 0 PER 100 WBC
PH UR STRIP: 5.5 [PH] (ref 5–8)
PLATELET # BLD AUTO: 222 K/UL (ref 150–400)
PMV BLD AUTO: 10.2 FL (ref 8.9–12.9)
POTASSIUM SERPL-SCNC: 3.8 MMOL/L (ref 3.5–5.1)
PROT SERPL-MCNC: 6.8 G/DL (ref 6.4–8.2)
PROT UR STRIP-MCNC: NEGATIVE MG/DL
RBC # BLD AUTO: 4.35 M/UL (ref 3.8–5.2)
RBC #/AREA URNS HPF: ABNORMAL /HPF (ref 0–5)
SAMPLES BEING HELD,HOLD: NORMAL
SERVICE CMNT-IMP: NORMAL
SODIUM SERPL-SCNC: 141 MMOL/L (ref 136–145)
SP GR UR REFRACTOMETRY: 1.02 (ref 1–1.03)
TROPONIN I SERPL-MCNC: <0.05 NG/ML
UR CULT HOLD, URHOLD: NORMAL
UROBILINOGEN UR QL STRIP.AUTO: 1 EU/DL (ref 0.2–1)
WBC # BLD AUTO: 7.8 K/UL (ref 3.6–11)
WBC URNS QL MICRO: ABNORMAL /HPF (ref 0–4)

## 2020-05-04 PROCEDURE — 83735 ASSAY OF MAGNESIUM: CPT

## 2020-05-04 PROCEDURE — 84484 ASSAY OF TROPONIN QUANT: CPT

## 2020-05-04 PROCEDURE — 36415 COLL VENOUS BLD VENIPUNCTURE: CPT

## 2020-05-04 PROCEDURE — 96374 THER/PROPH/DIAG INJ IV PUSH: CPT

## 2020-05-04 PROCEDURE — 96361 HYDRATE IV INFUSION ADD-ON: CPT

## 2020-05-04 PROCEDURE — 99285 EMERGENCY DEPT VISIT HI MDM: CPT

## 2020-05-04 PROCEDURE — 82962 GLUCOSE BLOOD TEST: CPT

## 2020-05-04 PROCEDURE — 85025 COMPLETE CBC W/AUTO DIFF WBC: CPT

## 2020-05-04 PROCEDURE — 74011250636 HC RX REV CODE- 250/636: Performed by: STUDENT IN AN ORGANIZED HEALTH CARE EDUCATION/TRAINING PROGRAM

## 2020-05-04 PROCEDURE — 80053 COMPREHEN METABOLIC PANEL: CPT

## 2020-05-04 PROCEDURE — 71046 X-RAY EXAM CHEST 2 VIEWS: CPT

## 2020-05-04 PROCEDURE — 81001 URINALYSIS AUTO W/SCOPE: CPT

## 2020-05-04 PROCEDURE — 93005 ELECTROCARDIOGRAM TRACING: CPT

## 2020-05-04 RX ORDER — ONDANSETRON 4 MG/1
4 TABLET, ORALLY DISINTEGRATING ORAL
Qty: 12 TAB | Refills: 0 | Status: SHIPPED | OUTPATIENT
Start: 2020-05-04

## 2020-05-04 RX ORDER — ONDANSETRON 2 MG/ML
4 INJECTION INTRAMUSCULAR; INTRAVENOUS ONCE
Status: COMPLETED | OUTPATIENT
Start: 2020-05-04 | End: 2020-05-04

## 2020-05-04 RX ADMIN — SODIUM CHLORIDE 1000 ML: 900 INJECTION, SOLUTION INTRAVENOUS at 18:19

## 2020-05-04 RX ADMIN — ONDANSETRON 4 MG: 2 INJECTION INTRAMUSCULAR; INTRAVENOUS at 18:19
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
## 2020-05-04 NOTE — DISCHARGE INSTRUCTIONS
- Your labs, EKG and chest x-ray did not demonstrate any emergent findings  - Drink lots of fluids throughout the day  - Follow up with your primary physician and with cardiology for reevaluation  - Return immediately to the ER for chest pain, difficulty breathing, repeated episodes of passing out or persistent dizziness or lightheadedness, headache, or difficulty walking.

## 2020-05-04 NOTE — ED TRIAGE NOTES
Pt reports lightheaded and dizziness since Saturday, pt reports also has episodes where \"legs start shaking\" pt was referred by pcp for BS and BP. BS at pcp was 89.

## 2020-05-05 LAB
ATRIAL RATE: 60 BPM
CALCULATED P AXIS, ECG09: 28 DEGREES
CALCULATED R AXIS, ECG10: 11 DEGREES
CALCULATED T AXIS, ECG11: 31 DEGREES
DIAGNOSIS, 93000: NORMAL
P-R INTERVAL, ECG05: 170 MS
Q-T INTERVAL, ECG07: 412 MS
QRS DURATION, ECG06: 80 MS
QTC CALCULATION (BEZET), ECG08: 412 MS
VENTRICULAR RATE, ECG03: 60 BPM

## 2020-09-23 NOTE — PROGRESS NOTES
TRANSFER - OUT REPORT: 
 
Verbal report given to Adrian Reaves RN(name) on [employer]  being transferred to [facility](unit) for routine progression of care Report consisted of patients Situation, Background, Assessment and  
Recommendations(SBAR). Information from the following report(s) SBAR, Procedure Summary, MAR and Cardiac Rhythm NSR was reviewed with the receiving nurse. Lines:  
Peripheral IV 03/28/19 Right Antecubital (Active) Site Assessment Clean, dry, & intact 3/29/2019  4:24 AM  
Phlebitis Assessment 0 3/29/2019  4:24 AM  
Infiltration Assessment 0 3/29/2019  4:24 AM  
Dressing Status Clean, dry, & intact 3/29/2019  4:24 AM  
Dressing Type Tape;Transparent 3/29/2019  4:24 AM  
Hub Color/Line Status Blue; Infusing 3/29/2019  4:24 AM  
Action Taken Open ports on tubing capped 3/29/2019  4:24 AM  
Alcohol Cap Used Yes 3/29/2019  4:24 AM  
  
 
Opportunity for questions and clarification was provided. Complex Repair And Rhombic Flap Text: The defect edges were debeveled with a #15 scalpel blade.  The primary defect was closed partially with a complex linear closure.  Given the location of the remaining defect, shape of the defect and the proximity to free margins a rhombic flap was deemed most appropriate for complete closure of the defect.  Using a sterile surgical marker, an appropriate advancement flap was drawn incorporating the defect and placing the expected incisions within the relaxed skin tension lines where possible.    The area thus outlined was incised deep to adipose tissue with a #15 scalpel blade.  The skin margins were undermined to an appropriate distance in all directions utilizing iris scissors.

## 2020-10-13 ENCOUNTER — APPOINTMENT (OUTPATIENT)
Dept: CT IMAGING | Age: 50
End: 2020-10-13
Attending: PHYSICIAN ASSISTANT
Payer: COMMERCIAL

## 2020-10-13 ENCOUNTER — HOSPITAL ENCOUNTER (EMERGENCY)
Age: 50
Discharge: HOME | End: 2020-10-13
Attending: STUDENT IN AN ORGANIZED HEALTH CARE EDUCATION/TRAINING PROGRAM | Admitting: STUDENT IN AN ORGANIZED HEALTH CARE EDUCATION/TRAINING PROGRAM
Payer: COMMERCIAL

## 2020-10-13 ENCOUNTER — APPOINTMENT (OUTPATIENT)
Dept: VASCULAR SURGERY | Age: 50
End: 2020-10-13
Attending: PHYSICIAN ASSISTANT
Payer: COMMERCIAL

## 2020-10-13 VITALS
RESPIRATION RATE: 18 BRPM | BODY MASS INDEX: 31.08 KG/M2 | OXYGEN SATURATION: 98 % | DIASTOLIC BLOOD PRESSURE: 86 MMHG | SYSTOLIC BLOOD PRESSURE: 151 MMHG | HEIGHT: 67 IN | WEIGHT: 198 LBS | HEART RATE: 60 BPM | TEMPERATURE: 98.7 F

## 2020-10-13 DIAGNOSIS — M79.605 LEFT LEG PAIN: Primary | ICD-10-CM

## 2020-10-13 LAB
ANION GAP SERPL CALC-SCNC: 3 MMOL/L (ref 5–15)
BASOPHILS # BLD: 0 K/UL (ref 0–0.1)
BASOPHILS NFR BLD: 0 % (ref 0–1)
BUN SERPL-MCNC: 12 MG/DL (ref 6–20)
BUN/CREAT SERPL: 20 (ref 12–20)
CALCIUM SERPL-MCNC: 8.5 MG/DL (ref 8.5–10.1)
CHLORIDE SERPL-SCNC: 109 MMOL/L (ref 97–108)
CO2 SERPL-SCNC: 30 MMOL/L (ref 21–32)
CREAT SERPL-MCNC: 0.61 MG/DL (ref 0.55–1.02)
DIFFERENTIAL METHOD BLD: NORMAL
EOSINOPHIL # BLD: 0.1 K/UL (ref 0–0.4)
EOSINOPHIL NFR BLD: 2 % (ref 0–7)
ERYTHROCYTE [DISTWIDTH] IN BLOOD BY AUTOMATED COUNT: 12.5 % (ref 11.5–14.5)
GLUCOSE SERPL-MCNC: 90 MG/DL (ref 65–100)
HCT VFR BLD AUTO: 40.6 % (ref 35–47)
HGB BLD-MCNC: 13.2 G/DL (ref 11.5–16)
IMM GRANULOCYTES # BLD AUTO: 0 K/UL (ref 0–0.04)
IMM GRANULOCYTES NFR BLD AUTO: 0 % (ref 0–0.5)
LYMPHOCYTES # BLD: 3 K/UL (ref 0.8–3.5)
LYMPHOCYTES NFR BLD: 44 % (ref 12–49)
MCH RBC QN AUTO: 29.8 PG (ref 26–34)
MCHC RBC AUTO-ENTMCNC: 32.5 G/DL (ref 30–36.5)
MCV RBC AUTO: 91.6 FL (ref 80–99)
MONOCYTES # BLD: 0.3 K/UL (ref 0–1)
MONOCYTES NFR BLD: 5 % (ref 5–13)
NEUTS SEG # BLD: 3.4 K/UL (ref 1.8–8)
NEUTS SEG NFR BLD: 49 % (ref 32–75)
NRBC # BLD: 0 K/UL (ref 0–0.01)
NRBC BLD-RTO: 0 PER 100 WBC
PLATELET # BLD AUTO: 225 K/UL (ref 150–400)
PMV BLD AUTO: 10.3 FL (ref 8.9–12.9)
POTASSIUM SERPL-SCNC: 3.8 MMOL/L (ref 3.5–5.1)
RBC # BLD AUTO: 4.43 M/UL (ref 3.8–5.2)
SODIUM SERPL-SCNC: 142 MMOL/L (ref 136–145)
WBC # BLD AUTO: 6.8 K/UL (ref 3.6–11)

## 2020-10-13 PROCEDURE — 36415 COLL VENOUS BLD VENIPUNCTURE: CPT

## 2020-10-13 PROCEDURE — 80048 BASIC METABOLIC PNL TOTAL CA: CPT

## 2020-10-13 PROCEDURE — 99283 EMERGENCY DEPT VISIT LOW MDM: CPT

## 2020-10-13 PROCEDURE — 93971 EXTREMITY STUDY: CPT

## 2020-10-13 PROCEDURE — 85025 COMPLETE CBC W/AUTO DIFF WBC: CPT

## 2020-10-13 PROCEDURE — 73706 CT ANGIO LWR EXTR W/O&W/DYE: CPT

## 2020-10-13 PROCEDURE — 74011000636 HC RX REV CODE- 636: Performed by: RADIOLOGY

## 2020-10-13 PROCEDURE — 74011250637 HC RX REV CODE- 250/637: Performed by: PHYSICIAN ASSISTANT

## 2020-10-13 RX ORDER — NAPROXEN 500 MG/1
500 TABLET ORAL 2 TIMES DAILY WITH MEALS
Qty: 20 TAB | Refills: 0 | Status: SHIPPED | OUTPATIENT
Start: 2020-10-13 | End: 2020-10-23

## 2020-10-13 RX ORDER — CYCLOBENZAPRINE HCL 10 MG
10 TABLET ORAL
Qty: 20 TAB | Refills: 0 | Status: SHIPPED | OUTPATIENT
Start: 2020-10-13

## 2020-10-13 RX ORDER — SODIUM CHLORIDE 0.9 % (FLUSH) 0.9 %
5-40 SYRINGE (ML) INJECTION EVERY 8 HOURS
Status: DISCONTINUED | OUTPATIENT
Start: 2020-10-13 | End: 2020-10-13 | Stop reason: HOSPADM

## 2020-10-13 RX ORDER — HYDROCODONE BITARTRATE AND ACETAMINOPHEN 5; 325 MG/1; MG/1
1 TABLET ORAL
Status: COMPLETED | OUTPATIENT
Start: 2020-10-13 | End: 2020-10-13

## 2020-10-13 RX ORDER — HYDROCODONE BITARTRATE AND ACETAMINOPHEN 5; 325 MG/1; MG/1
1 TABLET ORAL
Qty: 12 TAB | Refills: 0 | Status: SHIPPED | OUTPATIENT
Start: 2020-10-13 | End: 2020-10-16

## 2020-10-13 RX ORDER — SODIUM CHLORIDE 0.9 % (FLUSH) 0.9 %
5-40 SYRINGE (ML) INJECTION AS NEEDED
Status: DISCONTINUED | OUTPATIENT
Start: 2020-10-13 | End: 2020-10-13 | Stop reason: HOSPADM

## 2020-10-13 RX ADMIN — HYDROCODONE BITARTRATE AND ACETAMINOPHEN 1 TABLET: 5; 325 TABLET ORAL at 12:36

## 2020-10-13 RX ADMIN — IOPAMIDOL 100 ML: 755 INJECTION, SOLUTION INTRAVENOUS at 16:58

## 2020-10-13 NOTE — ED TRIAGE NOTES
Patient rpeorts 2 week hx of left leg swelling and pain that is worse this am. patient called PCP and told to come to ER for possible blood clot.  Denies redness or heat
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
## 2020-10-13 NOTE — DISCHARGE INSTRUCTIONS
Patient Education        Leg Pain: Care Instructions  Your Care Instructions  Many things can cause leg pain. Too much exercise or overuse can cause a muscle cramp (or charley horse). You can get leg cramps from not eating a balanced diet that has enough potassium, calcium, and other minerals. If you do not drink enough fluids or are taking certain medicines, you may develop leg cramps. Other causes of leg pain include injuries, blood flow problems, nerve damage, and twisted and enlarged veins (varicose veins). You can usually ease pain with self-care. Your doctor may recommend that you rest your leg and keep it elevated. Follow-up care is a key part of your treatment and safety. Be sure to make and go to all appointments, and call your doctor if you are having problems. It's also a good idea to know your test results and keep a list of the medicines you take. How can you care for yourself at home? · Take pain medicines exactly as directed. ? If the doctor gave you a prescription medicine for pain, take it as prescribed. ? If you are not taking a prescription pain medicine, ask your doctor if you can take an over-the-counter medicine. · Take any other medicines exactly as prescribed. Call your doctor if you think you are having a problem with your medicine. · Rest your leg while you have pain, and avoid standing for long periods of time. · Prop up your leg at or above the level of your heart when possible. · Make sure you are eating a balanced diet that is rich in calcium, potassium, and magnesium, especially if you are pregnant. · If directed by your doctor, put ice or a cold pack on the area for 10 to 20 minutes at a time. Put a thin cloth between the ice and your skin. · Your leg may be in a splint, a brace, or an elastic bandage, and you may have crutches to help you walk. Follow your doctor's directions about how long to wear supports and how to use the crutches. When should you call for help? Call 911 anytime you think you may need emergency care. For example, call if:    · You have sudden chest pain and shortness of breath, or you cough up blood.     · Your leg is cool or pale or changes color. Call your doctor now or seek immediate medical care if:    · You have increasing or severe pain.     · Your leg suddenly feels weak and you cannot move it.     · You have signs of a blood clot, such as:  ? Pain in your calf, back of the knee, thigh, or groin. ? Redness and swelling in your leg or groin.     · You have signs of infection, such as:  ? Increased pain, swelling, warmth, or redness. ? Red streaks leading from the sore area. ? Pus draining from a place on your leg. ? A fever.     · You cannot bear weight on your leg. Watch closely for changes in your health, and be sure to contact your doctor if:    · You do not get better as expected. Where can you learn more? Go to http://www.gray.com/  Enter K607 in the search box to learn more about \"Leg Pain: Care Instructions. \"  Current as of: June 26, 2019               Content Version: 12.6  © 3114-4978 [employer]. Care instructions adapted under license by [employer] (which disclaims liability or warranty for this information). If you have questions about a medical condition or this instruction, always ask your healthcare professional. [place] [address] any warranty or liability for your use of this information.

## 2020-10-22 ENCOUNTER — TRANSCRIBE ORDER (OUTPATIENT)
Dept: SCHEDULING | Age: 50
End: 2020-10-22

## 2020-10-22 DIAGNOSIS — M54.16 LUMBAR RADICULOPATHY: ICD-10-CM

## 2020-10-22 DIAGNOSIS — M79.605 LEFT LEG PAIN: ICD-10-CM

## 2020-10-22 DIAGNOSIS — M54.50 LOW BACK PAIN: Primary | ICD-10-CM

## 2020-11-17 ENCOUNTER — HOSPITAL ENCOUNTER (OUTPATIENT)
Dept: MRI IMAGING | Age: 50
Discharge: HOME | End: 2020-11-17
Attending: FAMILY MEDICINE
Payer: COMMERCIAL

## 2020-11-17 DIAGNOSIS — M54.16 LUMBAR RADICULOPATHY: ICD-10-CM

## 2020-11-17 DIAGNOSIS — M54.50 LOW BACK PAIN: ICD-10-CM

## 2020-11-17 DIAGNOSIS — M79.605 LEFT LEG PAIN: ICD-10-CM

## 2020-11-17 PROCEDURE — 72148 MRI LUMBAR SPINE W/O DYE: CPT

## 2020-12-23 NOTE — DISCHARGE INSTRUCTIONS
Patient Signature:  Date: 3/29/2019  Discharging Nurse: Lacey Etienne RN         Discharge Instructions       PATIENT ID: Meghan Mike  MRN: [id number]   YOB: 1970    DATE OF ADMISSION: 3/27/2019 11:48 AM    DATE OF DISCHARGE: 4/11/2019    PRIMARY CARE PROVIDER: Erin Ferreira MD     ATTENDING PHYSICIAN: Sarah Denis MD  DISCHARGING PROVIDER: Gamaliel Wagoner MD    To contact this individual call [phone] and ask the  to page. If unavailable ask to be transferred the Adult Hospitalist Department. DISCHARGE DIAGNOSES subphrenic abscess     CONSULTATIONS: IP CONSULT TO GENERAL SURGERY  IP CONSULT TO GENERAL SURGERY  IP CONSULT TO HOSPITALIST  IP CONSULT TO THORACIC SURGERY  IP CONSULT TO ONCOLOGY  IP CONSULT TO INFECTIOUS DISEASES  IP CONSULT TO INFECTIOUS DISEASES  IP CONSULT TO CARDIOLOGY    PROCEDURES/SURGERIES: Procedure(s):  DIAGNOSTIC LAPAROSCOPY WITH EGD    PENDING TEST RESULTS:   At the time of discharge the following test results are still pending: none     FOLLOW UP APPOINTMENTS:   Follow-up Information     Follow up With Specialties Details Why Contact Info    Jeevan Her MD General Surgery In 1 week For wound re-check [address] [facility]  [address] [place] [phone]      Erin Ferreira MD [address]  [address] [facility] [phone]             ADDITIONAL CARE RECOMMENDATIONS:   1. keep the drain in place until the output is more serous, should see Dr. Ernesto Tovar the surgeon in 1 week and he will remove the drainage in his office  2, Dr Ernesto Tovar will  Arrange  another CT scan in the next 1-2 wks. 3. Continue home IV antibiotics Per ID  4. Lab each Monday  5. Lab each Thursday:                    DIET: Regular Diet      ACTIVITY: Activity as tolerated          DISCHARGE MEDICATIONS:   See Medication Reconciliation Form    · It is important that you take the medication exactly as they are prescribed.    · Keep your Psychiatry consult note dictated.   Pt arrives to [facility] for left hip injection with Dr. Anne   medication in the bottles provided by the pharmacist and keep a list of the medication names, dosages, and times to be taken in your wallet. · Do not take other medications without consulting your doctor. NOTIFY YOUR PHYSICIAN FOR ANY OF THE FOLLOWING:   Fever over 101 degrees for 24 hours. Chest pain, shortness of breath, fever, chills, nausea, vomiting, diarrhea, change in mentation, falling, weakness, bleeding. Severe pain or pain not relieved by medications. Or, any other signs or symptoms that you may have questions about.       DISPOSITION:    Home With:   OT  PT x HH x RN       SNF/Inpatient Rehab/LTAC    Independent/assisted living    Hospice    Other:     CDMP Checked:   Yes x     PROBLEM LIST Updated:  Yes x       Signed:   Krys Judd MD  4/11/2019  1:36 PM

## 2021-01-20 ENCOUNTER — TELEPHONE (OUTPATIENT)
Dept: SURGERY | Age: 51
End: 2021-01-20

## 2021-04-09 ENCOUNTER — TRANSCRIBE ORDER (OUTPATIENT)
Dept: SCHEDULING | Age: 51
End: 2021-04-09

## 2021-04-09 DIAGNOSIS — R74.8 ABNORMAL LIVER ENZYMES: Primary | ICD-10-CM

## 2021-04-13 ENCOUNTER — HOSPITAL ENCOUNTER (OUTPATIENT)
Dept: ULTRASOUND IMAGING | Age: 51
Discharge: HOME | End: 2021-04-13
Attending: FAMILY MEDICINE
Payer: COMMERCIAL

## 2021-04-13 DIAGNOSIS — R74.8 ABNORMAL LIVER ENZYMES: ICD-10-CM

## 2021-04-13 PROCEDURE — 76700 US EXAM ABDOM COMPLETE: CPT

## 2021-07-17 NOTE — TELEPHONE ENCOUNTER
Group Topic: BH Physical Activity    Date: 7/17/2021  Start Time: 1530  End Time: 1600  Facilitators: Fede Johnston OT    Focus: Fitness  Number in attendance: 3    Method: Group  Attendance: Present  Participation: Active  Patient Response: Interested in topic  Mood: Normal  Affect: Type: Euthymic (normal mood)   Range: Full (normal)   Congruency: Congruent   Stability: Stable  Behavior/Socialization: Appropriate to group  Thought Process: Tracking  Task Performance: Follows directions  Patient Evaluation: Independent - full participation       Spoke with patient advised her that she was suppose to follow up 1wk after being seen in ER and she called and canceled appointment. We are not able to fill out FMLA because she was not admitted to the hospital and did not have any surgery.  Advised patient to follow up with GI and schedule to see

## 2021-09-25 NOTE — TELEPHONE ENCOUNTER
Passed ambulation trail without becoming hypoxic or having CP/SOB. Pt wanting to go home.    Spoke with patient. She is unable to drink her clear liquids while she is working. We will write a letter for her to drink clear liquids during the day. I will have the dietician call her as well.

## 2021-12-03 NOTE — PROGRESS NOTES
Hospitalist Progress Note Michael Crawford MD 
     
                             Answering service: [phone] OR 1941 from in house phone Date of Service:  3/29/2019 NAME:  Kenny Oh :  1970 MRN:  [id number] Admission Summary:  
 
46 y.o. white female with past medical history of anemia, anxiety Disorder, depression, arthritis, hypertension, menorrhagia, morbid obesity, s/p gastric bypass surgery was brought to the ED from work via EMS with chief complaint of generalized weakness, fatigue, feeling like she was going to pass out and diarrhea. Reason for follow up:  
 CC: Generalized weakness, fatigue, and near passing out. Patient denied any worsening of symptoms on afternoon rounds. C/O feeling anxious about results. Assessment & Plan:  
 
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 S/P CT-guided biopsy 3/29/19. Thoracic surgical eval noted and appreciated. 2) Resp: Small left pleural effusion without any respiratory failure. As needed oxygen and diuresis. 3) CVS: Near Syncope. Orthostatic readings. Controlled Primary hypertension. Pericardial effusion. 2D ECHO check. As needed Cardiology consult. 4) Hematology: Probable anemia of chronicity. R/O Iron deficiency. Iron profile and Ferritin level. 5) Electrolytes: Hypokalemia. Repletion with F/U. 6) Endocrine: Morbid obesity with a BMI greater than 40. H/O Gastric Bypass surgery. Therapeutic lifestyle changes counselling done. 7) JOHANNA: Generalized weakness. PT as tolerated. 8) Psych: Anxiety Disorder. Depression. As needed anxiolytics. 9) Prophylaxis: DVT. 10) Directives: Full Code with a guarded prognosis. D/W patient and family. 11) Plan: Anticipate D/C to home in am with outpatient F/U. Discussed with patient and patient's  Rashmi Jean Baptiste who can be reached at [phone].  
[facility] Problems  Date Reviewed: 3/29/2019 Codes Class Noted POA Pericardial effusion ICD-10-CM: I31.3 ICD-9-CM: 423.9  3/27/2019 Unknown Pleural effusion, left ICD-10-CM: J90 ICD-9-CM: 511.9  3/27/2019 Unknown Near syncope ICD-10-CM: R55 
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  
 
 Last 24hrs VS reviewed since prior progress note. Most recent are: 
Visit Vitals BP (!) 139/96 (BP 1 Location: Left arm, BP Patient Position: Supine) Pulse 87 Temp 99 °F (37.2 °C) Resp 14 SpO2 96% Constitutional:  No acute distress, cooperative, pleasant   
HEENT: Head is a traumatic, Un icteric sclera. Pink conjunctiva,no erythema or discharge. Oral mucous moist, oropharynx benign. Neck supple, Resp:  Decreased air entry B/L. CV:  Regular rhythm, normal rate, no murmurs, gallops, rubs GI:  Soft, obese, non distended, non tender. normoactive bowel sounds, no hepatosplenomegaly :  No CVA or suprapubic tenderness Skin  :  No erythema,rash,bullae,dipigmentation Musculoskeletal:  Bipedal edema. Neurologic:  AAOx3, CN II-XII reviewed. Moves all extremities. Intake/Output Summary (Last 24 hours) at 3/29/2019 1446 Last data filed at 3/28/2019 2200 Gross per 24 hour Intake 1200 ml Output  Net 1200 ml Labs:  
 
Recent Labs  
  03/28/19 
0314 03/27/19 
1152 WBC 10.9 13.5* HGB 8.8* 10.1* HCT 28.7* 32.5*  
 383 Recent Labs  
  03/28/19 
0314 03/27/19 
1152  135* K 3.2* 3.8  102 CO2 27 26 BUN 9 10 CREA 0.56 0.77 GLU 81 123* CA 8.7 9.2 MG  --  2.0 Recent Labs  
  03/27/19 
1152 SGOT 17 ALT 16  
* TBILI 0.8 TP 8.3* ALB 2.3*  
GLOB 6.0*  
LPSE 46* Recent Labs  
  03/29/19 
0210 INR 1.2* PTP 12.0* No results for input(s): FE, TIBC, PSAT, FERR in the last 72 hours. Lab Results Component Value Date/Time Folate 8.0 04/10/2009 06:00 PM  
  
No results for input(s): PH, PCO2, PO2 in the last 72 hours. Recent Labs  
  03/27/19 
1152 TROIQ <0.05 Lab Results Component Value Date/Time Cholesterol, total 211 (H) 10/13/2017 04:23 PM  
 HDL Cholesterol 44 10/13/2017 04:23 PM  
 LDL, calculated 113 (H) 10/13/2017 04:23 PM  
 Triglyceride 268 (H) 10/13/2017 04:23 PM  
 
Lab Results Component Value Date/Time Glucose (POC) 97 01/31/2010 01:10 PM  
 Glucose  05/14/2012 10:55 AM  
 
Lab Results Component Value Date/Time Color 0-3 03/27/2019 12:17 PM  
 Appearance CLOUDY (A) 03/27/2019 12:17 PM  
 Specific gravity 1.026 03/27/2019 12:17 PM  
 pH (UA) 5.5 03/27/2019 12:17 PM  
 Protein 100 (A) 03/27/2019 12:17 PM  
 Glucose NEGATIVE  03/27/2019 12:17 PM  
 Ketone 15 (A) 03/27/2019 12:17 PM  
 Bilirubin NEGATIVE  03/10/2015 02:45 PM  
 Urobilinogen 1.0 03/27/2019 12:17 PM  
 Nitrites NEGATIVE  03/27/2019 12:17 PM  
 Leukocyte Esterase SMALL (A) 03/27/2019 12:17 PM  
 Epithelial cells MANY (A) 03/27/2019 12:17 PM  
 Bacteria 2+ (A) 03/27/2019 12:17 PM  
 WBC 0-4 03/27/2019 12:17 PM  
 RBC 0-5 03/27/2019 12:17 PM  
 
 
 
Medications Reviewed:  
 
Current Facility-Administered Medications Medication Dose Route Frequency  [START ON 3/30/2019] multivitamin, tx-iron-ca-min (THERA-M w/ IRON) tablet 1 Tab  1 Tab Oral DAILY  [START ON 3/30/2019] calcium carbonate tablet 520 mg [elemental]  520 mg Oral DAILY  [START ON 3/30/2019] cholecalciferol (VITAMIN D3) tablet 3,000 Units  3,000 Units Oral DAILY  [START ON 3/30/2019] cyanocobalamin (VITAMIN B12) tablet 500 mcg  500 mcg Oral DAILY  LORazepam (ATIVAN) tablet 1 mg  1 mg Oral TID PRN  
  losartan (COZAAR) tablet 25 mg  25 mg Oral DAILY  PARoxetine (PAXIL) tablet 40 mg  40 mg Oral DAILY  sodium chloride (NS) flush 5-40 mL  5-40 mL IntraVENous Q8H  
 sodium chloride (NS) flush 5-40 mL  5-40 mL IntraVENous PRN  
 ondansetron (ZOFRAN) injection 4 mg  4 mg IntraVENous Q6H PRN  
 acetaminophen (TYLENOL) tablet 650 mg  650 mg Oral Q6H PRN  
 0.9% sodium chloride infusion  125 mL/hr IntraVENous CONTINUOUS  
 pantoprazole (PROTONIX) tablet 40 mg  40 mg Oral DAILY  
 
______________________________________________________________________ EXPECTED LENGTH OF STAY: 3d 16h ACTUAL LENGTH OF STAY:          2 Maria G Delacruz MD  
 
 No

## 2022-01-05 ENCOUNTER — TELEPHONE (OUTPATIENT)
Dept: SURGERY | Age: 52
End: 2022-01-05

## 2022-01-21 NOTE — PROGRESS NOTES
Having some pain and abdominal cramping but no N/V, walking and VS are normal.  Increase the frequency of her pain meds and add levsin. Drain output is SS Mardell Carmen Inflammation Suggestive Of Cancer Camouflage Histology Text: There was a dense lymphocytic infiltrate which prevented adequate histologic evaluation of adjacent structures.

## 2022-03-18 PROBLEM — I31.39 PERICARDIAL EFFUSION: Status: ACTIVE | Noted: 2019-03-27

## 2022-03-19 PROBLEM — J90 PLEURAL EFFUSION, LEFT: Status: ACTIVE | Noted: 2019-03-27

## 2022-03-19 PROBLEM — R55 NEAR SYNCOPE: Status: ACTIVE | Noted: 2019-03-27

## 2022-03-19 PROBLEM — F32.1 MODERATE MAJOR DEPRESSION (HCC): Status: ACTIVE | Noted: 2018-02-16

## 2022-03-19 PROBLEM — E66.01 MORBID OBESITY WITH BMI OF 45.0-49.9, ADULT (HCC): Status: ACTIVE | Noted: 2018-10-15

## 2022-03-19 PROBLEM — K75.0 ABSCESS, LIVER: Status: ACTIVE | Noted: 2019-04-03

## 2022-03-20 PROBLEM — D49.2: Status: ACTIVE | Noted: 2019-03-27

## 2022-03-20 PROBLEM — R73.09 ELEVATED GLUCOSE: Status: ACTIVE | Noted: 2018-05-02

## 2022-03-20 PROBLEM — S72.009A FRACTURE OF HIP (HCC): Status: ACTIVE | Noted: 2019-09-03

## 2022-06-02 NOTE — TELEPHONE ENCOUNTER
REady for pick-up Birth Control Pills Counseling: Birth Control Pill Counseling: I discussed with the patient the potential side effects of OCPs including but not limited to increased risk of stroke, heart attack, thrombophlebitis, deep venous thrombosis, hepatic adenomas, breast changes, GI upset, headaches, and depression.  The patient verbalized understanding of the proper use and possible adverse effects of OCPs. All of the patient's questions and concerns were addressed.

## 2023-01-13 NOTE — PROGRESS NOTES
BP 96/63 and the  HPI:  Sarah Beth Alex is a 48 y.o. female presenting for well woman exam.     No complaints or new concerns. GYN Hx: Onset of menses 14, has not had a menses since hysterectomy in     OB Hx:   via   Currently sexually active with 1 male partner, had previous hysterectomy for heavy bleeding, no history of cancer or abnormal PAPs    Diet: Started dieting Monday. Will be avoiding fast food and fried foods. Had a hamburger an hour ago. Exercise: Just joined [email or web address] and has a . Worked out Monday, Tuesday. Will be working out 3x week. Dental Exam:   Eye Exam:    Diagnosed with depression 20 years ago in [facility]. At the time admitted to acute care facility for depression and suicidal ideation. Has always been on Paxil. Typically uses 30 Ativan q 2 months, using 1 every other day. Has previously been on Effexor and did well with it. Has taken Buspar in the past but did not tolerate it well. Was previously prescribed Trazadone but never took the medication. Needs refill on Paxil, Ativan and Cozaar. Allergies- reviewed: Allergies   Allergen Reactions    Pcn [Penicillins] Anaphylaxis and Rash    Amlodipine Other (comments)     Fatigue/drowsy     Medications- reviewed:   Current Outpatient Prescriptions   Medication Sig    LORazepam (ATIVAN) 1 mg tablet Take 1 Tab by mouth every six (6) hours as needed for Anxiety (Panic Attack). Max Daily Amount: 4 mg.  losartan (COZAAR) 25 mg tablet TAKE 1 TABLET BY MOUTH DAILY    PARoxetine (PAXIL) 40 mg tablet TAKE 1 TABLET BY MOUTH EVERY DAY    pneumococcal 13 magdalena conj dip (PREVNAR-13) 0.5 mL syrg injection 0.5 mL by IntraMUSCular route once for 1 dose.  diphenhydrAMINE (BENADRYL) 25 mg capsule Take 25 mg by mouth every six (6) hours as needed.  meloxicam (MOBIC) 7.5 mg tablet Take 1 Tab by mouth daily.  traZODone (DESYREL) 50 mg tablet Take 1 Tab by mouth nightly.      No current facility-administered medications for this visit. Past Medical History- reviewed:  Past Medical History:   Diagnosis Date    Anemia     Anxiety     Current smoker     Depression     Hypertension     Morbid obesity ([address]     Psychiatric disorder     anxiety & panic attacks    Snoring          Past Surgical History- reviewed:   Past Surgical History:   Procedure Laterality Date    BIOPSY  2010    polyp, 1/2 cm - negative, per patient   [address]  [address]    [facility]    CYSTOURETHROSCOPY  7/10/2013    CYSTOSCOPY performed by Samuel Jordan MD at [address] HX CHOLECYSTECTOMY  2006    DE LAPAROSCOPY W TOT HYSTERECTUTERUS <=250 GRAM  W TUBE/OVARY  7/10/2013    HYSTERECTOMY ROBOTIC ASSISTED [facility]         Family History - reviewed:  Family History   Problem Relation Age of Onset    Heart Disease Mother      heart bypass    Heart Attack Mother     Diabetes Brother     Stroke Maternal Grandmother     Malignant Hyperthermia Neg Hx     Pseudocholinesterase Deficiency Neg Hx     Delayed Awakening Neg Hx     Post-op Nausea/Vomiting Neg Hx     Emergence Delirium Neg Hx     Post-op Cognitive Dysfunction Neg Hx     Other Neg Hx          Social History - reviewed:  Social History     Social History    Marital status:      Spouse name: N/A    Number of children: N/A    Years of education: N/A     Occupational History    Not on file.      Social History Main Topics    Smoking status: Current Some Day Smoker     Packs/day: 0.50     Years: 28.00     Types: Cigarettes    Smokeless tobacco: Never Used    Alcohol use Yes      Comment: rarely    Drug use: No    Sexual activity: Yes     Partners: Male     Birth control/ protection: None     Other Topics Concern    Not on file     Social History Narrative     Immunizations - reviewed:   Immunization History   Administered Date(s) Administered    Hep A Vaccine (Adult) 10/21/2015    Hep B Vaccine (Adult) 10/21/2015, 01/20/2016    Influenza Vaccine (Quad) PF 02/01/2017    Influenza Vaccine Split 10/07/2012    Pneumococcal Polysaccharide (PPSV-23) 11/18/2013    Tdap 07/06/2015     Flu: 2/2017  Tdap: 2015  Pneumovax: PPSV23 in 2013, has never had PCV13  Zostervax: Not indicated     Health Maintenance reviewed -  Pap smear - hysterectomy 2014, no history of previous abnormal paps  Mammogram - 2017, fatty breasts on mammo, BIRADS2, recommend repeat in 1 year  Colonoscopy - no family  Hx of colon cancer, not indicated until 46  DEXA scan - not at this time   HIV testing - not at this time  Hepatitis C testing - not indicated  Lung cancer screening - 32 year history 1/3 PPD, no indication for screening at this time     Review of Systems   CONSTITUTIONAL: Denies: fever, chills  EYES: Endorses blurry vision, denies eye pain  ENT: Denies congestion, allergies  CARDIOVASCULAR: Denies chest pain, palpitations  RESPIRATORY: Denies shortness of breath, cough   ENDOCRINE: Denies polyphagia or polyuria  GI: Denies abdominal pain, black tarry stools  : Denies dysuria, hematuria  NEURO: Denies weakness, endorses some numbness in the L leg  SKIN: Denies rashes, abnormal moles  PSYCH: Endorses some depressive symptoms and anxiety  BREASTS: No masses or nipple discharge      Physical Exam  Visit Vitals    /83 (BP 1 Location: Right arm, BP Patient Position: Sitting)    Pulse 66    Temp 98 °F (36.7 °C) (Oral)    Resp 18    Ht 5' 5\" (1.651 m)    Wt 312 lb (141.5 kg)    LMP 06/10/2013    SpO2 98%    BMI 51.92 kg/m2       General appearance - alert, well appearing, and in no distress  Eyes - pupils equal and reactive, extraocular eye movements intact  Ears - bilateral TM's and external ear canals normal  Nose - normal and patent, no erythema, discharge or polyps  Mouth - mucous membranes moist, pharynx normal without lesions  Neck - supple, no significant adenopathy  Chest - clear to auscultation, no wheezes, rales or rhonchi, symmetric air entry  Heart - normal rate, regular rhythm, normal S1, S2, no murmurs, rubs, clicks or gallops  Abdomen - soft, nontender, nondistended, no masses or organomegaly  Neurological - alert, oriented, normal speech, no focal findings or movement disorder noted  Musculoskeletal - no joint tenderness, deformity or swelling  Extremities - peripheral pulses normal, no pedal edema, no clubbing or cyanosis  Skin - normal coloration and turgor, no rashes, no suspicious skin lesions noted    PHQ9    9 Sx checklist. \"over the last 2 weeks how often have you had or been\"  (Answers:none=0, several days a week=1, more than half of the days=2, nearly every day=3)       Little pleasure in doing things? Several days a week - 1   Feeling down or depressed? Nearly every day - 3   Trouble sleeping? Nearly every day -3   No energy or fatigue? Nearly every day - 3   Poor appetite or overeating? Nearly every day - 3   Feeling you have let others down or you are a failure? None - 0   Trouble concentrating? Nearly every day - 3   Moving slowly or fidgety all the time? None - 0   Thinking you would be better off dead or thoughts of hurting yourself? None - 0      Total:16    How difficult has this made it for you to do your work, get along with others or take care of things at work? GAD7    (5-14 suspicious, >15 warrants medication and /or counseling)    Over the last 2 weeks how often have you been bothered by the following problems? 0 - not at all, 1 - several days, 2 - more then half the days, 3 nearly every day    1. Feeling nervous, anxious or on edge? More then half days - 3  2. Not being able to stop or control worrying? Nearly every day -3  3. Worrying too much about different things? Nearly every day - 3  4. Trouble relaxing? Nearly every day - 3  5. Being so restless that it is hard to sit still? None - 0  6. Becoming easily annoyed or irritable? Nearly every day - 3  7.  Feeling afraid as if something awful might happen? None - 0    Total score = 15    Assessment/Plan:    ICD-10-CM ICD-9-CM    1. Well woman exam (no gynecological exam) Z00.00 V70.0 CBC W/O DIFF      TSH 3RD GENERATION      METABOLIC PANEL, COMPREHENSIVE      HEMOGLOBIN A1C WITH EAG      LIPID PANEL   2. Essential hypertension I10 401.9 losartan (COZAAR) 25 mg tablet    switch HCTZ to losartan   3. Anxiety F41.9 300.00 LORazepam (ATIVAN) 1 mg tablet      PARoxetine (PAXIL) 40 mg tablet   4. Tobacco abuse counseling Z71.6 V65.42      305.1    5.  Encounter for immunization Z23 V03.89 INFLUENZA VIRUS VAC QUAD,SPLIT,PRESV FREE SYRINGE IM      MD IMMUNIZ ADMIN,1 SINGLE/COMB VAC/TOXOID      pneumococcal 13 magdalena conj dip (PREVNAR-13) 0.5 mL syrg injection     Well Woman Exam (without GYN)  - Patient is up to date on mammogram, recommend repeat in 1 year  - Patient has previous hysterectomy for bleeding, no hx of abnormal PAPs, no PAP indicated  - Discussed current BMI 51.92 and need for weight loss, patient has started exercising and dieting  - CBC, CMP, TSH, A1c, Lipid Panel today    Hypertension  - Well controlled on current medication - 129/83 in the office today  - Refill Cozaar    Anxiety/ Panic Attacks  - PHQ9 and GAD7 both high, patient is currently on SSRI and Benzo PRN panic attacks  - Recommend counseling/ therapy and gave a list of providers in the area to patient  - Refill Paxil and Ativan  - Discussed if patient ever experiences thoughts of hurting herself or others to go to ED  - Recommend follow up in 3 months to reassess depression/ anxiety with counseling     Encounter for Immunization  - Patient previously had PPSV23, needs PCV13 today to complete series  - Flu shot today    Tobacco Abuse Counseling  - Discussed need to quit smoking  - Patient is down to 2-3 cigarettes per day  - Suggested nicotine patches to help with cessation as patient is concerned about possible night terrors and anxiety with medications    Follow-up Disposition: Not on File    I have discussed the diagnosis with the patient and the intended plan as seen in the above orders. Patient verbalized understanding of the plan and agrees with the plan. The patient has received an after-visit summary and questions were answered concerning future plans. I have discussed medication side effects and warnings with the patient as well. Informed patient to return to the office if new symptoms arise.     Patient discussed with Dr. Lion Randall MD (Attending)    Kinjal Whaley DO  Family Medicine Resident, PGY1

## 2023-03-08 ENCOUNTER — HOSPITAL ENCOUNTER (EMERGENCY)
Age: 53
Discharge: HOME | End: 2023-03-08
Attending: EMERGENCY MEDICINE
Payer: COMMERCIAL

## 2023-03-08 ENCOUNTER — APPOINTMENT (OUTPATIENT)
Dept: CT IMAGING | Age: 53
End: 2023-03-08
Attending: EMERGENCY MEDICINE
Payer: COMMERCIAL

## 2023-03-08 VITALS
WEIGHT: 264 LBS | HEART RATE: 64 BPM | BODY MASS INDEX: 41.44 KG/M2 | DIASTOLIC BLOOD PRESSURE: 73 MMHG | RESPIRATION RATE: 16 BRPM | HEIGHT: 67 IN | SYSTOLIC BLOOD PRESSURE: 158 MMHG | TEMPERATURE: 98 F | OXYGEN SATURATION: 100 %

## 2023-03-08 DIAGNOSIS — K52.9 NONINFECTIOUS GASTROENTERITIS, UNSPECIFIED TYPE: Primary | ICD-10-CM

## 2023-03-08 LAB
ALBUMIN SERPL-MCNC: 3.2 G/DL (ref 3.5–5)
ALBUMIN/GLOB SERPL: 0.8 (ref 1.1–2.2)
ALP SERPL-CCNC: 122 U/L (ref 45–117)
ALT SERPL-CCNC: 23 U/L (ref 12–78)
ANION GAP SERPL CALC-SCNC: 9 MMOL/L (ref 5–15)
AST SERPL-CCNC: 15 U/L (ref 15–37)
BASOPHILS # BLD: 0 K/UL (ref 0–0.1)
BASOPHILS NFR BLD: 0 % (ref 0–1)
BILIRUB SERPL-MCNC: 0.4 MG/DL (ref 0.2–1)
BUN SERPL-MCNC: 9 MG/DL (ref 6–20)
BUN/CREAT SERPL: 9 (ref 12–20)
CALCIUM SERPL-MCNC: 9.1 MG/DL (ref 8.5–10.1)
CHLORIDE SERPL-SCNC: 105 MMOL/L (ref 97–108)
CO2 SERPL-SCNC: 28 MMOL/L (ref 21–32)
CREAT SERPL-MCNC: 0.95 MG/DL (ref 0.55–1.02)
DIFFERENTIAL METHOD BLD: NORMAL
EOSINOPHIL # BLD: 0.2 K/UL (ref 0–0.4)
EOSINOPHIL NFR BLD: 2 % (ref 0–7)
ERYTHROCYTE [DISTWIDTH] IN BLOOD BY AUTOMATED COUNT: 13.2 % (ref 11.5–14.5)
GLOBULIN SER CALC-MCNC: 3.9 G/DL (ref 2–4)
GLUCOSE BLD STRIP.AUTO-MCNC: 102 MG/DL (ref 65–117)
GLUCOSE SERPL-MCNC: 114 MG/DL (ref 65–100)
HCT VFR BLD AUTO: 38.8 % (ref 35–47)
HGB BLD-MCNC: 12.7 G/DL (ref 11.5–16)
IMM GRANULOCYTES # BLD AUTO: 0 K/UL (ref 0–0.04)
IMM GRANULOCYTES NFR BLD AUTO: 0 % (ref 0–0.5)
LYMPHOCYTES # BLD: 2.8 K/UL (ref 0.8–3.5)
LYMPHOCYTES NFR BLD: 30 % (ref 12–49)
MCH RBC QN AUTO: 27.5 PG (ref 26–34)
MCHC RBC AUTO-ENTMCNC: 32.7 G/DL (ref 30–36.5)
MCV RBC AUTO: 84.2 FL (ref 80–99)
MONOCYTES # BLD: 0.5 K/UL (ref 0–1)
MONOCYTES NFR BLD: 5 % (ref 5–13)
NEUTS SEG # BLD: 5.9 K/UL (ref 1.8–8)
NEUTS SEG NFR BLD: 63 % (ref 32–75)
NRBC # BLD: 0 K/UL (ref 0–0.01)
NRBC BLD-RTO: 0 PER 100 WBC
PLATELET # BLD AUTO: 242 K/UL (ref 150–400)
PMV BLD AUTO: 10.3 FL (ref 8.9–12.9)
POTASSIUM SERPL-SCNC: 4.2 MMOL/L (ref 3.5–5.1)
PROT SERPL-MCNC: 7.1 G/DL (ref 6.4–8.2)
RBC # BLD AUTO: 4.61 M/UL (ref 3.8–5.2)
SERVICE CMNT-IMP: NORMAL
SODIUM SERPL-SCNC: 142 MMOL/L (ref 136–145)
TROPONIN I SERPL HS-MCNC: 4 NG/L (ref 0–51)
WBC # BLD AUTO: 9.5 K/UL (ref 3.6–11)

## 2023-03-08 PROCEDURE — 85025 COMPLETE CBC W/AUTO DIFF WBC: CPT

## 2023-03-08 PROCEDURE — 82962 GLUCOSE BLOOD TEST: CPT

## 2023-03-08 PROCEDURE — 74011000636 HC RX REV CODE- 636: Performed by: EMERGENCY MEDICINE

## 2023-03-08 PROCEDURE — 84484 ASSAY OF TROPONIN QUANT: CPT

## 2023-03-08 PROCEDURE — 80053 COMPREHEN METABOLIC PANEL: CPT

## 2023-03-08 PROCEDURE — 93005 ELECTROCARDIOGRAM TRACING: CPT

## 2023-03-08 PROCEDURE — 99285 EMERGENCY DEPT VISIT HI MDM: CPT

## 2023-03-08 PROCEDURE — 36415 COLL VENOUS BLD VENIPUNCTURE: CPT

## 2023-03-08 PROCEDURE — 74011250636 HC RX REV CODE- 250/636: Performed by: EMERGENCY MEDICINE

## 2023-03-08 PROCEDURE — 74177 CT ABD & PELVIS W/CONTRAST: CPT

## 2023-03-08 PROCEDURE — 96374 THER/PROPH/DIAG INJ IV PUSH: CPT

## 2023-03-08 RX ORDER — ONDANSETRON 4 MG/1
4 TABLET, ORALLY DISINTEGRATING ORAL
Qty: 10 TABLET | Refills: 0 | Status: SHIPPED | OUTPATIENT
Start: 2023-03-08

## 2023-03-08 RX ORDER — LEVOFLOXACIN 750 MG/1
750 TABLET ORAL DAILY
Qty: 7 TABLET | Refills: 0 | Status: SHIPPED | OUTPATIENT
Start: 2023-03-08 | End: 2023-03-15

## 2023-03-08 RX ORDER — METRONIDAZOLE 500 MG/1
500 TABLET ORAL 3 TIMES DAILY
Qty: 21 TABLET | Refills: 0 | Status: SHIPPED | OUTPATIENT
Start: 2023-03-08 | End: 2023-03-15

## 2023-03-08 RX ORDER — ONDANSETRON 2 MG/ML
4 INJECTION INTRAMUSCULAR; INTRAVENOUS ONCE
Status: COMPLETED | OUTPATIENT
Start: 2023-03-08 | End: 2023-03-08

## 2023-03-08 RX ADMIN — ONDANSETRON 4 MG: 2 INJECTION INTRAMUSCULAR; INTRAVENOUS at 14:15

## 2023-03-08 RX ADMIN — IOPAMIDOL 100 ML: 755 INJECTION, SOLUTION INTRAVENOUS at 13:54

## 2023-03-08 RX ADMIN — SODIUM CHLORIDE 1000 ML: 9 INJECTION, SOLUTION INTRAVENOUS at 14:12

## 2023-03-08 NOTE — ED NOTES
Patient c/o increasing abdominal pain in left upper abdomen. Has used the bathroom since being here and had her CT.  Family at bedside

## 2023-03-08 NOTE — Clinical Note
[address] EMERGENCY DEPT  [address] [facility]  [place] [facility] [address]  [phone]    Work/School Note    Date: 3/8/2023    To Whom It May concern:    Adal Ledesma was seen and treated today in the emergency room by the following provider(s):  Attending Provider: Dianna Pal MD.      Adal Ledesma is excused from work/school on 3/8/2023 through 3/10/2023. She is medically clear to return to work/school on 3/11/2023.          Sincerely,          Mo Holbrook MD

## 2023-03-08 NOTE — ED TRIAGE NOTES
Pt c/o vomiting x 2 days diarrhea  x 3 days; and when she drinks or eats her abdomen hurts, weak and shaky; pt reports 6 episodes of watery stools today, no vomiting x 24 hours; denied difficulty urinating.  Denied fever; Pain 5/10 and throbbing intermittent; waves of nausea and shortness of breath

## 2023-03-09 LAB
ATRIAL RATE: 74 BPM
CALCULATED P AXIS, ECG09: 30 DEGREES
CALCULATED R AXIS, ECG10: 12 DEGREES
CALCULATED T AXIS, ECG11: 19 DEGREES
DIAGNOSIS, 93000: NORMAL
P-R INTERVAL, ECG05: 150 MS
Q-T INTERVAL, ECG07: 382 MS
QRS DURATION, ECG06: 80 MS
QTC CALCULATION (BEZET), ECG08: 424 MS
VENTRICULAR RATE, ECG03: 74 BPM
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
## 2023-03-13 NOTE — PROGRESS NOTES
HISTORY OF PRESENT ILLNESS  History and Physical.     The patient is a 50 y.o. obese female here for history and physical for Gastric bypass surgery. Body mass index is 49.02 kg/(m^2). Visit Vitals    /71 (BP 1 Location: Left arm, BP Patient Position: Sitting)    Pulse 70    Temp 97.8 °F (36.6 °C) (Oral)    Resp 18    Ht 5' 7\" (1.702 m)    Wt 313 lb (142 kg)    LMP 06/10/2013    SpO2 98%    BMI 49.02 kg/m2         Patient has an advanced directive: NOt on file. Ms. Lolita Zamora has a reminder for a \"due or due soon\" health maintenance. I have asked that she contact her primary care provider for follow-up on this health maintenance.       Patient Active Problem List    Diagnosis Date Noted    Elevated glucose 05/02/2018    Moderate major depression ([address] 02/16/2018    MAK (nonalcoholic steatohepatitis) 10/20/2015    Smoker 10/13/2015    H/O: hysterectomy 07/06/2015    Morbid obesity ([address] 02/28/2011    HTN (hypertension), benign 02/28/2011    Panic attacks 07/21/2010     Past Medical History:   Diagnosis Date    Anemia     Anxiety     Arthritis     LEFT KNEE    Current smoker     Depression     Hypertension     Menorrhagia     Morbid obesity ([address]     Nicotine vapor product user     SMALL AMOUNT OF NICOTINE 0.3    Snoring       Past Surgical History:   Procedure Laterality Date    BIOPSY  2010    polyp, 1/2 cm - negative, per patient   [address]    HX CHOLECYSTECTOMY  2006    [facility] GYN      PARTIAL HYSTERECTOMY    MT CYSTOURETHROSCOPY  7/10/2013    CYSTOSCOPY performed by Ese Loredo MD at [address] [place] [address] <=250 GRAM  W TUBE/OVARY  7/10/2013    HYSTERECTOMY ROBOTIC ASSISTED performed by Ese Loredo MD at [facility] MAIN OR      Social History   Substance Use Topics    Smoking status: Former Smoker     Packs/day: 0.50     Years: 28.00     Types: Cigarettes     Quit date: 6/4/2018   [facility] Smokeless tobacco: Never Used    Alcohol use No      Comment: rarely      Family History   Problem Relation Age of Onset    Heart Disease Mother      heart bypass    Heart Attack Mother     Diabetes Brother     Stroke Maternal Grandmother     No Known Problems Father     Malignant Hyperthermia Neg Hx     Pseudocholinesterase Deficiency Neg Hx     Delayed Awakening Neg Hx     Post-op Nausea/Vomiting Neg Hx     Emergence Delirium Neg Hx     Post-op Cognitive Dysfunction Neg Hx     Other Neg Hx     Anesth Problems Neg Hx       Prior to Admission medications    Medication Sig Start Date End Date Taking? Authorizing Provider   LORazepam (ATIVAN) 1 mg tablet Take 1 Tab by mouth daily as needed for Anxiety (Panic Attack). 7/30/18  Yes Ramu Conde MD   losartan (COZAAR) 25 mg tablet TAKE 1 TABLET BY MOUTH DAILY 2/12/18  Yes Ramu Conde MD   PARoxetine (PAXIL) 40 mg tablet TAKE 1 TABLET BY MOUTH EVERY DAY 2/12/18  Yes Ramu Conde MD   diphenhydrAMINE (BENADRYL) 50 mg tablet Take 50 mg by mouth nightly as needed for Sleep. Historical Provider     Allergies   Allergen Reactions    Pcn [Penicillins] Anaphylaxis and Rash    Amlodipine Other (comments)     Fatigue/drowsy       HPI    Review of Systems   Constitutional: Negative for chills, fever and malaise/fatigue. HENT: Negative for congestion, ear pain, hearing loss, sinus pain and sore throat. Eyes: Negative for blurred vision, double vision, pain, discharge and redness. Respiratory: Negative for cough, shortness of breath and wheezing. Cardiovascular: Negative for chest pain, palpitations and leg swelling. Gastrointestinal: Negative for abdominal pain, heartburn, nausea and vomiting. Genitourinary: Negative for dysuria, frequency and urgency. Musculoskeletal: Negative for back pain, joint pain and neck pain. Skin: Negative for itching and rash.    Neurological: Negative for dizziness, seizures and headaches. Psychiatric/Behavioral: Positive for depression (anxiety- panic attacks). Physical Exam   Constitutional: She is oriented to person, place, and time. She appears well-developed and well-nourished. Cardiovascular: Normal rate and regular rhythm. Exam reveals no gallop and no friction rub. No murmur heard. Pulmonary/Chest: Effort normal and breath sounds normal.   Abdominal: Soft. Bowel sounds are normal. She exhibits no distension. There is no tenderness. No masses or hernias noted. Neurological: She is alert and oriented to person, place, and time. Skin: Skin is warm and dry. Psychiatric: She has a normal mood and affect. ASSESSMENT and PLAN    Assessment:     Morbid obesity with body mass index of 49. Co-morbids listed above    Plan:   Patient is schedule for Gastric bypass with Jamaal Cardona on 10/15/2018 at [address] patient in regard to post diet restrictions, follow- up office visits, follow- up care. Patient as received educational booklet and vitamin list. E-prescribed: Zofran and Prilosec    Pt verbalized understanding and questions were answered to the best of my knowledge and ability.         Signed By: Hadley Heaton NP     September 26, 2018 PAST MEDICAL HISTORY:  COVID-19 7/13/22    Induced termination of pregnancy 2018

## 2023-05-10 RX ORDER — OMEPRAZOLE 20 MG/1
20 TABLET, DELAYED RELEASE ORAL DAILY
COMMUNITY

## 2023-05-10 RX ORDER — ONDANSETRON 4 MG/1
TABLET, ORALLY DISINTEGRATING ORAL EVERY 8 HOURS PRN
COMMUNITY
Start: 2020-05-04

## 2023-05-10 RX ORDER — PAROXETINE HYDROCHLORIDE 40 MG/1
1 TABLET, FILM COATED ORAL DAILY
COMMUNITY
Start: 2018-11-21

## 2023-05-10 RX ORDER — LORAZEPAM 1 MG/1
TABLET ORAL DAILY PRN
COMMUNITY
Start: 2019-02-08

## 2023-05-10 RX ORDER — CYCLOBENZAPRINE HCL 10 MG
TABLET ORAL 3 TIMES DAILY PRN
COMMUNITY
Start: 2020-10-13

## 2023-10-06 ENCOUNTER — APPOINTMENT (OUTPATIENT)
Facility: HOSPITAL | Age: 53
End: 2023-10-06
Payer: COMMERCIAL

## 2023-10-06 ENCOUNTER — HOSPITAL ENCOUNTER (EMERGENCY)
Facility: HOSPITAL | Age: 53
Discharge: HOME | End: 2023-10-06
Attending: EMERGENCY MEDICINE
Payer: COMMERCIAL

## 2023-10-06 VITALS
OXYGEN SATURATION: 98 % | HEART RATE: 61 BPM | RESPIRATION RATE: 15 BRPM | TEMPERATURE: 99 F | DIASTOLIC BLOOD PRESSURE: 81 MMHG | SYSTOLIC BLOOD PRESSURE: 148 MMHG

## 2023-10-06 DIAGNOSIS — K52.9 CHRONIC DIARRHEA: ICD-10-CM

## 2023-10-06 DIAGNOSIS — R07.89 LEFT-SIDED CHEST WALL PAIN: Primary | ICD-10-CM

## 2023-10-06 LAB
ALBUMIN SERPL-MCNC: 3 G/DL (ref 3.5–5)
ALBUMIN/GLOB SERPL: 0.8 (ref 1.1–2.2)
ALP SERPL-CCNC: 115 U/L (ref 45–117)
ALT SERPL-CCNC: 17 U/L (ref 12–78)
ANION GAP SERPL CALC-SCNC: 8 MMOL/L (ref 5–15)
AST SERPL-CCNC: 12 U/L (ref 15–37)
BASOPHILS # BLD: 0 K/UL (ref 0–0.1)
BASOPHILS NFR BLD: 0 % (ref 0–1)
BILIRUB SERPL-MCNC: 0.3 MG/DL (ref 0.2–1)
BUN SERPL-MCNC: 14 MG/DL (ref 6–20)
BUN/CREAT SERPL: 15 (ref 12–20)
CALCIUM SERPL-MCNC: 8.7 MG/DL (ref 8.5–10.1)
CHLORIDE SERPL-SCNC: 103 MMOL/L (ref 97–108)
CO2 SERPL-SCNC: 25 MMOL/L (ref 21–32)
COMMENT:: NORMAL
CREAT SERPL-MCNC: 0.95 MG/DL (ref 0.55–1.02)
DIFFERENTIAL METHOD BLD: ABNORMAL
EOSINOPHIL # BLD: 0.2 K/UL (ref 0–0.4)
EOSINOPHIL NFR BLD: 2 % (ref 0–7)
ERYTHROCYTE [DISTWIDTH] IN BLOOD BY AUTOMATED COUNT: 14.1 % (ref 11.5–14.5)
GLOBULIN SER CALC-MCNC: 3.8 G/DL (ref 2–4)
GLUCOSE SERPL-MCNC: 101 MG/DL (ref 65–100)
HCT VFR BLD AUTO: 34 % (ref 35–47)
HGB BLD-MCNC: 11.1 G/DL (ref 11.5–16)
IMM GRANULOCYTES # BLD AUTO: 0 K/UL (ref 0–0.04)
IMM GRANULOCYTES NFR BLD AUTO: 0 % (ref 0–0.5)
LIPASE SERPL-CCNC: 30 U/L (ref 13–75)
LYMPHOCYTES # BLD: 2.8 K/UL (ref 0.8–3.5)
LYMPHOCYTES NFR BLD: 28 % (ref 12–49)
MAGNESIUM SERPL-MCNC: 1.8 MG/DL (ref 1.6–2.4)
MCH RBC QN AUTO: 27.1 PG (ref 26–34)
MCHC RBC AUTO-ENTMCNC: 32.6 G/DL (ref 30–36.5)
MCV RBC AUTO: 82.9 FL (ref 80–99)
MONOCYTES # BLD: 0.6 K/UL (ref 0–1)
MONOCYTES NFR BLD: 6 % (ref 5–13)
NEUTS SEG # BLD: 6.2 K/UL (ref 1.8–8)
NEUTS SEG NFR BLD: 64 % (ref 32–75)
NRBC # BLD: 0 K/UL (ref 0–0.01)
NRBC BLD-RTO: 0 PER 100 WBC
PLATELET # BLD AUTO: 246 K/UL (ref 150–400)
PMV BLD AUTO: 10.4 FL (ref 8.9–12.9)
POTASSIUM SERPL-SCNC: 3.8 MMOL/L (ref 3.5–5.1)
PROT SERPL-MCNC: 6.8 G/DL (ref 6.4–8.2)
RBC # BLD AUTO: 4.1 M/UL (ref 3.8–5.2)
SODIUM SERPL-SCNC: 136 MMOL/L (ref 136–145)
SPECIMEN HOLD: NORMAL
TROPONIN I SERPL HS-MCNC: 4 NG/L (ref 0–51)
WBC # BLD AUTO: 9.8 K/UL (ref 3.6–11)

## 2023-10-06 PROCEDURE — 99285 EMERGENCY DEPT VISIT HI MDM: CPT

## 2023-10-06 PROCEDURE — 85025 COMPLETE CBC W/AUTO DIFF WBC: CPT

## 2023-10-06 PROCEDURE — 93005 ELECTROCARDIOGRAM TRACING: CPT | Performed by: EMERGENCY MEDICINE

## 2023-10-06 PROCEDURE — 80053 COMPREHEN METABOLIC PANEL: CPT

## 2023-10-06 PROCEDURE — 71045 X-RAY EXAM CHEST 1 VIEW: CPT

## 2023-10-06 PROCEDURE — 84484 ASSAY OF TROPONIN QUANT: CPT

## 2023-10-06 PROCEDURE — 83735 ASSAY OF MAGNESIUM: CPT

## 2023-10-06 PROCEDURE — 36415 COLL VENOUS BLD VENIPUNCTURE: CPT

## 2023-10-06 PROCEDURE — 83690 ASSAY OF LIPASE: CPT

## 2023-10-06 RX ORDER — LIDOCAINE 50 MG/G
1 PATCH TOPICAL DAILY
Qty: 10 PATCH | Refills: 0 | Status: SHIPPED | OUTPATIENT
Start: 2023-10-06 | End: 2023-10-16

## 2023-10-06 RX ORDER — CAPSAICIN 0.025 %
CREAM (GRAM) TOPICAL 3 TIMES DAILY PRN
Qty: 60 G | Refills: 0 | Status: SHIPPED | OUTPATIENT
Start: 2023-10-06

## 2023-10-06 RX ORDER — CALCIUM POLYCARBOPHIL 625 MG
625 TABLET ORAL DAILY
Qty: 60 TABLET | Refills: 0 | Status: SHIPPED | OUTPATIENT
Start: 2023-10-06

## 2023-10-06 NOTE — ED NOTES
Pt discharged to home at this time. All discharge instructions provided to patient. All questions answered. IV removed. No distress noted.       Betsy Lynne RN  10/06/23 1134

## 2023-10-06 NOTE — ED PROVIDER NOTES
note that portions of this note were completed with a voice recognition program.  Efforts were made to edit the dictations but occasionally words are mis-transcribed.)    Mariely Delcid MD (electronically signed)  Emergency Attending Physician     Mariely Delcid MD  10/07/23 0429

## 2023-10-06 NOTE — ED TRIAGE NOTES
Pt states she has been experiencing R sided chest pain for about 2 weeks. Since yesterday she has been feeling shakey and weak and pain got worse in chest 7/10. Pain was in L jaw earlier today. Pt has been having diarrhea, denies vomiting.

## 2023-10-07 LAB
EKG ATRIAL RATE: 70 BPM
EKG DIAGNOSIS: NORMAL
EKG P AXIS: 19 DEGREES
EKG P-R INTERVAL: 152 MS
EKG Q-T INTERVAL: 390 MS
EKG QRS DURATION: 86 MS
EKG QTC CALCULATION (BAZETT): 421 MS
EKG R AXIS: 4 DEGREES
EKG T AXIS: 21 DEGREES
EKG VENTRICULAR RATE: 70 BPM

## 2023-10-07 PROCEDURE — 93010 ELECTROCARDIOGRAM REPORT: CPT | Performed by: SPECIALIST

## 2024-02-11 NOTE — PROGRESS NOTES
Pre-operative Bariatric Nutrition Evaluation    Date: 3/26/2018   Physician/Surgeon:Zhang Laird M.D. Name: Cole Newell  :  1970  Age:  50  Gender: Female   Type of Surgery: [x]           Gastric Bypass  []           LAGB  []           Sleeve Gastrectomy    ASSESSMENT:    Past Medical History:HTN, depression, anxiety; recent smoking cessation (3/25/18)     Medications/Supplements:   Prior to Admission medications    Medication Sig Start Date End Date Taking? Authorizing Provider   LORazepam (ATIVAN) 1 mg tablet Take 1 Tab by mouth daily as needed for Anxiety (Panic Attack). 18   Joyice Duane, MD   losartan (COZAAR) 25 mg tablet TAKE 1 TABLET BY MOUTH DAILY 18   Bill Cat MD   PARoxetine (PAXIL) 40 mg tablet TAKE 1 TABLET BY MOUTH EVERY DAY 18   Bill Cat MD       Food Allergies/Intolerances:none    Anthropometrics:    Ht:67\"    Wt: 314#    IBW: 135#    %IBW: 232%     BMI:49    Category: obesity III     Reported wt history:Pt presents today for pre-op nutrition evaluation for wt loss surgery. Reports lowest adult BW was 281# at age 53. Highest adult BW was 319# in the past 1-2 months. Pt attributes obesity and weight gain r/t emotional eating and work schedule. Reports a strong family h/o \"addiction\" and thinks food has been an addiction for her over the years. Has attempted wt loss through various methods over the years. States her most successful wt loss was 40# while taking PhenFen back in  but was unable to maintain wt loss once she stopped the medication. Pt is now seeking approval for gastric bypass. Has recently stopped smoking as of yesterday. Has never previously quit smoking. Pt's  is also going through approval process for gastric bypass. They are making lifestyle changes together but pt admits to never being able to make changes for longer than about 1 week d/t work schedule/time constraints.      Exercise/Physical Activity:none at present; some walking but very infrequent, average is once a month     Reported Diet History:diet pills, exercise     24 Hour Diet Recall  Breakfast  Usually skips    Lunch  [employer] chicken wrap or a breakfast sandwich    Dinner  Protein and veggies    Snacks  Chips or cookies    Beverages  Soda or water      A pre-op nutrition checklist was reviewed. Patient checked off 4 of 15 items. Environment/Psychosocial/Support:pt's  is primary support person and he is also seeking approval for gastric bypass. Pt's sister-in-law has had gastric bypass and has done well and maintained significant wt loss. Pt works full time as a  and often works late evening and then early morning. Pt's elderly parents live with her and  and sometimes rely on them as caretakers. NUTRITION DIAGNOSIS:  1. Self-monitoring deficit r/t previous lack of value for this change and perception that time constraints prevent self-monitoring evidenced by pt skips meals. 2. Excessive energy intake r/t food and beverage choices evidenced by diet recall. Heavy reliance on fast food/convenience foods d/t perception of limited time to cook and prepare from home. 3. Physical inactivity r/t unknown etiology evidenced by no current physical activity. NUTRITION INTERVENTION:  Pt educated on nutrition recommendations for weight loss surgery, specifically gastric bypass . Instructed on consuming 3 meals per day starting now. Use the balanced plate method to plan meals, include 3 oz of lean source of protein, 1/2 cup whole grains, unlimited non-starchy vegetables, 1/2 cup fruit and 1 serving of low fat dairy. Utilize handouts listing healthy snack and meal ideas to limit restaurant meals. After surgery measure all meals to 1/2 cup. Each meal will contain a 1/4 cup lean protein and 1/4 cup fruit, non-starchy vegetable or starch (limiting to once per day). Aim for 60 g protein per day.  Sip on 48-64 oz of sugar free, calorie free, non-carbonated beverages each day. Do not use a straw. Do not consume beverages 30 minutes before, during or 30 minutes after meals. Read all nutrition labels. Demonstrated and emphasized identifying serving size, total fat, sugar and protein content. Defined low fat as </= 3 g per serving. Discussed lean and extra lean sources of protein. Provided list of low fat cooking methods. Avoid foods with sugar listed in the first 3 ingredients and >/15 g sugar per serving. Excess sugar/fat intake may lead to dumping syndrome. Discussed signs and symptoms of dumping syndrome. Practice mindful eating habits; take small bites, chew thoroughly, avoid distractions, utilize hunger/fullness scale. Consume meals over 20-30 minutes. Attend Bariatric Support Group and increase physical activity (approved per MD) for long term weight maintenance. NUTRITION MONITORING AND EVALUATION:    The following goals were established with patient;  1. Eat 3 meals a day. Do not skip meals. Set timers to serve as reminders. We talked about the importance of eating 3 meals a day as a means to get adequate protein intake after surgery. Okay to use a protein shake in place of skipping the meal. Choose whole foods as much as possible. 2. Limit reliance on fast food and convenience foods. Prep at home on a Sunday for the week ahead. Discussed various tips, ideas, recommendations and recipes for healthy eating that is still quick/easy. Use balanced plate method for help with meal planning and portion control. 3. Increase physical activity. Okay to do short intervals spaced throughout the day. 4. Read food labels to monitor sugar intake. We discussed potential for dumping syndrome after gastric bypass. 5. Maintain smoking cessation. 6. Follow up with RD in 2 weeks to assess compliance with discussed changes.        Specific tips and techniques to facilitate compliance with above recommendations were provided and discussed. Pt was strongly encourage to begin making necessary changes now, attend support group, and re-visit the dietitian prn. Nutrition evaluation reveals important lifestyle and behavior changes are indicated. Concerns for patient's ability to maintain changes for longer than 1 week and reported \"food addiction\". Will continue to assess as pt works to complete discussed goals and follow up in 2 weeks. If further details are desired please feel free to contact me at [phone]. This phone number was also provided to the patient for any further questions or concerns.            Abdon Jalloh, RD present

## (undated) DEVICE — SOLUTION IRRIG 1000ML H2O STRL BLT

## (undated) DEVICE — BLADELESS OPTICAL TROCAR WITH FIXATION CANNULA: Brand: VERSAONE

## (undated) DEVICE — ELECTRODE ES 36CM LAP FLAT L HK COAT DISP CLEANCOAT

## (undated) DEVICE — DEVON™ KNEE AND BODY STRAP 60" X 3" (1.5 M X 7.6 CM): Brand: DEVON

## (undated) DEVICE — WOUND RETRACTOR AND PROTECTOR: Brand: ALEXIS WOUND PROTECTOR-RETRACTOR

## (undated) DEVICE — REINFORCED INTELLIGENT RELOAD, FOR USE WITH SIGNIA STAPLING SYSTEM: Brand: TRI-STAPLE 2.0

## (undated) DEVICE — SUTURE V-LOC 180 SZ 3-0 L6IN ABSRB VLT CV-23 L17MM 1/2 CIR VLOCM0804

## (undated) DEVICE — 1200 GUARD II KIT W/5MM TUBE W/O VAC TUBE: Brand: GUARDIAN

## (undated) DEVICE — ARTICULATING RELOAD WITH TRI-STAPLE TECHNOLOGY: Brand: ENDO GIA

## (undated) DEVICE — SUT ETHLN 2-0 18IN FS BLK --

## (undated) DEVICE — STERILE POLYISOPRENE POWDER-FREE SURGICAL GLOVES WITH EMOLLIENT COATING: Brand: PROTEXIS

## (undated) DEVICE — SPECIMEN RETRIEVAL POUCH: Brand: ENDO CATCH GOLD

## (undated) DEVICE — PACK PROCEDURE SURG HRT CATH

## (undated) DEVICE — BLADELESS OPTICAL TROCAR WITH FIXATION CANNULA: Brand: VERSAPORT

## (undated) DEVICE — TUBING INSUFLTN 10FT LUER -- CONVERT TO ITEM 368568

## (undated) DEVICE — TRAP SUC MUCOUS 70ML -- MEDICHOICE MEDLINE

## (undated) DEVICE — Z INACTIVE USE 2240337 DRAPE SURG PT TRANSFER TRAWAY SHT

## (undated) DEVICE — DRAPE,UTILTY,TAPE,15X26, 4EA/PK: Brand: MEDLINE

## (undated) DEVICE — SUTURE MCRYL SZ 4-0 L27IN ABSRB UD L19MM PS-2 1/2 CIR PRIM Y426H

## (undated) DEVICE — UNIVERSAL FIXATION CANNULA: Brand: VERSAONE

## (undated) DEVICE — GOWN,SIRUS,FABRNF,XL,20/CS: Brand: MEDLINE

## (undated) DEVICE — (D)PREP SKN CHLRAPRP APPL 26ML -- CONVERT TO ITEM 371833

## (undated) DEVICE — FILTER SMK EVAC FLO CLR MEGADYNE

## (undated) DEVICE — KIT PERICARDCENT DIA8.3FR STRAIGHT CATH + CLP LIDO TY FLD ASPIR

## (undated) DEVICE — NEEDLE HYPO 22GA L1.5IN BLK S STL HUB POLYPR SHLD REG BVL

## (undated) DEVICE — LAP-BAND SYSTEM CALIBRATION TUBE: Brand: LAP-BAND

## (undated) DEVICE — Device

## (undated) DEVICE — MEDI-VAC NON-CONDUCTIVE SUCTION TUBING: Brand: CARDINAL HEALTH

## (undated) DEVICE — APPLIER LIG CLP 5MM CONTAIN 16 TI CLP DISP ENDO CLP

## (undated) DEVICE — SPECIAL PROCEDURE DRAPE 32" X 34": Brand: SPECIAL PROCEDURE DRAPE

## (undated) DEVICE — CLICKLINE SCISSORS INSERT: Brand: CLICKLINE

## (undated) DEVICE — DISSECTOR ULTRASONIC L48CM CRDLSS W/ TORQ WRNCH SONICISION

## (undated) DEVICE — POWER SHELL: Brand: SIGNIA

## (undated) DEVICE — SURGICAL PROCEDURE KIT GEN LAPAROSCOPY LF

## (undated) DEVICE — SOLUTION IV 1000ML 0.9% SOD CHL

## (undated) DEVICE — BLADELESS TROCAR WITH FIXATION CANNULA: Brand: VERSAPORT PLUS

## (undated) DEVICE — VISUALIZATION SYSTEM: Brand: CLEARIFY

## (undated) DEVICE — APPLICATOR BNDG 1MM ADH PREMIERPRO EXOFIN

## (undated) DEVICE — TROCAR SITE CLOSURE DEVICE: Brand: ENDO CLOSE

## (undated) DEVICE — DRAIN CHN 19FR L0.25MM DIA6.3MM SIL RND HUBLESS FULL FLUT

## (undated) DEVICE — SUTURE SZ 0 27IN 5/8 CIR UR-6  TAPER PT VIOLET ABSRB VICRYL J603H

## (undated) DEVICE — SUTURE VCRL SZ 3-0 L27IN ABSRB VLT L26MM SH 1/2 CIR J316H

## (undated) DEVICE — SUTURE MCRYL SZ 3-0 L27IN ABSRB UD L19MM PS-2 3/8 CIR PRIM Y427H

## (undated) DEVICE — ENDO CARRY-ON PROCEDURE KIT INCLUDES ENZYMATIC SPONGE, GAUZE, BIOHAZARD LABEL, TRAY, LUBRICANT, DIRTY SCOPE LABEL, WATER LABEL, TRAY, DRAWSTRING PAD, AND DEFENDO 4-PIECE KIT.: Brand: ENDO CARRY-ON PROCEDURE KIT

## (undated) DEVICE — INFECTION CONTROL KIT SYS

## (undated) DEVICE — INTRODUCER DEVICE: Brand: EEA

## (undated) DEVICE — 3000CC GUARDIAN II: Brand: GUARDIAN

## (undated) DEVICE — REM POLYHESIVE ADULT PATIENT RETURN ELECTRODE: Brand: VALLEYLAB

## (undated) DEVICE — GARMENT,MEDLINE,DVT,INT,CALF,MED, GEN2: Brand: MEDLINE

## (undated) DEVICE — KENDALL SCD EXPRESS SLEEVES, KNEE LENGTH, MEDIUM: Brand: KENDALL SCD

## (undated) DEVICE — STAPLER INT W25MM WHT TRNSOR CIR ANVIL W/ ADVANCING PROX

## (undated) DEVICE — 34" SINGLE PATIENT USE HOVERMATT BREATHABLE: Brand: SINGLE PATIENT USE HOVERMATT

## (undated) DEVICE — DEVICE WND CLSR ABSRB